# Patient Record
Sex: FEMALE | Race: WHITE | NOT HISPANIC OR LATINO | Employment: OTHER | URBAN - METROPOLITAN AREA
[De-identification: names, ages, dates, MRNs, and addresses within clinical notes are randomized per-mention and may not be internally consistent; named-entity substitution may affect disease eponyms.]

---

## 2017-05-09 ENCOUNTER — TRANSCRIBE ORDERS (OUTPATIENT)
Dept: ADMINISTRATIVE | Facility: HOSPITAL | Age: 82
End: 2017-05-09

## 2017-05-09 DIAGNOSIS — I73.9 PAD (PERIPHERAL ARTERY DISEASE) (HCC): Primary | ICD-10-CM

## 2017-05-16 ENCOUNTER — TRANSCRIBE ORDERS (OUTPATIENT)
Dept: ADMINISTRATIVE | Facility: HOSPITAL | Age: 82
End: 2017-05-16

## 2017-05-16 DIAGNOSIS — I73.9 PAD (PERIPHERAL ARTERY DISEASE) (HCC): Primary | ICD-10-CM

## 2017-05-17 ENCOUNTER — APPOINTMENT (OUTPATIENT)
Dept: CT IMAGING | Facility: HOSPITAL | Age: 82
End: 2017-05-17
Attending: SURGERY

## 2017-05-17 ENCOUNTER — HOSPITAL ENCOUNTER (OUTPATIENT)
Dept: CT IMAGING | Facility: HOSPITAL | Age: 82
Discharge: HOME | End: 2017-05-17
Attending: SURGERY | Admitting: SURGERY

## 2017-05-17 ENCOUNTER — OFFICE VISIT (OUTPATIENT)
Dept: CARDIOLOGY | Facility: CLINIC | Age: 82
End: 2017-05-17

## 2017-05-17 VITALS
BODY MASS INDEX: 20.32 KG/M2 | HEART RATE: 72 BPM | SYSTOLIC BLOOD PRESSURE: 138 MMHG | RESPIRATION RATE: 16 BRPM | HEIGHT: 64 IN | DIASTOLIC BLOOD PRESSURE: 78 MMHG | WEIGHT: 119 LBS

## 2017-05-17 DIAGNOSIS — I73.9 PAD (PERIPHERAL ARTERY DISEASE) (HCC): ICD-10-CM

## 2017-05-17 DIAGNOSIS — I10 ESSENTIAL HYPERTENSION: ICD-10-CM

## 2017-05-17 DIAGNOSIS — Z01.810 ENCOUNTER FOR PRE-OPERATIVE CARDIOVASCULAR CLEARANCE: Primary | ICD-10-CM

## 2017-05-17 DIAGNOSIS — I73.9 INTERMITTENT CLAUDICATION (HCC): ICD-10-CM

## 2017-05-17 DIAGNOSIS — R06.02 SHORTNESS OF BREATH: ICD-10-CM

## 2017-05-17 LAB — CREAT BLDA-MCNC: 0.9 MG/DL (ref 0.6–1.3)

## 2017-05-17 PROCEDURE — 82565 ASSAY OF CREATININE: CPT

## 2017-05-17 PROCEDURE — 99203 OFFICE O/P NEW LOW 30 MIN: CPT | Performed by: NURSE PRACTITIONER

## 2017-05-17 PROCEDURE — 0 IOPAMIDOL 61 % SOLUTION: Performed by: SURGERY

## 2017-05-17 PROCEDURE — 93000 ELECTROCARDIOGRAM COMPLETE: CPT | Performed by: NURSE PRACTITIONER

## 2017-05-17 PROCEDURE — 75635 CT ANGIO ABDOMINAL ARTERIES: CPT

## 2017-05-17 RX ORDER — OMEPRAZOLE 20 MG/1
20 CAPSULE, DELAYED RELEASE ORAL DAILY
COMMUNITY

## 2017-05-17 RX ORDER — FUROSEMIDE 20 MG/1
20 TABLET ORAL DAILY
COMMUNITY

## 2017-05-17 RX ORDER — MONTELUKAST SODIUM 10 MG/1
10 TABLET ORAL DAILY
COMMUNITY

## 2017-05-17 RX ORDER — ASPIRIN 325 MG
325 TABLET, DELAYED RELEASE (ENTERIC COATED) ORAL DAILY
COMMUNITY
End: 2017-07-18 | Stop reason: DRUGHIGH

## 2017-05-17 RX ORDER — PENTOXIFYLLINE 400 MG/1
400 TABLET, EXTENDED RELEASE ORAL
Status: ON HOLD | COMMUNITY
End: 2022-10-31

## 2017-05-17 RX ORDER — POTASSIUM CHLORIDE 750 MG/1
10 CAPSULE, EXTENDED RELEASE ORAL DAILY
COMMUNITY

## 2017-05-17 RX ORDER — CARBOXYMETHYLCELLULOSE SODIUM 5 MG/ML
1 SOLUTION/ DROPS OPHTHALMIC 3 TIMES DAILY PRN
COMMUNITY

## 2017-05-17 RX ORDER — AMOXICILLIN 500 MG
2400 CAPSULE ORAL DAILY
COMMUNITY

## 2017-05-17 RX ORDER — LISINOPRIL 40 MG/1
40 TABLET ORAL DAILY
COMMUNITY

## 2017-05-17 RX ADMIN — IOPAMIDOL 95 ML: 612 INJECTION, SOLUTION INTRAVENOUS at 12:10

## 2017-05-19 ENCOUNTER — TRANSCRIBE ORDERS (OUTPATIENT)
Dept: ADMINISTRATIVE | Facility: HOSPITAL | Age: 82
End: 2017-05-19

## 2017-05-19 DIAGNOSIS — I65.23 CAROTID STENOSIS, BILATERAL: ICD-10-CM

## 2017-05-19 DIAGNOSIS — I73.9 CLAUDICATION (HCC): Primary | ICD-10-CM

## 2017-05-19 PROBLEM — I10 ESSENTIAL HYPERTENSION: Status: ACTIVE | Noted: 2017-05-19

## 2017-05-19 PROBLEM — R06.02 SHORTNESS OF BREATH: Status: ACTIVE | Noted: 2017-05-19

## 2017-05-23 ENCOUNTER — HOSPITAL ENCOUNTER (OUTPATIENT)
Dept: CARDIOLOGY | Facility: HOSPITAL | Age: 82
Discharge: HOME | End: 2017-05-23
Admitting: NURSE PRACTITIONER

## 2017-05-23 ENCOUNTER — TELEPHONE (OUTPATIENT)
Dept: CARDIOLOGY | Facility: HOSPITAL | Age: 82
End: 2017-05-23

## 2017-05-23 DIAGNOSIS — R06.02 SHORTNESS OF BREATH: ICD-10-CM

## 2017-05-23 DIAGNOSIS — I73.9 INTERMITTENT CLAUDICATION (HCC): ICD-10-CM

## 2017-05-23 DIAGNOSIS — I73.9 PAD (PERIPHERAL ARTERY DISEASE) (HCC): ICD-10-CM

## 2017-05-23 DIAGNOSIS — I10 ESSENTIAL HYPERTENSION: ICD-10-CM

## 2017-05-23 DIAGNOSIS — Z01.810 ENCOUNTER FOR PRE-OPERATIVE CARDIOVASCULAR CLEARANCE: ICD-10-CM

## 2017-05-23 LAB
BH CV NUCLEAR PRIOR STUDY: 3
BH CV STRESS BP STAGE 1: NORMAL
BH CV STRESS COMMENTS STAGE 1: NORMAL
BH CV STRESS DOSE REGADENOSON STAGE 1: 0.4
BH CV STRESS DURATION MIN STAGE 1: 0
BH CV STRESS DURATION SEC STAGE 1: 15
BH CV STRESS HR STAGE 1: 129
BH CV STRESS PROTOCOL 1: NORMAL
BH CV STRESS RECOVERY BP: NORMAL MMHG
BH CV STRESS RECOVERY HR: 91 BPM
BH CV STRESS STAGE 1: 1
LV EF NUC BP: 84 %
MAXIMAL PREDICTED HEART RATE: 140 BPM
PERCENT MAX PREDICTED HR: 92.14 %
STRESS BASELINE BP: NORMAL MMHG
STRESS BASELINE HR: 75 BPM
STRESS PERCENT HR: 108 %
STRESS POST EXERCISE DUR SEC: 15 SEC
STRESS POST PEAK BP: NORMAL MMHG
STRESS POST PEAK HR: 129 BPM
STRESS TARGET HR: 119 BPM

## 2017-05-23 PROCEDURE — A9502 TC99M TETROFOSMIN: HCPCS | Performed by: NURSE PRACTITIONER

## 2017-05-23 PROCEDURE — 93016 CV STRESS TEST SUPVJ ONLY: CPT | Performed by: INTERNAL MEDICINE

## 2017-05-23 PROCEDURE — 0 TECHNETIUM TETROFOSMIN KIT: Performed by: NURSE PRACTITIONER

## 2017-05-23 PROCEDURE — 78452 HT MUSCLE IMAGE SPECT MULT: CPT | Performed by: INTERNAL MEDICINE

## 2017-05-23 PROCEDURE — 25010000002 REGADENOSON 0.4 MG/5ML SOLUTION: Performed by: NURSE PRACTITIONER

## 2017-05-23 PROCEDURE — 93017 CV STRESS TEST TRACING ONLY: CPT

## 2017-05-23 PROCEDURE — 78452 HT MUSCLE IMAGE SPECT MULT: CPT

## 2017-05-23 PROCEDURE — 93018 CV STRESS TEST I&R ONLY: CPT | Performed by: INTERNAL MEDICINE

## 2017-05-23 RX ADMIN — REGADENOSON 0.4 MG: 0.08 INJECTION, SOLUTION INTRAVENOUS at 12:35

## 2017-05-23 RX ADMIN — TETROFOSMIN 1 DOSE: 1.38 INJECTION, POWDER, LYOPHILIZED, FOR SOLUTION INTRAVENOUS at 12:35

## 2017-05-23 RX ADMIN — TETROFOSMIN 1 DOSE: 1.38 INJECTION, POWDER, LYOPHILIZED, FOR SOLUTION INTRAVENOUS at 11:35

## 2017-06-16 ENCOUNTER — HOSPITAL ENCOUNTER (OUTPATIENT)
Dept: CARDIOLOGY | Facility: HOSPITAL | Age: 82
Discharge: HOME | End: 2017-06-16
Attending: SURGERY

## 2017-06-16 ENCOUNTER — HOSPITAL ENCOUNTER (OUTPATIENT)
Dept: CARDIOLOGY | Facility: HOSPITAL | Age: 82
Discharge: HOME | End: 2017-06-16
Attending: SURGERY | Admitting: SURGERY

## 2017-06-16 DIAGNOSIS — I65.23 CAROTID STENOSIS, BILATERAL: ICD-10-CM

## 2017-06-16 DIAGNOSIS — I73.9 CLAUDICATION (HCC): ICD-10-CM

## 2017-06-16 LAB
BH CV LOWER ARTERIAL LEFT ABI RATIO: 0.54
BH CV LOWER ARTERIAL LEFT DORSALIS PEDIS SYS MAX: 90 MMHG
BH CV LOWER ARTERIAL LEFT GREAT TOE SYS MAX: 48 MMHG
BH CV LOWER ARTERIAL LEFT HIGH THIGH SYS MAX: 173 MMHG
BH CV LOWER ARTERIAL LEFT LOW THIGH SYS MAX: 134 MMHG
BH CV LOWER ARTERIAL LEFT POPLITEAL SYS MAX: 112 MMHG
BH CV LOWER ARTERIAL LEFT POST EX ABI RATIO: 0.52
BH CV LOWER ARTERIAL LEFT POST TIBIAL SYS MAX: 92 MMHG
BH CV LOWER ARTERIAL LEFT TBI RATIO: 0.28
BH CV LOWER ARTERIAL RIGHT ABI RATIO: 0.64
BH CV LOWER ARTERIAL RIGHT DORSALIS PEDIS SYS MAX: 108 MMHG
BH CV LOWER ARTERIAL RIGHT GREAT TOE SYS MAX: 52 MMHG
BH CV LOWER ARTERIAL RIGHT LOW THIGH SYS MAX: 169 MMHG
BH CV LOWER ARTERIAL RIGHT POPLITEAL SYS MAX: 139 MMHG
BH CV LOWER ARTERIAL RIGHT POST EX ABI RATIO: 0.63
BH CV LOWER ARTERIAL RIGHT POST TIBIAL SYS MAX: 109 MMHG
BH CV LOWER ARTERIAL RIGHT TBI RATIO: 0.31
BH CV XLRA MEAS LEFT DIST CCA EDV: -14.1 CM/SEC
BH CV XLRA MEAS LEFT DIST CCA PSV: -67.2 CM/SEC
BH CV XLRA MEAS LEFT DIST ICA EDV: -21 CM/SEC
BH CV XLRA MEAS LEFT DIST ICA PSV: -87.6 CM/SEC
BH CV XLRA MEAS LEFT MID ICA EDV: -18.6 CM/SEC
BH CV XLRA MEAS LEFT MID ICA PSV: -70.9 CM/SEC
BH CV XLRA MEAS LEFT PROX CCA EDV: 13.4 CM/SEC
BH CV XLRA MEAS LEFT PROX CCA PSV: 77.8 CM/SEC
BH CV XLRA MEAS LEFT PROX ECA EDV: -7.5 CM/SEC
BH CV XLRA MEAS LEFT PROX ECA PSV: -62.9 CM/SEC
BH CV XLRA MEAS LEFT PROX ICA EDV: 19.9 CM/SEC
BH CV XLRA MEAS LEFT PROX ICA PSV: 101 CM/SEC
BH CV XLRA MEAS LEFT PROX SCLA PSV: 119 CM/SEC
BH CV XLRA MEAS LEFT VERTEBRAL A EDV: 8.2 CM/SEC
BH CV XLRA MEAS LEFT VERTEBRAL A PSV: 39.9 CM/SEC
BH CV XLRA MEAS RIGHT DIST CCA EDV: -13.8 CM/SEC
BH CV XLRA MEAS RIGHT DIST CCA PSV: -57 CM/SEC
BH CV XLRA MEAS RIGHT DIST ICA EDV: -16.3 CM/SEC
BH CV XLRA MEAS RIGHT DIST ICA PSV: -73.9 CM/SEC
BH CV XLRA MEAS RIGHT MID ICA EDV: 25.8 CM/SEC
BH CV XLRA MEAS RIGHT MID ICA PSV: 110 CM/SEC
BH CV XLRA MEAS RIGHT PROX CCA EDV: -14.1 CM/SEC
BH CV XLRA MEAS RIGHT PROX CCA PSV: -78 CM/SEC
BH CV XLRA MEAS RIGHT PROX ECA EDV: -9.6 CM/SEC
BH CV XLRA MEAS RIGHT PROX ECA PSV: -77.8 CM/SEC
BH CV XLRA MEAS RIGHT PROX ICA EDV: -21.7 CM/SEC
BH CV XLRA MEAS RIGHT PROX ICA PSV: -86.2 CM/SEC
BH CV XLRA MEAS RIGHT PROX SCLA PSV: 136 CM/SEC
BH CV XLRA MEAS RIGHT VERTEBRAL A EDV: 16.8 CM/SEC
BH CV XLRA MEAS RIGHT VERTEBRAL A PSV: 55 CM/SEC
LEFT ARM BP: 169 MMHG
RIGHT ARM BP: 164 MMHG
UPPER ARTERIAL LEFT ARM BRACHIAL SYS MAX: 169 MMHG
UPPER ARTERIAL RIGHT ARM BRACHIAL SYS MAX: 164 MMHG

## 2017-06-16 PROCEDURE — 93924 LWR XTR VASC STDY BILAT: CPT

## 2017-06-16 PROCEDURE — 93880 EXTRACRANIAL BILAT STUDY: CPT

## 2017-07-18 RX ORDER — CLOPIDOGREL BISULFATE 75 MG/1
75 TABLET ORAL DAILY
COMMUNITY
End: 2022-11-03 | Stop reason: HOSPADM

## 2017-07-18 RX ORDER — ASPIRIN 81 MG/1
81 TABLET ORAL DAILY
COMMUNITY
End: 2022-11-19 | Stop reason: HOSPADM

## 2017-07-19 ENCOUNTER — APPOINTMENT (OUTPATIENT)
Dept: CT IMAGING | Facility: HOSPITAL | Age: 82
End: 2017-07-19

## 2017-07-19 ENCOUNTER — ANESTHESIA (OUTPATIENT)
Dept: PERIOP | Facility: HOSPITAL | Age: 82
End: 2017-07-19

## 2017-07-19 ENCOUNTER — HOSPITAL ENCOUNTER (INPATIENT)
Facility: HOSPITAL | Age: 82
LOS: 2 days | Discharge: HOME | End: 2017-07-21
Attending: SURGERY | Admitting: SURGERY

## 2017-07-19 ENCOUNTER — APPOINTMENT (OUTPATIENT)
Dept: GENERAL RADIOLOGY | Facility: HOSPITAL | Age: 82
End: 2017-07-19
Attending: SURGERY

## 2017-07-19 ENCOUNTER — APPOINTMENT (OUTPATIENT)
Dept: GENERAL RADIOLOGY | Facility: HOSPITAL | Age: 82
End: 2017-07-19

## 2017-07-19 ENCOUNTER — ANESTHESIA EVENT (OUTPATIENT)
Dept: PERIOP | Facility: HOSPITAL | Age: 82
End: 2017-07-19

## 2017-07-19 DIAGNOSIS — R26.2 DIFFICULTY WALKING: Primary | ICD-10-CM

## 2017-07-19 PROBLEM — I70.213 ATHEROSCLEROSIS OF NATIVE ARTERIES OF EXTREMITIES WITH INTERMITTENT CLAUDICATION, BILATERAL LEGS: Status: ACTIVE | Noted: 2017-07-19

## 2017-07-19 LAB
ALBUMIN SERPL-MCNC: 4.1 G/DL (ref 3.5–5.2)
ALBUMIN/GLOB SERPL: 1.5 G/DL
ALP SERPL-CCNC: 90 U/L (ref 39–117)
ALT SERPL W P-5'-P-CCNC: 10 U/L (ref 1–33)
ANION GAP SERPL CALCULATED.3IONS-SCNC: 11.1 MMOL/L
APTT PPP: 29.7 SECONDS (ref 22.7–35.4)
AST SERPL-CCNC: 13 U/L (ref 1–32)
BACTERIA UR QL AUTO: NORMAL /HPF
BASOPHILS # BLD AUTO: 0.02 10*3/MM3 (ref 0–0.2)
BASOPHILS NFR BLD AUTO: 0.3 % (ref 0–1.5)
BILIRUB SERPL-MCNC: 0.5 MG/DL (ref 0.1–1.2)
BILIRUB UR QL STRIP: NEGATIVE
BUN BLD-MCNC: 10 MG/DL (ref 8–23)
BUN/CREAT SERPL: 13.2 (ref 7–25)
CALCIUM SPEC-SCNC: 9.2 MG/DL (ref 8.6–10.5)
CHLORIDE SERPL-SCNC: 94 MMOL/L (ref 98–107)
CLARITY UR: CLEAR
CO2 SERPL-SCNC: 24.9 MMOL/L (ref 22–29)
COLOR UR: YELLOW
CREAT BLD-MCNC: 0.76 MG/DL (ref 0.57–1)
DEPRECATED RDW RBC AUTO: 44.5 FL (ref 37–54)
EOSINOPHIL # BLD AUTO: 0.19 10*3/MM3 (ref 0–0.7)
EOSINOPHIL NFR BLD AUTO: 3.3 % (ref 0.3–6.2)
ERYTHROCYTE [DISTWIDTH] IN BLOOD BY AUTOMATED COUNT: 13.3 % (ref 11.7–13)
GFR SERPL CREATININE-BSD FRML MDRD: 73 ML/MIN/1.73
GLOBULIN UR ELPH-MCNC: 2.8 GM/DL
GLUCOSE BLD-MCNC: 93 MG/DL (ref 65–99)
GLUCOSE UR STRIP-MCNC: NEGATIVE MG/DL
HCT VFR BLD AUTO: 34.9 % (ref 35.6–45.5)
HGB BLD-MCNC: 11.9 G/DL (ref 11.9–15.5)
HGB UR QL STRIP.AUTO: NEGATIVE
HYALINE CASTS UR QL AUTO: NORMAL /LPF
IMM GRANULOCYTES # BLD: 0 10*3/MM3 (ref 0–0.03)
IMM GRANULOCYTES NFR BLD: 0 % (ref 0–0.5)
INR PPP: 1.01 (ref 0.9–1.1)
KETONES UR QL STRIP: NEGATIVE
LEUKOCYTE ESTERASE UR QL STRIP.AUTO: NEGATIVE
LYMPHOCYTES # BLD AUTO: 1.02 10*3/MM3 (ref 0.9–4.8)
LYMPHOCYTES NFR BLD AUTO: 17.7 % (ref 19.6–45.3)
MCH RBC QN AUTO: 31.3 PG (ref 26.9–32)
MCHC RBC AUTO-ENTMCNC: 34.1 G/DL (ref 32.4–36.3)
MCV RBC AUTO: 91.8 FL (ref 80.5–98.2)
MONOCYTES # BLD AUTO: 0.78 10*3/MM3 (ref 0.2–1.2)
MONOCYTES NFR BLD AUTO: 13.5 % (ref 5–12)
NEUTROPHILS # BLD AUTO: 3.76 10*3/MM3 (ref 1.9–8.1)
NEUTROPHILS NFR BLD AUTO: 65.2 % (ref 42.7–76)
NITRITE UR QL STRIP: NEGATIVE
PH UR STRIP.AUTO: 7 [PH] (ref 5–8)
PLATELET # BLD AUTO: 262 10*3/MM3 (ref 140–500)
PMV BLD AUTO: 9.8 FL (ref 6–12)
POTASSIUM BLD-SCNC: 4.2 MMOL/L (ref 3.5–5.2)
PROT SERPL-MCNC: 6.9 G/DL (ref 6–8.5)
PROT UR QL STRIP: NEGATIVE
PROTHROMBIN TIME: 12.9 SECONDS (ref 11.7–14.2)
RBC # BLD AUTO: 3.8 10*6/MM3 (ref 3.9–5.2)
RBC # UR: NORMAL /HPF
REF LAB TEST METHOD: NORMAL
SODIUM BLD-SCNC: 130 MMOL/L (ref 136–145)
SP GR UR STRIP: 1.01 (ref 1–1.03)
SQUAMOUS #/AREA URNS HPF: NORMAL /HPF
UROBILINOGEN UR QL STRIP: NORMAL
WBC NRBC COR # BLD: 5.77 10*3/MM3 (ref 4.5–10.7)
WBC UR QL AUTO: NORMAL /HPF

## 2017-07-19 PROCEDURE — G0378 HOSPITAL OBSERVATION PER HR: HCPCS

## 2017-07-19 PROCEDURE — 85730 THROMBOPLASTIN TIME PARTIAL: CPT | Performed by: SURGERY

## 2017-07-19 PROCEDURE — 85610 PROTHROMBIN TIME: CPT | Performed by: SURGERY

## 2017-07-19 PROCEDURE — C1887 CATHETER, GUIDING: HCPCS | Performed by: SURGERY

## 2017-07-19 PROCEDURE — 25010000002 HEPARIN (PORCINE) PER 1000 UNITS: Performed by: SURGERY

## 2017-07-19 PROCEDURE — C1874 STENT, COATED/COV W/DEL SYS: HCPCS | Performed by: SURGERY

## 2017-07-19 PROCEDURE — C1894 INTRO/SHEATH, NON-LASER: HCPCS | Performed by: SURGERY

## 2017-07-19 PROCEDURE — 25010000002 HEPARIN (PORCINE) PER 1000 UNITS: Performed by: NURSE ANESTHETIST, CERTIFIED REGISTERED

## 2017-07-19 PROCEDURE — C1751 CATH, INF, PER/CENT/MIDLINE: HCPCS | Performed by: SURGERY

## 2017-07-19 PROCEDURE — 81001 URINALYSIS AUTO W/SCOPE: CPT | Performed by: SURGERY

## 2017-07-19 PROCEDURE — C1769 GUIDE WIRE: HCPCS | Performed by: SURGERY

## 2017-07-19 PROCEDURE — 74176 CT ABD & PELVIS W/O CONTRAST: CPT

## 2017-07-19 PROCEDURE — 25010000002 PROPOFOL 10 MG/ML EMULSION: Performed by: NURSE ANESTHETIST, CERTIFIED REGISTERED

## 2017-07-19 PROCEDURE — 25010000002 ONDANSETRON PER 1 MG: Performed by: SURGERY

## 2017-07-19 PROCEDURE — 25010000002 PROTAMINE SULFATE PER 10 MG: Performed by: NURSE ANESTHETIST, CERTIFIED REGISTERED

## 2017-07-19 PROCEDURE — B4001ZZ PLAIN RADIOGRAPHY OF ABDOMINAL AORTA USING LOW OSMOLAR CONTRAST: ICD-10-PCS | Performed by: SURGERY

## 2017-07-19 PROCEDURE — 04QK0ZZ REPAIR RIGHT FEMORAL ARTERY, OPEN APPROACH: ICD-10-PCS | Performed by: SURGERY

## 2017-07-19 PROCEDURE — 25010000002 HYDROMORPHONE PER 4 MG: Performed by: NURSE ANESTHETIST, CERTIFIED REGISTERED

## 2017-07-19 PROCEDURE — B40G1ZZ PLAIN RADIOGRAPHY OF LEFT LOWER EXTREMITY ARTERIES USING LOW OSMOLAR CONTRAST: ICD-10-PCS | Performed by: SURGERY

## 2017-07-19 PROCEDURE — B40F1ZZ PLAIN RADIOGRAPHY OF RIGHT LOWER EXTREMITY ARTERIES USING LOW OSMOLAR CONTRAST: ICD-10-PCS | Performed by: SURGERY

## 2017-07-19 PROCEDURE — 25010000002 FENTANYL CITRATE (PF) 100 MCG/2ML SOLUTION: Performed by: NURSE ANESTHETIST, CERTIFIED REGISTERED

## 2017-07-19 PROCEDURE — 25010000002 MIDAZOLAM PER 1 MG: Performed by: NURSE ANESTHETIST, CERTIFIED REGISTERED

## 2017-07-19 PROCEDURE — 80053 COMPREHEN METABOLIC PANEL: CPT | Performed by: SURGERY

## 2017-07-19 PROCEDURE — 71020 HC CHEST PA AND LATERAL: CPT

## 2017-07-19 PROCEDURE — 0 IOPAMIDOL PER 1 ML: Performed by: SURGERY

## 2017-07-19 PROCEDURE — 25010000002 MIDAZOLAM PER 1 MG: Performed by: ANESTHESIOLOGY

## 2017-07-19 PROCEDURE — 047C3DZ DILATION OF RIGHT COMMON ILIAC ARTERY WITH INTRALUMINAL DEVICE, PERCUTANEOUS APPROACH: ICD-10-PCS | Performed by: SURGERY

## 2017-07-19 PROCEDURE — 85025 COMPLETE CBC W/AUTO DIFF WBC: CPT | Performed by: SURGERY

## 2017-07-19 PROCEDURE — 71250 CT THORAX DX C-: CPT

## 2017-07-19 DEVICE — STENTGR ENDOPROSTH VIABAHN VBX EXP 7F 8X16X39MM 135CM: Type: IMPLANTABLE DEVICE | Status: FUNCTIONAL

## 2017-07-19 RX ORDER — ONDANSETRON 2 MG/ML
4 INJECTION INTRAMUSCULAR; INTRAVENOUS ONCE AS NEEDED
Status: DISCONTINUED | OUTPATIENT
Start: 2017-07-19 | End: 2017-07-19 | Stop reason: HOSPADM

## 2017-07-19 RX ORDER — ONDANSETRON 2 MG/ML
4 INJECTION INTRAMUSCULAR; INTRAVENOUS EVERY 6 HOURS PRN
Status: DISCONTINUED | OUTPATIENT
Start: 2017-07-19 | End: 2017-07-21 | Stop reason: HOSPADM

## 2017-07-19 RX ORDER — NALOXONE HCL 0.4 MG/ML
0.4 VIAL (ML) INJECTION
Status: DISCONTINUED | OUTPATIENT
Start: 2017-07-19 | End: 2017-07-21 | Stop reason: HOSPADM

## 2017-07-19 RX ORDER — MIDAZOLAM HYDROCHLORIDE 1 MG/ML
2 INJECTION INTRAMUSCULAR; INTRAVENOUS
Status: DISCONTINUED | OUTPATIENT
Start: 2017-07-19 | End: 2017-07-19 | Stop reason: HOSPADM

## 2017-07-19 RX ORDER — NALOXONE HCL 0.4 MG/ML
0.2 VIAL (ML) INJECTION AS NEEDED
Status: DISCONTINUED | OUTPATIENT
Start: 2017-07-19 | End: 2017-07-19 | Stop reason: HOSPADM

## 2017-07-19 RX ORDER — HYDROCODONE BITARTRATE AND ACETAMINOPHEN 5; 325 MG/1; MG/1
1 TABLET ORAL EVERY 4 HOURS PRN
Status: DISCONTINUED | OUTPATIENT
Start: 2017-07-19 | End: 2017-07-21 | Stop reason: HOSPADM

## 2017-07-19 RX ORDER — PANTOPRAZOLE SODIUM 40 MG/1
40 TABLET, DELAYED RELEASE ORAL EVERY MORNING
Status: DISCONTINUED | OUTPATIENT
Start: 2017-07-19 | End: 2017-07-21 | Stop reason: HOSPADM

## 2017-07-19 RX ORDER — MONTELUKAST SODIUM 10 MG/1
10 TABLET ORAL DAILY
Status: DISCONTINUED | OUTPATIENT
Start: 2017-07-19 | End: 2017-07-21 | Stop reason: HOSPADM

## 2017-07-19 RX ORDER — FLUMAZENIL 0.1 MG/ML
0.2 INJECTION INTRAVENOUS AS NEEDED
Status: DISCONTINUED | OUTPATIENT
Start: 2017-07-19 | End: 2017-07-19 | Stop reason: HOSPADM

## 2017-07-19 RX ORDER — PROPOFOL 10 MG/ML
VIAL (ML) INTRAVENOUS CONTINUOUS PRN
Status: DISCONTINUED | OUTPATIENT
Start: 2017-07-19 | End: 2017-07-19 | Stop reason: SURG

## 2017-07-19 RX ORDER — CLOPIDOGREL BISULFATE 75 MG/1
75 TABLET ORAL DAILY
Status: DISCONTINUED | OUTPATIENT
Start: 2017-07-20 | End: 2017-07-21 | Stop reason: HOSPADM

## 2017-07-19 RX ORDER — MIDAZOLAM HYDROCHLORIDE 1 MG/ML
INJECTION INTRAMUSCULAR; INTRAVENOUS AS NEEDED
Status: DISCONTINUED | OUTPATIENT
Start: 2017-07-19 | End: 2017-07-19 | Stop reason: SURG

## 2017-07-19 RX ORDER — POTASSIUM CHLORIDE 750 MG/1
10 CAPSULE, EXTENDED RELEASE ORAL DAILY
Status: DISCONTINUED | OUTPATIENT
Start: 2017-07-20 | End: 2017-07-21 | Stop reason: HOSPADM

## 2017-07-19 RX ORDER — HYDROMORPHONE HYDROCHLORIDE 1 MG/ML
0.5 INJECTION, SOLUTION INTRAMUSCULAR; INTRAVENOUS; SUBCUTANEOUS
Status: DISCONTINUED | OUTPATIENT
Start: 2017-07-19 | End: 2017-07-19 | Stop reason: HOSPADM

## 2017-07-19 RX ORDER — ONDANSETRON 4 MG/1
4 TABLET, FILM COATED ORAL EVERY 6 HOURS PRN
Status: DISCONTINUED | OUTPATIENT
Start: 2017-07-19 | End: 2017-07-21 | Stop reason: HOSPADM

## 2017-07-19 RX ORDER — FENTANYL CITRATE 50 UG/ML
50 INJECTION, SOLUTION INTRAMUSCULAR; INTRAVENOUS
Status: DISCONTINUED | OUTPATIENT
Start: 2017-07-19 | End: 2017-07-19 | Stop reason: HOSPADM

## 2017-07-19 RX ORDER — LABETALOL HYDROCHLORIDE 5 MG/ML
5 INJECTION, SOLUTION INTRAVENOUS
Status: DISCONTINUED | OUTPATIENT
Start: 2017-07-19 | End: 2017-07-19 | Stop reason: HOSPADM

## 2017-07-19 RX ORDER — PROMETHAZINE HYDROCHLORIDE 25 MG/1
25 SUPPOSITORY RECTAL ONCE AS NEEDED
Status: DISCONTINUED | OUTPATIENT
Start: 2017-07-19 | End: 2017-07-19 | Stop reason: HOSPADM

## 2017-07-19 RX ORDER — HYDROCODONE BITARTRATE AND ACETAMINOPHEN 7.5; 325 MG/1; MG/1
1 TABLET ORAL ONCE AS NEEDED
Status: DISCONTINUED | OUTPATIENT
Start: 2017-07-19 | End: 2017-07-19 | Stop reason: HOSPADM

## 2017-07-19 RX ORDER — OXYCODONE AND ACETAMINOPHEN 7.5; 325 MG/1; MG/1
1 TABLET ORAL ONCE AS NEEDED
Status: DISCONTINUED | OUTPATIENT
Start: 2017-07-19 | End: 2017-07-19 | Stop reason: HOSPADM

## 2017-07-19 RX ORDER — PENTOXIFYLLINE 400 MG/1
400 TABLET, EXTENDED RELEASE ORAL 2 TIMES DAILY
Status: DISCONTINUED | OUTPATIENT
Start: 2017-07-20 | End: 2017-07-21 | Stop reason: HOSPADM

## 2017-07-19 RX ORDER — LISINOPRIL 20 MG/1
40 TABLET ORAL DAILY
Status: DISCONTINUED | OUTPATIENT
Start: 2017-07-19 | End: 2017-07-21 | Stop reason: HOSPADM

## 2017-07-19 RX ORDER — PROTAMINE SULFATE 10 MG/ML
INJECTION, SOLUTION INTRAVENOUS AS NEEDED
Status: DISCONTINUED | OUTPATIENT
Start: 2017-07-19 | End: 2017-07-19 | Stop reason: SURG

## 2017-07-19 RX ORDER — FAMOTIDINE 10 MG/ML
20 INJECTION, SOLUTION INTRAVENOUS ONCE
Status: COMPLETED | OUTPATIENT
Start: 2017-07-19 | End: 2017-07-19

## 2017-07-19 RX ORDER — MIDAZOLAM HYDROCHLORIDE 1 MG/ML
1 INJECTION INTRAMUSCULAR; INTRAVENOUS
Status: DISCONTINUED | OUTPATIENT
Start: 2017-07-19 | End: 2017-07-19 | Stop reason: HOSPADM

## 2017-07-19 RX ORDER — EPHEDRINE SULFATE 50 MG/ML
5 INJECTION, SOLUTION INTRAVENOUS ONCE AS NEEDED
Status: DISCONTINUED | OUTPATIENT
Start: 2017-07-19 | End: 2017-07-19 | Stop reason: HOSPADM

## 2017-07-19 RX ORDER — FENTANYL CITRATE 50 UG/ML
INJECTION, SOLUTION INTRAMUSCULAR; INTRAVENOUS AS NEEDED
Status: DISCONTINUED | OUTPATIENT
Start: 2017-07-19 | End: 2017-07-19 | Stop reason: SURG

## 2017-07-19 RX ORDER — ASPIRIN 81 MG/1
81 TABLET ORAL DAILY
Status: DISCONTINUED | OUTPATIENT
Start: 2017-07-20 | End: 2017-07-21 | Stop reason: HOSPADM

## 2017-07-19 RX ORDER — ONDANSETRON 4 MG/1
4 TABLET, ORALLY DISINTEGRATING ORAL EVERY 6 HOURS PRN
Status: DISCONTINUED | OUTPATIENT
Start: 2017-07-19 | End: 2017-07-21 | Stop reason: HOSPADM

## 2017-07-19 RX ORDER — HYDRALAZINE HYDROCHLORIDE 20 MG/ML
5 INJECTION INTRAMUSCULAR; INTRAVENOUS
Status: DISCONTINUED | OUTPATIENT
Start: 2017-07-19 | End: 2017-07-19 | Stop reason: HOSPADM

## 2017-07-19 RX ORDER — DIPHENHYDRAMINE HYDROCHLORIDE 50 MG/ML
12.5 INJECTION INTRAMUSCULAR; INTRAVENOUS
Status: DISCONTINUED | OUTPATIENT
Start: 2017-07-19 | End: 2017-07-19 | Stop reason: HOSPADM

## 2017-07-19 RX ORDER — HEPARIN SODIUM 1000 [USP'U]/ML
INJECTION, SOLUTION INTRAVENOUS; SUBCUTANEOUS AS NEEDED
Status: DISCONTINUED | OUTPATIENT
Start: 2017-07-19 | End: 2017-07-19 | Stop reason: SURG

## 2017-07-19 RX ORDER — SODIUM CHLORIDE, SODIUM LACTATE, POTASSIUM CHLORIDE, CALCIUM CHLORIDE 600; 310; 30; 20 MG/100ML; MG/100ML; MG/100ML; MG/100ML
100 INJECTION, SOLUTION INTRAVENOUS CONTINUOUS
Status: DISCONTINUED | OUTPATIENT
Start: 2017-07-19 | End: 2017-07-20

## 2017-07-19 RX ORDER — FUROSEMIDE 20 MG/1
20 TABLET ORAL DAILY
Status: DISCONTINUED | OUTPATIENT
Start: 2017-07-20 | End: 2017-07-21 | Stop reason: HOSPADM

## 2017-07-19 RX ORDER — PROMETHAZINE HYDROCHLORIDE 25 MG/1
25 TABLET ORAL ONCE AS NEEDED
Status: DISCONTINUED | OUTPATIENT
Start: 2017-07-19 | End: 2017-07-19 | Stop reason: HOSPADM

## 2017-07-19 RX ORDER — SODIUM CHLORIDE 0.9 % (FLUSH) 0.9 %
1-10 SYRINGE (ML) INJECTION AS NEEDED
Status: DISCONTINUED | OUTPATIENT
Start: 2017-07-19 | End: 2017-07-19 | Stop reason: HOSPADM

## 2017-07-19 RX ORDER — ALBUTEROL SULFATE 2.5 MG/3ML
2.5 SOLUTION RESPIRATORY (INHALATION) ONCE AS NEEDED
Status: DISCONTINUED | OUTPATIENT
Start: 2017-07-19 | End: 2017-07-19 | Stop reason: HOSPADM

## 2017-07-19 RX ORDER — SODIUM CHLORIDE, SODIUM LACTATE, POTASSIUM CHLORIDE, CALCIUM CHLORIDE 600; 310; 30; 20 MG/100ML; MG/100ML; MG/100ML; MG/100ML
9 INJECTION, SOLUTION INTRAVENOUS CONTINUOUS
Status: DISCONTINUED | OUTPATIENT
Start: 2017-07-19 | End: 2017-07-19

## 2017-07-19 RX ORDER — PROMETHAZINE HYDROCHLORIDE 25 MG/1
12.5 TABLET ORAL ONCE AS NEEDED
Status: DISCONTINUED | OUTPATIENT
Start: 2017-07-19 | End: 2017-07-19 | Stop reason: HOSPADM

## 2017-07-19 RX ORDER — PROMETHAZINE HYDROCHLORIDE 25 MG/ML
5 INJECTION, SOLUTION INTRAMUSCULAR; INTRAVENOUS
Status: DISCONTINUED | OUTPATIENT
Start: 2017-07-19 | End: 2017-07-19 | Stop reason: HOSPADM

## 2017-07-19 RX ORDER — LIDOCAINE HYDROCHLORIDE 20 MG/ML
INJECTION, SOLUTION INFILTRATION; PERINEURAL AS NEEDED
Status: DISCONTINUED | OUTPATIENT
Start: 2017-07-19 | End: 2017-07-19 | Stop reason: SURG

## 2017-07-19 RX ORDER — PROMETHAZINE HYDROCHLORIDE 25 MG/ML
12.5 INJECTION, SOLUTION INTRAMUSCULAR; INTRAVENOUS ONCE AS NEEDED
Status: DISCONTINUED | OUTPATIENT
Start: 2017-07-19 | End: 2017-07-19 | Stop reason: HOSPADM

## 2017-07-19 RX ADMIN — SODIUM CHLORIDE, POTASSIUM CHLORIDE, SODIUM LACTATE AND CALCIUM CHLORIDE 100 ML/HR: 600; 310; 30; 20 INJECTION, SOLUTION INTRAVENOUS at 14:01

## 2017-07-19 RX ADMIN — EPHEDRINE SULFATE 5 MG: 50 INJECTION INTRAMUSCULAR; INTRAVENOUS; SUBCUTANEOUS at 09:40

## 2017-07-19 RX ADMIN — IOPAMIDOL 204.3 ML: 510 INJECTION, SOLUTION INTRAVASCULAR at 08:45

## 2017-07-19 RX ADMIN — HYDROMORPHONE HYDROCHLORIDE 0.5 MG: 1 INJECTION, SOLUTION INTRAMUSCULAR; INTRAVENOUS; SUBCUTANEOUS at 11:56

## 2017-07-19 RX ADMIN — LIDOCAINE HYDROCHLORIDE 25 MG: 20 INJECTION, SOLUTION INFILTRATION; PERINEURAL at 08:55

## 2017-07-19 RX ADMIN — HEPARIN SODIUM 5000 UNITS: 1000 INJECTION, SOLUTION INTRAVENOUS; SUBCUTANEOUS at 08:40

## 2017-07-19 RX ADMIN — FENTANYL CITRATE 25 MCG: 50 INJECTION INTRAMUSCULAR; INTRAVENOUS at 09:30

## 2017-07-19 RX ADMIN — EPHEDRINE SULFATE 10 MG: 50 INJECTION INTRAMUSCULAR; INTRAVENOUS; SUBCUTANEOUS at 09:15

## 2017-07-19 RX ADMIN — PROPOFOL 50 MCG/KG/MIN: 10 INJECTION, EMULSION INTRAVENOUS at 08:55

## 2017-07-19 RX ADMIN — FENTANYL CITRATE 25 MCG: 50 INJECTION INTRAMUSCULAR; INTRAVENOUS at 08:55

## 2017-07-19 RX ADMIN — FENTANYL CITRATE 25 MCG: 50 INJECTION INTRAMUSCULAR; INTRAVENOUS at 10:50

## 2017-07-19 RX ADMIN — EPHEDRINE SULFATE 5 MG: 50 INJECTION INTRAMUSCULAR; INTRAVENOUS; SUBCUTANEOUS at 09:45

## 2017-07-19 RX ADMIN — EPHEDRINE SULFATE 5 MG: 50 INJECTION INTRAMUSCULAR; INTRAVENOUS; SUBCUTANEOUS at 10:20

## 2017-07-19 RX ADMIN — EPHEDRINE SULFATE 5 MG: 50 INJECTION INTRAMUSCULAR; INTRAVENOUS; SUBCUTANEOUS at 09:05

## 2017-07-19 RX ADMIN — PROTAMINE SULFATE 30 MG: 10 INJECTION, SOLUTION INTRAVENOUS at 10:30

## 2017-07-19 RX ADMIN — SODIUM CHLORIDE, POTASSIUM CHLORIDE, SODIUM LACTATE AND CALCIUM CHLORIDE 100 ML/HR: 600; 310; 30; 20 INJECTION, SOLUTION INTRAVENOUS at 18:17

## 2017-07-19 RX ADMIN — HEPARIN SODIUM 3000 UNITS: 1000 INJECTION, SOLUTION INTRAVENOUS; SUBCUTANEOUS at 09:45

## 2017-07-19 RX ADMIN — VANCOMYCIN HYDROCHLORIDE 750 MG: 750 INJECTION, POWDER, LYOPHILIZED, FOR SOLUTION INTRAVENOUS at 07:15

## 2017-07-19 RX ADMIN — SODIUM CHLORIDE, POTASSIUM CHLORIDE, SODIUM LACTATE AND CALCIUM CHLORIDE 9 ML/HR: 600; 310; 30; 20 INJECTION, SOLUTION INTRAVENOUS at 07:24

## 2017-07-19 RX ADMIN — MIDAZOLAM 1 MG: 1 INJECTION INTRAMUSCULAR; INTRAVENOUS at 07:25

## 2017-07-19 RX ADMIN — FENTANYL CITRATE 50 MCG: 50 INJECTION INTRAMUSCULAR; INTRAVENOUS at 11:51

## 2017-07-19 RX ADMIN — MIDAZOLAM HYDROCHLORIDE 0.5 MG: 1 INJECTION, SOLUTION INTRAMUSCULAR; INTRAVENOUS at 08:55

## 2017-07-19 RX ADMIN — SODIUM CHLORIDE, POTASSIUM CHLORIDE, SODIUM LACTATE AND CALCIUM CHLORIDE: 600; 310; 30; 20 INJECTION, SOLUTION INTRAVENOUS at 10:45

## 2017-07-19 RX ADMIN — MIDAZOLAM HYDROCHLORIDE 0.5 MG: 1 INJECTION, SOLUTION INTRAMUSCULAR; INTRAVENOUS at 09:30

## 2017-07-19 RX ADMIN — MIDAZOLAM HYDROCHLORIDE 0.5 MG: 1 INJECTION, SOLUTION INTRAMUSCULAR; INTRAVENOUS at 08:00

## 2017-07-19 RX ADMIN — ONDANSETRON 4 MG: 2 INJECTION INTRAMUSCULAR; INTRAVENOUS at 15:36

## 2017-07-19 RX ADMIN — EPHEDRINE SULFATE 5 MG: 50 INJECTION INTRAMUSCULAR; INTRAVENOUS; SUBCUTANEOUS at 10:15

## 2017-07-19 RX ADMIN — MIDAZOLAM HYDROCHLORIDE 0.5 MG: 1 INJECTION, SOLUTION INTRAMUSCULAR; INTRAVENOUS at 07:47

## 2017-07-19 RX ADMIN — FENTANYL CITRATE 25 MCG: 50 INJECTION INTRAMUSCULAR; INTRAVENOUS at 08:00

## 2017-07-19 RX ADMIN — EPHEDRINE SULFATE 5 MG: 50 INJECTION INTRAMUSCULAR; INTRAVENOUS; SUBCUTANEOUS at 10:25

## 2017-07-19 RX ADMIN — VANCOMYCIN HYDROCHLORIDE 750 MG: 750 INJECTION, POWDER, LYOPHILIZED, FOR SOLUTION INTRAVENOUS at 22:57

## 2017-07-19 RX ADMIN — FENTANYL CITRATE 25 MCG: 50 INJECTION INTRAMUSCULAR; INTRAVENOUS at 07:47

## 2017-07-19 RX ADMIN — FAMOTIDINE 20 MG: 10 INJECTION INTRAVENOUS at 07:24

## 2017-07-19 NOTE — ANESTHESIA PREPROCEDURE EVALUATION
Anesthesia Evaluation            Airway   Mallampati: II  TM distance: <3 FB  Neck ROM: full  no difficulty expected  Dental      Comment: One chipped upper right molar    Pulmonary - normal exam   (-) not a smoker  Cardiovascular - normal exam    (+) hypertension, PVD,       Neuro/Psych  (+) CVA,    GI/Hepatic/Renal/Endo    (+)  GERD,     Musculoskeletal     Abdominal    Substance History      OB/GYN          Other                                        Anesthesia Plan    ASA 3     MAC     intravenous induction   Anesthetic plan and risks discussed with patient.

## 2017-07-19 NOTE — OP NOTE
ANGIOPLASTY FEMORAL ARTERY POSSIBLE STENT  Procedure Note    Lourdes Platt  Admission date: 7/19/2017  Date of operation: 7/19/2017    Pre-op Diagnosis:    * Atherosclerosis of native arteries of extremities with intermittent claudication, bilateral legs [I70.213]    Post-op Diagnosis:     Post-Op Diagnosis Codes:     * Atherosclerosis of native arteries of extremities with intermittent claudication, bilateral legs [I70.213]    Procedure(s):  RIGHT FEMORAL ARTERIAL CUTDOWN, LIGATION OF FEMORAL ARTERY PSUEDOANEURYSM, AIF WITH BILATERAL RUNOFF ARTERIOGRAM, RIGHT COMMON ILIAC ANGIOPLASTY    Surgeon(s):  Keny Sadler MD    Assistants:  Margaret JIMÉNEZ, Breann Guidry    Anesthesia: Monitor Anesthesia Care    Staff:   Cell Saver : Shaan Baig  Circulator: Gricelda Wallis RN; Mirela Lyons RN  Scrub Person: Raul Lambert  Assistant: Margaret Pearson  Orientee: Jaqueline Antonio RN  Vascular Radiology Technician: Wilfred Stein  Other: Breann Guidry    Indications:  Pleasant female with progressive lifestyle limiting claudication bilaterally.  CT angiogram with severe right common iliac stenoses.  She also has mid left superficial femoral artery occlusion.  She is for arteriogram from right femoral artery approach for right common iliac artery stenting as well as possible crossing total occlusion left femoral artery with angioplasty stent.  Plans and risks discussed and agrees to proceed.  Preoperative chest x-ray with lesion behind heart and patient has history of this in past but no recent evaluation.  She also has history pneumonias in past.  For this reason we'll check CT chest after procedure.       Procedure Details both groins prepped and draped in usual fashion.  Percutaneous access performed with 18-gauge needle of the right common femoral artery.  Wire advanced but did have difficulty and were unable to advance a 5 Azeri or navicross catheter over the wire, even though the  wire did advance into the iliac and aorta..  For this reason the catheters and wires were removed.  Pressure was held for 10 minutes and hemostasis achieved.  A second puncture performed of the right common femoral artery with a micropuncture technique.  Right iliofemoral arteriogram the micropuncture catheter confirmed the common femoral artery was okay in the catheter was in the appropriate position.  A 7 Serbian sheath was placed. Patient was given 5000 units of heparin. Marker pigtail catheter placed and juxtarenal position.  Abdominal aortogram was performed.  Pigtail catheter pulled into aortic bifurcation and aortoiliofemoral arteriogram with bilateral runoff arteriogram was performed.  This showed the expected findings.  However there was evidence of a pseudoaneurysm of a distal branch of a common femoral artery going into the pelvis and retroperitoneum retrograde.  This was felt to be secondary to the first wire access.  For this reason we then performed a right femoral artery cutdown through the skin and subcutaneous tissue.  Bleeders were controlled with electrocautery.  Crossing vessels controlled with hemoclips.  The common femoral artery above the level of the sheath insertion which was in the common femoral artery was circumferentially controlled.  The distal common femoral artery down to the bifurcation into the superficial deep femoral arteries were also controlled.  The branch was seen to be posterior off the common femoral artery with 2 branches going medially to the thigh and then the superior one going retrograde to the retroperitoneum which was the source of the retroperitoneal bleed and branch rupture.  The branches off of the posterior common femoral artery were clamped divided and ligated and hemoclipped.  Repeat right iliofemoral arteriogram showed resolution of the branch artery femoral pseudoaneurysm with no further leaking.  Next the 7 Serbian sheath was exchanged to an 8 Serbian sheath.   With RIM catheter selective catheterization was performed of the left femoral artery.  We elected to give an additional 2500 units of heparin at this time.  Advantage wire was placed over the horn and a 45 cm destination 6 Cypriot sheath was placed into the left common femoral artery.  Never cross catheter was placed and this was exchanged to an angled Glidewire.  With the narrow cross catheter and angled Glidewire selective catheterization of the proximal left sufficient artery was performed.  With a combination of the never cross catheter with a straight stiff Glidewire and angled Glidewire we attempted to get through the total occlusion of the superficial artery however was severely diseased and calcified and were unable to cross the total occlusion.  For this reasons the wires and catheters were removed from the left iliac artery.  The wire was redirected into the abdominal aorta.  Pigtail catheter was placed and repeat abdominal aortogram performed localizing the severe right common iliac artery stenoses.  Stent angioplasty was performed with a [place] VBX stent graft of the severe right common femoral artery 8 x 39.  Repeat angiogram showed no residual stenoses.  The wires and sheaths were removed.  The common femoral arteries clamped proximally and distally.  The sheath site was closed directly with a figure-of-eight 5-0 Prolene suture without problems.  Additional bleeders were controlled with electrocautery and silk suture ligatures.  Patient had Doppler pedal pulses bilaterally same as preoperatively on the left and improved on the right.  The heparin was reversed with 30 mg of protamine.  The right groin was irrigated with antibiotic solution.  All counts correct.  The right groin closed in layers with 2-0 and 3-0 Vicryl and the skin was closed with subcuticular stitch.  Dermabond applied.  Patient taken to recovery room in stable condition.  Because we did have the do the femoral cutdown, patient lives 2  hours away, and we had CT scans of the chest with elected to admit the patient postoperatively.    Radiographic interpretation: Abdominal aortogram with patent aorta but severe calcification.  80% stenosis of the right common iliac artery.  Left common duct artery without symptoms and stenoses.  Right and left internal and external iliac artery velocities and stenoses.  Right iliofemoral arteriogram with retroperitoneal rupture of a branch of the common femoral artery distally.  Repeat arteriogram following ligation of branch with no evidence of bleeding in the retroperitoneum.  Bilateral runoff was stenoses of the right sufficient artery and total occlusion of the mid left superficial femoral artery.  Significant distal tibial disease and popliteal disease bilaterally    Findings: See above    Estimated Blood Loss: 150 cc    Specimens:   None      Drains:   Urethral Catheter 07/19/17 0700 100% silicone 16 10 10 (Active)           Complications: Right common femoral artery distal retroperitoneal branch pseudoaneurysm    Condition: stable    Disposition: To recovery room, admitted postoperatively    Keny Sadler MD     Date: 7/19/2017  Time: 11:14 AM    There are no hospital problems to display for this patient.

## 2017-07-19 NOTE — NURSING NOTE
Notified Dr Sadler upon transfer to floor, unable to doppler Left PT pulse. Confirmed presence of Lt DP pulse.

## 2017-07-19 NOTE — ANESTHESIA POSTPROCEDURE EVALUATION
"Patient: Lourdes Platt    Procedure Summary     Date Anesthesia Start Anesthesia Stop Room / Location    07/19/17 0744 1110  [facility]       Procedure Diagnosis Provider Provider    RIGHT FEMORAL ARTERIAL CUTDOWN, LIGATION OF FEMORAL ARTERY PSUEDOANEURYSM, AIF WITH BILATERAL RUNOFF ARTERIOGRAM, RIGHT COMMON ILIAC ANGIOPLASTY (Left Thigh) Atherosclerosis of native arteries of extremities with intermittent claudication, bilateral legs MD Lacey Kiran MD          Anesthesia Type: MAC  Last vitals  BP   104/58 (07/19/17 1205)    Temp        Pulse   77 (07/19/17 1205)   Resp   18 (07/19/17 1205)    SpO2   97 % (07/19/17 1205)      Post Anesthesia Care and Evaluation    Patient location during evaluation: PACU  Patient participation: complete - patient participated  Level of consciousness: sleepy but conscious  Pain score: 0  Pain management: adequate  Airway patency: patent  Anesthetic complications: No anesthetic complications    Cardiovascular status: acceptable  Respiratory status: acceptable  Hydration status: acceptable    Comments: /58  Pulse 77  Temp 36.4 °C (97.6 °F) (Oral)   Resp 18  Ht 64\" (162.6 cm)  Wt 119 lb (54 kg)  SpO2 97%  BMI 20.43 kg/m2      "

## 2017-07-19 NOTE — PLAN OF CARE
Problem: Patient Care Overview (Adult)  Goal: Plan of Care Review  Outcome: Ongoing (interventions implemented as appropriate)    07/19/17 0716   Coping/Psychosocial Response Interventions   Plan Of Care Reviewed With patient   Patient Care Overview   Progress no change       Goal: Adult Individualization and Mutuality  Outcome: Ongoing (interventions implemented as appropriate)  Goal: Discharge Needs Assessment  Outcome: Ongoing (interventions implemented as appropriate)    Problem: Perioperative Period (Adult)  Goal: Signs and Symptoms of Listed Potential Problems Will be Absent or Manageable (Perioperative Period)  Outcome: Ongoing (interventions implemented as appropriate)    07/19/17 0716   Perioperative Period   Problems Assessed (Perioperative Period) pain   Problems Present (Perioperative Period) none

## 2017-07-19 NOTE — PERIOPERATIVE NURSING NOTE
DR BAIRD AT BEDSIDE, INFORMED HIM DIFFICULTY FINDING (L)PT PULSE. FOOT IS COOL BUT WITH ADEQUATE CAPILLARY REFILL

## 2017-07-19 NOTE — H&P
Patient Care Team:  Everton Disla MD as PCP - General (Internal Medicine)    Chief complaint progressive life style limiting bilateral claudication    Subjective     Patient is a 82 y.o. female presents with bilateral claudication left worse than right. Onset of symptoms was gradual starting 6 months ago.  Symptoms are associated with walking.  Symptoms are aggravated by stairs.   Symptoms improve with stopping. CTA with right common iliac severe stenosis, left superficial femoral artery occlusion and right stenosis.    Patient never smoked. Patient with no cardiac symptoms. Patient with history stroke remote past but mild carotid disease and asymptomatic.    Pre op CXR reviewed with patient that shows retrocardiac 3 cm possible infiltrate vs mass. Discussed with patient. She has chronic cough and history of prior pneumonias and CXR at home in past. She has no acute symptoms. Will check CT of chest after procedure and patient agrees.       Review of Systems   Pertinent items are noted in HPI, all other systems reviewed and negative    Past Medical History:   Diagnosis Date   • Bilateral carotid artery stenosis    • Hypertension    • Intermittent claudication 5/19/2017   • PAD (peripheral artery disease) 5/19/2017   • PVD (peripheral vascular disease) with claudication     lennie legs   • Stroke     2005     Past Surgical History:   Procedure Laterality Date   • APPENDECTOMY  1949   • BUNIONECTOMY     • HYSTERECTOMY  1970   • ORIF WRIST FRACTURE Left      Family History   Problem Relation Age of Onset   • Cancer Mother    • Stroke Mother    • Heart disease Brother    • Stroke Brother    • Leukemia Other    • Malig Hyperthermia Neg Hx      Social History   Substance Use Topics   • Smoking status: Never Smoker   • Smokeless tobacco: Never Used      Comment: caffeine - 3 - 5 daily   • Alcohol use No     Prescriptions Prior to Admission   Medication Sig Dispense Refill Last Dose   • aspirin 81 MG EC tablet Take 81 mg  "by mouth Daily.   7/18/2017 at 0700   • carboxymethylcellulose (REFRESH PLUS) 0.5 % solution Administer 1 drop to both eyes 3 (Three) Times a Day As Needed for Dry Eyes.   7/18/2017 at 0700   • clopidogrel (PLAVIX) 75 MG tablet Take 75 mg by mouth Daily.   7/18/2017 at 0700   • Flaxseed, Linseed, (FLAXSEED OIL) 1200 MG capsule Take 2 capsules by mouth Daily. Holding for last two weeks   7/5/2017   • furosemide (LASIX) 20 MG tablet Take 20 mg by mouth Daily.   7/18/2017 at 0700   • lisinopril (PRINIVIL,ZESTRIL) 40 MG tablet Take 40 mg by mouth Daily.   7/18/2017 at 0700   • montelukast (SINGULAIR) 10 MG tablet Take 10 mg by mouth Daily.   7/18/2017 at 0700   • Nutritional Supplements (EQUATE PO) Take 750 mg by mouth Daily.   7/18/2017 at 1600   • Omega-3 Fatty Acids (FISH OIL) 1200 MG capsule capsule Take 2,400 mg by mouth Daily. Holding for sx   7/5/2017   • omeprazole (priLOSEC) 20 MG capsule Take 20 mg by mouth Daily.   7/19/2017 at 0200   • pentoxifylline (TRENtal) 400 MG CR tablet Take 400 mg by mouth 3 (Three) Times a Day With Meals.   7/18/2017 at 1600   • potassium chloride (MICRO-K) 10 MEQ CR capsule Take 10 mEq by mouth Daily.   7/18/2017 at 0700     Allergies:  Penicillins    Objective     Vital Signs  Temp:  [98 °F (36.7 °C)] 98 °F (36.7 °C)  Heart Rate:  [78] 78  Resp:  [16] 16  BP: (178)/(78) 178/78    Flowsheet Rows         First Filed Value    Admission Height  64\" (162.6 cm) Documented at 07/18/2017 0955    Admission Weight  118 lb (53.5 kg) Documented at 07/18/2017 0955           Physical Exam:      General Appearance:    Alert, cooperative, in no acute distress   Head:    Normocephalic, without obvious abnormality, atraumatic   Eyes:            Lids and lashes normal, conjunctivae and sclerae normal, no   icterus, no pallor, corneas clear, PERRLA   Ears:    Ears appear intact with no abnormalities noted   Throat:   No oral lesions, no thrush, oral mucosa moist   Neck:   No adenopathy, supple, " trachea midline, no thyromegaly, no     carotid bruit, no JVD   Back:     No kyphosis present, no scoliosis present, no skin lesions,       erythema or scars, no tenderness to percussion or                   palpation,   range of motion normal   Lungs:     Clear to auscultation,respirations regular, even and                   unlabored    Heart:    Regular rhythm and normal rate, normal S1 and S2, no            murmur, no gallop, no rub, no click   Breast Exam:    Deferred   Abdomen:     Normal bowel sounds, no masses, no organomegaly, soft        non-tender, non-distended, no guarding, no rebound                 tenderness   Genitalia:    Deferred   Extremities:   Moves all extremities well, no edema, no cyanosis, no              redness   Pulses:   Pulses palpable femoral and  and equal bilaterally   Skin:   No bleeding, bruising or rash   Lymph nodes:   No palpable adenopathy   Neurologic:   Cranial nerves 2 - 12 grossly intact, sensation intact, DTR        present and equal bilaterally       Results Review:    I reviewed the patient's new clinical results.  Assessment/Plan     Active Problems:    * No active hospital problems. *      Arteriogram with right iliac stent and left superficial femoral artery crossing total occlusion with angioplasty possible stent. Plans and risks again discussed and agree to proceed.    Plan ct chest after to assess CXR findings    I discussed the patients findings and my recommendations with patient and nursing staff.     Keny Sadler MD  07/19/17  7:23 AM    Time: 30 minutes

## 2017-07-19 NOTE — PERIOPERATIVE NURSING NOTE
PATIENT NOTED WITH MODERATE PAIN WITH PALPATION TO (R)GROIN SITE AS WELL AS OVER (R)LQ ABDOMEN. DR BAIRD ON UNIT AND INFORMED. PATIENT SEEN AND ASSESSED BY HIM. DR BAIRD STATED WE WILL OBTAIN CT SCAN OF CHEST, ABDOMEN AND PELVIS W/O CONTRAST ON WAY TO FLOOR

## 2017-07-20 LAB
ANION GAP SERPL CALCULATED.3IONS-SCNC: 10.9 MMOL/L
APTT PPP: 29.3 SECONDS (ref 22.7–35.4)
BASOPHILS # BLD AUTO: 0.02 10*3/MM3 (ref 0–0.2)
BASOPHILS NFR BLD AUTO: 0.2 % (ref 0–1.5)
BUN BLD-MCNC: 7 MG/DL (ref 8–23)
BUN/CREAT SERPL: 10.1 (ref 7–25)
CALCIUM SPEC-SCNC: 8.4 MG/DL (ref 8.6–10.5)
CHLORIDE SERPL-SCNC: 98 MMOL/L (ref 98–107)
CO2 SERPL-SCNC: 24.1 MMOL/L (ref 22–29)
CREAT BLD-MCNC: 0.69 MG/DL (ref 0.57–1)
DEPRECATED RDW RBC AUTO: 45.8 FL (ref 37–54)
EOSINOPHIL # BLD AUTO: 0.09 10*3/MM3 (ref 0–0.7)
EOSINOPHIL NFR BLD AUTO: 1 % (ref 0.3–6.2)
ERYTHROCYTE [DISTWIDTH] IN BLOOD BY AUTOMATED COUNT: 13.7 % (ref 11.7–13)
GFR SERPL CREATININE-BSD FRML MDRD: 82 ML/MIN/1.73
GLUCOSE BLD-MCNC: 99 MG/DL (ref 65–99)
HCT VFR BLD AUTO: 24.6 % (ref 35.6–45.5)
HGB BLD-MCNC: 8.3 G/DL (ref 11.9–15.5)
IMM GRANULOCYTES # BLD: 0 10*3/MM3 (ref 0–0.03)
IMM GRANULOCYTES NFR BLD: 0 % (ref 0–0.5)
INR PPP: 1.06 (ref 0.9–1.1)
LYMPHOCYTES # BLD AUTO: 1.02 10*3/MM3 (ref 0.9–4.8)
LYMPHOCYTES NFR BLD AUTO: 11.5 % (ref 19.6–45.3)
MCH RBC QN AUTO: 31 PG (ref 26.9–32)
MCHC RBC AUTO-ENTMCNC: 33.7 G/DL (ref 32.4–36.3)
MCV RBC AUTO: 91.8 FL (ref 80.5–98.2)
MONOCYTES # BLD AUTO: 0.88 10*3/MM3 (ref 0.2–1.2)
MONOCYTES NFR BLD AUTO: 9.9 % (ref 5–12)
NEUTROPHILS # BLD AUTO: 6.86 10*3/MM3 (ref 1.9–8.1)
NEUTROPHILS NFR BLD AUTO: 77.4 % (ref 42.7–76)
PLATELET # BLD AUTO: 217 10*3/MM3 (ref 140–500)
PMV BLD AUTO: 9.7 FL (ref 6–12)
POTASSIUM BLD-SCNC: 4.4 MMOL/L (ref 3.5–5.2)
PROTHROMBIN TIME: 13.4 SECONDS (ref 11.7–14.2)
RBC # BLD AUTO: 2.68 10*6/MM3 (ref 3.9–5.2)
SODIUM BLD-SCNC: 133 MMOL/L (ref 136–145)
WBC NRBC COR # BLD: 8.87 10*3/MM3 (ref 4.5–10.7)

## 2017-07-20 PROCEDURE — 85730 THROMBOPLASTIN TIME PARTIAL: CPT | Performed by: SURGERY

## 2017-07-20 PROCEDURE — 85025 COMPLETE CBC W/AUTO DIFF WBC: CPT | Performed by: SURGERY

## 2017-07-20 PROCEDURE — 97161 PT EVAL LOW COMPLEX 20 MIN: CPT

## 2017-07-20 PROCEDURE — 85610 PROTHROMBIN TIME: CPT | Performed by: SURGERY

## 2017-07-20 PROCEDURE — 97110 THERAPEUTIC EXERCISES: CPT

## 2017-07-20 PROCEDURE — 80048 BASIC METABOLIC PNL TOTAL CA: CPT | Performed by: SURGERY

## 2017-07-20 RX ORDER — POLYETHYLENE GLYCOL 3350 17 G/17G
17 POWDER, FOR SOLUTION ORAL DAILY
Status: DISCONTINUED | OUTPATIENT
Start: 2017-07-20 | End: 2017-07-21 | Stop reason: HOSPADM

## 2017-07-20 RX ADMIN — FUROSEMIDE 20 MG: 20 TABLET ORAL at 09:29

## 2017-07-20 RX ADMIN — HYDROCODONE BITARTRATE AND ACETAMINOPHEN 1 TABLET: 5; 325 TABLET ORAL at 16:44

## 2017-07-20 RX ADMIN — PENTOXIFYLLINE 400 MG: 400 TABLET, EXTENDED RELEASE ORAL at 17:29

## 2017-07-20 RX ADMIN — MONTELUKAST 10 MG: 10 TABLET, FILM COATED ORAL at 09:30

## 2017-07-20 RX ADMIN — CLOPIDOGREL 75 MG: 75 TABLET, FILM COATED ORAL at 09:29

## 2017-07-20 RX ADMIN — PANTOPRAZOLE SODIUM 40 MG: 40 TABLET, DELAYED RELEASE ORAL at 09:30

## 2017-07-20 RX ADMIN — ASPIRIN 81 MG: 81 TABLET ORAL at 09:29

## 2017-07-20 RX ADMIN — PENTOXIFYLLINE 400 MG: 400 TABLET, EXTENDED RELEASE ORAL at 09:30

## 2017-07-20 RX ADMIN — POTASSIUM CHLORIDE 10 MEQ: 750 CAPSULE, EXTENDED RELEASE ORAL at 09:30

## 2017-07-20 RX ADMIN — LISINOPRIL 40 MG: 20 TABLET ORAL at 09:30

## 2017-07-20 RX ADMIN — POLYETHYLENE GLYCOL 3350 17 G: 17 POWDER, FOR SOLUTION ORAL at 17:28

## 2017-07-20 NOTE — PLAN OF CARE
Problem: Patient Care Overview (Adult)  Goal: Plan of Care Review    07/20/17 1242   Coping/Psychosocial Response Interventions   Plan Of Care Reviewed With patient   Patient Care Overview   Progress improving   Outcome Evaluation   Outcome Summary/Follow up Plan VSS. pt now on room air. O2 sats at %. pt ambulated around nurses station twice so far this shift. Currently up in bedside chair. complaint of soreness but refused prn pain medication. possibly d/c tomorrow.       Goal: Adult Individualization and Mutuality  Outcome: Ongoing (interventions implemented as appropriate)    Problem: Perioperative Period (Adult)  Goal: Signs and Symptoms of Listed Potential Problems Will be Absent or Manageable (Perioperative Period)  Outcome: Ongoing (interventions implemented as appropriate)

## 2017-07-20 NOTE — PROGRESS NOTES
Discharge Planning Assessment  [facility]     Patient Name: Lourdes Platt  MRN: [id number]  Today's Date: 7/20/2017    Admit Date: 7/19/2017          Discharge Needs Assessment       07/20/17 1120    Living Environment    Lives With alone    Living Arrangements house    Home Accessibility bed and bath on same level;no concerns    Stair Railings at Home none    Living Environment Comment has a walker and 3 in 1 commode if needed    Living Environment    Provides Primary Care For no one    Living Arrangement Comments lives alone, but son and daughter in law live close by    Discharge Needs Assessment    Concerns To Be Addressed no discharge needs identified    Readmission Within The Last 30 Days no previous admission in last 30 days    Equipment Currently Used at Home none            Discharge Plan       07/20/17 1121    Case Management/Social Work Plan    Plan return home- no needs    Patient/Family In Agreement With Plan yes    Additional Comments Facesheet verified.  Spoke with patient and son, Fady, in room.  Introduced self and explained role.  Patient lives in a ss house alone.  Patient IADLS and still drives.  She  does not use any DME and does not have a HH or SNU history. She does have a walker and 3 in 1 commode that she can use if needed.  Denies any needs and plans to return home.  CCP will follow.         Discharge Placement     No information found                Demographic Summary       07/20/17 1119    Referral Information    Admission Type inpatient    Arrived From admitted as an inpatient    Referral Source admission list    Reason For Consult discharge planning    Primary Care Physician Information    Name Dr Everton Disla            Functional Status       07/20/17 1119    Functional Status Current    Ambulation 0-->independent    Transferring 0-->independent    Toileting 0-->independent    Bathing 0-->independent    Dressing 0-->independent    Eating 0-->independent    Communication  0-->understands/communicates without difficulty    Swallowing (if score 2 or more for any item, consult Rehab Services) 0-->swallows foods/liquids without difficulty    Change in Functional Status Since Onset of Current Illness/Injury no    Functional Status Prior    Ambulation 0-->independent    Transferring 0-->independent    Toileting 0-->independent    Bathing 0-->independent    Dressing 0-->independent    Eating 0-->independent    Communication 0-->understands/communicates without difficulty    Swallowing 0-->swallows foods/liquids without difficulty    Cognitive/Perceptual/Developmental    Current Mental Status/Cognitive Functioning no deficits noted    Recent Changes in Mental Status/Cognitive Functioning no changes            Psychosocial     None            Abuse/Neglect     None            Legal     None            Substance Abuse     None            Patient Forms     None          Jennifer Landrum, RN

## 2017-07-20 NOTE — THERAPY EVALUATION
Acute Care - Physical Therapy Initial Evaluation  [facility]     Patient Name: Lourdes Platt  : 1935  MRN: [id number]  Today's Date: 2017   Onset of Illness/Injury or Date of Surgery Date: 17            Admit Date: 2017     Visit Dx:    ICD-10-CM ICD-9-CM   1. Difficulty walking R26.2 719.7     Patient Active Problem List   Diagnosis   • PAD (peripheral artery disease)   • Intermittent claudication   • Shortness of breath   • Essential hypertension   • Atherosclerosis of native arteries of extremities with intermittent claudication, bilateral legs     Past Medical History:   Diagnosis Date   • Bilateral carotid artery stenosis    • Hypertension    • Intermittent claudication 2017   • PAD (peripheral artery disease) 2017   • PVD (peripheral vascular disease) with claudication     lennie legs   • Stroke          Past Surgical History:   Procedure Laterality Date   • ANGIOPLASTY FEMORAL ARTERY Left 2017    Procedure: RIGHT FEMORAL ARTERIAL CUTDOWN, LIGATION OF FEMORAL ARTERY PSUEDOANEURYSM, AIF WITH BILATERAL RUNOFF ARTERIOGRAM, RIGHT COMMON ILIAC ANGIOPLASTY;  Surgeon: Keny Sadler MD;  Location: [facility] ;  Service:    • APPENDECTOMY     • BUNIONECTOMY     • HYSTERECTOMY     • ORIF WRIST FRACTURE Left           PT ASSESSMENT (last 72 hours)      PT Evaluation       17 0950 17 1328    Rehab Evaluation    Document Type evaluation  -DM     Subjective Information no complaints;agree to therapy  -DM     Patient Effort, Rehab Treatment good  -DM     Symptoms Noted During/After Treatment none  -DM     General Information    Patient Profile Review yes  -DM     Onset of Illness/Injury or Date of Surgery Date 17  -DM     Pertinent History Of Current Problem POD#1 R memoral artery angioplasty ( hx/of intermittent claudication)  -DM     Precautions/Limitations fall precautions  -DM     Prior Level of Function independent:  -DM     Equipment  "Currently Used at Home none  -DM none  -BR    Plans/Goals Discussed With patient and family;agreed upon  -DM     Living Environment    Living Environment Comment has a \"walking stick\" and walker if needed  -DM     Clinical Impression    Functional Level At Time Of Evaluation CGA/Lulu  -DM     Patient/Family Goals Statement PLOF  -DM     Criteria for Skilled Therapeutic Interventions Met yes;treatment indicated  -DM     Pathology/Pathophysiology Noted (Describe Specifically for Each System) musculoskeletal  -DM     Impairments Found (describe specific impairments) gait, locomotion, and balance  -DM     Functional Limitations in Following Categories (Describe Specific Limitations) self-care;home management;community/leisure  -DM     Predicted Duration of Therapy Intervention (days/wks) 3 days  -DM     Pain Assessment    Pain Assessment No/denies pain  -DM     Vision Assessment/Intervention    Visual Impairment WFL with corrective lenses  -DM     Cognitive Assessment/Intervention    Current Cognitive/Communication Assessment functional  -DM     Orientation Status oriented x 4  -DM     Follows Commands/Answers Questions 100% of the time  -DM     Personal Safety WNL/WFL  -DM     Personal Safety Interventions fall prevention program maintained;gait belt;nonskid shoes/slippers when out of bed;supervised activity  -DM     ROM (Range of Motion)    General ROM no range of motion deficits identified  -DM     MMT (Manual Muscle Testing)    General MMT Assessment no strength deficits identified  -DM     General MMT Assessment Detail generalized weakness post-op  -DM     Bed Mobility, Assessment/Treatment    Bed Mobility, Assistive Device bed rails  -DM     Bed Mob, Supine to Sit, [place] verbal cues required;minimum assist (75% patient effort)  -DM     Bed Mob, Sit to Supine, [place] not tested  -DM     Bed Mobility, Impairments strength decreased  -DM     Bed Mobility, Comment some soreness at R groin reported  -DM  "    Transfer Assessment/Treatment    Transfers, Sit-Stand [place] contact guard assist;verbal cues required  -DM     Transfers, Stand-Sit [place] contact guard assist;verbal cues required  -DM     Transfer, Safety Issues step length decreased;balance decreased during turns  -DM     Transfer, Impairments strength decreased;impaired balance  -DM     Transfer, Comment first time up  -DM     Gait Assessment/Treatment    Gait, [place] Level contact guard assist;hand held assist;minimum assist (75% patient effort)  -DM     Gait, Assistive Device other (see comments)   initially HHA>none  -DM     Gait, Distance (Feet) 150  -DM     Gait, Safety Issues balance decreased during turns;step length decreased  -DM     Gait, Impairments strength decreased;impaired balance  -DM     Motor Skills/Interventions    Additional Documentation Balance Skills Training (Group)  -DM     Balance Skills Training    Sitting-Level of Assistance Independent  -DM     Sitting-Balance Support Feet supported  -DM     Standing-Level of Assistance Contact guard  -DM     Static Standing Balance Support Left upper extremity supported  -DM     Gait Balance-Level of Assistance Minimum assistance  -DM     Gait Balance Support Left upper extremity supported  -DM     Positioning and Restraints    Pre-Treatment Position in bed  -DM     Post Treatment Position chair  -DM     In Chair notified nsg;sitting;call light within reach;encouraged to call for assist;with family/caregiver  -       07/19/17 0710 07/18/17 0954    General Information    Equipment Currently Used at Home none  -RH     Living Environment    Lives With alone  -RH alone  -JS    Living Arrangements house  -RH     Home Accessibility bed and bath on same level;no concerns  -RH       User Key  (r) = Recorded By, (t) = Taken By, (c) = Cosigned By    Initials Name Provider Type    DM Janette Acuna, PT Physical Therapist     Johana Holloway, RN Registered Nurse    CONSUELO Cruz,  RN Registered Nurse    EVER Hendricks, RN Registered Nurse              PT Recommendation and Plan  Anticipated Discharge Disposition: home  Planned Therapy Interventions: balance training, bed mobility training, gait training, patient/family education, transfer training  PT Frequency: daily  Plan of Care Review  Plan Of Care Reviewed With: patient  Outcome Summary/Follow up Plan: Pt presented to hospital for R LE angioplasty due to  intermittent claudication pain affecting daily life; She has a hx/of PAD, HTN and is I in mobility. Upon PT eval, she presents with genetralized post-op weakness affecting her functional mobility, now requiring CGA./Min A for mobility. Will benefit from continued skilled PT for a few days to advance mobility for return to PLOF.          IP PT Goals       07/20/17 1012          Bed Mobility PT LTG    Bed Mobility PT LTG, Date Established 07/20/17  -DM      Bed Mobility PT LTG, Time to Achieve 3 days  -DM      Bed Mobility PT LTG, Activity Type all bed mobility  -DM      Bed Mobility PT LTG, [place] Level independent  -DM      Transfer Training PT LTG    Transfer Training PT LTG, Date Established 07/20/17  -DM      Transfer Training PT LTG, Time to Achieve 3 days  -DM      Transfer Training PT LTG, Activity Type all transfers  -DM      Transfer Training PT LTG, [place] Level independent  -DM      Gait Training PT LTG    Gait Training Goal PT LTG, Date Established 07/20/17  -DM      Gait Training Goal PT LTG, Time to Achieve 3 days  -DM      Gait Training Goal PT LTG, [place] Level supervision required  -DM      Gait Training Goal PT LTG, Distance to Achieve 150  -DM        User Key  (r) = Recorded By, (t) = Taken By, (c) = Cosigned By    Initials Name Provider Type    LUCIEN Acuna PT Physical Therapist                Outcome Measures       07/20/17 1000          How much help from another person do you currently need...    Turning from your back to your  side while in flat bed without using bedrails? 3  -DM      Moving from lying on back to sitting on the side of a flat bed without bedrails? 3  -DM      Moving to and from a bed to a chair (including a wheelchair)? 3  -DM      Standing up from a chair using your arms (e.g., wheelchair, bedside chair)? 3  -DM      Climbing 3-5 steps with a railing? 3  -DM      To walk in hospital room? 3  -DM      AM-PAC 6 Clicks Score 18  -DM      Functional Assessment    Outcome Measure Options AM-PAC 6 Clicks Basic Mobility (PT)  -DM        User Key  (r) = Recorded By, (t) = Taken By, (c) = Cosigned By    Initials Name Provider Type    LUCIEN Acuna PT Physical Therapist           Time Calculation:         PT Charges       07/20/17 1016          Time Calculation    Start Time 0950  -DM      Stop Time 1016  -DM      Time Calculation (min) 26 min  -DM      PT Received On 07/20/17  -DM      PT - Next Appointment 07/21/17  -DM      PT Goal Re-Cert Due Date 07/24/17  -DM        User Key  (r) = Recorded By, (t) = Taken By, (c) = Cosigned By    Initials Name Provider Type    LUCIEN Acuna PT Physical Therapist          Therapy Charges for Today     Code Description Service Date Service Provider Modifiers Qty    12753543438 HC PT EVAL LOW COMPLEXITY 2 7/20/2017 Janette Acuna, PT GP 1    59922954266 HC PT THER PROC EA 15 MIN 7/20/2017 Janette Acuna PT GP 2          PT G-Codes  Outcome Measure Options: AM-PAC 6 Clicks Basic Mobility (PT)      Janette Acuna PT  7/20/2017

## 2017-07-20 NOTE — PLAN OF CARE
Problem: Patient Care Overview (Adult)  Goal: Plan of Care Review    07/20/17 0524   Coping/Psychosocial Response Interventions   Plan Of Care Reviewed With patient   Patient Care Overview   Progress improving         Problem: Perioperative Period (Adult)  Intervention: Promote Pulmonary Hygiene and Secretion Clearance    07/19/17 2000 07/20/17 0305   Activity   Activity Type --  activity adjusted per tolerance   Promote Aggressive Pulmonary Hygiene/Secretion Management   Cough And Deep Breathing done with encouragement --    Positioning   Head Of Bed (HOB) Position --  HOB at 15 degrees         Goal: Signs and Symptoms of Listed Potential Problems Will be Absent or Manageable (Perioperative Period)  Outcome: Ongoing (interventions implemented as appropriate)    07/20/17 0524   Perioperative Period   Problems Assessed (Perioperative Period) all   Problems Present (Perioperative Period) physiologic stress response

## 2017-07-20 NOTE — PLAN OF CARE
Problem: Patient Care Overview (Adult)  Goal: Plan of Care Review  Outcome: Ongoing (interventions implemented as appropriate)    07/20/17 1012   Coping/Psychosocial Response Interventions   Plan Of Care Reviewed With patient   Outcome Evaluation   Outcome Summary/Follow up Plan Pt presented to hospital for R LE angioplasty due to intermittent claudication pain affecting daily life; She has a hx/of PAD, HTN and is I in mobility. Upon PT eval, she presents with genetralized post-op weakness affecting her functional mobility, now requiring CGA./Min A for mobility. Will benefit from continued skilled PT for a few days to advance mobility for return to PLOF.         Problem: Inpatient Physical Therapy  Goal: Bed Mobility Goal LTG- PT  Outcome: Ongoing (interventions implemented as appropriate)    07/20/17 1012   Bed Mobility PT LTG   Bed Mobility PT LTG, Date Established 07/20/17   Bed Mobility PT LTG, Time to Achieve 3 days   Bed Mobility PT LTG, Activity Type all bed mobility   Bed Mobility PT LTG, [place] Level independent       Goal: Transfer Training Goal 1 LTG- PT  Outcome: Ongoing (interventions implemented as appropriate)    07/20/17 1012   Transfer Training PT LTG   Transfer Training PT LTG, Date Established 07/20/17   Transfer Training PT LTG, Time to Achieve 3 days   Transfer Training PT LTG, Activity Type all transfers   Transfer Training PT LTG, [place] Level independent       Goal: Gait Training Goal LTG- PT  Outcome: Ongoing (interventions implemented as appropriate)    07/20/17 1012   Gait Training PT LTG   Gait Training Goal PT LTG, Date Established 07/20/17   Gait Training Goal PT LTG, Time to Achieve 3 days   Gait Training Goal PT LTG, [place] Level supervision required   Gait Training Goal PT LTG, Distance to Achieve 150

## 2017-07-20 NOTE — PROGRESS NOTES
Keny Sadler MD       LOS: 1 day   Patient Care Team:  Everton Disla MD as PCP - General (Internal Medicine)    Subjective     82 y.o. female doing well after right iliac stenting and failed attempted crossing total occlusion left femoral artery yesterday.  Patient did have femoral artery branch retroperitoneal bleed and pseudoaneurysm repair with ligation of artery at time of surgery.  Patient hemoglobin has decreased from 11 to 8 this morning.  Patient had excellent urine output and has been hemodynamically stable.  Abdominal pain and nausea resolved.  CT scan of abdomen results noted with expected bleed and pelvis area from femoral artery branch pseudoaneurysm that has been repaired.  CT scan chest because of abnormal chest x-ray showed chronic lung changes and no mass.  All above has been reviewed with patient and family yesterday.    Review of Systems  Review of Systems - Negative except history of present illness      Objective     Vital Signs  Temp:  [97.1 °F (36.2 °C)-98.9 °F (37.2 °C)] 98.9 °F (37.2 °C)  Heart Rate:  [61-97] 97  Resp:  [12-18] 16  BP: ()/(45-78) 126/52    Physical Exam  General: No acute distress. Alert and oriented x 4  HEENT: No jugular venous distension, trachea is midline  CV: RRR, S1S2  Resp: Clear unlabored breathing on oxygen  Abd: Abdomen is soft, nontender, nondistended  Extremities: Right groin incision with stable hematoma.  Mild redness around incision will monitor.  No leg swelling bilaterally.  Doppler pedal pulses bilaterally right improved and left no change.    Results Review:       Recent Results (from the past 12 hour(s))   Basic Metabolic Panel    Collection Time: 07/20/17  5:15 AM   Result Value Ref Range    Glucose 99 65 - 99 mg/dL    BUN 7 (L) 8 - 23 mg/dL    Creatinine 0.69 0.57 - 1.00 mg/dL    Sodium 133 (L) 136 - 145 mmol/L    Potassium 4.4 3.5 - 5.2 mmol/L    Chloride 98 98 - 107 mmol/L    CO2 24.1 22.0 - 29.0 mmol/L    Calcium 8.4 (L) 8.6 - 10.5  mg/dL    eGFR Non African Amer 82 >60 mL/min/1.73    BUN/Creatinine Ratio 10.1 7.0 - 25.0    Anion Gap 10.9 mmol/L   aPTT    Collection Time: 07/20/17  5:15 AM   Result Value Ref Range    PTT 29.3 22.7 - 35.4 seconds   Protime-INR    Collection Time: 07/20/17  5:15 AM   Result Value Ref Range    Protime 13.4 11.7 - 14.2 Seconds    INR 1.06 0.90 - 1.10   CBC Auto Differential    Collection Time: 07/20/17  5:15 AM   Result Value Ref Range    WBC 8.87 4.50 - 10.70 10*3/mm3    RBC 2.68 (L) 3.90 - 5.20 10*6/mm3    Hemoglobin 8.3 (L) 11.9 - 15.5 g/dL    Hematocrit 24.6 (L) 35.6 - 45.5 %    MCV 91.8 80.5 - 98.2 fL    MCH 31.0 26.9 - 32.0 pg    MCHC 33.7 32.4 - 36.3 g/dL    RDW 13.7 (H) 11.7 - 13.0 %    RDW-SD 45.8 37.0 - 54.0 fl    MPV 9.7 6.0 - 12.0 fL    Platelets 217 140 - 500 10*3/mm3    Neutrophil % 77.4 (H) 42.7 - 76.0 %    Lymphocyte % 11.5 (L) 19.6 - 45.3 %    Monocyte % 9.9 5.0 - 12.0 %    Eosinophil % 1.0 0.3 - 6.2 %    Basophil % 0.2 0.0 - 1.5 %    Immature Grans % 0.0 0.0 - 0.5 %    Neutrophils, Absolute 6.86 1.90 - 8.10 10*3/mm3    Lymphocytes, Absolute 1.02 0.90 - 4.80 10*3/mm3    Monocytes, Absolute 0.88 0.20 - 1.20 10*3/mm3    Eosinophils, Absolute 0.09 0.00 - 0.70 10*3/mm3    Basophils, Absolute 0.02 0.00 - 0.20 10*3/mm3    Immature Grans, Absolute 0.00 0.00 - 0.03 10*3/mm3   ]      Assessment/Plan           Active Problems:    Atherosclerosis of native arteries of extremities with intermittent claudication, bilateral legs      Assessment & Plan  82 y.o. female doing well after surgery yesterday.  We'll keep an hospital today and have physical therapy assist with ambulation.  We'll monitor hemoglobin and recheck tomorrow morning.  We'll send home tomorrow follow okay.  We'll increase diet and activity.  Discussed with patient CT scan findings of chest and abdomen.  Discussed will need to continue Plavix long-term and only option left leg revascularization would be attempted composite bypass with inability  to cross total occlusion of left femoral artery.  Would only recommend that for severe limb threatening ischemia and not for claudication which patient has at present.  Home tomorrow if all okay.      Keny Sadler MD  07/20/17  6:36 AM

## 2017-07-21 VITALS
SYSTOLIC BLOOD PRESSURE: 126 MMHG | HEIGHT: 64 IN | DIASTOLIC BLOOD PRESSURE: 62 MMHG | WEIGHT: 119 LBS | HEART RATE: 91 BPM | RESPIRATION RATE: 17 BRPM | OXYGEN SATURATION: 100 % | BODY MASS INDEX: 20.32 KG/M2 | TEMPERATURE: 99.6 F

## 2017-07-21 LAB
DEPRECATED RDW RBC AUTO: 46 FL (ref 37–54)
ERYTHROCYTE [DISTWIDTH] IN BLOOD BY AUTOMATED COUNT: 13.7 % (ref 11.7–13)
HCT VFR BLD AUTO: 21.8 % (ref 35.6–45.5)
HGB BLD-MCNC: 7.3 G/DL (ref 11.9–15.5)
MCH RBC QN AUTO: 30.9 PG (ref 26.9–32)
MCHC RBC AUTO-ENTMCNC: 33.5 G/DL (ref 32.4–36.3)
MCV RBC AUTO: 92.4 FL (ref 80.5–98.2)
PLATELET # BLD AUTO: 201 10*3/MM3 (ref 140–500)
PMV BLD AUTO: 9.5 FL (ref 6–12)
RBC # BLD AUTO: 2.36 10*6/MM3 (ref 3.9–5.2)
WBC NRBC COR # BLD: 9.44 10*3/MM3 (ref 4.5–10.7)

## 2017-07-21 PROCEDURE — 85027 COMPLETE CBC AUTOMATED: CPT | Performed by: SURGERY

## 2017-07-21 RX ORDER — HYDROCODONE BITARTRATE AND ACETAMINOPHEN 5; 325 MG/1; MG/1
1 TABLET ORAL EVERY 4 HOURS PRN
Qty: 30 TABLET | Refills: 0 | Status: SHIPPED | OUTPATIENT
Start: 2017-07-21 | End: 2017-07-29

## 2017-07-21 RX ORDER — FERROUS SULFATE 325(65) MG
325 TABLET ORAL 2 TIMES DAILY WITH MEALS
Status: DISCONTINUED | OUTPATIENT
Start: 2017-07-21 | End: 2017-07-21 | Stop reason: HOSPADM

## 2017-07-21 RX ORDER — FERROUS SULFATE 325(65) MG
325 TABLET ORAL 2 TIMES DAILY WITH MEALS
Qty: 60 TABLET | Refills: 3 | Status: SHIPPED | OUTPATIENT
Start: 2017-07-21 | End: 2017-11-22

## 2017-07-21 RX ADMIN — ASPIRIN 81 MG: 81 TABLET ORAL at 08:36

## 2017-07-21 RX ADMIN — PANTOPRAZOLE SODIUM 40 MG: 40 TABLET, DELAYED RELEASE ORAL at 06:28

## 2017-07-21 RX ADMIN — HYDROCODONE BITARTRATE AND ACETAMINOPHEN 1 TABLET: 5; 325 TABLET ORAL at 08:40

## 2017-07-21 RX ADMIN — POTASSIUM CHLORIDE 10 MEQ: 750 CAPSULE, EXTENDED RELEASE ORAL at 08:36

## 2017-07-21 RX ADMIN — PENTOXIFYLLINE 400 MG: 400 TABLET, EXTENDED RELEASE ORAL at 08:36

## 2017-07-21 RX ADMIN — POLYETHYLENE GLYCOL 3350 17 G: 17 POWDER, FOR SOLUTION ORAL at 08:36

## 2017-07-21 RX ADMIN — FUROSEMIDE 20 MG: 20 TABLET ORAL at 08:36

## 2017-07-21 RX ADMIN — CLOPIDOGREL 75 MG: 75 TABLET, FILM COATED ORAL at 08:36

## 2017-07-21 RX ADMIN — MONTELUKAST 10 MG: 10 TABLET, FILM COATED ORAL at 08:36

## 2017-07-21 RX ADMIN — FERROUS SULFATE TAB 325 MG (65 MG ELEMENTAL FE) 325 MG: 325 (65 FE) TAB at 08:36

## 2017-07-21 RX ADMIN — LISINOPRIL 40 MG: 20 TABLET ORAL at 08:36

## 2017-07-21 NOTE — PROGRESS NOTES
Keny Sadler MD       LOS: 2 days   Patient Care Team:  Everton Disla MD as PCP - General (Internal Medicine)    Subjective     82 y.o. female doing well after right iliac stent and failed attempt at crossing total occlusion left superficial femoral artery.  She did have common femoral artery branch retroperitoneal pseudoaneurysm and treated with ligation at time of surgery.  She does have postoperative blood loss anemia.  Hemoglobin yesterday 8 and today 7.3.  Discussed this with her and transfusion of 2 units of blood today however patient did not wish to do this and wish to take the option of taking iron to allow the blood count slowly returned towards normal.  Patient did wish to go home this morning.  She has no signs of active bleeding.  Her abdomen is soft and flat and a right groin is soft.  She is ambulating tolerating a diet and voiding with no difficulty.  We'll continue aspirin and Plavix with stent placement right iliac artery and ischemia left leg.  Discussed options of vein bypass of her left leg with use of segments of saphenous vein and arm vein.  Discussed option of staying in proceeding with that this hospitalization however patient is very reluctant and does not wish to do that and wishes to go home.  We'll see in office in 1 week and check arm vein mapping at that time of appointment to discuss options for left leg revascularization.  She is aware the risk of major amputation.  At present she has claudication and some numbness of her toes at night possibly consistent with rest pain with STELLA left 0.5.  She is well with definitely need to proceed with left leg revascularization of pain should worsen or develop tissue loss.  All questions answered with patient.    Review of Systems  Review of Systems - all negative except history of present illness      Objective     Vital Signs  Temp:  [97.3 °F (36.3 °C)-99.8 °F (37.7 °C)] 98.9 °F (37.2 °C)  Heart Rate:  [] 93  Resp:  [16-17]  16  BP: (132-141)/(54-62) 133/62    Physical Exam  General: No acute distress. Alert and oriented x 4  HEENT: No jugular venous distension, trachea is midline  CV: RRR, S1S2  Resp: Clear unlabored breathing  Abd: Abdomen is soft, nontender, nondistended  Extremities: Right groin incision healing well resolving hematoma stable.  No leg swelling.  Doppler pedal pulses bilaterally.    Results Review:       Recent Results (from the past 12 hour(s))   CBC (No Diff)    Collection Time: 07/21/17  5:30 AM   Result Value Ref Range    WBC 9.44 4.50 - 10.70 10*3/mm3    RBC 2.36 (L) 3.90 - 5.20 10*6/mm3    Hemoglobin 7.3 (L) 11.9 - 15.5 g/dL    Hematocrit 21.8 (L) 35.6 - 45.5 %    MCV 92.4 80.5 - 98.2 fL    MCH 30.9 26.9 - 32.0 pg    MCHC 33.5 32.4 - 36.3 g/dL    RDW 13.7 (H) 11.7 - 13.0 %    RDW-SD 46.0 37.0 - 54.0 fl    MPV 9.5 6.0 - 12.0 fL    Platelets 201 140 - 500 10*3/mm3   ]      Assessment/Plan           Active Problems:    Atherosclerosis of native arteries of extremities with intermittent claudication, bilateral legs      Assessment & Plan  82 y.o. female doing well after right iliac stent and failed attempts at crossing total occlusion left femoral artery.  Plan home on oral arm in addition to home medications.  Will follow-up in office in one week with arm vein mapping.      Keny Sadler MD  07/21/17  7:08 AM

## 2017-07-21 NOTE — SIGNIFICANT NOTE
07/21/17 1033   PT Discharge Summary   Reason for Discharge Discharge from facility   Discharge Destination Home with assist

## 2017-07-21 NOTE — PROGRESS NOTES
Continued Stay Note  [facility]     Patient Name: Lourdes Platt  MRN: [id number]  Today's Date: 7/21/2017    Admit Date: 7/19/2017          Discharge Plan       07/21/17 1531    Final Note    Final Note Pt discharged home.  Denied any discharge needs.               Discharge Codes       07/21/17 1532    Discharge Codes    Discharge Codes 01  Discharge to home        Expected Discharge Date and Time     Expected Discharge Date Expected Discharge Time    Jul 21, 2017             Maribeth Leung

## 2017-07-21 NOTE — DISCHARGE SUMMARY
Name: Lourdes Platt ADMIT: 2017   : 1935  PCP: Everton Disla MD    MRN: [id number] LOS: 2 days   AGE/SEX: 82 y.o. female  ROOM: [facility]/1     Date of Admission: 2017    Date of Discharge:  2017    PCP: Everton Disla MD    DISCHARGE DIAGNOSIS  Active Hospital Problems (** Indicates Principal Problem)    Diagnosis Date Noted   • Atherosclerosis of native arteries of extremities with intermittent claudication, bilateral legs [I70.213] 2017      Resolved Hospital Problems    Diagnosis Date Noted Date Resolved   No resolved problems to display.       SECONDARY DIAGNOSES  Past Medical History:   Diagnosis Date   • Bilateral carotid artery stenosis    • Hypertension    • Intermittent claudication 2017   • PAD (peripheral artery disease) 2017   • PVD (peripheral vascular disease) with claudication     lennie legs   • Stroke     2005       CONSULTS   Consults     No orders found from 2017 to 2017.          PROCEDURES PERFORMED    Procedure(s):  RIGHT FEMORAL ARTERIAL CUTDOWN, LIGATION OF FEMORAL ARTERY PSUEDOANEURYSM, AIF WITH BILATERAL RUNOFF ARTERIOGRAM, RIGHT COMMON ILIAC ANGIOPLASTY       HOSPITAL COURSE    Patient is a 82 y.o. female presented to [facility] admitted for Progressive claudication left worse than right leg.  Please see the admitting history and physical for further details. Diagnosis on admission was Atherosclerosis of native arteries of extremities with intermittent claudication, bilateral legs [I70.213].  Patient medical postoperatively following right iliac stenting.  She had an attempt at crossing total occlusion left superficial femoral artery unsuccessful.  At procedure she had distal branch of common femoral artery pseudoaneurysm with retroperitoneal bleed and postoperative blood loss anemia treated with ligation of branch at time of surgery via right femoral artery cutdown.  Hemoglobin 8 postoperative day 1 and 7.3 postoperative  "day #2.  This was treated with iron per patient request and not blood transfusions.  Patient was asymptomatic.  They have discharge patient was ambulating and tolerating diet and voiding without difficulty with resolving hematoma groin.  CT scan did show retroperitoneal hematoma but abdomen soft and flat and improving as well.  She maintained Doppler pedal pulses.  Long discussion with patient regarding option of revascularization left leg of segments of saphenous vein left leg with arm vein.  She did not wish to proceed with that this admission and wished to think about it.  She is aware with definite need to have developed severe ischemia rest pain or tissue loss.  She is aware of risk of major amputation.  We'll continue medical treatment.  Will follow-up in office in 1 week and check arm vein mapping at that time.  With placement of iliac stent will continue aspirin and Plavix.  She was discharged on her home medication and iron prescription for narcotic pain medicine prescription given to patient as well.      VITAL SIGNS  /62 (BP Location: Left arm, Patient Position: Lying)  Pulse 93  Temp 98.9 °F (37.2 °C) (Oral)   Resp 16  Ht 64\" (162.6 cm)  Wt 119 lb (54 kg)  SpO2 100%  BMI 20.43 kg/m2   Flowsheet Rows         First Filed Value    Admission Height  64\" (162.6 cm) Documented at 07/18/2017 0955    Admission Weight  118 lb (53.5 kg) Documented at 07/18/2017 0955          Physical Exam:     General Appearance:    Alert, cooperative, in no acute distress   Head:    Normocephalic, without obvious abnormality, atraumatic   Eyes:            Lids and lashes normal, conjunctivae and sclerae normal, no   icterus, no pallor, corneas clear, PERRLA   Ears:    Ears appear intact with no abnormalities noted   Throat:   No oral lesions, no thrush, oral mucosa moist   Neck:   No adenopathy, supple, trachea midline, no thyromegaly, no     carotid bruit, no JVD   Back:     No kyphosis present, no scoliosis " present, no skin lesions,       erythema or scars, no tenderness to percussion or                   palpation,   range of motion normal   Lungs:     Clear to auscultation,respirations regular, even and                   unlabored    Heart:    Regular rhythm and normal rate, normal S1 and S2, no            murmur, no gallop, no rub, no click   Breast Exam:    Deferred   Abdomen:     Normal bowel sounds, no masses, no organomegaly, soft        non-tender, non-distended, no guarding, no rebound                 tenderness   Genitalia:    Deferred   Extremities:   Moves all extremities well, no edema, no cyanosis, no              redness, Right groin incision soft and resolving hematoma    Pulses:   Pulses Dopplerable and equal bilaterally   Skin:   No bleeding, bruising or rash   Lymph nodes:   No palpable adenopathy   Neurologic:   Cranial nerves 2 - 12 grossly intact, sensation intact, DTR        present and equal bilaterally         Pertinent Test Results: angiography: Resolution right iliac stenosis with stent.  Resolution right distal branch retroperitoneal pseudoaneurysm of common femoral artery with ligation.  Occluded left superficial femoral artery with failed attempt at crossing total occlusion.    CONDITION ON DISCHARGE  Stable.      DISCHARGE DISPOSITION Home or Self Care      DISCHARGE MEDICATIONS     Lourdes Platt   Home Medication Instructions TRINH:[id number]    Printed on:07/21/17 0990   Medication Information                      aspirin 81 MG EC tablet  Take 81 mg by mouth Daily.             carboxymethylcellulose (REFRESH PLUS) 0.5 % solution  Administer 1 drop to both eyes 3 (Three) Times a Day As Needed for Dry Eyes.             clopidogrel (PLAVIX) 75 MG tablet  Take 75 mg by mouth Daily.             ferrous sulfate 325 (65 FE) MG tablet  Take 1 tablet by mouth 2 (Two) Times a Day With Meals.             Flaxseed, Linseed, (FLAXSEED OIL) 1200 MG capsule  Take 2 capsules by mouth Daily. Holding for  last two weeks             furosemide (LASIX) 20 MG tablet  Take 20 mg by mouth Daily.             HYDROcodone-acetaminophen (NORCO) 5-325 MG per tablet  Take 1 tablet by mouth Every 4 (Four) Hours As Needed for Moderate Pain  for up to 8 days.             lisinopril (PRINIVIL,ZESTRIL) 40 MG tablet  Take 40 mg by mouth Daily.             montelukast (SINGULAIR) 10 MG tablet  Take 10 mg by mouth Daily.             Nutritional Supplements (EQUATE PO)  Take 750 mg by mouth Daily.             Omega-3 Fatty Acids (FISH OIL) 1200 MG capsule capsule  Take 2,400 mg by mouth Daily. Holding for sx             omeprazole (priLOSEC) 20 MG capsule  Take 20 mg by mouth Daily.             pentoxifylline (TRENtal) 400 MG CR tablet  Take 400 mg by mouth 3 (Three) Times a Day With Meals.             potassium chloride (MICRO-K) 10 MEQ CR capsule  Take 10 mEq by mouth Daily.                 TEST  RESULTS PENDING AT DISCHARGE        Discharge Diet:   Diet Instructions     Diet: Regular, Cardiac; Thin Liquids, No Restrictions       Discharge Diet:   Regular  Cardiac      Fluid Consistency:  Thin Liquids, No Restrictions                 Activity at Discharge:   Activity Instructions     Activity as Tolerated                     Follow-up Appointments    Future Appointments  Date Time Provider Department Center   10/13/2017 9:00 AM [facility] US [facility] ARTERIAL CRT 1 [facility]     Additional Instructions for the Follow-ups that You Need to Schedule     Discharge Follow-Up With Specified Provider    As directed    To:  dr dent   Follow Up:  1 Week               Additional Instructions for the Follow-ups that You Need to Schedule     Discharge Follow-Up With Specified Provider    As directed    To:  dr dent   Follow Up:  1 Week             Follow-up Information     Follow up with Everton Disla MD .    Specialty:  Internal Medicine    Contact information:    [facility] [address] [place] [id number] [phone]                [place]    Keny Sadler MD  Office Number [phone]    17  7:20 AM        Dragon disclaimer:  Much of this encounter note is an electronic transcription/translation of spoken language to printed text. The electronic translation of spoken language may permit erroneous, or at times, nonsensical words or phrases to be inadvertently transcribed; Although I have reviewed the note for such errors, some may still exist.

## 2017-08-04 ENCOUNTER — TRANSCRIBE ORDERS (OUTPATIENT)
Dept: ADMINISTRATIVE | Facility: HOSPITAL | Age: 82
End: 2017-08-04

## 2017-08-04 DIAGNOSIS — I73.9 CLAUDICATION (HCC): ICD-10-CM

## 2017-08-04 DIAGNOSIS — K66.1: ICD-10-CM

## 2017-08-04 DIAGNOSIS — I72.4 FEMORAL ARTERY PSEUDO-ANEURYSM, RIGHT (HCC): Primary | ICD-10-CM

## 2017-08-08 ENCOUNTER — TRANSCRIBE ORDERS (OUTPATIENT)
Dept: ADMINISTRATIVE | Facility: HOSPITAL | Age: 82
End: 2017-08-08

## 2017-08-08 DIAGNOSIS — I73.9 CLAUDICATION (HCC): Primary | ICD-10-CM

## 2017-08-10 ENCOUNTER — APPOINTMENT (OUTPATIENT)
Dept: CARDIOLOGY | Facility: HOSPITAL | Age: 82
End: 2017-08-10
Attending: SURGERY

## 2017-08-10 ENCOUNTER — APPOINTMENT (OUTPATIENT)
Dept: CT IMAGING | Facility: HOSPITAL | Age: 82
End: 2017-08-10
Attending: SURGERY

## 2017-08-18 ENCOUNTER — LAB (OUTPATIENT)
Dept: LAB | Facility: HOSPITAL | Age: 82
End: 2017-08-18
Attending: SURGERY

## 2017-08-18 ENCOUNTER — HOSPITAL ENCOUNTER (OUTPATIENT)
Dept: CT IMAGING | Facility: HOSPITAL | Age: 82
Discharge: HOME | End: 2017-08-18
Attending: SURGERY | Admitting: SURGERY

## 2017-08-18 ENCOUNTER — HOSPITAL ENCOUNTER (OUTPATIENT)
Dept: CARDIOLOGY | Facility: HOSPITAL | Age: 82
Discharge: HOME | End: 2017-08-18
Attending: SURGERY

## 2017-08-18 ENCOUNTER — TRANSCRIBE ORDERS (OUTPATIENT)
Dept: ADMINISTRATIVE | Facility: HOSPITAL | Age: 82
End: 2017-08-18

## 2017-08-18 DIAGNOSIS — K66.1: ICD-10-CM

## 2017-08-18 DIAGNOSIS — D64.9 ANEMIA, UNSPECIFIED TYPE: ICD-10-CM

## 2017-08-18 DIAGNOSIS — I73.9 CLAUDICATION (HCC): ICD-10-CM

## 2017-08-18 DIAGNOSIS — I72.4 FEMORAL ARTERY PSEUDO-ANEURYSM, RIGHT (HCC): ICD-10-CM

## 2017-08-18 DIAGNOSIS — D64.9 ANEMIA, UNSPECIFIED TYPE: Primary | ICD-10-CM

## 2017-08-18 LAB
ALBUMIN SERPL-MCNC: 4.5 G/DL (ref 3.5–5.2)
ALBUMIN/GLOB SERPL: 1.5 G/DL
ALP SERPL-CCNC: 103 U/L (ref 39–117)
ALT SERPL W P-5'-P-CCNC: 14 U/L (ref 1–33)
ANION GAP SERPL CALCULATED.3IONS-SCNC: 12.9 MMOL/L
AST SERPL-CCNC: 16 U/L (ref 1–32)
BASOPHILS # BLD AUTO: 0.02 10*3/MM3 (ref 0–0.2)
BASOPHILS NFR BLD AUTO: 0.4 % (ref 0–1.5)
BH CV LOWER ARTERIAL LEFT ABI RATIO: 0.7
BH CV LOWER ARTERIAL LEFT DORSALIS PEDIS SYS MAX: 84 MMHG
BH CV LOWER ARTERIAL LEFT GREAT TOE SYS MAX: 49 MMHG
BH CV LOWER ARTERIAL LEFT LOW THIGH SYS MAX: 101 MMHG
BH CV LOWER ARTERIAL LEFT POPLITEAL SYS MAX: 85 MMHG
BH CV LOWER ARTERIAL LEFT POST TIBIAL SYS MAX: 105 MMHG
BH CV LOWER ARTERIAL LEFT TBI RATIO: 0.32
BH CV LOWER ARTERIAL RIGHT ABI RATIO: 0.73
BH CV LOWER ARTERIAL RIGHT DORSALIS PEDIS SYS MAX: 103 MMHG
BH CV LOWER ARTERIAL RIGHT GREAT TOE SYS MAX: 52 MMHG
BH CV LOWER ARTERIAL RIGHT LOW THIGH SYS MAX: 166 MMHG
BH CV LOWER ARTERIAL RIGHT POPLITEAL SYS MAX: 125 MMHG
BH CV LOWER ARTERIAL RIGHT POST TIBIAL SYS MAX: 110 MMHG
BH CV LOWER ARTERIAL RIGHT TBI RATIO: 0.34
BILIRUB SERPL-MCNC: 0.3 MG/DL (ref 0.1–1.2)
BUN BLD-MCNC: 10 MG/DL (ref 8–23)
BUN/CREAT SERPL: 12.7 (ref 7–25)
CALCIUM SPEC-SCNC: 10.2 MG/DL (ref 8.6–10.5)
CHLORIDE SERPL-SCNC: 94 MMOL/L (ref 98–107)
CO2 SERPL-SCNC: 26.1 MMOL/L (ref 22–29)
CREAT BLD-MCNC: 0.79 MG/DL (ref 0.57–1)
CREAT BLDA-MCNC: 0.8 MG/DL (ref 0.6–1.3)
DEPRECATED RDW RBC AUTO: 52.7 FL (ref 37–54)
EOSINOPHIL # BLD AUTO: 0.22 10*3/MM3 (ref 0–0.7)
EOSINOPHIL NFR BLD AUTO: 4 % (ref 0.3–6.2)
ERYTHROCYTE [DISTWIDTH] IN BLOOD BY AUTOMATED COUNT: 14.8 % (ref 11.7–13)
GFR SERPL CREATININE-BSD FRML MDRD: 70 ML/MIN/1.73
GLOBULIN UR ELPH-MCNC: 3.1 GM/DL
GLUCOSE BLD-MCNC: 90 MG/DL (ref 65–99)
HCT VFR BLD AUTO: 37.1 % (ref 35.6–45.5)
HGB BLD-MCNC: 11.7 G/DL (ref 11.9–15.5)
IMM GRANULOCYTES # BLD: 0 10*3/MM3 (ref 0–0.03)
IMM GRANULOCYTES NFR BLD: 0 % (ref 0–0.5)
LYMPHOCYTES # BLD AUTO: 1.04 10*3/MM3 (ref 0.9–4.8)
LYMPHOCYTES NFR BLD AUTO: 19 % (ref 19.6–45.3)
MCH RBC QN AUTO: 30.4 PG (ref 26.9–32)
MCHC RBC AUTO-ENTMCNC: 31.5 G/DL (ref 32.4–36.3)
MCV RBC AUTO: 96.4 FL (ref 80.5–98.2)
MONOCYTES # BLD AUTO: 0.56 10*3/MM3 (ref 0.2–1.2)
MONOCYTES NFR BLD AUTO: 10.2 % (ref 5–12)
NEUTROPHILS # BLD AUTO: 3.63 10*3/MM3 (ref 1.9–8.1)
NEUTROPHILS NFR BLD AUTO: 66.4 % (ref 42.7–76)
PLATELET # BLD AUTO: 285 10*3/MM3 (ref 140–500)
PMV BLD AUTO: 9.6 FL (ref 6–12)
POTASSIUM BLD-SCNC: 4.4 MMOL/L (ref 3.5–5.2)
PROT SERPL-MCNC: 7.6 G/DL (ref 6–8.5)
RBC # BLD AUTO: 3.85 10*6/MM3 (ref 3.9–5.2)
SODIUM BLD-SCNC: 133 MMOL/L (ref 136–145)
UPPER ARTERIAL LEFT ARM BRACHIAL SYS MAX: 151 MMHG
UPPER ARTERIAL RIGHT ARM BRACHIAL SYS MAX: 150 MMHG
WBC NRBC COR # BLD: 5.47 10*3/MM3 (ref 4.5–10.7)

## 2017-08-18 PROCEDURE — 0 IOPAMIDOL 61 % SOLUTION: Performed by: SURGERY

## 2017-08-18 PROCEDURE — 82565 ASSAY OF CREATININE: CPT

## 2017-08-18 PROCEDURE — 74174 CTA ABD&PLVS W/CONTRAST: CPT

## 2017-08-18 PROCEDURE — 36415 COLL VENOUS BLD VENIPUNCTURE: CPT

## 2017-08-18 PROCEDURE — 80053 COMPREHEN METABOLIC PANEL: CPT

## 2017-08-18 PROCEDURE — 93923 UPR/LXTR ART STDY 3+ LVLS: CPT

## 2017-08-18 PROCEDURE — 85025 COMPLETE CBC W/AUTO DIFF WBC: CPT

## 2017-08-18 RX ADMIN — IOPAMIDOL 95 ML: 612 INJECTION, SOLUTION INTRAVENOUS at 10:15

## 2017-10-13 ENCOUNTER — APPOINTMENT (OUTPATIENT)
Dept: CARDIOLOGY | Facility: HOSPITAL | Age: 82
End: 2017-10-13
Attending: SURGERY

## 2017-11-22 ENCOUNTER — APPOINTMENT (OUTPATIENT)
Dept: PREADMISSION TESTING | Facility: HOSPITAL | Age: 82
End: 2017-11-22

## 2017-11-22 ENCOUNTER — HOSPITAL ENCOUNTER (OUTPATIENT)
Dept: GENERAL RADIOLOGY | Facility: HOSPITAL | Age: 82
Discharge: HOME | End: 2017-11-22
Admitting: SURGERY

## 2017-11-22 VITALS
BODY MASS INDEX: 20.84 KG/M2 | HEIGHT: 64 IN | OXYGEN SATURATION: 96 % | HEART RATE: 82 BPM | WEIGHT: 122.1 LBS | RESPIRATION RATE: 16 BRPM | SYSTOLIC BLOOD PRESSURE: 115 MMHG | DIASTOLIC BLOOD PRESSURE: 64 MMHG | TEMPERATURE: 97.1 F

## 2017-11-22 LAB
ALBUMIN SERPL-MCNC: 4.3 G/DL (ref 3.5–5.2)
ALBUMIN/GLOB SERPL: 1.4 G/DL
ALP SERPL-CCNC: 90 U/L (ref 39–117)
ALT SERPL W P-5'-P-CCNC: 12 U/L (ref 1–33)
ANION GAP SERPL CALCULATED.3IONS-SCNC: 11.8 MMOL/L
APTT PPP: 26.6 SECONDS (ref 22.7–35.4)
AST SERPL-CCNC: 16 U/L (ref 1–32)
BILIRUB SERPL-MCNC: 0.4 MG/DL (ref 0.1–1.2)
BILIRUB UR QL STRIP: NEGATIVE
BUN BLD-MCNC: 14 MG/DL (ref 8–23)
BUN/CREAT SERPL: 18.2 (ref 7–25)
CALCIUM SPEC-SCNC: 9.6 MG/DL (ref 8.6–10.5)
CHLORIDE SERPL-SCNC: 101 MMOL/L (ref 98–107)
CLARITY UR: CLEAR
CO2 SERPL-SCNC: 27.2 MMOL/L (ref 22–29)
COLOR UR: YELLOW
CREAT BLD-MCNC: 0.77 MG/DL (ref 0.57–1)
DEPRECATED RDW RBC AUTO: 45.8 FL (ref 37–54)
ERYTHROCYTE [DISTWIDTH] IN BLOOD BY AUTOMATED COUNT: 14.1 % (ref 11.7–13)
GFR SERPL CREATININE-BSD FRML MDRD: 72 ML/MIN/1.73
GLOBULIN UR ELPH-MCNC: 3 GM/DL
GLUCOSE BLD-MCNC: 115 MG/DL (ref 65–99)
GLUCOSE UR STRIP-MCNC: NEGATIVE MG/DL
HCT VFR BLD AUTO: 38.2 % (ref 35.6–45.5)
HGB BLD-MCNC: 12.9 G/DL (ref 11.9–15.5)
HGB UR QL STRIP.AUTO: NEGATIVE
INR PPP: 0.91 (ref 0.9–1.1)
KETONES UR QL STRIP: NEGATIVE
LEUKOCYTE ESTERASE UR QL STRIP.AUTO: NEGATIVE
MCH RBC QN AUTO: 30.2 PG (ref 26.9–32)
MCHC RBC AUTO-ENTMCNC: 33.8 G/DL (ref 32.4–36.3)
MCV RBC AUTO: 89.5 FL (ref 80.5–98.2)
NITRITE UR QL STRIP: NEGATIVE
PH UR STRIP.AUTO: 7 [PH] (ref 5–8)
PLATELET # BLD AUTO: 280 10*3/MM3 (ref 140–500)
PMV BLD AUTO: 9.4 FL (ref 6–12)
POTASSIUM BLD-SCNC: 4.1 MMOL/L (ref 3.5–5.2)
PROT SERPL-MCNC: 7.3 G/DL (ref 6–8.5)
PROT UR QL STRIP: NEGATIVE
PROTHROMBIN TIME: 11.9 SECONDS (ref 11.7–14.2)
RBC # BLD AUTO: 4.27 10*6/MM3 (ref 3.9–5.2)
SODIUM BLD-SCNC: 140 MMOL/L (ref 136–145)
SP GR UR STRIP: 1.01 (ref 1–1.03)
UROBILINOGEN UR QL STRIP: NORMAL
WBC NRBC COR # BLD: 5.83 10*3/MM3 (ref 4.5–10.7)

## 2017-11-22 PROCEDURE — 36415 COLL VENOUS BLD VENIPUNCTURE: CPT

## 2017-11-22 PROCEDURE — 85027 COMPLETE CBC AUTOMATED: CPT | Performed by: SURGERY

## 2017-11-22 PROCEDURE — 80053 COMPREHEN METABOLIC PANEL: CPT | Performed by: SURGERY

## 2017-11-22 PROCEDURE — 93005 ELECTROCARDIOGRAM TRACING: CPT

## 2017-11-22 PROCEDURE — 71020 HC CHEST PA AND LATERAL: CPT

## 2017-11-22 PROCEDURE — 85610 PROTHROMBIN TIME: CPT | Performed by: SURGERY

## 2017-11-22 PROCEDURE — 81003 URINALYSIS AUTO W/O SCOPE: CPT | Performed by: SURGERY

## 2017-11-22 PROCEDURE — 85730 THROMBOPLASTIN TIME PARTIAL: CPT | Performed by: SURGERY

## 2017-11-22 PROCEDURE — 93010 ELECTROCARDIOGRAM REPORT: CPT | Performed by: INTERNAL MEDICINE

## 2017-11-22 NOTE — DISCHARGE INSTRUCTIONS
Take the following medications the morning of surgery with a small sip of water:  OMEPRAZOLE, ASPIRIN (PER DR. BAIRD)    PLEASE ARRIVE AT HOSPITAL AT 05:30 AM ON November 24, 2017      General Instructions:  • Do not eat solid food after midnight the night before surgery.  • You may drink clear liquids day of surgery but must stop at least one hour before your hospital arrival time.  • It is beneficial for you to have a clear drink that contains carbohydrates the day of surgery.  We suggest a 12 to 20 ounce bottle of Gatorade or Powerade for non-diabetic patients or a 12 to 20 ounce bottle of G2 or Powerade Zero for diabetic patients. (Pediatric patients, are not advised to drink a 12 to 20 ounce carbohydrate drink)    Clear liquids are liquids you can see through.  Nothing red in color.     Plain water                               Sports drinks  Sodas                                   Gelatin (Jell-O)  Fruit juices without pulp such as white grape juice and apple juice  Popsicles that contain no fruit or yogurt  Tea or coffee (no cream or milk added)  Gatorade / Powerade  G2 / Powerade Zero    • Infants may have breast milk up to four hours before surgery.  • Infants drinking formula may drink formula up to six hours before surgery.   • Patients who avoid smoking, chewing tobacco and alcohol for 4 weeks prior to surgery have a reduced risk of post-operative complications.  Quit smoking as many days before surgery as you can.  • Do not smoke, use chewing tobacco or drink alcohol the day of surgery.   • If applicable bring your C-PAP/ BI-PAP machine.  • Bring any papers given to you in the doctor’s office.  • Wear clean comfortable clothes and socks.  • Do not wear contact lenses or make-up.  Bring a case for your glasses.   • Bring crutches or walker if applicable.  • Remove all piercings.  Leave jewelry and any other valuables at home.  • The Pre-Admission Testing nurse will instruct you to bring medications  if unable to obtain an accurate list in Pre-Admission Testing.        If you were given a blood bank ID arm band remember to bring it with you the day of surgery.    Preventing a Surgical Site Infection:  • For 2 to 3 days before surgery, avoid shaving with a razor because the razor can irritate skin and make it easier to develop an infection.  • The night prior to surgery sleep in a clean bed with clean clothing.  Do not allow pets to sleep with you.  • Shower on the morning of surgery using a fresh bar of anti-bacterial soap (such as Dial) and clean washcloth.  Dry with a clean towel and dress in clean clothing.  • Ask your surgeon if you will be receiving antibiotics prior to surgery.  • Make sure you, your family, and all healthcare providers clean their hands with soap and water or an alcohol based hand  before caring for you or your wound.    Day of surgery:  Upon arrival, a Pre-op nurse and Anesthesiologist will review your health history, obtain vital signs, and answer questions you may have.  The only belongings needed at this time will be your home medications and if applicable your C-PAP/BI-PAP machine.  If you are staying overnight your family can leave the rest of your belongings in the car and bring them to your room later.  A Pre-op nurse will start an IV and you may receive medication in preparation for surgery, including something to help you relax.  Your family will be able to see you in the Pre-op area.  While you are in surgery your family should notify the waiting room  if they leave the waiting room area and provide a contact phone number.    Please be aware that surgery does come with discomfort.  We want to make every effort to control your discomfort so please discuss any uncontrolled symptoms with your nurse.   Your doctor will most likely have prescribed pain medications.      If you are going home after surgery you will receive individualized written care instructions  before being discharged.  A responsible adult must drive you to and from the hospital on the day of your surgery and stay with you for 24 hours.    If you are staying overnight following surgery, you will be transported to your hospital room following the recovery period.  [facility] has all private rooms.    If you have any questions please call Pre-Admission Testing at [phone].  Deductibles and co-payments are collected on the day of service. Please be prepared to pay the required co-pay, deductible or deposit on the day of service as defined by your plan.

## 2017-11-24 ENCOUNTER — APPOINTMENT (OUTPATIENT)
Dept: GENERAL RADIOLOGY | Facility: HOSPITAL | Age: 82
End: 2017-11-24

## 2017-11-24 ENCOUNTER — ANESTHESIA EVENT (OUTPATIENT)
Dept: PERIOP | Facility: HOSPITAL | Age: 82
End: 2017-11-24

## 2017-11-24 ENCOUNTER — ANESTHESIA (OUTPATIENT)
Dept: PERIOP | Facility: HOSPITAL | Age: 82
End: 2017-11-24

## 2017-11-24 ENCOUNTER — HOSPITAL ENCOUNTER (OUTPATIENT)
Facility: HOSPITAL | Age: 82
Setting detail: HOSPITAL OUTPATIENT SURGERY
Discharge: HOME | End: 2017-11-24
Attending: SURGERY | Admitting: SURGERY

## 2017-11-24 VITALS
WEIGHT: 120.44 LBS | TEMPERATURE: 97.5 F | HEIGHT: 64 IN | SYSTOLIC BLOOD PRESSURE: 159 MMHG | DIASTOLIC BLOOD PRESSURE: 75 MMHG | BODY MASS INDEX: 20.56 KG/M2 | RESPIRATION RATE: 16 BRPM | HEART RATE: 76 BPM | OXYGEN SATURATION: 98 %

## 2017-11-24 PROBLEM — I70.221 ATHEROSCLEROSIS OF NATIVE ARTERIES OF EXTREMITIES WITH REST PAIN, RIGHT LEG (HCC): Status: ACTIVE | Noted: 2017-07-19

## 2017-11-24 PROCEDURE — 25010000002 HEPARIN (PORCINE) PER 1000 UNITS: Performed by: SURGERY

## 2017-11-24 PROCEDURE — C1725 CATH, TRANSLUMIN NON-LASER: HCPCS | Performed by: SURGERY

## 2017-11-24 PROCEDURE — 25010000002 PROPOFOL 10 MG/ML EMULSION: Performed by: NURSE ANESTHETIST, CERTIFIED REGISTERED

## 2017-11-24 PROCEDURE — C1887 CATHETER, GUIDING: HCPCS | Performed by: SURGERY

## 2017-11-24 PROCEDURE — C1894 INTRO/SHEATH, NON-LASER: HCPCS | Performed by: SURGERY

## 2017-11-24 PROCEDURE — C1769 GUIDE WIRE: HCPCS | Performed by: SURGERY

## 2017-11-24 PROCEDURE — C1760 CLOSURE DEV, VASC: HCPCS | Performed by: SURGERY

## 2017-11-24 PROCEDURE — 75710 ARTERY X-RAYS ARM/LEG: CPT

## 2017-11-24 PROCEDURE — C2623 CATH, TRANSLUMIN, DRUG-COAT: HCPCS

## 2017-11-24 PROCEDURE — 25010000002 HEPARIN (PORCINE) PER 1000 UNITS: Performed by: NURSE ANESTHETIST, CERTIFIED REGISTERED

## 2017-11-24 PROCEDURE — 0 IOPAMIDOL PER 1 ML: Performed by: SURGERY

## 2017-11-24 PROCEDURE — 25010000002 PROTAMINE SULFATE PER 10 MG: Performed by: NURSE ANESTHETIST, CERTIFIED REGISTERED

## 2017-11-24 PROCEDURE — 85347 COAGULATION TIME ACTIVATED: CPT

## 2017-11-24 PROCEDURE — C1751 CATH, INF, PER/CENT/MIDLINE: HCPCS | Performed by: SURGERY

## 2017-11-24 RX ORDER — PROTAMINE SULFATE 10 MG/ML
INJECTION, SOLUTION INTRAVENOUS AS NEEDED
Status: DISCONTINUED | OUTPATIENT
Start: 2017-11-24 | End: 2017-11-24 | Stop reason: SURG

## 2017-11-24 RX ORDER — EPHEDRINE SULFATE 50 MG/ML
5 INJECTION, SOLUTION INTRAVENOUS ONCE AS NEEDED
Status: DISCONTINUED | OUTPATIENT
Start: 2017-11-24 | End: 2017-11-24 | Stop reason: HOSPADM

## 2017-11-24 RX ORDER — SODIUM CHLORIDE 0.9 % (FLUSH) 0.9 %
1-10 SYRINGE (ML) INJECTION AS NEEDED
Status: DISCONTINUED | OUTPATIENT
Start: 2017-11-24 | End: 2017-11-24 | Stop reason: HOSPADM

## 2017-11-24 RX ORDER — NALOXONE HCL 0.4 MG/ML
0.2 VIAL (ML) INJECTION AS NEEDED
Status: DISCONTINUED | OUTPATIENT
Start: 2017-11-24 | End: 2017-11-24 | Stop reason: HOSPADM

## 2017-11-24 RX ORDER — PROPOFOL 10 MG/ML
VIAL (ML) INTRAVENOUS CONTINUOUS PRN
Status: DISCONTINUED | OUTPATIENT
Start: 2017-11-24 | End: 2017-11-24 | Stop reason: SURG

## 2017-11-24 RX ORDER — LIDOCAINE HYDROCHLORIDE 20 MG/ML
INJECTION, SOLUTION INFILTRATION; PERINEURAL AS NEEDED
Status: DISCONTINUED | OUTPATIENT
Start: 2017-11-24 | End: 2017-11-24 | Stop reason: SURG

## 2017-11-24 RX ORDER — FLUMAZENIL 0.1 MG/ML
0.2 INJECTION INTRAVENOUS AS NEEDED
Status: DISCONTINUED | OUTPATIENT
Start: 2017-11-24 | End: 2017-11-24 | Stop reason: HOSPADM

## 2017-11-24 RX ORDER — FENTANYL CITRATE 50 UG/ML
50 INJECTION, SOLUTION INTRAMUSCULAR; INTRAVENOUS
Status: DISCONTINUED | OUTPATIENT
Start: 2017-11-24 | End: 2017-11-24 | Stop reason: HOSPADM

## 2017-11-24 RX ORDER — FAMOTIDINE 10 MG/ML
20 INJECTION, SOLUTION INTRAVENOUS ONCE
Status: COMPLETED | OUTPATIENT
Start: 2017-11-24 | End: 2017-11-24

## 2017-11-24 RX ORDER — CLINDAMYCIN PHOSPHATE 600 MG/50ML
600 INJECTION INTRAVENOUS ONCE
Status: COMPLETED | OUTPATIENT
Start: 2017-11-24 | End: 2017-11-24

## 2017-11-24 RX ORDER — IBUPROFEN 400 MG/1
400 TABLET ORAL EVERY 6 HOURS PRN
COMMUNITY
End: 2022-11-19 | Stop reason: HOSPADM

## 2017-11-24 RX ORDER — SODIUM CHLORIDE, SODIUM LACTATE, POTASSIUM CHLORIDE, CALCIUM CHLORIDE 600; 310; 30; 20 MG/100ML; MG/100ML; MG/100ML; MG/100ML
9 INJECTION, SOLUTION INTRAVENOUS CONTINUOUS
Status: DISCONTINUED | OUTPATIENT
Start: 2017-11-24 | End: 2017-11-24 | Stop reason: HOSPADM

## 2017-11-24 RX ORDER — LABETALOL HYDROCHLORIDE 5 MG/ML
5 INJECTION, SOLUTION INTRAVENOUS
Status: DISCONTINUED | OUTPATIENT
Start: 2017-11-24 | End: 2017-11-24 | Stop reason: HOSPADM

## 2017-11-24 RX ORDER — HYDRALAZINE HYDROCHLORIDE 20 MG/ML
5 INJECTION INTRAMUSCULAR; INTRAVENOUS
Status: DISCONTINUED | OUTPATIENT
Start: 2017-11-24 | End: 2017-11-24 | Stop reason: HOSPADM

## 2017-11-24 RX ORDER — HEPARIN SODIUM 1000 [USP'U]/ML
INJECTION, SOLUTION INTRAVENOUS; SUBCUTANEOUS AS NEEDED
Status: DISCONTINUED | OUTPATIENT
Start: 2017-11-24 | End: 2017-11-24 | Stop reason: SURG

## 2017-11-24 RX ORDER — ONDANSETRON 2 MG/ML
4 INJECTION INTRAMUSCULAR; INTRAVENOUS ONCE AS NEEDED
Status: DISCONTINUED | OUTPATIENT
Start: 2017-11-24 | End: 2017-11-24 | Stop reason: HOSPADM

## 2017-11-24 RX ADMIN — IOPAMIDOL 103.8 ML: 510 INJECTION, SOLUTION INTRAVASCULAR at 08:30

## 2017-11-24 RX ADMIN — HEPARIN SODIUM 5000 UNITS: 1000 INJECTION, SOLUTION INTRAVENOUS; SUBCUTANEOUS at 08:42

## 2017-11-24 RX ADMIN — PROTAMINE SULFATE 40 MG: 10 INJECTION, SOLUTION INTRAVENOUS at 09:47

## 2017-11-24 RX ADMIN — HEPARIN SODIUM 5000 UNITS: 1000 INJECTION, SOLUTION INTRAVENOUS; SUBCUTANEOUS at 08:22

## 2017-11-24 RX ADMIN — LIDOCAINE HYDROCHLORIDE 100 MG: 20 INJECTION, SOLUTION INFILTRATION; PERINEURAL at 07:48

## 2017-11-24 RX ADMIN — PROPOFOL 100 MCG/KG/MIN: 10 INJECTION, EMULSION INTRAVENOUS at 07:47

## 2017-11-24 RX ADMIN — SODIUM CHLORIDE, POTASSIUM CHLORIDE, SODIUM LACTATE AND CALCIUM CHLORIDE: 600; 310; 30; 20 INJECTION, SOLUTION INTRAVENOUS at 07:39

## 2017-11-24 RX ADMIN — CLINDAMYCIN PHOSPHATE 600 MG: 12 INJECTION, SOLUTION INTRAVENOUS at 07:45

## 2017-11-24 RX ADMIN — FAMOTIDINE 20 MG: 10 INJECTION, SOLUTION INTRAVENOUS at 07:14

## 2017-11-24 NOTE — ANESTHESIA POSTPROCEDURE EVALUATION
Patient: Lourdes Platt    Procedure Summary     Date Anesthesia Start Anesthesia Stop Room / Location    11/24/17 0738 1015  [facility]       Procedure Diagnosis Provider Provider    LEFT FEMORAL APPROACH AIF RIGHT LEG ARTERIOGRAM  RIGHT FEMORAL POPLITEAL ARTERY WITH DRUG COATED BALLOON ANGIOPLASTY (Right ) Atherosclerosis of native artery of right leg with rest pain MD Lacey Kiran MD          Anesthesia Type: MAC  Last vitals  BP   131/74 (11/24/17 1130)   Temp   36.4 °C (97.5 °F) (11/24/17 1030)   Pulse   72 (11/24/17 1130)   Resp   16 (11/24/17 1130)     SpO2   98 % (11/24/17 1130)     Post Anesthesia Care and Evaluation    Patient location during evaluation: PACU  Patient participation: complete - patient participated  Level of consciousness: awake and alert  Pain management: adequate  Airway patency: patent  Anesthetic complications: No anesthetic complications    Cardiovascular status: acceptable  Respiratory status: acceptable  Hydration status: acceptable

## 2017-11-24 NOTE — PLAN OF CARE
Problem: Patient Care Overview (Adult)  Goal: Plan of Care Review  Outcome: Outcome(s) achieved Date Met:  11/24/17  Goal: Adult Individualization and Mutuality  Outcome: Outcome(s) achieved Date Met:  11/24/17  Goal: Discharge Needs Assessment  Outcome: Outcome(s) achieved Date Met:  11/24/17    Problem: Perioperative Period (Adult)  Goal: Signs and Symptoms of Listed Potential Problems Will be Absent or Manageable (Perioperative Period)  Outcome: Outcome(s) achieved Date Met:  11/24/17 11/24/17 1344   Perioperative Period   Problems Assessed (Perioperative Period) pain;wound complications;hemorrhage;perioperative injury;infection;situational response;physiologic stress response   Problems Present (Perioperative Period) pain

## 2017-11-24 NOTE — PERIOPERATIVE NURSING NOTE
Dr. Sadler at bedside to speak with patient and family. Discussed surgery and findings. Wants patient to have a one week follow-up appointment. Flat Bedrest for 2 hours, and can discharge home an hour afterwards. Remove Phillips once Patient is off Bedrest. No lifting more than 20 pounds.

## 2017-11-24 NOTE — INTERVAL H&P NOTE
H&P updated. The patient was examined and the following changes are noted:  Also discussed with patient and family the possibility of laser tibial artery angioplasty to improve perfusion of occluded tibial peroneal trunk with single-vessel peroneal artery runoff.  Also discussed that if cannot improve blood flow only option would be major alone amputation in this patient with no adequate arm or leg vein and understand the above the risks of the procedure and agreed to proceed.

## 2017-11-24 NOTE — PLAN OF CARE
"Problem: Patient Care Overview (Adult)  Goal: Plan of Care Review  Outcome: Ongoing (interventions implemented as appropriate)    11/24/17 0642   Coping/Psychosocial Response Interventions   Plan Of Care Reviewed With patient   Patient Care Overview   Progress progress toward functional goals as expected       Goal: Adult Individualization and Mutuality  Outcome: Ongoing (interventions implemented as appropriate)    11/24/17 0642   Individualization   Patient Specific Preferences PT PREFERS TO BE CALLED \"WIN\"    Patient Specific Goals TO BE RELAXED FOR SX. TODAY.   Patient Specific Interventions TO GIVE RELAXING MED PER AA ORDER   Mutuality/Individual Preferences   What Anxieties, Fears or Concerns Do You Have About Your Health or Care? NONE AT THIS TIME.    What Questions Do You Have About Your Health or Care? NONE AT THIS TIME.   What Information Would Help Us Give You More Personalized Care? SEE ABOVE       Goal: Discharge Needs Assessment  Outcome: Ongoing (interventions implemented as appropriate)    Problem: Perioperative Period (Adult)  Goal: Signs and Symptoms of Listed Potential Problems Will be Absent or Manageable (Perioperative Period)  Outcome: Ongoing (interventions implemented as appropriate)    11/24/17 0642   Perioperative Period   Problems Assessed (Perioperative Period) all   Problems Present (Perioperative Period) pain           "

## 2017-11-24 NOTE — PERIOPERATIVE NURSING NOTE
DR BAIRD HERE, ASKED HIM IF HE WANTED PT TO HAVE AN ANTIBIOTIC TODAY, NEW ORDERS NOTED. ALSO LET HIM KNOW WE WERE NOT ABLE TO DOPPLER A DP PULSE IN RIGHT FOOT, HE STATES OK.

## 2017-11-24 NOTE — ANESTHESIA PREPROCEDURE EVALUATION
Anesthesia Evaluation     history of anesthetic complications:         Airway   Mallampati: III  TM distance: >3 FB  Neck ROM: limited  no difficulty expected  Dental - normal exam     Pulmonary    (+) shortness of breath,   Cardiovascular - normal exam    (+) hypertension, PVD,       Neuro/Psych  (+) CVA, headaches,    GI/Hepatic/Renal/Endo    (+)  GERD,     Musculoskeletal     Abdominal    Substance History      OB/GYN          Other   (+) arthritis                                     Anesthesia Plan    ASA 3     MAC     Anesthetic plan and risks discussed with patient.

## 2017-11-24 NOTE — OP NOTE
ATHRECTOMY ILIAC, FEMORAL, TIBIAL ARTERY WITH POSSIBLE STENT  Procedure Note    Lourdes Platt  Admission date: 11/24/2017  Date of operation: 11/24/2017    Pre-op Diagnosis:      * Atherosclerosis of native artery of right leg with rest pain [I70.221]    Post-Op Diagnosis Codes:     * Atherosclerosis of native artery of right leg with rest pain [I70.221]    Assistants:  Stewart Ocasio MD    Anesthesia: Monitor Anesthesia Care    Staff:   Circulator: Karen Castaneda RN  Laser Staff: Rickie Burnette V  Scrub Person: Raul Lambert  Vascular Radiology Technician: Wilfred Stein    Indications:  Pleasant female with severe ischemia rest pain right lower extremity.  She is for right leg arteriogram possible angioplasty stent or laser atherectomy.  Plans and risks discussed and agrees to proceed.       Procedure Details both groins prepped and draped in usual fashion.  1% Xylocaine with Marcaine was used for local anesthesia left groin.  Ultrasound-guided access left common femoral artery performed without difficulty with micropuncture technique.  Left iliofemoral arteriogram in oblique view confirmed puncture common femoral artery. 5 Malawian sheath placed left iliofemoral artery and flushed with heparin saline.  Straight multi-sidehole catheter placed into the aorta.  Aortoiliofemoral arteriogram performed.  RIM catheter and angled Glidewire utilized for selective catheterization right femoral artery.  Straight multi-sidehole catheter advanced to the right external iliac artery.  Right leg runoff arteriogram performed.  This showed new occlusion of the mid right superficial femoral artery.  Diseased popliteal artery behind the reconstituted.  Tibial peroneal trunk remained occluded.  Mid calf peroneal artery reconstituted as so collateral was only collaterals in foot.  Patient given 10,000 units of heparin to get ACT twice normal.  5 Malawian sheath removed from left femoral artery and 6 Malawian 60 cm destination sheath  placed over the advantage wire placed and right femoral artery into proximal right superficial femoral artery.  A combination of straight stiff Glidewire and angled navicross catheter utilized to cross total occlusion of right superficial femoral artery with wire advanced into popliteal artery.  Arteriogram through catheter in op material artery confirmed intraluminal position across occlusion.  Repeat arteriogram with catheter in the popliteal artery below-knee confirmed total occlusion tibial peroneal trunk with peroneal reconstituting the catheter.  With use of was per wire and narrow cross catheter we attempted to cross the occlusion of tibial peroneal trunk.  With this maneuver we did go subintimal and perforating branch of the peroneal artery with arteriogram.  The catheters and wires were removed from the tibial arteries because of this and we stopped attempted crossing total occlusion tibial peroneal trunk.  We then proceeded with drug-coated balloon angioplasty of the occluded and diseased popliteal artery and superficial femoral artery up to the proximal third of the thigh.  3 minute inflation times were each drug-coated balloon angioplasty was performed.  Repeat arteriogram following this showed patent superficial femoral and popliteal artery without residual stenoses.  The tibial peroneal trunk remained occluded with collaterals reconstituting the peroneal artery mid and distal calf with collaterals into the foot.  The wires and sheaths were redirected into the aorta.  The destination sheath was removed and a short 6 Bengali sheath was placed left iliofemoral artery.  A minx device was utilized for closure of the left common femoral artery puncture.  The heparin was reversed with 40 mg of protamine.  With 10 minutes of pressure hemostasis achieved left groin.  Dressing applied.  Patient had Doppler pedal pulses bilaterally at completion.  The right calf was soft without evidence of hematoma.  Patient  tolerated procedure well and taken to recovery area in stable condition.    Radiographic interpretation: Total occlusion mid and distal superficial femoral artery and above-knee popliteal artery.  Total occlusion tibial peroneal trunk with peroneal artery runoff midcalf to ankle with collaterals only and foot.  No residual stenoses after drug coated balloon angioplasty of superficial femoral and popliteal arteries.    Findings: See above    Estimated Blood Loss: 900 mL    Specimens: * No orders in the log *      Drains:   Urethral Catheter 11/24/17 0755 100% silicone (Active)   Daily Indications < 24 hr post op 11/24/2017 10:12 AM   Securement secured to upper leg with adhesive device 11/24/2017 10:12 AM           Complications: Subintimal dissection and branch perforation peroneal artery.     Condition: stable    Disposition: To recovery    Keny Sadler MD     Date: 11/24/2017  Time: 10:27 AM    There are no hospital problems to display for this patient.

## 2017-11-27 LAB
ACT BLD: 186 SECONDS (ref 82–152)
ACT BLD: 219 SECONDS (ref 82–152)
ACT BLD: 340 SECONDS (ref 82–152)

## 2017-12-01 PROCEDURE — C2623 CATH, TRANSLUMIN, DRUG-COAT: HCPCS

## 2019-12-10 NOTE — DISCHARGE INSTRUCTIONS
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   Follow-up MRI has improved and is now stabilized  Continue to follow up with Hematology Oncology      Patient was provided medication, adherence and prevention education    Subjective:  Routine follow-up for HIV  Patient claims 100% adherence with Prezcobix/Tivicay/Descovy  Patient denies any notable side effects  Overall the feeling well  The patient denies any fever chills or sweats, denies any nausea vomiting or diarrhea, denies any cough or shortness of breath  ROS: A complete 12 point ROS is negative other than that noted in the HPI    Followup portions patient history reviewed and updated as: Allergies, current medications, past medical history, past social history, past surgical history, and the problem list    Objective:  Vitals:  Vitals:    12/10/19 1605   BP: 110/80   Pulse: 97   Temp: 98 8 °F (37 1 °C)   SpO2: 98%   Weight: 77 3 kg (170 lb 6 4 oz)   Height: 5' 1" (1 549 m)       Physical Exam:   General Appearance:  Alert, interactive, appearing well,  nontoxic, no acute distress  Neck:   Supple without lymphadenopathy, no thyromegaly or masses   Throat: Oropharynx moist without lesions  Lungs:   Clear to auscultation bilaterally; no wheezes, rhonchi or rales; respirations unlabored   Heart:  RRR; no murmur, rub or gallop   Abdomen:   Soft, non-tender, non-distended, positive bowel sounds       Extremities: No clubbing, cyanosis or [facility]  Juaquin Garcia, Anthony Caro Rachel, Scherrer, Thomas  [address]  [place] [address]  [phone]      Discharge Instructions for Arteriogram/Balloon Angioplasty    1. Go home, rest and take it easy today.      2. You may experience some dizziness or memory loss from the anesthesia.  This may last for the next 24 hours.  Someone should plan on staying with you for the first 24 hours for your safety.    3. Do not make any important legal decisions or sign any legal papers for the next 24 hours.      4. Eat and drink lightly today.  Start off with liquids, jello, soup, crackers or other bland foods at first. You may advance your diet tomorrow as tolerated as long as you do not experience any nausea or vomiting.    5. You may resume routine medications including blood thinners. However, Glucophage should be not be started for 72 hours after the dye is given.      6. No lifting over 10 pounds and no strenuous activity for the next 2-3 days.    7. Try not to strain or bear down when your bowels move or when you empty your bladder.    8. Limit going up and down steps for 2 days.    9. No driving for the remainder of the day.  You may resume limited driving tomorrow if necessary.     10.  You may shower tomorrow.  No bath or hot tubs for at least 2 days after the procedure.          11. Leave the pressure dressing on until tomorrow morning.  You may then take this off, as well as the small see through dressing with gauze tomorrow.  If it doesn’t come off easily, do not pull this off.  If it is stuck, shower and let the warm water loosen it before removal.       12. Check your groin dressing regularly for bleeding through the dressing (under the pressure dressing).  A small amount of blood contained by the gauze is normal; the whole dressing should not be filled with blood or leaking out under the sides.     13. If you experience bleeding through the gauze/clear sticky  dressing, lay flat and have someone apply direct pressure for 15 minutes.  If bleeding has stopped after this, you may put a clean gauze and tape over the area.  Continue to lie flat for 1-2 hours.  If bleeding continues after 15 minutes of pressure, call us at the number listed above.  There is an MD available after hours.      14. If you experience heavy bleeding or large swelling, continue to hold firm pressure and              call 911.  Do not call the MD on call.     15.  If you experience pain or discomfort you may take Tylenol or Ibuprofen, whichever you normally use for minor discomfort, unless otherwise instructed.       16.  If the MD gives you a prescription for narcotic pain pills (Tylenol #3, Vicodin, Hydrocodone, Oxycodone or Percocet), you cannot drive a vehicle or operate machinery while taking these.    17.  Severe pain is not expected after this procedure.  If you experience severe pain, please call our office at [phone].    18.  Remember to contact our office for any of the following:    • Fever > 101 degrees  • Severe pain that cannot be controlled by taking your pain pills  • Severe nausea or vomiting   • Significant bleeding of your incisions  • Drainage that has a bad smell or is yellow or green in appearance  • Any other questions or concerns        Moderate Conscious Sedation, Adult, Care After  Refer to this sheet in the next few weeks. These instructions provide you with information on caring for yourself after your procedure. Your health care provider may also give you more specific instructions. Your treatment has been planned according to current medical practices, but problems sometimes occur. Call your health care provider if you have any problems or questions after your procedure.  WHAT TO EXPECT AFTER THE PROCEDURE   After your procedure:  · You may feel sleepy, clumsy, and have poor balance for several hours.  · Vomiting may occur if you eat too soon after the procedure.  HOME  edema   Skin: No new rashes or lesions  No draining wounds noted  Labs, Imaging, & Other studies:   All pertinent labs and imaging studies were personally reviewed    Lab Results   Component Value Date     06/16/2017    K 4 2 11/02/2019     11/02/2019    CO2 23 11/02/2019    ANIONGAP 6 06/11/2015    BUN 16 11/02/2019    CREATININE 0 93 11/02/2019    GLUCOSE 154 (H) 07/01/2017    GLUF 118 (H) 11/02/2019    CALCIUM 9 1 11/02/2019    AST 17 11/02/2019    ALT 33 11/02/2019    ALKPHOS 86 11/02/2019    PROT 7 2 06/16/2017    BILITOT 0 2 06/16/2017    EGFR 79 11/02/2019     Lab Results   Component Value Date    WBC 5 27 11/02/2019    WBC 5 3 11/02/2019    HGB 15 5 (H) 11/02/2019    HGB 15 6 11/02/2019    HCT 46 6 (H) 11/02/2019    HCT 48 1 (H) 11/02/2019    MCV 89 11/02/2019    MCV 91 11/02/2019     11/02/2019     11/02/2019     Lab Results   Component Value Date    HEPCAB Non-reactive 05/10/2019     Lab Results   Component Value Date    HAV Non-reactive 08/16/2019    HEPAIGM Non-reactive 04/28/2017    HEPBIGM Non-reactive 04/28/2017    HEPCAB Non-reactive 05/10/2019     Lab Results   Component Value Date    RPR Non-Reactive 08/16/2019     CD4 ABS   Date/Time Value Ref Range Status   11/02/2019 10:44  (L) 359 - 1519 /uL Final     HIV-1 RNA by PCR, Qn   Date/Time Value Ref Range Status   11/02/2019 10:44 AM <20 copies/mL Final     Comment:     HIV-1 RNA detected  The reportable range for this assay is 20 to 10,000,000  copies HIV-1 RNA/mL             Current Outpatient Medications:     Acetaminophen 325 MG CAPS, Take 2 tablets by mouth every 6 (six) hours as needed, Disp: , Rfl:     nystatin (MYCOSTATIN) powder, Apply topically 3 (three) times a day, Disp: 15 g, Rfl: 1    dabigatran etexilate (PRADAXA) 150 mg capsu, Take 1 capsule (150 mg total) by mouth 2 (two) times a day, Disp: 180 capsule, Rfl: 5    Darunavir-Cobicistat (PREZCOBIX) 800-150 MG TABS, Take 1 tablet by mouth daily, CARE INSTRUCTIONS  · Do not participate in any activities where you could become injured for at least 24 hours. Do not:    Drive.    Swim.    Ride a bicycle.    Operate heavy machinery.    Cook.    Use power tools.    Climb ladders.    Work from a high place.  · Do not make important decisions or sign legal documents until you are improved.  · If you vomit, drink water, juice, or soup when you can drink without vomiting. Make sure you have little or no nausea before eating solid foods.  · Only take over-the-counter or prescription medicines for pain, discomfort, or fever as directed by your health care provider.  · Make sure you and your family fully understand everything about the medicines given to you, including what side effects may occur.  · You should not drink alcohol, take sleeping pills, or take medicines that cause drowsiness for at least 24 hours.  · If you smoke, do not smoke without supervision.  · If you are feeling better, you may resume normal activities 24 hours after you were sedated.  · Keep all appointments with your health care provider.  · You should have a responsible adult stay with you for the first 24 hours post procedure.  SEEK MEDICAL CARE IF:  · Your skin is pale or bluish in color.  · You continue to feel nauseous or vomit.  · Your pain is getting worse and is not helped by medicine.  · You have bleeding or swelling.  · You are still sleepy or feeling clumsy after 24 hours.  SEEK IMMEDIATE MEDICAL CARE IF:  · You develop a rash.  · You have difficulty breathing.  · You develop any type of allergic problem.  · You have a fever.  MAKE SURE YOU:  · Understand these instructions.  · Will watch your condition.  · Will get help right away if you are not doing well or get worse.     This information is not intended to replace advice given to you by your health care provider. Make sure you discuss any questions you have with your health care provider.     Document Released: 10/08/2014 Document  Disp: 30 tablet, Rfl: 5    dolutegravir (TIVICAY) 50 MG TABS, Take 1 tablet (50 mg total) by mouth daily, Disp: 30 tablet, Rfl: 5    emtricitabine-tenofovir AF (DESCOVY) 200-25 MG tablet, Take 1 tablet by mouth daily, Disp: 30 tablet, Rfl: 5 Revised: 01/08/2016 Document Reviewed: 10/08/2014  Elsevier Interactive Patient Education ©2016 [employer]

## 2022-08-26 ENCOUNTER — TRANSCRIBE ORDERS (OUTPATIENT)
Dept: ADMINISTRATIVE | Facility: HOSPITAL | Age: 87
End: 2022-08-26

## 2022-08-26 DIAGNOSIS — I73.9 PAD (PERIPHERAL ARTERY DISEASE): Primary | ICD-10-CM

## 2022-08-31 ENCOUNTER — HOSPITAL ENCOUNTER (OUTPATIENT)
Dept: CT IMAGING | Facility: HOSPITAL | Age: 87
Discharge: HOME | End: 2022-08-31
Admitting: SURGERY

## 2022-08-31 DIAGNOSIS — I73.9 PAD (PERIPHERAL ARTERY DISEASE): ICD-10-CM

## 2022-08-31 LAB — CREAT BLDA-MCNC: 1.1 MG/DL (ref 0.6–1.3)

## 2022-08-31 PROCEDURE — 25010000002 IOPAMIDOL 61 % SOLUTION: Performed by: SURGERY

## 2022-08-31 PROCEDURE — 82565 ASSAY OF CREATININE: CPT

## 2022-08-31 PROCEDURE — 75635 CT ANGIO ABDOMINAL ARTERIES: CPT

## 2022-08-31 RX ADMIN — IOPAMIDOL 100 ML: 612 INJECTION, SOLUTION INTRAVENOUS at 10:58

## 2022-10-28 RX ORDER — TOPIRAMATE 25 MG/1
25 TABLET ORAL DAILY
COMMUNITY
Start: 2022-08-29

## 2022-10-31 ENCOUNTER — ANESTHESIA (OUTPATIENT)
Dept: PERIOP | Facility: HOSPITAL | Age: 87
End: 2022-10-31

## 2022-10-31 ENCOUNTER — APPOINTMENT (OUTPATIENT)
Dept: GENERAL RADIOLOGY | Facility: HOSPITAL | Age: 87
End: 2022-10-31

## 2022-10-31 ENCOUNTER — ANESTHESIA EVENT (OUTPATIENT)
Dept: PERIOP | Facility: HOSPITAL | Age: 87
End: 2022-10-31

## 2022-10-31 ENCOUNTER — HOSPITAL ENCOUNTER (INPATIENT)
Facility: HOSPITAL | Age: 87
LOS: 3 days | Discharge: HOME HEALTH | End: 2022-11-03
Attending: SURGERY | Admitting: SURGERY

## 2022-10-31 DIAGNOSIS — D62 ACUTE BLOOD LOSS ANEMIA: ICD-10-CM

## 2022-10-31 DIAGNOSIS — I70.222 ATHEROSCLEROSIS OF NATIVE ARTERIES OF EXTREMITIES WITH REST PAIN, LEFT LEG: Primary | ICD-10-CM

## 2022-10-31 DIAGNOSIS — I70.221 ATHEROSCLEROSIS OF NATIVE ARTERIES OF EXTREMITIES WITH REST PAIN, RIGHT LEG: ICD-10-CM

## 2022-10-31 LAB
ABO GROUP BLD: NORMAL
ALBUMIN SERPL-MCNC: 4.1 G/DL (ref 3.5–5.2)
ALBUMIN/GLOB SERPL: 1.4 G/DL
ALP SERPL-CCNC: 104 U/L (ref 39–117)
ALT SERPL W P-5'-P-CCNC: 11 U/L (ref 1–33)
ANION GAP SERPL CALCULATED.3IONS-SCNC: 11.7 MMOL/L (ref 5–15)
APTT PPP: 27.8 SECONDS (ref 22.7–35.4)
AST SERPL-CCNC: 19 U/L (ref 1–32)
BASOPHILS # BLD AUTO: 0.04 10*3/MM3 (ref 0–0.2)
BASOPHILS NFR BLD AUTO: 0.2 % (ref 0–1.5)
BILIRUB SERPL-MCNC: 0.3 MG/DL (ref 0–1.2)
BLD GP AB SCN SERPL QL: NEGATIVE
BUN SERPL-MCNC: 20 MG/DL (ref 8–23)
BUN/CREAT SERPL: 23 (ref 7–25)
CALCIUM SPEC-SCNC: 9.2 MG/DL (ref 8.6–10.5)
CHLORIDE SERPL-SCNC: 102 MMOL/L (ref 98–107)
CO2 SERPL-SCNC: 23.3 MMOL/L (ref 22–29)
CREAT SERPL-MCNC: 0.87 MG/DL (ref 0.57–1)
DEPRECATED RDW RBC AUTO: 39.7 FL (ref 37–54)
DEPRECATED RDW RBC AUTO: 40.5 FL (ref 37–54)
EGFRCR SERPLBLD CKD-EPI 2021: 65 ML/MIN/1.73
EOSINOPHIL # BLD AUTO: 0 10*3/MM3 (ref 0–0.4)
EOSINOPHIL NFR BLD AUTO: 0 % (ref 0.3–6.2)
ERYTHROCYTE [DISTWIDTH] IN BLOOD BY AUTOMATED COUNT: 12.2 % (ref 12.3–15.4)
ERYTHROCYTE [DISTWIDTH] IN BLOOD BY AUTOMATED COUNT: 12.3 % (ref 12.3–15.4)
GLOBULIN UR ELPH-MCNC: 2.9 GM/DL
GLUCOSE SERPL-MCNC: 89 MG/DL (ref 65–99)
HCT VFR BLD AUTO: 24.5 % (ref 34–46.6)
HCT VFR BLD AUTO: 39.3 % (ref 34–46.6)
HGB BLD-MCNC: 13 G/DL (ref 12–15.9)
HGB BLD-MCNC: 8.2 G/DL (ref 12–15.9)
IMM GRANULOCYTES # BLD AUTO: 0.08 10*3/MM3 (ref 0–0.05)
IMM GRANULOCYTES NFR BLD AUTO: 0.5 % (ref 0–0.5)
INR PPP: 0.9 (ref 0.9–1.1)
LYMPHOCYTES # BLD AUTO: 0.72 10*3/MM3 (ref 0.7–3.1)
LYMPHOCYTES NFR BLD AUTO: 4.1 % (ref 19.6–45.3)
MCH RBC QN AUTO: 30.2 PG (ref 26.6–33)
MCH RBC QN AUTO: 30.4 PG (ref 26.6–33)
MCHC RBC AUTO-ENTMCNC: 33.1 G/DL (ref 31.5–35.7)
MCHC RBC AUTO-ENTMCNC: 33.5 G/DL (ref 31.5–35.7)
MCV RBC AUTO: 90.7 FL (ref 79–97)
MCV RBC AUTO: 91.2 FL (ref 79–97)
MONOCYTES # BLD AUTO: 0.73 10*3/MM3 (ref 0.1–0.9)
MONOCYTES NFR BLD AUTO: 4.1 % (ref 5–12)
NEUTROPHILS NFR BLD AUTO: 16.18 10*3/MM3 (ref 1.7–7)
NEUTROPHILS NFR BLD AUTO: 91.1 % (ref 42.7–76)
NRBC BLD AUTO-RTO: 0 /100 WBC (ref 0–0.2)
PLATELET # BLD AUTO: 183 10*3/MM3 (ref 140–450)
PLATELET # BLD AUTO: 273 10*3/MM3 (ref 140–450)
PMV BLD AUTO: 10 FL (ref 6–12)
PMV BLD AUTO: 9.7 FL (ref 6–12)
POTASSIUM SERPL-SCNC: 3.9 MMOL/L (ref 3.5–5.2)
PROT SERPL-MCNC: 7 G/DL (ref 6–8.5)
PROTHROMBIN TIME: 12.3 SECONDS (ref 11.7–14.2)
QT INTERVAL: 375 MS
RBC # BLD AUTO: 2.7 10*6/MM3 (ref 3.77–5.28)
RBC # BLD AUTO: 4.31 10*6/MM3 (ref 3.77–5.28)
RH BLD: POSITIVE
SODIUM SERPL-SCNC: 137 MMOL/L (ref 136–145)
T&S EXPIRATION DATE: NORMAL
WBC NRBC COR # BLD: 17.75 10*3/MM3 (ref 3.4–10.8)
WBC NRBC COR # BLD: 7.42 10*3/MM3 (ref 3.4–10.8)

## 2022-10-31 PROCEDURE — 85347 COAGULATION TIME ACTIVATED: CPT

## 2022-10-31 PROCEDURE — 041L0ZM BYPASS LEFT FEMORAL ARTERY TO PERONEAL ARTERY, OPEN APPROACH: ICD-10-PCS | Performed by: SURGERY

## 2022-10-31 PROCEDURE — 86923 COMPATIBILITY TEST ELECTRIC: CPT

## 2022-10-31 PROCEDURE — 86901 BLOOD TYPING SEROLOGIC RH(D): CPT | Performed by: SURGERY

## 2022-10-31 PROCEDURE — 25010000002 CEFAZOLIN IN DEXTROSE 2-4 GM/100ML-% SOLUTION: Performed by: SURGERY

## 2022-10-31 PROCEDURE — 80053 COMPREHEN METABOLIC PANEL: CPT | Performed by: SURGERY

## 2022-10-31 PROCEDURE — 25010000002 PROTAMINE SULFATE PER 10 MG: Performed by: NURSE ANESTHETIST, CERTIFIED REGISTERED

## 2022-10-31 PROCEDURE — 93010 ELECTROCARDIOGRAM REPORT: CPT | Performed by: INTERNAL MEDICINE

## 2022-10-31 PROCEDURE — P9047 ALBUMIN (HUMAN), 25%, 50ML: HCPCS | Performed by: SURGERY

## 2022-10-31 PROCEDURE — 25010000002 VANCOMYCIN 1 G RECONSTITUTED SOLUTION

## 2022-10-31 PROCEDURE — P9016 RBC LEUKOCYTES REDUCED: HCPCS

## 2022-10-31 PROCEDURE — 86900 BLOOD TYPING SEROLOGIC ABO: CPT

## 2022-10-31 PROCEDURE — 25010000002 ALBUMIN HUMAN 5% PER 50 ML: Performed by: NURSE ANESTHETIST, CERTIFIED REGISTERED

## 2022-10-31 PROCEDURE — 82947 ASSAY GLUCOSE BLOOD QUANT: CPT

## 2022-10-31 PROCEDURE — 25010000002 MIDAZOLAM PER 1 MG: Performed by: ANESTHESIOLOGY

## 2022-10-31 PROCEDURE — 25010000002 HEPARIN (PORCINE) PER 1000 UNITS: Performed by: NURSE ANESTHETIST, CERTIFIED REGISTERED

## 2022-10-31 PROCEDURE — 25010000002 ALBUMIN HUMAN 25% PER 50 ML: Performed by: SURGERY

## 2022-10-31 PROCEDURE — 25010000002 FENTANYL CITRATE (PF) 100 MCG/2ML SOLUTION: Performed by: NURSE ANESTHETIST, CERTIFIED REGISTERED

## 2022-10-31 PROCEDURE — C1768 GRAFT, VASCULAR: HCPCS | Performed by: SURGERY

## 2022-10-31 PROCEDURE — 85014 HEMATOCRIT: CPT

## 2022-10-31 PROCEDURE — 86850 RBC ANTIBODY SCREEN: CPT | Performed by: SURGERY

## 2022-10-31 PROCEDURE — P9041 ALBUMIN (HUMAN),5%, 50ML: HCPCS | Performed by: NURSE ANESTHETIST, CERTIFIED REGISTERED

## 2022-10-31 PROCEDURE — 25010000002 PROPOFOL 10 MG/ML EMULSION: Performed by: NURSE ANESTHETIST, CERTIFIED REGISTERED

## 2022-10-31 PROCEDURE — 85027 COMPLETE CBC AUTOMATED: CPT | Performed by: SURGERY

## 2022-10-31 PROCEDURE — 25010000002 ONDANSETRON PER 1 MG: Performed by: NURSE ANESTHETIST, CERTIFIED REGISTERED

## 2022-10-31 PROCEDURE — 25010000002 DEXAMETHASONE SODIUM PHOSPHATE 20 MG/5ML SOLUTION: Performed by: NURSE ANESTHETIST, CERTIFIED REGISTERED

## 2022-10-31 PROCEDURE — 85730 THROMBOPLASTIN TIME PARTIAL: CPT | Performed by: SURGERY

## 2022-10-31 PROCEDURE — 85018 HEMOGLOBIN: CPT

## 2022-10-31 PROCEDURE — 25010000002 HYDROMORPHONE 1 MG/ML SOLUTION: Performed by: NURSE ANESTHETIST, CERTIFIED REGISTERED

## 2022-10-31 PROCEDURE — 86901 BLOOD TYPING SEROLOGIC RH(D): CPT

## 2022-10-31 PROCEDURE — 25010000002 FENTANYL CITRATE (PF) 50 MCG/ML SOLUTION: Performed by: NURSE ANESTHETIST, CERTIFIED REGISTERED

## 2022-10-31 PROCEDURE — 25010000002 HEPARIN (PORCINE) PER 1000 UNITS: Performed by: SURGERY

## 2022-10-31 PROCEDURE — 86900 BLOOD TYPING SEROLOGIC ABO: CPT | Performed by: SURGERY

## 2022-10-31 PROCEDURE — 25010000002 NEOSTIGMINE 5 MG/10ML SOLUTION: Performed by: NURSE ANESTHETIST, CERTIFIED REGISTERED

## 2022-10-31 PROCEDURE — 85610 PROTHROMBIN TIME: CPT | Performed by: SURGERY

## 2022-10-31 PROCEDURE — 85025 COMPLETE CBC W/AUTO DIFF WBC: CPT | Performed by: SURGERY

## 2022-10-31 PROCEDURE — 36430 TRANSFUSION BLD/BLD COMPNT: CPT

## 2022-10-31 PROCEDURE — 82803 BLOOD GASES ANY COMBINATION: CPT

## 2022-10-31 PROCEDURE — 04CY0ZZ EXTIRPATION OF MATTER FROM LOWER ARTERY, OPEN APPROACH: ICD-10-PCS | Performed by: SURGERY

## 2022-10-31 PROCEDURE — 71045 X-RAY EXAM CHEST 1 VIEW: CPT

## 2022-10-31 PROCEDURE — 93005 ELECTROCARDIOGRAM TRACING: CPT | Performed by: SURGERY

## 2022-10-31 PROCEDURE — 25010000002 VANCOMYCIN 750 MG RECONSTITUTED SOLUTION: Performed by: SURGERY

## 2022-10-31 DEVICE — PROPATEN VASCULAR GRAFT TW IR 6MMX80CM 80CM RINGS HEPARIN
Type: IMPLANTABLE DEVICE | Site: LEG | Status: FUNCTIONAL
Brand: GORE PROPATEN VASCULAR GRAFT

## 2022-10-31 DEVICE — LIGACLIP MCA MULTIPLE CLIP APPLIERS, 20 MEDIUM CLIPS
Type: IMPLANTABLE DEVICE | Site: LEG | Status: FUNCTIONAL
Brand: LIGACLIP

## 2022-10-31 DEVICE — FLOSEAL HEMOSTATIC MATRIX, 10ML
Type: IMPLANTABLE DEVICE | Site: LEG | Status: FUNCTIONAL
Brand: FLOSEAL HEMOSTATIC MATRIX

## 2022-10-31 DEVICE — ABSORBABLE HEMOSTAT (OXIDIZED REGENERATED CELLULOSE, U.S.P.)
Type: IMPLANTABLE DEVICE | Site: LEG | Status: FUNCTIONAL
Brand: SURGICEL

## 2022-10-31 DEVICE — LIGACLIP MCA MULTIPLE CLIP APPLIERS, 20 SMALL CLIPS
Type: IMPLANTABLE DEVICE | Site: LEG | Status: FUNCTIONAL
Brand: LIGACLIP

## 2022-10-31 DEVICE — SURGICEL ABSORBABLE HEMOSTAT, STRUCTURED NON-WOVEN FABRIC
Type: IMPLANTABLE DEVICE | Site: LEG | Status: FUNCTIONAL
Brand: SURGICEL SNOW

## 2022-10-31 RX ORDER — FLUMAZENIL 0.1 MG/ML
0.2 INJECTION INTRAVENOUS AS NEEDED
Status: DISCONTINUED | OUTPATIENT
Start: 2022-10-31 | End: 2022-10-31 | Stop reason: HOSPADM

## 2022-10-31 RX ORDER — MIDAZOLAM HYDROCHLORIDE 1 MG/ML
INJECTION INTRAMUSCULAR; INTRAVENOUS AS NEEDED
Status: COMPLETED | OUTPATIENT
Start: 2022-10-31 | End: 2022-10-31

## 2022-10-31 RX ORDER — HYDROMORPHONE HYDROCHLORIDE 1 MG/ML
0.5 INJECTION, SOLUTION INTRAMUSCULAR; INTRAVENOUS; SUBCUTANEOUS
Status: DISCONTINUED | OUTPATIENT
Start: 2022-10-31 | End: 2022-11-03 | Stop reason: HOSPADM

## 2022-10-31 RX ORDER — HYDRALAZINE HYDROCHLORIDE 20 MG/ML
5 INJECTION INTRAMUSCULAR; INTRAVENOUS
Status: DISCONTINUED | OUTPATIENT
Start: 2022-10-31 | End: 2022-10-31 | Stop reason: HOSPADM

## 2022-10-31 RX ORDER — EPHEDRINE SULFATE 50 MG/ML
INJECTION INTRAVENOUS AS NEEDED
Status: DISCONTINUED | OUTPATIENT
Start: 2022-10-31 | End: 2022-10-31 | Stop reason: SURG

## 2022-10-31 RX ORDER — CEFAZOLIN SODIUM 2 G/100ML
2 INJECTION, SOLUTION INTRAVENOUS EVERY 8 HOURS
Status: COMPLETED | OUTPATIENT
Start: 2022-10-31 | End: 2022-11-01

## 2022-10-31 RX ORDER — PROMETHAZINE HYDROCHLORIDE 25 MG/1
25 TABLET ORAL ONCE AS NEEDED
Status: DISCONTINUED | OUTPATIENT
Start: 2022-10-31 | End: 2022-10-31 | Stop reason: HOSPADM

## 2022-10-31 RX ORDER — ONDANSETRON 2 MG/ML
4 INJECTION INTRAMUSCULAR; INTRAVENOUS EVERY 6 HOURS PRN
Status: DISCONTINUED | OUTPATIENT
Start: 2022-10-31 | End: 2022-11-03 | Stop reason: HOSPADM

## 2022-10-31 RX ORDER — FENTANYL CITRATE 50 UG/ML
50 INJECTION, SOLUTION INTRAMUSCULAR; INTRAVENOUS
Status: DISCONTINUED | OUTPATIENT
Start: 2022-10-31 | End: 2022-10-31 | Stop reason: HOSPADM

## 2022-10-31 RX ORDER — NALOXONE HCL 0.4 MG/ML
0.4 VIAL (ML) INJECTION
Status: DISCONTINUED | OUTPATIENT
Start: 2022-10-31 | End: 2022-11-03 | Stop reason: HOSPADM

## 2022-10-31 RX ORDER — PROMETHAZINE HYDROCHLORIDE 25 MG/1
25 SUPPOSITORY RECTAL ONCE AS NEEDED
Status: DISCONTINUED | OUTPATIENT
Start: 2022-10-31 | End: 2022-10-31 | Stop reason: HOSPADM

## 2022-10-31 RX ORDER — HYDROCODONE BITARTRATE AND ACETAMINOPHEN 7.5; 325 MG/1; MG/1
1 TABLET ORAL EVERY 4 HOURS PRN
Status: DISCONTINUED | OUTPATIENT
Start: 2022-10-31 | End: 2022-11-02

## 2022-10-31 RX ORDER — LIDOCAINE HYDROCHLORIDE 10 MG/ML
0.5 INJECTION, SOLUTION EPIDURAL; INFILTRATION; INTRACAUDAL; PERINEURAL ONCE AS NEEDED
Status: DISCONTINUED | OUTPATIENT
Start: 2022-10-31 | End: 2022-10-31 | Stop reason: HOSPADM

## 2022-10-31 RX ORDER — EPHEDRINE SULFATE 50 MG/ML
5 INJECTION, SOLUTION INTRAVENOUS ONCE AS NEEDED
Status: DISCONTINUED | OUTPATIENT
Start: 2022-10-31 | End: 2022-10-31 | Stop reason: HOSPADM

## 2022-10-31 RX ORDER — ALBUMIN (HUMAN) 12.5 G/50ML
12.5 SOLUTION INTRAVENOUS ONCE
Status: COMPLETED | OUTPATIENT
Start: 2022-10-31 | End: 2022-10-31

## 2022-10-31 RX ORDER — HYDROCODONE BITARTRATE AND ACETAMINOPHEN 7.5; 325 MG/1; MG/1
1 TABLET ORAL ONCE AS NEEDED
Status: DISCONTINUED | OUTPATIENT
Start: 2022-10-31 | End: 2022-10-31 | Stop reason: HOSPADM

## 2022-10-31 RX ORDER — POTASSIUM CHLORIDE 750 MG/1
10 TABLET, FILM COATED, EXTENDED RELEASE ORAL DAILY
Status: DISCONTINUED | OUTPATIENT
Start: 2022-10-31 | End: 2022-11-03 | Stop reason: HOSPADM

## 2022-10-31 RX ORDER — DIPHENHYDRAMINE HYDROCHLORIDE 50 MG/ML
12.5 INJECTION INTRAMUSCULAR; INTRAVENOUS
Status: DISCONTINUED | OUTPATIENT
Start: 2022-10-31 | End: 2022-10-31 | Stop reason: HOSPADM

## 2022-10-31 RX ORDER — FAMOTIDINE 10 MG/ML
20 INJECTION, SOLUTION INTRAVENOUS ONCE
Status: COMPLETED | OUTPATIENT
Start: 2022-10-31 | End: 2022-10-31

## 2022-10-31 RX ORDER — MIDAZOLAM HYDROCHLORIDE 1 MG/ML
0.5 INJECTION INTRAMUSCULAR; INTRAVENOUS
Status: DISCONTINUED | OUTPATIENT
Start: 2022-10-31 | End: 2022-10-31 | Stop reason: HOSPADM

## 2022-10-31 RX ORDER — SODIUM CHLORIDE 9 MG/ML
200 INJECTION, SOLUTION INTRAVENOUS ONCE
Status: DISCONTINUED | OUTPATIENT
Start: 2022-10-31 | End: 2022-11-03 | Stop reason: HOSPADM

## 2022-10-31 RX ORDER — LABETALOL HYDROCHLORIDE 5 MG/ML
5 INJECTION, SOLUTION INTRAVENOUS
Status: DISCONTINUED | OUTPATIENT
Start: 2022-10-31 | End: 2022-10-31 | Stop reason: HOSPADM

## 2022-10-31 RX ORDER — TOPIRAMATE 25 MG/1
25 TABLET ORAL DAILY
Status: DISCONTINUED | OUTPATIENT
Start: 2022-10-31 | End: 2022-11-03 | Stop reason: HOSPADM

## 2022-10-31 RX ORDER — PANTOPRAZOLE SODIUM 40 MG/1
40 TABLET, DELAYED RELEASE ORAL EVERY MORNING
Status: DISCONTINUED | OUTPATIENT
Start: 2022-11-01 | End: 2022-11-03 | Stop reason: HOSPADM

## 2022-10-31 RX ORDER — FUROSEMIDE 20 MG/1
20 TABLET ORAL DAILY
Status: DISCONTINUED | OUTPATIENT
Start: 2022-10-31 | End: 2022-11-03 | Stop reason: HOSPADM

## 2022-10-31 RX ORDER — HEPARIN SODIUM 1000 [USP'U]/ML
INJECTION, SOLUTION INTRAVENOUS; SUBCUTANEOUS AS NEEDED
Status: DISCONTINUED | OUTPATIENT
Start: 2022-10-31 | End: 2022-10-31 | Stop reason: SURG

## 2022-10-31 RX ORDER — HYDROMORPHONE HYDROCHLORIDE 1 MG/ML
0.5 INJECTION, SOLUTION INTRAMUSCULAR; INTRAVENOUS; SUBCUTANEOUS
Status: DISCONTINUED | OUTPATIENT
Start: 2022-10-31 | End: 2022-10-31 | Stop reason: HOSPADM

## 2022-10-31 RX ORDER — ALBUMIN, HUMAN INJ 5% 5 %
SOLUTION INTRAVENOUS CONTINUOUS PRN
Status: DISCONTINUED | OUTPATIENT
Start: 2022-10-31 | End: 2022-10-31 | Stop reason: SURG

## 2022-10-31 RX ORDER — PROTAMINE SULFATE 10 MG/ML
INJECTION, SOLUTION INTRAVENOUS AS NEEDED
Status: DISCONTINUED | OUTPATIENT
Start: 2022-10-31 | End: 2022-10-31 | Stop reason: SURG

## 2022-10-31 RX ORDER — SODIUM CHLORIDE 0.9 % (FLUSH) 0.9 %
3 SYRINGE (ML) INJECTION EVERY 12 HOURS SCHEDULED
Status: DISCONTINUED | OUTPATIENT
Start: 2022-10-31 | End: 2022-10-31 | Stop reason: HOSPADM

## 2022-10-31 RX ORDER — ONDANSETRON 2 MG/ML
INJECTION INTRAMUSCULAR; INTRAVENOUS AS NEEDED
Status: DISCONTINUED | OUTPATIENT
Start: 2022-10-31 | End: 2022-10-31 | Stop reason: SURG

## 2022-10-31 RX ORDER — BUPIVACAINE HCL/0.9 % NACL/PF 0.125 %
PLASTIC BAG, INJECTION (ML) EPIDURAL AS NEEDED
Status: DISCONTINUED | OUTPATIENT
Start: 2022-10-31 | End: 2022-10-31 | Stop reason: SURG

## 2022-10-31 RX ORDER — DEXTROSE, SODIUM CHLORIDE, AND POTASSIUM CHLORIDE 5; .45; .15 G/100ML; G/100ML; G/100ML
75 INJECTION INTRAVENOUS CONTINUOUS
Status: DISCONTINUED | OUTPATIENT
Start: 2022-10-31 | End: 2022-11-01

## 2022-10-31 RX ORDER — MIDAZOLAM HYDROCHLORIDE 1 MG/ML
2 INJECTION INTRAMUSCULAR; INTRAVENOUS ONCE
Status: DISCONTINUED | OUTPATIENT
Start: 2022-10-31 | End: 2022-10-31 | Stop reason: HOSPADM

## 2022-10-31 RX ORDER — ATORVASTATIN CALCIUM 20 MG/1
40 TABLET, FILM COATED ORAL EVERY EVENING
Status: DISCONTINUED | OUTPATIENT
Start: 2022-10-31 | End: 2022-11-03 | Stop reason: HOSPADM

## 2022-10-31 RX ORDER — LIDOCAINE HYDROCHLORIDE 20 MG/ML
INJECTION, SOLUTION INFILTRATION; PERINEURAL AS NEEDED
Status: DISCONTINUED | OUTPATIENT
Start: 2022-10-31 | End: 2022-10-31 | Stop reason: SURG

## 2022-10-31 RX ORDER — IBUPROFEN 400 MG/1
400 TABLET ORAL EVERY 6 HOURS PRN
Status: DISCONTINUED | OUTPATIENT
Start: 2022-10-31 | End: 2022-11-03 | Stop reason: HOSPADM

## 2022-10-31 RX ORDER — CLOPIDOGREL BISULFATE 75 MG/1
75 TABLET ORAL DAILY
Status: DISCONTINUED | OUTPATIENT
Start: 2022-10-31 | End: 2022-11-01

## 2022-10-31 RX ORDER — SODIUM CHLORIDE, SODIUM LACTATE, POTASSIUM CHLORIDE, CALCIUM CHLORIDE 600; 310; 30; 20 MG/100ML; MG/100ML; MG/100ML; MG/100ML
9 INJECTION, SOLUTION INTRAVENOUS CONTINUOUS
Status: DISCONTINUED | OUTPATIENT
Start: 2022-10-31 | End: 2022-10-31

## 2022-10-31 RX ORDER — NEOSTIGMINE METHYLSULFATE 0.5 MG/ML
INJECTION, SOLUTION INTRAVENOUS AS NEEDED
Status: DISCONTINUED | OUTPATIENT
Start: 2022-10-31 | End: 2022-10-31 | Stop reason: SURG

## 2022-10-31 RX ORDER — LISINOPRIL 40 MG/1
40 TABLET ORAL DAILY
Status: DISCONTINUED | OUTPATIENT
Start: 2022-10-31 | End: 2022-11-03 | Stop reason: HOSPADM

## 2022-10-31 RX ORDER — LABETALOL HYDROCHLORIDE 5 MG/ML
INJECTION, SOLUTION INTRAVENOUS AS NEEDED
Status: DISCONTINUED | OUTPATIENT
Start: 2022-10-31 | End: 2022-10-31 | Stop reason: SURG

## 2022-10-31 RX ORDER — ALBUMIN, HUMAN INJ 5% 5 %
250 SOLUTION INTRAVENOUS ONCE
Status: DISCONTINUED | OUTPATIENT
Start: 2022-10-31 | End: 2022-10-31

## 2022-10-31 RX ORDER — ROCURONIUM BROMIDE 10 MG/ML
INJECTION, SOLUTION INTRAVENOUS AS NEEDED
Status: DISCONTINUED | OUTPATIENT
Start: 2022-10-31 | End: 2022-10-31 | Stop reason: SURG

## 2022-10-31 RX ORDER — MONTELUKAST SODIUM 10 MG/1
10 TABLET ORAL DAILY
Status: DISCONTINUED | OUTPATIENT
Start: 2022-10-31 | End: 2022-11-03 | Stop reason: HOSPADM

## 2022-10-31 RX ORDER — GLYCOPYRROLATE 0.2 MG/ML
INJECTION INTRAMUSCULAR; INTRAVENOUS AS NEEDED
Status: DISCONTINUED | OUTPATIENT
Start: 2022-10-31 | End: 2022-10-31 | Stop reason: SURG

## 2022-10-31 RX ORDER — ONDANSETRON 4 MG/1
4 TABLET, FILM COATED ORAL EVERY 6 HOURS PRN
Status: DISCONTINUED | OUTPATIENT
Start: 2022-10-31 | End: 2022-11-03 | Stop reason: HOSPADM

## 2022-10-31 RX ORDER — ASPIRIN 81 MG/1
81 TABLET ORAL DAILY
Status: DISCONTINUED | OUTPATIENT
Start: 2022-10-31 | End: 2022-11-03 | Stop reason: HOSPADM

## 2022-10-31 RX ORDER — FENTANYL CITRATE 50 UG/ML
50 INJECTION, SOLUTION INTRAMUSCULAR; INTRAVENOUS ONCE
Status: DISCONTINUED | OUTPATIENT
Start: 2022-10-31 | End: 2022-10-31 | Stop reason: HOSPADM

## 2022-10-31 RX ORDER — ONDANSETRON 2 MG/ML
4 INJECTION INTRAMUSCULAR; INTRAVENOUS ONCE AS NEEDED
Status: DISCONTINUED | OUTPATIENT
Start: 2022-10-31 | End: 2022-10-31 | Stop reason: HOSPADM

## 2022-10-31 RX ORDER — ENOXAPARIN SODIUM 100 MG/ML
40 INJECTION SUBCUTANEOUS DAILY
Status: DISCONTINUED | OUTPATIENT
Start: 2022-11-01 | End: 2022-11-02

## 2022-10-31 RX ORDER — OXYCODONE AND ACETAMINOPHEN 7.5; 325 MG/1; MG/1
1 TABLET ORAL EVERY 4 HOURS PRN
Status: DISCONTINUED | OUTPATIENT
Start: 2022-10-31 | End: 2022-10-31 | Stop reason: HOSPADM

## 2022-10-31 RX ORDER — MAGNESIUM HYDROXIDE 1200 MG/15ML
LIQUID ORAL AS NEEDED
Status: DISCONTINUED | OUTPATIENT
Start: 2022-10-31 | End: 2022-10-31 | Stop reason: HOSPADM

## 2022-10-31 RX ORDER — DIPHENHYDRAMINE HCL 25 MG
25 CAPSULE ORAL
Status: DISCONTINUED | OUTPATIENT
Start: 2022-10-31 | End: 2022-10-31 | Stop reason: HOSPADM

## 2022-10-31 RX ORDER — FENTANYL CITRATE 50 UG/ML
INJECTION, SOLUTION INTRAMUSCULAR; INTRAVENOUS AS NEEDED
Status: DISCONTINUED | OUTPATIENT
Start: 2022-10-31 | End: 2022-10-31 | Stop reason: SURG

## 2022-10-31 RX ORDER — SODIUM CHLORIDE 9 MG/ML
500 INJECTION, SOLUTION INTRAVENOUS CONTINUOUS
Status: ACTIVE | OUTPATIENT
Start: 2022-10-31 | End: 2022-11-02

## 2022-10-31 RX ORDER — DEXAMETHASONE SODIUM PHOSPHATE 4 MG/ML
INJECTION, SOLUTION INTRA-ARTICULAR; INTRALESIONAL; INTRAMUSCULAR; INTRAVENOUS; SOFT TISSUE AS NEEDED
Status: DISCONTINUED | OUTPATIENT
Start: 2022-10-31 | End: 2022-10-31 | Stop reason: SURG

## 2022-10-31 RX ORDER — NITROGLYCERIN 0.4 MG/1
0.4 TABLET SUBLINGUAL
Status: DISCONTINUED | OUTPATIENT
Start: 2022-10-31 | End: 2022-11-03 | Stop reason: HOSPADM

## 2022-10-31 RX ORDER — SODIUM CHLORIDE 0.9 % (FLUSH) 0.9 %
3-10 SYRINGE (ML) INJECTION AS NEEDED
Status: DISCONTINUED | OUTPATIENT
Start: 2022-10-31 | End: 2022-10-31 | Stop reason: HOSPADM

## 2022-10-31 RX ORDER — NALOXONE HCL 0.4 MG/ML
0.2 VIAL (ML) INJECTION AS NEEDED
Status: DISCONTINUED | OUTPATIENT
Start: 2022-10-31 | End: 2022-10-31 | Stop reason: HOSPADM

## 2022-10-31 RX ORDER — PROPOFOL 10 MG/ML
VIAL (ML) INTRAVENOUS AS NEEDED
Status: DISCONTINUED | OUTPATIENT
Start: 2022-10-31 | End: 2022-10-31 | Stop reason: SURG

## 2022-10-31 RX ADMIN — HYDROCODONE BITARTRATE AND ACETAMINOPHEN 1 TABLET: 7.5; 325 TABLET ORAL at 20:15

## 2022-10-31 RX ADMIN — Medication 100 MCG: at 09:29

## 2022-10-31 RX ADMIN — Medication 100 MCG: at 11:45

## 2022-10-31 RX ADMIN — LABETALOL HYDROCHLORIDE 2.5 MG: 5 INJECTION, SOLUTION INTRAVENOUS at 10:16

## 2022-10-31 RX ADMIN — Medication 100 MCG: at 11:53

## 2022-10-31 RX ADMIN — Medication 100 MCG: at 11:58

## 2022-10-31 RX ADMIN — FENTANYL CITRATE 25 MCG: 50 INJECTION, SOLUTION INTRAMUSCULAR; INTRAVENOUS at 10:13

## 2022-10-31 RX ADMIN — SODIUM CHLORIDE, POTASSIUM CHLORIDE, SODIUM LACTATE AND CALCIUM CHLORIDE: 600; 310; 30; 20 INJECTION, SOLUTION INTRAVENOUS at 09:21

## 2022-10-31 RX ADMIN — DEXAMETHASONE SODIUM PHOSPHATE 8 MG: 4 INJECTION, SOLUTION INTRAMUSCULAR; INTRAVENOUS at 09:34

## 2022-10-31 RX ADMIN — Medication 50 MCG: at 10:51

## 2022-10-31 RX ADMIN — Medication 100 MCG: at 11:49

## 2022-10-31 RX ADMIN — TOPIRAMATE 25 MG: 25 TABLET, FILM COATED ORAL at 20:00

## 2022-10-31 RX ADMIN — Medication 50 MCG: at 10:59

## 2022-10-31 RX ADMIN — Medication 100 MCG: at 11:41

## 2022-10-31 RX ADMIN — MONTELUKAST SODIUM 10 MG: 10 TABLET, FILM COATED ORAL at 20:00

## 2022-10-31 RX ADMIN — HEPARIN SODIUM 7500 UNITS: 1000 INJECTION INTRAVENOUS; SUBCUTANEOUS at 10:32

## 2022-10-31 RX ADMIN — EPHEDRINE SULFATE 10 MG: 50 INJECTION INTRAVENOUS at 11:26

## 2022-10-31 RX ADMIN — SODIUM CHLORIDE, POTASSIUM CHLORIDE, SODIUM LACTATE AND CALCIUM CHLORIDE: 600; 310; 30; 20 INJECTION, SOLUTION INTRAVENOUS at 10:59

## 2022-10-31 RX ADMIN — ONDANSETRON 4 MG: 2 INJECTION INTRAMUSCULAR; INTRAVENOUS at 12:07

## 2022-10-31 RX ADMIN — Medication 100 MCG: at 09:46

## 2022-10-31 RX ADMIN — Medication 50 MCG: at 11:37

## 2022-10-31 RX ADMIN — ROCURONIUM BROMIDE 40 MG: 10 INJECTION, SOLUTION INTRAVENOUS at 09:29

## 2022-10-31 RX ADMIN — FENTANYL CITRATE 25 MCG: 50 INJECTION, SOLUTION INTRAMUSCULAR; INTRAVENOUS at 10:03

## 2022-10-31 RX ADMIN — SODIUM CHLORIDE 750 MG: 900 INJECTION, SOLUTION INTRAVENOUS at 08:52

## 2022-10-31 RX ADMIN — ROCURONIUM BROMIDE 10 MG: 10 INJECTION, SOLUTION INTRAVENOUS at 11:41

## 2022-10-31 RX ADMIN — CEFAZOLIN SODIUM 2 G: 2 INJECTION, SOLUTION INTRAVENOUS at 20:03

## 2022-10-31 RX ADMIN — PROPOFOL 100 MG: 10 INJECTION, EMULSION INTRAVENOUS at 09:29

## 2022-10-31 RX ADMIN — POTASSIUM CHLORIDE, DEXTROSE MONOHYDRATE AND SODIUM CHLORIDE 75 ML/HR: 150; 5; 450 INJECTION, SOLUTION INTRAVENOUS at 19:57

## 2022-10-31 RX ADMIN — GLYCOPYRROLATE 0.4 MCG: 1 INJECTION INTRAMUSCULAR; INTRAVENOUS at 12:07

## 2022-10-31 RX ADMIN — FENTANYL CITRATE 25 MCG: 50 INJECTION, SOLUTION INTRAMUSCULAR; INTRAVENOUS at 10:00

## 2022-10-31 RX ADMIN — Medication 50 MCG: at 11:39

## 2022-10-31 RX ADMIN — FENTANYL CITRATE 25 MCG: 50 INJECTION, SOLUTION INTRAMUSCULAR; INTRAVENOUS at 09:58

## 2022-10-31 RX ADMIN — SODIUM CHLORIDE 500 ML/HR: 9 INJECTION, SOLUTION INTRAVENOUS at 13:32

## 2022-10-31 RX ADMIN — ALBUMIN (HUMAN): 12.5 SOLUTION INTRAVENOUS at 11:43

## 2022-10-31 RX ADMIN — ROCURONIUM BROMIDE 10 MG: 10 INJECTION, SOLUTION INTRAVENOUS at 11:13

## 2022-10-31 RX ADMIN — POTASSIUM CHLORIDE 10 MEQ: 750 TABLET, EXTENDED RELEASE ORAL at 20:00

## 2022-10-31 RX ADMIN — NEOSTIGMINE METHYLSULFATE 3 MG: 0.5 INJECTION INTRAVENOUS at 12:07

## 2022-10-31 RX ADMIN — FUROSEMIDE 20 MG: 20 TABLET ORAL at 20:00

## 2022-10-31 RX ADMIN — HYDROMORPHONE HYDROCHLORIDE 0.5 MG: 1 INJECTION, SOLUTION INTRAMUSCULAR; INTRAVENOUS; SUBCUTANEOUS at 12:11

## 2022-10-31 RX ADMIN — Medication 100 MCG: at 11:43

## 2022-10-31 RX ADMIN — LIDOCAINE HYDROCHLORIDE 80 MG: 20 INJECTION, SOLUTION INFILTRATION; PERINEURAL at 09:29

## 2022-10-31 RX ADMIN — CLOPIDOGREL 75 MG: 75 TABLET, FILM COATED ORAL at 19:59

## 2022-10-31 RX ADMIN — FAMOTIDINE 20 MG: 10 INJECTION INTRAVENOUS at 08:51

## 2022-10-31 RX ADMIN — FENTANYL CITRATE 50 MCG: 50 INJECTION INTRAMUSCULAR; INTRAVENOUS at 15:34

## 2022-10-31 RX ADMIN — ATORVASTATIN CALCIUM 40 MG: 20 TABLET, FILM COATED ORAL at 19:59

## 2022-10-31 RX ADMIN — Medication 50 MCG: at 11:20

## 2022-10-31 RX ADMIN — ALBUMIN HUMAN 12.5 G: 0.25 SOLUTION INTRAVENOUS at 15:13

## 2022-10-31 RX ADMIN — Medication 100 MCG: at 12:28

## 2022-10-31 RX ADMIN — LABETALOL HYDROCHLORIDE 2.5 MG: 5 INJECTION, SOLUTION INTRAVENOUS at 10:31

## 2022-10-31 RX ADMIN — GLYCOPYRROLATE 0.2 MCG: 1 INJECTION INTRAMUSCULAR; INTRAVENOUS at 09:29

## 2022-10-31 RX ADMIN — Medication 50 MCG: at 11:33

## 2022-10-31 RX ADMIN — HEPARIN SODIUM 2000 UNITS: 1000 INJECTION INTRAVENOUS; SUBCUTANEOUS at 11:20

## 2022-10-31 RX ADMIN — ASPIRIN 81 MG: 81 TABLET, COATED ORAL at 19:59

## 2022-10-31 RX ADMIN — PROTAMINE SULFATE 50 MG: 10 INJECTION, SOLUTION INTRAVENOUS at 11:45

## 2022-10-31 NOTE — ANESTHESIA PROCEDURE NOTES
Airway  Urgency: elective    Airway not difficult    General Information and Staff    Patient location during procedure: OR  Anesthesiologist: Delvin Springer MD  CRNA/CAA: Melanie Badillo CRNA    Indications and Patient Condition  Indications for airway management: airway protection    Preoxygenated: yes  Mask difficulty assessment: 1 - vent by mask    Final Airway Details  Final airway type: endotracheal airway      Successful airway: ETT  Cuffed: yes   Successful intubation technique: direct laryngoscopy  Facilitating devices/methods: intubating stylet and anterior pressure/BURP  Endotracheal tube insertion site: oral  Blade: Villa  Blade size: 3  ETT size (mm): 7.0  Cormack-Lehane Classification: grade IIb - view of arytenoids or posterior of glottis only  Placement verified by: chest auscultation and capnometry   Measured from: lips  ETT/EBT  to lips (cm): 22  Number of attempts at approach: 1  Assessment: lips, teeth, and gum same as pre-op and atraumatic intubation

## 2022-10-31 NOTE — ANESTHESIA PROCEDURE NOTES
Arterial Line      Patient reassessed immediately prior to procedure    Patient location during procedure: pre-op   Line placed for ABGs/Labs/ISTAT and hemodynamic monitoring.  Performed By   Anesthesiologist: Taiwo Baker MD   Preanesthetic Checklist  Completed: patient identified, monitors and equipment checked, pre-op evaluation and timeout performed  Arterial Line Prep    Prep: ChloraPrep  Arterial Line Procedure   Laterality:left  Location:  radial artery  Catheter size: 20 G   Guidance: ultrasound guided and landmark technique  PROCEDURE NOTE/ULTRASOUND INTERPRETATION.  Using ultrasound guidance the potential vascular sites for insertion of the catheter were visualized to determine the patency of the vessel to be used for vascular access.  After selecting the appropriate site for insertion, the needle was visualized under ultrasound being inserted into the radial artery, followed by ultrasound confirmation of wire and catheter placement. There were no abnormalities seen on ultrasound; an image was taken; and the patient tolerated the procedure with no complications.   Number of attempts: 2  Successful placement: yes   Post Assessment   Dressing Type: biopatch applied.   Complications no   Patient Tolerance: patient tolerated the procedure well with no apparent complications

## 2022-10-31 NOTE — ANESTHESIA POSTPROCEDURE EVALUATION
"Patient: Lourdes Platt    Procedure Summary     Date: 10/31/22 Room / Location: [facility] OR  / [facility] MAIN OR    Anesthesia Start: 0921 Anesthesia Stop: 1242    Procedure: LEFT FEMORAL TO PERONEAL ARTERY BYPASS WITH POLYTETRAFLUOROETHYLENE, LEFT FEMORAL ENDARTERECTOMY (Left: Thigh) Diagnosis:     Surgeons: Juan A Roblero MD Provider: Delvin Springer MD    Anesthesia Type: generalMira ASA Status: 3          Anesthesia Type: general, [facility]    Vitals  Vitals Value Taken Time   /56 10/31/22 1601   Temp 36.4 °C (97.5 °F) 10/31/22 1600   Pulse 87 10/31/22 1609   Resp     SpO2 98 % 10/31/22 1609   Vitals shown include unvalidated device data.        Post Anesthesia Care and Evaluation    Patient location during evaluation: bedside  Patient participation: complete - patient participated  Level of consciousness: awake and alert  Pain management: adequate    Airway patency: patent  Anesthetic complications: No anesthetic complications  PONV Status: none  Cardiovascular status: acceptable  Respiratory status: acceptable  Hydration status: acceptable    Comments: /56   Pulse 87   Temp 36.4 °C (97.5 °F) (Oral)   Resp 18   Ht 160 cm (63\")   Wt 55.2 kg (121 lb 11.2 oz)   SpO2 100%   BMI 21.56 kg/m²         "

## 2022-10-31 NOTE — ANESTHESIA PREPROCEDURE EVALUATION
Anesthesia Evaluation     Patient summary reviewed and Nursing notes reviewed   NPO Solid Status: > 8 hours             Airway   Mallampati: II  TM distance: >3 FB  Neck ROM: full  Dental - normal exam     Pulmonary    (+) shortness of breath,   Cardiovascular     Rhythm: regular    (+) hypertension, PVD,       Neuro/Psych  (+) CVA,    GI/Hepatic/Renal/Endo    (+)  GERD,      Musculoskeletal     Abdominal    Substance History      OB/GYN          Other   arthritis,                      Anesthesia Plan    ASA 3     general and [place]       Anesthetic plan, risks, benefits, and alternatives have been provided, discussed and informed consent has been obtained with: patient.        CODE STATUS:

## 2022-11-01 LAB
ACT BLD: 126 SECONDS (ref 82–152)
ACT BLD: 138 SECONDS (ref 82–152)
ACT BLD: 225 SECONDS (ref 82–152)
ACT BLD: 254 SECONDS (ref 82–152)
ANION GAP SERPL CALCULATED.3IONS-SCNC: 6.5 MMOL/L (ref 5–15)
BASE EXCESS BLDA CALC-SCNC: -3 MMOL/L (ref -5–5)
BH BB BLOOD EXPIRATION DATE: NORMAL
BH BB BLOOD TYPE BARCODE: 5100
BH BB DISPENSE STATUS: NORMAL
BH BB PRODUCT CODE: NORMAL
BH BB UNIT NUMBER: NORMAL
BUN SERPL-MCNC: 14 MG/DL (ref 8–23)
BUN/CREAT SERPL: 16.3 (ref 7–25)
CA-I BLDA-SCNC: ABNORMAL MMOL/L
CALCIUM SPEC-SCNC: 8.2 MG/DL (ref 8.6–10.5)
CHLORIDE SERPL-SCNC: 108 MMOL/L (ref 98–107)
CO2 BLDA-SCNC: 24 MMOL/L (ref 24–29)
CO2 SERPL-SCNC: 23.5 MMOL/L (ref 22–29)
CREAT SERPL-MCNC: 0.86 MG/DL (ref 0.57–1)
CROSSMATCH INTERPRETATION: NORMAL
DEPRECATED RDW RBC AUTO: 42.5 FL (ref 37–54)
EGFRCR SERPLBLD CKD-EPI 2021: 65.9 ML/MIN/1.73
ERYTHROCYTE [DISTWIDTH] IN BLOOD BY AUTOMATED COUNT: 12.9 % (ref 12.3–15.4)
GLUCOSE BLDC GLUCOMTR-MCNC: 111 MG/DL (ref 70–130)
GLUCOSE SERPL-MCNC: 163 MG/DL (ref 65–99)
HCO3 BLDA-SCNC: 23.1 MMOL/L (ref 22–26)
HCT VFR BLD AUTO: 25.1 % (ref 34–46.6)
HCT VFR BLD AUTO: 25.1 % (ref 34–46.6)
HCT VFR BLDA CALC: 29 % (ref 38–51)
HGB BLD-MCNC: 8.4 G/DL (ref 12–15.9)
HGB BLD-MCNC: 8.4 G/DL (ref 12–15.9)
HGB BLDA-MCNC: 9.9 G/DL (ref 12–17)
MCH RBC QN AUTO: 30.2 PG (ref 26.6–33)
MCHC RBC AUTO-ENTMCNC: 33.5 G/DL (ref 31.5–35.7)
MCV RBC AUTO: 90.3 FL (ref 79–97)
PCO2 BLDA: 43 MM HG (ref 35–45)
PH BLDA: 7.35 PH UNITS (ref 7.35–7.6)
PLATELET # BLD AUTO: 150 10*3/MM3 (ref 140–450)
PMV BLD AUTO: 10.1 FL (ref 6–12)
PO2 BLDA: 226 MMHG (ref 80–105)
POTASSIUM BLDA-SCNC: 3.5 MMOL/L (ref 3.5–4.9)
POTASSIUM SERPL-SCNC: 4.1 MMOL/L (ref 3.5–5.2)
RBC # BLD AUTO: 2.78 10*6/MM3 (ref 3.77–5.28)
SAO2 % BLDA: 100 % (ref 95–98)
SODIUM SERPL-SCNC: 138 MMOL/L (ref 136–145)
UNIT  ABO: NORMAL
UNIT  RH: NORMAL
WBC NRBC COR # BLD: 12.07 10*3/MM3 (ref 3.4–10.8)

## 2022-11-01 PROCEDURE — 25010000002 HYDROMORPHONE PER 4 MG: Performed by: SURGERY

## 2022-11-01 PROCEDURE — 85018 HEMOGLOBIN: CPT | Performed by: SURGERY

## 2022-11-01 PROCEDURE — 85014 HEMATOCRIT: CPT | Performed by: SURGERY

## 2022-11-01 PROCEDURE — 80048 BASIC METABOLIC PNL TOTAL CA: CPT | Performed by: SURGERY

## 2022-11-01 PROCEDURE — 25010000002 ENOXAPARIN PER 10 MG: Performed by: SURGERY

## 2022-11-01 PROCEDURE — 97162 PT EVAL MOD COMPLEX 30 MIN: CPT

## 2022-11-01 PROCEDURE — 25010000002 CEFAZOLIN IN DEXTROSE 2-4 GM/100ML-% SOLUTION: Performed by: SURGERY

## 2022-11-01 PROCEDURE — 97110 THERAPEUTIC EXERCISES: CPT

## 2022-11-01 PROCEDURE — 85027 COMPLETE CBC AUTOMATED: CPT | Performed by: SURGERY

## 2022-11-01 RX ADMIN — HYDROCODONE BITARTRATE AND ACETAMINOPHEN 1 TABLET: 7.5; 325 TABLET ORAL at 11:16

## 2022-11-01 RX ADMIN — RIVAROXABAN 2.5 MG: 2.5 TABLET, FILM COATED ORAL at 09:58

## 2022-11-01 RX ADMIN — HYDROCODONE BITARTRATE AND ACETAMINOPHEN 1 TABLET: 7.5; 325 TABLET ORAL at 00:05

## 2022-11-01 RX ADMIN — POTASSIUM CHLORIDE 10 MEQ: 750 TABLET, EXTENDED RELEASE ORAL at 08:20

## 2022-11-01 RX ADMIN — HYDROCODONE BITARTRATE AND ACETAMINOPHEN 1 TABLET: 7.5; 325 TABLET ORAL at 17:21

## 2022-11-01 RX ADMIN — ATORVASTATIN CALCIUM 40 MG: 20 TABLET, FILM COATED ORAL at 17:20

## 2022-11-01 RX ADMIN — HYDROMORPHONE HYDROCHLORIDE 0.5 MG: 1 INJECTION, SOLUTION INTRAMUSCULAR; INTRAVENOUS; SUBCUTANEOUS at 08:23

## 2022-11-01 RX ADMIN — HYDROCODONE BITARTRATE AND ACETAMINOPHEN 1 TABLET: 7.5; 325 TABLET ORAL at 05:26

## 2022-11-01 RX ADMIN — TOPIRAMATE 25 MG: 25 TABLET, FILM COATED ORAL at 08:20

## 2022-11-01 RX ADMIN — HYDROCODONE BITARTRATE AND ACETAMINOPHEN 1 TABLET: 7.5; 325 TABLET ORAL at 21:22

## 2022-11-01 RX ADMIN — RIVAROXABAN 2.5 MG: 2.5 TABLET, FILM COATED ORAL at 17:21

## 2022-11-01 RX ADMIN — PANTOPRAZOLE SODIUM 40 MG: 40 TABLET, DELAYED RELEASE ORAL at 06:02

## 2022-11-01 RX ADMIN — MONTELUKAST SODIUM 10 MG: 10 TABLET, FILM COATED ORAL at 08:20

## 2022-11-01 RX ADMIN — ENOXAPARIN SODIUM 40 MG: 100 INJECTION SUBCUTANEOUS at 08:20

## 2022-11-01 RX ADMIN — ASPIRIN 81 MG: 81 TABLET, COATED ORAL at 08:20

## 2022-11-01 RX ADMIN — FUROSEMIDE 20 MG: 20 TABLET ORAL at 08:20

## 2022-11-01 RX ADMIN — CEFAZOLIN SODIUM 2 G: 2 INJECTION, SOLUTION INTRAVENOUS at 01:54

## 2022-11-01 RX ADMIN — LISINOPRIL 40 MG: 40 TABLET ORAL at 08:20

## 2022-11-01 RX ADMIN — CLOPIDOGREL 75 MG: 75 TABLET, FILM COATED ORAL at 08:20

## 2022-11-01 NOTE — PLAN OF CARE
Goal Outcome Evaluation:  Plan of Care Reviewed With: patient, family        Progress: improving  Outcome Evaluation: Pt seen for PT eval this afternoon. She is POD#1 femoral to peroneal artery bypass w femoral endarterectomy. She currently presents w expected post op pain and decreased functional mobility. At baseline, pt lives w alone and is independent with mobility and ADLs. She still drives at times. She also has walker and cane to use if needed. Today, pt is up in chair upon entry to room. She was able to stand w CGA/min A using Rwx. She then ambulated approx 35 ft w Rwx and CGA. She does exhibit antalgic gait w decreased L step length and heel strike. Her gait did improve w increased distance. Pt plans home at [place] and can stay with her son if needed. She will continue to benefit from skilled PT to maximize safety, strength, and independence w mobility.

## 2022-11-01 NOTE — PROGRESS NOTES
Name: Lourdes Platt ADMIT: 10/31/2022   : 1935  PCP: Everton Disla MD    MRN: [id number] LOS: 1 days   AGE/SEX: 87 y.o. female  ROOM: [address]    [place], Post Op Global    87 y.o. female with peripheral arterial disease of the left lower extremity with ischemic rest pain, who today is postop day 1 from common femoral to peroneal artery bypass with femoral endarterectomy.  Complains of appropriate incisional pain.  No postop nausea or vomiting.  Received 1 unit packed cell transfusion last night.    Scheduled Medications:   aspirin, 81 mg, Oral, Daily  atorvastatin, 40 mg, Oral, Q PM  clopidogrel, 75 mg, Oral, Daily  enoxaparin, 40 mg, Subcutaneous, Daily  furosemide, 20 mg, Oral, Daily  lisinopril, 40 mg, Oral, Daily  montelukast, 10 mg, Oral, Daily  pantoprazole, 40 mg, Oral, QAM  potassium chloride, 10 mEq, Oral, Daily  sodium chloride, 200 mL, Intravenous, Once  topiramate, 25 mg, Oral, Daily    Active Infusions:  dextrose 5 % and sodium chloride 0.45 % with KCl 20 mEq/L, 75 mL/hr, Last Rate: 75 mL/hr (10/31/22 1957)  sodium chloride, 500 mL/hr, Last Rate: Stopped (10/31/22 1530)    Vital Signs  Vital Signs Patient Vitals for the past 24 hrs:   BP Temp Temp src Pulse Resp SpO2   22 0600 124/52 -- -- 93 -- 98 %   22 0500 116/57 -- -- 95 -- 98 %   22 0400 129/58 -- -- 91 -- 99 %   22 0300 143/64 -- -- 92 -- 99 %   22 0200 132/62 -- -- 97 -- 99 %   22 0100 129/61 -- -- 99 -- 99 %   22 0000 132/65 -- -- 93 -- 100 %   10/31/22 2343 128/65 98.9 °F (37.2 °C) Oral 90 18 100 %   10/31/22 2301 119/77 98.7 °F (37.1 °C) Oral 99 18 96 %   10/31/22 2300 119/77 -- -- 105 -- 100 %   10/31/22 2200 135/69 -- -- 90 -- 100 %   10/31/22 2115 127/59 97.3 °F (36.3 °C) Oral 81 18 100 %   10/31/22 2100 119/54 -- -- 88 -- 100 %   10/31/22 2043 126/57 97.1 °F (36.2 °C) Oral 88 18 100 %   10/31/22 2000 107/47 -- -- 78 -- 99 %   10/31/22 1959 107/47 -- -- -- -- --    10/31/22 1900 127/56 -- -- 78 -- 100 %   10/31/22 1600 132/56 97.5 °F (36.4 °C) Oral 87 -- 100 %   10/31/22 1445 114/46 -- -- 67 -- 100 %   10/31/22 1435 -- -- -- 65 -- 100 %   10/31/22 1430 106/47 -- -- 64 -- 100 %   10/31/22 1415 108/49 -- -- 62 -- 100 %   10/31/22 1405 -- -- -- 62 -- 100 %   10/31/22 1400 99/43 -- -- 74 -- 99 %   10/31/22 1345 102/45 -- -- 58 -- 98 %   10/31/22 1335 -- -- -- 61 -- 98 %   10/31/22 1330 91/46 -- -- 57 -- 98 %   10/31/22 1305 -- -- -- 69 -- 98 %   10/31/22 1300 103/52 -- -- 66 -- 100 %   10/31/22 1255 -- -- -- 68 -- 99 %   10/31/22 1245 109/58 -- -- 69 -- 100 %   10/31/22 1240 109/55 98.4 °F (36.9 °C) Oral 80 -- 98 %   10/31/22 0915 -- -- -- 79 -- 94 %   10/31/22 0912 -- -- -- 76 -- 100 %   10/31/22 0909 -- -- -- 77 -- 100 %   10/31/22 0906 -- -- -- 75 -- 100 %   10/31/22 0904 -- -- -- -- -- 100 %   10/31/22 0903 -- -- -- 76 -- --   10/31/22 0902 -- -- -- -- -- 100 %   10/31/22 0900 -- -- -- 78 -- --   10/31/22 0857 -- -- -- 74 -- 100 %   10/31/22 0854 -- -- -- 79 -- 99 %   10/31/22 0851 -- -- -- 81 -- 100 %   10/31/22 0848 -- -- -- 78 -- 95 %     I/O:  I/O last 3 completed shifts:  In: 3507.5 [P.O.:240; I.V.:2517.5; Blood:500; IV Piggyback:250]  Out: 1710 [Urine:1210; Blood:500]    Physical Exam: Alert, oriented.  Does not appear to be uncomfortable.  Groin and calf incisions soft without bleeding.  Foot hyperemic.  Good peroneal Doppler signal.  Right foot okay.     CBC    Results from last 7 days   Lab Units 11/01/22  0518 10/31/22  1721 10/31/22  1123 10/31/22  0833   WBC 10*3/mm3 12.07* 17.75*  --  7.42   HEMOGLOBIN g/dL 8.4*  8.4* 8.2*  --  13.0   HEMOGLOBIN, POC g/dL  --   --  9.9*  --    PLATELETS 10*3/mm3 150 183  --  273     BMP   Results from last 7 days   Lab Units 11/01/22  0518 10/31/22  0833   SODIUM mmol/L 138 137   POTASSIUM mmol/L 4.1 3.9   CHLORIDE mmol/L 108* 102   CO2 mmol/L 23.5 23.3   BUN mg/dL 14 20   CREATININE mg/dL 0.86 0.87   GLUCOSE mg/dL 163* 89      Cr Clearance Estimated Creatinine Clearance: 40.9 mL/min (by C-G formula based on SCr of 0.86 mg/dL).  Assessment & Plan   Assessment & Plan    Atherosclerosis of native arteries of extremities with rest pain, left leg (HCC)    Essential hypertension    Atherosclerosis of native arteries of extremities with rest pain, right leg (HCC)    87 y.o. female stable post difficult revascularization procedure.  Already transfused 1 unit packed cells for acute blood loss anemia.  Bypasses patent to examination.  Will remove lines and mobilize.  Adjust medications in light of new prosthetic infrainguinal bypass extending below the knee.    Juan A Roblero MD  11/01/22  08:29 EDT    Please call my office with any question: [phone]    Active Hospital Problems    Diagnosis  POA   • **Atherosclerosis of native arteries of extremities with rest pain, left leg (HCC) [I70.222]  Yes   • Atherosclerosis of native arteries of extremities with rest pain, right leg (HCC) [I70.221]  Yes   • Essential hypertension [I10]  Yes      Resolved Hospital Problems   No resolved problems to display.

## 2022-11-01 NOTE — PLAN OF CARE
Goal Outcome Evaluation:      VSS. 2L O2 NC. YEMI Meyers positional, zeroed & calibrated, flushed per protocol. Consent for blood received. 1 Unit of blood given. H&H to be drawn in AM. D5 1/2 NS + 20 KCl fluids @ 75 mL/hr. IV Cefazolin antibx. PRN Norco x2 for pain. L lower leg incision w/ small, dried drainage, guaze dressing intact. L groin incision LALITHA, bruising, firmness noted and MD aware, area marked. L DP absent w/ doppler, MD aware. L PT dopplerable but weak. R DP found w/ doppler, site marked. R PT dopplerable & strong. Phillips catheter patent. Daughter-in-law @ bedside. Labs to get in AM through [facility].

## 2022-11-01 NOTE — THERAPY EVALUATION
Patient Name: Lourdes Platt  : 1935    MRN: [id number]                              Today's Date: 2022       Admit Date: 10/31/2022    Visit Dx: No diagnosis found.  Patient Active Problem List   Diagnosis   • PAD (peripheral artery disease) (Formerly [facility])   • Intermittent claudication (HCC)   • Shortness of breath   • Essential hypertension   • Atherosclerosis of native arteries of extremities with rest pain, right leg (HCC)   • Atherosclerosis of native arteries of extremities with rest pain, left leg (HCC)     Past Medical History:   Diagnosis Date   • Arthritis    • Bilateral carotid artery stenosis    • Constipation    • Depression    • Foot swelling     RIGHT    • GERD (gastroesophageal reflux disease)    • Hypertension    • Intermittent claudication (Formerly [facility]) 2017   • PAD (peripheral artery disease) (Formerly [facility]) 2017   • PONV (postoperative nausea and vomiting)    • PVD (peripheral vascular disease) with claudication (Formerly [facility])     lennie legs   • Slow to wake up after anesthesia    • Spinal headache    • Stroke (Formerly [facility])     ASA     Past Surgical History:   Procedure Laterality Date   • ANGIOPLASTY FEMORAL ARTERY Left 2017    Procedure: RIGHT FEMORAL ARTERIAL CUTDOWN, LIGATION OF FEMORAL ARTERY PSUEDOANEURYSM, AIF WITH BILATERAL RUNOFF ARTERIOGRAM, RIGHT COMMON ILIAC ANGIOPLASTY;  Surgeon: Keny Sadler MD;  Location: [facility] OR ;  Service:    • APPENDECTOMY     • ATHRECTOMY ILIAC, FEMORAL, TIBIAL ARTERY Right 2017    Procedure: LEFT FEMORAL APPROACH AIF RIGHT LEG ARTERIOGRAM  RIGHT FEMORAL POPLITEAL ARTERY WITH DRUG COATED BALLOON ANGIOPLASTY;  Surgeon: Keny Sadler MD;  Location: [facility] OR ;  Service:    • BUNIONECTOMY     • EYE SURGERY      CATARACT EXTRACTION   • FEMORAL POPLITEAL BYPASS Left 10/31/2022    Procedure: LEFT FEMORAL TO PERONEAL ARTERY BYPASS WITH POLYTETRAFLUOROETHYLENE, LEFT FEMORAL ENDARTERECTOMY;  Surgeon: Juan A Roblero MD;   Location: [facility] MAIN OR;  Service: Vascular;  Laterality: Left;   • HYSTERECTOMY  1970   • ORIF WRIST FRACTURE Left       General Information     Row Name 11/01/22 1537          Physical Therapy Time and Intention    Document Type evaluation  -EJ     Mode of Treatment physical therapy  -EJ     Row Name 11/01/22 1537          General Information    Patient Profile Reviewed yes  -EJ     Prior Level of Function independent:;ADL's;all household mobility;community mobility;driving  -EJ     Existing Precautions/Restrictions no known precautions/restrictions  -EJ     Barriers to Rehab none identified  -EJ     Row Name 11/01/22 1537          Living Environment    People in Home alone  can stay w family at [place]  -EJ     Row Name 11/01/22 1537          Home Main Entrance    Number of Stairs, Main Entrance two  -EJ     Row Name 11/01/22 1537          Cognition    Orientation Status (Cognition) oriented x 4  -EJ     Row Name 11/01/22 1537          Safety Issues, Functional Mobility    Impairments Affecting Function (Mobility) pain  -EJ           User Key  (r) = Recorded By, (t) = Taken By, (c) = Cosigned By    Initials Name Provider Type    EJ Danielle Fuentes, PT Physical Therapist               Mobility     Row Name 11/01/22 1539          Bed Mobility    Comment, (Bed Mobility) up in chair  -     Row Name 11/01/22 1539          Sit-Stand Transfer    Sit-Stand [place] (Transfers) verbal cues;nonverbal cues (demo/gesture);minimum assist (75% patient effort);contact guard  -EJ     Assistive Device (Sit-Stand Transfers) walker, front-wheeled  -EJ     Row Name 11/01/22 1539          Gait/Stairs (Locomotion)    [place] Level (Gait) verbal cues;contact guard  -EJ     Assistive Device (Gait) walker, front-wheeled  -EJ     Distance in Feet (Gait) 35  -EJ     Deviations/Abnormal Patterns (Gait) maria c decreased;antalgic;stride length decreased  -EJ     Bilateral Gait Deviations forward flexed posture  -EJ     Left Sided  Gait Deviations heel strike decreased;weight shift ability decreased  -EJ     Comment, (Gait/Stairs) slow pace, cues for gait sequence and increased L step length, limited L LE weight bearing initially due to pain-gait improved w distance  -EJ           User Key  (r) = Recorded By, (t) = Taken By, (c) = Cosigned By    Initials Name Provider Type    Danielle Cobb, PT Physical Therapist               Obj/Interventions     Row Name 11/01/22 1543          Range of Motion Comprehensive    General Range of Motion no range of motion deficits identified  -EJ     Comment, General Range of Motion x LLE due to pain  -EJ     Row Name 11/01/22 1543          Strength Comprehensive (MMT)    General Manual Muscle Testing (MMT) Assessment no strength deficits identified  -EJ     Comment, General Manual Muscle Testing (MMT) Assessment LLE weakness due to pain  -EJ     Row Name 11/01/22 1543          Motor Skills    Therapeutic Exercise --  seated BLE AP and LAQ x 10 reps  -EJ           User Key  (r) = Recorded By, (t) = Taken By, (c) = Cosigned By    Initials Name Provider Type    Danielle Cobb, PT Physical Therapist               Goals/Plan     Row Name 11/01/22 1558          Bed Mobility Goal 1 (PT)    Activity/Assistive Device (Bed Mobility Goal 1, PT) bed mobility activities, all  -EJ     [place] Level/Cues Needed (Bed Mobility Goal 1, PT) standby assist  -EJ     Time Frame (Bed Mobility Goal 1, PT) 1 week  -EJ     Row Name 11/01/22 1554          Transfer Goal 1 (PT)    Activity/Assistive Device (Transfer Goal 1, PT) transfers, all;walker, rolling  -EJ     [place] Level/Cues Needed (Transfer Goal 1, PT) standby assist  -EJ     Time Frame (Transfer Goal 1, PT) 1 week  -EJ     Row Name 11/01/22 1552          Gait Training Goal 1 (PT)    Activity/Assistive Device (Gait Training Goal 1, PT) gait (walking locomotion);walker, rolling  -EJ     [place] Level (Gait Training Goal 1, PT) standby assist  -EJ      Distance (Gait Training Goal 1, PT) 100  -EJ     Time Frame (Gait Training Goal 1, PT) 1 week  -     Row Name 11/01/22 2478          Therapy Assessment/Plan (PT)    Planned Therapy Interventions (PT) balance training;bed mobility training;gait training;home exercise program;patient/family education;stretching;strengthening;stair training;ROM (range of motion);transfer training  -EJ           User Key  (r) = Recorded By, (t) = Taken By, (c) = Cosigned By    Initials Name Provider Type    EJ Danielle Fuentes, PT Physical Therapist               Clinical Impression     Row Name 11/01/22 1546          Pain    Posttreatment Pain Rating 6/10  -EJ     Pain Location - Side/Orientation Left  -EJ     Pain Location lower  -EJ     Pain Location - extremity  -EJ     Pain Intervention(s) Repositioned;Ambulation/increased activity  -     Row Name 11/01/22 1542          Plan of Care Review    Plan of Care Reviewed With patient;family  -     Progress improving  -EJ     Outcome Evaluation Pt seen for PT eval this afternoon. She is POD#1 femoral to peroneal artery bypass w femoral endarterectomy. She currently presents w expected post op pain and decreased functional mobility. At baseline, pt lives w alone and is independent with mobility and ADLs. She still drives at times. She also has walker and cane to use if needed. Today, pt is up in chair upon entry to room. She was able to stand w CGA/min A using Rwx. She then ambulated approx 35 ft w Rwx and CGA. She does exhibit antalgic gait w decreased L step length and heel strike. Her gait did improve w increased distance. Pt plans home at [place] and can stay with her son if needed. She will continue to benefit from skilled PT to maximize safety, strength, and independence w mobility.  -     Row Name 11/01/22 1545          Therapy Assessment/Plan (PT)    Patient/Family Therapy Goals Statement (PT) go home  -EJ     Rehab Potential (PT) good, to achieve stated therapy goals  -      Criteria for Skilled Interventions Met (PT) yes  -EJ     Therapy Frequency (PT) 5 times/wk  -     Row Name 11/01/22 1545          Positioning and Restraints    Pre-Treatment Position sitting in chair/recliner  -EJ     Post Treatment Position chair  -EJ     In Chair notified nsg;sitting;call light within reach;encouraged to call for assist;exit alarm on;with family/caregiver  -           User Key  (r) = Recorded By, (t) = Taken By, (c) = Cosigned By    Initials Name Provider Type    Danielle Cobb, PT Physical Therapist               Outcome Measures     Row Name 11/01/22 1559          How much help from another person do you currently need...    Turning from your back to your side while in flat bed without using bedrails? 3  -EJ     Moving from lying on back to sitting on the side of a flat bed without bedrails? 3  -EJ     Moving to and from a bed to a chair (including a wheelchair)? 3  -EJ     Standing up from a chair using your arms (e.g., wheelchair, bedside chair)? 3  -EJ     Climbing 3-5 steps with a railing? 3  -EJ     To walk in hospital room? 3  -EJ     AM-PAC 6 Clicks Score (PT) 18  -EJ     Highest level of mobility 6 --> Walked 10 steps or more  -     Row Name 11/01/22 1559          Functional Assessment    Outcome Measure Options AM-PAC 6 Clicks Basic Mobility (PT)  -           User Key  (r) = Recorded By, (t) = Taken By, (c) = Cosigned By    Initials Name Provider Type    Danielle Cobb, PT Physical Therapist                             Physical Therapy Education     Title: PT OT SLP Therapies (In Progress)     Topic: Physical Therapy (In Progress)     Point: Mobility training (Done)     Learning Progress Summary           Patient Acceptance, E,TB,D, VU,NR by MALINDA at 11/1/2022 1559                   Point: Home exercise program (Done)     Learning Progress Summary           Patient Acceptance, E,TB,D, VU,NR by MALINDA at 11/1/2022 1559                   Point: Body mechanics (Not  Started)     Learner Progress:  Not documented in this visit.          Point: Precautions (Not Started)     Learner Progress:  Not documented in this visit.                      User Key     Initials Effective Dates Name Provider Type [facility] 06/16/21 -  Danielle Fuentes, PT Physical Therapist PT              PT Recommendation and Plan  Planned Therapy Interventions (PT): balance training, bed mobility training, gait training, home exercise program, patient/family education, stretching, strengthening, stair training, ROM (range of motion), transfer training  Plan of Care Reviewed With: patient, family  Progress: improving  Outcome Evaluation: Pt seen for PT eval this afternoon. She is POD#1 femoral to peroneal artery bypass w femoral endarterectomy. She currently presents w expected post op pain and decreased functional mobility. At baseline, pt lives w alone and is independent with mobility and ADLs. She still drives at times. She also has walker and cane to use if needed. Today, pt is up in chair upon entry to room. She was able to stand w CGA/min A using Rwx. She then ambulated approx 35 ft w Rwx and CGA. She does exhibit antalgic gait w decreased L step length and heel strike. Her gait did improve w increased distance. Pt plans home at [place] and can stay with her son if needed. She will continue to benefit from skilled PT to maximize safety, strength, and independence w mobility.     Time Calculation:    PT Charges     Row Name 11/01/22 5506             Time Calculation    Start Time 1500  -EJ      Stop Time 1525  -EJ      Time Calculation (min) 25 min  -EJ      PT Received On 11/01/22  -EJ      PT - Next Appointment 11/02/22  -EJ      PT Goal Re-Cert Due Date 11/08/22  -EJ         Time Calculation- PT    Total Timed Code Minutes- PT 20 minute(s)  -EJ            User Key  (r) = Recorded By, (t) = Taken By, (c) = Cosigned By    Initials Name Provider Type    EJ Danielle Fuentes, PT Physical Therapist               Therapy Charges for Today     Code Description Service Date Service Provider Modifiers Qty    31447269978 HC PT EVAL MOD COMPLEXITY 3 11/1/2022 Danielle Fuentes, PT GP 1    85946510485 HC PT THER PROC EA 15 MIN 11/1/2022 Danielle Fuentes, PT GP 1          PT G-Codes  Outcome Measure Options: AM-PAC 6 Clicks Basic Mobility (PT)  AM-PAC 6 Clicks Score (PT): 18    Danielle Fuentes, PT  11/1/2022

## 2022-11-01 NOTE — PLAN OF CARE
Goal Outcome Evaluation:  Plan of Care Reviewed With: patient        Progress: improving  Outcome Evaluation: VSS, pain treated with PRN meds. Mira and enamorado DC'd. Pt up in chair. PT to work with patient.

## 2022-11-01 NOTE — CASE MANAGEMENT/SOCIAL WORK
Discharge Planning Assessment  [facility]     Patient Name: Lourdes Platt  MRN: [id number]  Today's Date: 11/1/2022    Admit Date: 10/31/2022    Plan: Home with family   Discharge Needs Assessment     Row Name 11/01/22 1514       Living Environment    People in Home alone    Current Living Arrangements home    Primary Care Provided by self    Provides Primary Care For no one    Family Caregiver if Needed child(gretchen), adult    Quality of Family Relationships supportive       Transition Planning    Patient/Family Anticipates Transition to home with family    Patient/Family Anticipated Services at Transition none    Transportation Anticipated family or friend will provide       Discharge Needs Assessment    Equipment Currently Used at Home cane, straight;walker, rolling    Equipment Needed After Discharge none               Discharge Plan     Row Name 11/01/22 1514       Plan    Plan Home with family    Patient/Family in Agreement with Plan yes    Plan Comments Met with pt and family at bedside. Introduced self, explained CCP role, facesheet veriifed. Pt states she lives alone and is independent with ADLS. Has cane and walker at home, no other DME.  No history of HH or SNF. Plans to return home at discharge.  If having trouble with mobility, will stay with son and daughter in law at discharge.  No identified needs at this time. CCP will follow.  ROLANDO Leung RN              Continued Care and Services - Admitted Since 10/31/2022    Coordination has not been started for this encounter.       Expected Discharge Date and Time     Expected Discharge Date Expected Discharge Time    Nov 2, 2022          Demographic Summary     Row Name 11/01/22 1513       General Information    Admission Type inpatient    Arrived From home    Referral Source admission list    Reason for Consult discharge planning    Preferred Language English       Contact Information    Permission Granted to Share Info With family/designee  Stanley Platt (son)  [phone]               Functional Status    No documentation.                Psychosocial    No documentation.                Abuse/Neglect    No documentation.                Legal    No documentation.                Substance Abuse    No documentation.                Patient Forms    No documentation.                   Maribeth Leung RN

## 2022-11-02 LAB
DEPRECATED RDW RBC AUTO: 40.7 FL (ref 37–54)
ERYTHROCYTE [DISTWIDTH] IN BLOOD BY AUTOMATED COUNT: 12.6 % (ref 12.3–15.4)
HCT VFR BLD AUTO: 22.1 % (ref 34–46.6)
HGB BLD-MCNC: 7.6 G/DL (ref 12–15.9)
MCH RBC QN AUTO: 30.2 PG (ref 26.6–33)
MCHC RBC AUTO-ENTMCNC: 34.4 G/DL (ref 31.5–35.7)
MCV RBC AUTO: 87.7 FL (ref 79–97)
PLATELET # BLD AUTO: 142 10*3/MM3 (ref 140–450)
PMV BLD AUTO: 10.2 FL (ref 6–12)
RBC # BLD AUTO: 2.52 10*6/MM3 (ref 3.77–5.28)
WBC NRBC COR # BLD: 9.64 10*3/MM3 (ref 3.4–10.8)

## 2022-11-02 PROCEDURE — 97110 THERAPEUTIC EXERCISES: CPT

## 2022-11-02 PROCEDURE — 85027 COMPLETE CBC AUTOMATED: CPT | Performed by: SURGERY

## 2022-11-02 RX ORDER — OXYCODONE HYDROCHLORIDE AND ACETAMINOPHEN 5; 325 MG/1; MG/1
1 TABLET ORAL EVERY 4 HOURS PRN
Status: DISCONTINUED | OUTPATIENT
Start: 2022-11-02 | End: 2022-11-03 | Stop reason: HOSPADM

## 2022-11-02 RX ORDER — IRON ASPGLY,PS/C/B12/FA/CA/SUC 150-25-1
1 CAPSULE ORAL
Status: DISCONTINUED | OUTPATIENT
Start: 2022-11-02 | End: 2022-11-03 | Stop reason: HOSPADM

## 2022-11-02 RX ADMIN — TOPIRAMATE 25 MG: 25 TABLET, FILM COATED ORAL at 08:13

## 2022-11-02 RX ADMIN — LISINOPRIL 40 MG: 40 TABLET ORAL at 08:13

## 2022-11-02 RX ADMIN — POTASSIUM CHLORIDE 10 MEQ: 750 TABLET, EXTENDED RELEASE ORAL at 08:13

## 2022-11-02 RX ADMIN — OXYCODONE AND ACETAMINOPHEN 1 TABLET: 5; 325 TABLET ORAL at 08:18

## 2022-11-02 RX ADMIN — HYDROCODONE BITARTRATE AND ACETAMINOPHEN 1 TABLET: 7.5; 325 TABLET ORAL at 02:53

## 2022-11-02 RX ADMIN — PANTOPRAZOLE SODIUM 40 MG: 40 TABLET, DELAYED RELEASE ORAL at 07:17

## 2022-11-02 RX ADMIN — ATORVASTATIN CALCIUM 40 MG: 20 TABLET, FILM COATED ORAL at 17:25

## 2022-11-02 RX ADMIN — OXYCODONE AND ACETAMINOPHEN 1 TABLET: 5; 325 TABLET ORAL at 21:59

## 2022-11-02 RX ADMIN — ASPIRIN 81 MG: 81 TABLET, COATED ORAL at 08:13

## 2022-11-02 RX ADMIN — FUROSEMIDE 20 MG: 20 TABLET ORAL at 08:13

## 2022-11-02 RX ADMIN — RIVAROXABAN 2.5 MG: 2.5 TABLET, FILM COATED ORAL at 17:25

## 2022-11-02 RX ADMIN — OXYCODONE AND ACETAMINOPHEN 1 TABLET: 5; 325 TABLET ORAL at 17:25

## 2022-11-02 RX ADMIN — RIVAROXABAN 2.5 MG: 2.5 TABLET, FILM COATED ORAL at 08:13

## 2022-11-02 RX ADMIN — MONTELUKAST SODIUM 10 MG: 10 TABLET, FILM COATED ORAL at 08:13

## 2022-11-02 RX ADMIN — Medication 1 CAPSULE: at 10:42

## 2022-11-02 NOTE — PLAN OF CARE
Goal Outcome Evaluation:  Plan of Care Reviewed With: patient        Progress: improving  Outcome Evaluation: Pt seen for PT today. She is up in chair upon entry to room. Pt states she is doing well and feels she is moving better and better. Today, pt able to stand w CGA. She then ambulated approx 120 ft w Rwx. Still exhibits slower pace, but steady w overall improved gait mechanics today. Pt returned to chair at end of session. encouraged ambualtion w nsg as well. Pt plans home w family upon DC. Will continue to progress as tolerated.

## 2022-11-02 NOTE — PLAN OF CARE
Goal Outcome Evaluation:  Plan of Care Reviewed With: patient        Progress: improving  Outcome Evaluation: VSS, pain treated with PRN meds. Ambulating better with PT. Incision CDI

## 2022-11-02 NOTE — PLAN OF CARE
Problem: Adult Inpatient Plan of Care  Goal: Plan of Care Review  Flowsheets (Taken 11/2/2022 1973)  Progress: improving  Plan of Care Reviewed With: patient  Outcome Evaluation: incisions intact to left lower leg and left groin left groin bruised pedal pulses with doppler except left dp is absent ambulated to bathroom and tolerated well vooiding pain controlled with po pain med   Goal Outcome Evaluation:

## 2022-11-02 NOTE — PROGRESS NOTES
Name: Lourdes Platt ADMIT: 10/31/2022   : 1935  PCP: Everton Disla MD    MRN: [id number] LOS: 2 days   AGE/SEX: 87 y.o. female  ROOM: [address]    [place], Post Op [employer]    87 y.o. female with ischemic rest pain of the left foot, post prosthetic left common femoral to mid peroneal artery bypass with PTFE.  Complains of incisional pain, not well controlled with hydrocodone 7.5 mg.  No symptoms of opioid overdose.  [facility] and [facility]-line out and worked with PT yesterday.    Scheduled Medications:   aspirin, 81 mg, Oral, Daily  atorvastatin, 40 mg, Oral, Q PM  enoxaparin, 40 mg, Subcutaneous, Daily  furosemide, 20 mg, Oral, Daily  lisinopril, 40 mg, Oral, Daily  montelukast, 10 mg, Oral, Daily  pantoprazole, 40 mg, Oral, QAM  potassium chloride, 10 mEq, Oral, Daily  Rivaroxaban, 2.5 mg, Oral, BID With Meals  sodium chloride, 200 mL, Intravenous, Once  topiramate, 25 mg, Oral, Daily    Active Infusions:  sodium chloride, 500 mL/hr, Last Rate: Stopped (10/31/22 1530)    Vital Signs  Vital Signs Patient Vitals for the past 24 hrs:   BP Temp Temp src Pulse Resp SpO2   22 0700 124/61 98.5 °F (36.9 °C) Oral 96 18 95 %   22 2347 131/53 98.9 °F (37.2 °C) Oral 98 18 97 %   22 1955 120/55 98.6 °F (37 °C) Oral 89 18 99 %   22 1500 110/56 98.2 °F (36.8 °C) Oral -- 18 --   22 1100 133/52 98.6 °F (37 °C) Oral 91 18 99 %     I/O:  I/O last 3 completed shifts:  In: 2857.5 [P.O.:1440; I.V.:1117.5; Blood:300]  Out: 2100 [Urine:2100]    Physical Exam: Alert, oriented.  Tachycardic but not hypotensive.  Incisions clean, dry and intact and washed with CHG cloth.  Strong arterial Doppler signal in bypass graft at knee, with reasonable signal in peroneal artery near ankle.  Foot hyperemic.  No ulcers.     CBC    Results from last 7 days   Lab Units 22  0613 22  0518 10/31/22  1721 10/31/22  1123 10/31/22  0833   WBC 10*3/mm3 9.64 12.07* 17.75*  --  7.42   HEMOGLOBIN g/dL  7.6* 8.4*  8.4* 8.2*  --  13.0   HEMOGLOBIN, POC g/dL  --   --   --  9.9*  --    PLATELETS 10*3/mm3 142 150 183  --  273     BMP   Results from last 7 days   Lab Units 11/01/22  0518 10/31/22  0833   SODIUM mmol/L 138 137   POTASSIUM mmol/L 4.1 3.9   CHLORIDE mmol/L 108* 102   CO2 mmol/L 23.5 23.3   BUN mg/dL 14 20   CREATININE mg/dL 0.86 0.87   GLUCOSE mg/dL 163* 89     Cr Clearance Estimated Creatinine Clearance: 40.9 mL/min (by C-G formula based on SCr of 0.86 mg/dL).  Assessment & Plan   Assessment & Plan    Atherosclerosis of native arteries of extremities with rest pain, left leg (HCC)    Essential hypertension    Atherosclerosis of native arteries of extremities with rest pain, right leg (HCC)    87 y.o. female with peripheral arterial disease and rest pain of the left lower extremity post prosthetic left lower extremity bypass bypass graft is patent.  Treating with lower dose aspirin and rivaroxaban.  Acute blood loss anemia with hemoglobin trending down.  Not hypotensive, so will start iron and follow H&H.  No transfusion for now.  Mobilize, discharge planning.    Juan A Roblero MD  11/02/22  08:07 EDT    Please call my office with any question: [phone]    Active Hospital Problems    Diagnosis  POA   • **Atherosclerosis of native arteries of extremities with rest pain, left leg (HCC) [I70.222]  Yes   • Atherosclerosis of native arteries of extremities with rest pain, right leg (HCC) [I70.221]  Yes   • Essential hypertension [I10]  Yes      Resolved Hospital Problems   No resolved problems to display.

## 2022-11-02 NOTE — THERAPY TREATMENT NOTE
Patient Name: Lourdes Platt  : 1935    MRN: [id number]                              Today's Date: 2022       Admit Date: 10/31/2022    Visit Dx: No diagnosis found.  Patient Active Problem List   Diagnosis   • PAD (peripheral artery disease) (Formerly [facility])   • Intermittent claudication (HCC)   • Shortness of breath   • Essential hypertension   • Atherosclerosis of native arteries of extremities with rest pain, right leg (HCC)   • Atherosclerosis of native arteries of extremities with rest pain, left leg (HCC)     Past Medical History:   Diagnosis Date   • Arthritis    • Bilateral carotid artery stenosis    • Constipation    • Depression    • Foot swelling     RIGHT    • GERD (gastroesophageal reflux disease)    • Hypertension    • Intermittent claudication (Formerly [facility]) 2017   • PAD (peripheral artery disease) (Formerly [facility]) 2017   • PONV (postoperative nausea and vomiting)    • PVD (peripheral vascular disease) with claudication (Formerly [facility])     lennie legs   • Slow to wake up after anesthesia    • Spinal headache    • Stroke (Formerly [facility])     ASA     Past Surgical History:   Procedure Laterality Date   • ANGIOPLASTY FEMORAL ARTERY Left 2017    Procedure: RIGHT FEMORAL ARTERIAL CUTDOWN, LIGATION OF FEMORAL ARTERY PSUEDOANEURYSM, AIF WITH BILATERAL RUNOFF ARTERIOGRAM, RIGHT COMMON ILIAC ANGIOPLASTY;  Surgeon: Keny Sadler MD;  Location: [facility] OR ;  Service:    • APPENDECTOMY     • ATHRECTOMY ILIAC, FEMORAL, TIBIAL ARTERY Right 2017    Procedure: LEFT FEMORAL APPROACH AIF RIGHT LEG ARTERIOGRAM  RIGHT FEMORAL POPLITEAL ARTERY WITH DRUG COATED BALLOON ANGIOPLASTY;  Surgeon: Keny Sadler MD;  Location: [facility] OR ;  Service:    • BUNIONECTOMY     • EYE SURGERY      CATARACT EXTRACTION   • FEMORAL POPLITEAL BYPASS Left 10/31/2022    Procedure: LEFT FEMORAL TO PERONEAL ARTERY BYPASS WITH POLYTETRAFLUOROETHYLENE, LEFT FEMORAL ENDARTERECTOMY;  Surgeon: Juan A Roblero MD;   Location: [facility] MAIN OR;  Service: Vascular;  Laterality: Left;   • HYSTERECTOMY  1970   • ORIF WRIST FRACTURE Left       General Information     Row Name 11/02/22 1318          Physical Therapy Time and Intention    Document Type therapy note (daily note)  -EJ     Mode of Treatment physical therapy  -EJ     Row Name 11/02/22 1318          General Information    Existing Precautions/Restrictions no known precautions/restrictions  -EJ           User Key  (r) = Recorded By, (t) = Taken By, (c) = Cosigned By    Initials Name Provider Type    EJ Danielle Fuentes, PT Physical Therapist               Mobility     Row Name 11/02/22 1324          Bed Mobility    Comment, (Bed Mobility) up in chair  -EJ     Row Name 11/02/22 1324          Sit-Stand Transfer    Sit-Stand [place] (Transfers) contact guard  -EJ     Assistive Device (Sit-Stand Transfers) walker, front-wheeled  -EJ     Row Name 11/02/22 1324          Gait/Stairs (Locomotion)    [place] Level (Gait) contact guard  -EJ     Assistive Device (Gait) walker, front-wheeled  -EJ     Distance in Feet (Gait) 120  -EJ     Deviations/Abnormal Patterns (Gait) maria c decreased;antalgic;stride length decreased  -EJ     Bilateral Gait Deviations forward flexed posture  -EJ     Left Sided Gait Deviations weight shift ability decreased  -EJ     Comment, (Gait/Stairs) improved sequencing today and slightly increased L step length  -EJ           User Key  (r) = Recorded By, (t) = Taken By, (c) = Cosigned By    Initials Name Provider Type    EJ Danielle Fuentes, PT Physical Therapist               Obj/Interventions    No documentation.                Goals/Plan    No documentation.                Clinical Impression     Row Name 11/02/22 1335          Pain    Posttreatment Pain Rating 4/10  -EJ     Pain Location - Side/Orientation Left  -EJ     Pain Location lower  -EJ     Pain Location - extremity  -EJ     Row Name 11/02/22 1333          Plan of Care Review    Plan  of Care Reviewed With patient  -EJ     Progress improving  -EJ     Outcome Evaluation Pt seen for PT today. She is up in chair upon entry to room. Pt states she is doing well and feels she is moving better and better. Today, pt able to stand w CGA. She then ambulated approx 120 ft w Rwx. Still exhibits slower pace, but steady w overall improved gait mechanics today. Pt returned to chair at end of session. encouraged ambualtion w nsg as well. Pt plans home w family upon DC. Will continue to progress as tolerated.  -EJ     Row Name 11/02/22 1334          Positioning and Restraints    Pre-Treatment Position sitting in chair/recliner  -EJ     Post Treatment Position chair  -EJ     In Chair notified nsg;reclined;call light within reach;encouraged to call for assist;exit alarm on;with family/caregiver  -EJ           User Key  (r) = Recorded By, (t) = Taken By, (c) = Cosigned By    Initials Name Provider Type    Danielle Cobb PT Physical Therapist               Outcome Measures     Row Name 11/02/22 1347          How much help from another person do you currently need...    Turning from your back to your side while in flat bed without using bedrails? 3  -EJ     Moving from lying on back to sitting on the side of a flat bed without bedrails? 3  -EJ     Moving to and from a bed to a chair (including a wheelchair)? 3  -EJ     Standing up from a chair using your arms (e.g., wheelchair, bedside chair)? 3  -EJ     Climbing 3-5 steps with a railing? 3  -EJ     To walk in hospital room? 3  -EJ     AM-PAC 6 Clicks Score (PT) 18  -EJ     Highest level of mobility 6 --> Walked 10 steps or more  -EJ           User Key  (r) = Recorded By, (t) = Taken By, (c) = Cosigned By    Initials Name Provider Type    Danielle Cobb, PT Physical Therapist                             Physical Therapy Education     Title: PT OT SLP Therapies (In Progress)     Topic: Physical Therapy (In Progress)     Point: Mobility training (Done)      Learning Progress Summary           Patient Acceptance, E,TB,D, VU,NR by  at 11/1/2022 1559                   Point: Home exercise program (Done)     Learning Progress Summary           Patient Acceptance, E,TB,D, VU,NR by  at 11/1/2022 1559                   Point: Body mechanics (Not Started)     Learner Progress:  Not documented in this visit.          Point: Precautions (Not Started)     Learner Progress:  Not documented in this visit.                      User Key     Initials Effective Dates Name Provider Type [facility] 06/16/21 -  Danielle Fuentes, PT Physical Therapist PT              PT Recommendation and Plan  Planned Therapy Interventions (PT): balance training, bed mobility training, gait training, home exercise program, patient/family education, stretching, strengthening, stair training, ROM (range of motion), transfer training  Plan of Care Reviewed With: patient  Progress: improving  Outcome Evaluation: Pt seen for PT today. She is up in chair upon entry to room. Pt states she is doing well and feels she is moving better and better. Today, pt able to stand w CGA. She then ambulated approx 120 ft w Rwx. Still exhibits slower pace, but steady w overall improved gait mechanics today. Pt returned to chair at end of session. encouraged ambualtion w nsg as well. Pt plans home w family upon DC. Will continue to progress as tolerated.     Time Calculation:    PT Charges     Row Name 11/02/22 1347             Time Calculation    Start Time 1257  -EJ      Stop Time 1315  -EJ      Time Calculation (min) 18 min  -EJ      PT Received On 11/02/22  -EJ      PT - Next Appointment 11/03/22  -EJ            User Key  (r) = Recorded By, (t) = Taken By, (c) = Cosigned By    Initials Name Provider Type     Danielle Fuentes, PT Physical Therapist              Therapy Charges for Today     Code Description Service Date Service Provider Modifiers Qty    97391060583 HC PT EVAL MOD COMPLEXITY 3 11/1/2022  Danielle Fuentes, PT GP 1    91059660499 HC PT THER PROC EA 15 MIN 11/1/2022 Danielle Fuentes, PT GP 1    22116929890 HC PT THER PROC EA 15 MIN 11/2/2022 Danielle Fuentes, PT GP 1          PT G-Codes  Outcome Measure Options: AM-PAC 6 Clicks Basic Mobility (PT)  AM-PAC 6 Clicks Score (PT): 18    Danielle Fuentes, PT  11/2/2022

## 2022-11-03 VITALS
SYSTOLIC BLOOD PRESSURE: 118 MMHG | BODY MASS INDEX: 21.56 KG/M2 | HEIGHT: 63 IN | TEMPERATURE: 98.4 F | WEIGHT: 121.7 LBS | OXYGEN SATURATION: 98 % | RESPIRATION RATE: 16 BRPM | HEART RATE: 88 BPM | DIASTOLIC BLOOD PRESSURE: 56 MMHG

## 2022-11-03 PROBLEM — D62 ACUTE BLOOD LOSS ANEMIA: Status: ACTIVE | Noted: 2022-11-03

## 2022-11-03 LAB
HCT VFR BLD AUTO: 25.3 % (ref 34–46.6)
HGB BLD-MCNC: 8.1 G/DL (ref 12–15.9)

## 2022-11-03 PROCEDURE — 85014 HEMATOCRIT: CPT | Performed by: SURGERY

## 2022-11-03 PROCEDURE — 85018 HEMOGLOBIN: CPT | Performed by: SURGERY

## 2022-11-03 RX ORDER — OXYCODONE HYDROCHLORIDE AND ACETAMINOPHEN 5; 325 MG/1; MG/1
1 TABLET ORAL EVERY 4 HOURS PRN
Qty: 20 TABLET | Refills: 0 | Status: SHIPPED | OUTPATIENT
Start: 2022-11-03 | End: 2022-11-09

## 2022-11-03 RX ORDER — IRON ASPGLY,PS/C/B12/FA/CA/SUC 150-25-1
1 CAPSULE ORAL
Qty: 30 EACH | Refills: 1 | Status: SHIPPED | OUTPATIENT
Start: 2022-11-04

## 2022-11-03 RX ORDER — ATORVASTATIN CALCIUM 40 MG/1
40 TABLET, FILM COATED ORAL EVERY EVENING
Qty: 90 TABLET | Refills: 3 | Status: SHIPPED | OUTPATIENT
Start: 2022-11-03

## 2022-11-03 RX ADMIN — LISINOPRIL 40 MG: 40 TABLET ORAL at 08:02

## 2022-11-03 RX ADMIN — MONTELUKAST SODIUM 10 MG: 10 TABLET, FILM COATED ORAL at 08:02

## 2022-11-03 RX ADMIN — RIVAROXABAN 2.5 MG: 2.5 TABLET, FILM COATED ORAL at 08:02

## 2022-11-03 RX ADMIN — TOPIRAMATE 25 MG: 25 TABLET, FILM COATED ORAL at 08:02

## 2022-11-03 RX ADMIN — Medication 1 CAPSULE: at 08:02

## 2022-11-03 RX ADMIN — ASPIRIN 81 MG: 81 TABLET, COATED ORAL at 08:02

## 2022-11-03 RX ADMIN — PANTOPRAZOLE SODIUM 40 MG: 40 TABLET, DELAYED RELEASE ORAL at 05:16

## 2022-11-03 RX ADMIN — OXYCODONE AND ACETAMINOPHEN 1 TABLET: 5; 325 TABLET ORAL at 05:16

## 2022-11-03 RX ADMIN — POTASSIUM CHLORIDE 10 MEQ: 750 TABLET, EXTENDED RELEASE ORAL at 08:02

## 2022-11-03 RX ADMIN — FUROSEMIDE 20 MG: 20 TABLET ORAL at 08:02

## 2022-11-03 NOTE — DISCHARGE PLACEMENT REQUEST
"Win Platt (87 y.o. Female)     Date of Birth   1935    Social Security Number       Address   [address] [facility] [address]    Home Phone   [phone]    MRN   [id number]       [facility]    Marital Status                               Admission Date   10/31/22    Admission Type   Elective    Admitting Provider   Juan A Roblero MD    Attending Provider   Juan A Roblero MD    Department, Room/Bed   [address], [address]       Discharge Date       Discharge Disposition   Home or Self Care    Discharge Destination                               Attending Provider: Juan A Roblero MD    Allergies: Penicillins    Isolation: None   Infection: None   Code Status: CPR    Ht: 160 cm (63\")   Wt: 55.2 kg (121 lb 11.2 oz)    Admission Cmt: None   Principal Problem: Atherosclerosis of native arteries of extremities with rest pain, left leg (HCC) [I70.222]                 Active Insurance as of 10/31/2022     Primary Coverage     Payor Plan Insurance Group Employer/Plan Group    MEDICARE MEDICARE A & B      Payor Plan Address Payor Plan Phone Number Payor Plan Fax Number Effective Dates    [address] [phone]  7/1/2001 - None Entered    [facility] [id number]       Subscriber Name Subscriber Birth Date Member ID       WIN PLATT 1935 [id number]           Secondary Coverage     Payor Plan Insurance Group Employer/Plan Group    [facility] SUPP [id number]     Payor Plan Address Payor Plan Phone Number Payor Plan Fax Number Effective Dates    [address]   12/1/2016 - None Entered    [facility] [id number]       Subscriber Name Subscriber Birth Date Member ID       WIN PLATT 1935 [id number]                 Emergency Contacts      (Rel.) Home Phone Work Phone Mobile Phone    GiuliaStanley (Son) [phone] -- [phone]    GiuliaFady buckley (Son) [phone] -- [phone]          "

## 2022-11-03 NOTE — CASE MANAGEMENT/SOCIAL WORK
Case Management Discharge Note      Final Note: HH order noted.  [facility] and spoke with Isabel/Keira who accepted.  Pt will be staying with son and daughter in law at [address], [place].  Daughter in law is Breann [phone].  ROLANDO Leung RN         Selected Continued Care - Admitted Since 10/31/2022     Destination    No services have been selected for the patient.              Durable Medical Equipment    No services have been selected for the patient.              Dialysis/Infusion    No services have been selected for the patient.              Home Medical Care     Service Provider Selected Services Address Phone Fax Patient Preferred    [facility] - [facility] [address] [facility], [facility] [address] [phone] --          Therapy    No services have been selected for the patient.              Community Resources    No services have been selected for the patient.              Community & DME    No services have been selected for the patient.                  Transportation Services  Private: Car    Final Discharge Disposition Code: 06 - home with home health care

## 2022-11-03 NOTE — PLAN OF CARE
Goal Outcome Evaluation:  Plan of Care Reviewed With: patient        Progress: improving  Outcome Evaluation: VSS, pain treated with PRN meds. Incision CDI

## 2022-11-03 NOTE — DISCHARGE SUMMARY
Name: Lourdes Platt ADMIT: 10/31/2022   : 1935  PCP: Everton Disla MD    MRN: [id number] LOS: 3 days   AGE/SEX: 87 y.o. female  Location: [facility]     Date of Admission: 10/31/2022  Date of Discharge:  11/3/2022    PCP: Everton Disla MD    DISCHARGE DIAGNOSIS  Active Hospital Problems    Diagnosis  POA   • **Atherosclerosis of native arteries of extremities with rest pain, left leg (HCC) [I70.222]  Yes   • Acute blood loss anemia [D62]  No   • Atherosclerosis of native arteries of extremities with rest pain, right leg (HCC) [I70.221]  Yes   • Essential hypertension [I10]  Yes      Resolved Hospital Problems   No resolved problems to display.     SECONDARY DIAGNOSES  Past Medical History:   Diagnosis Date   • Arthritis    • Bilateral carotid artery stenosis    • Constipation    • Depression    • Foot swelling     RIGHT    • GERD (gastroesophageal reflux disease)    • Hypertension    • Intermittent claudication (Former[facility]) 2017   • PAD (peripheral artery disease) (Former[facility]) 2017   • PONV (postoperative nausea and vomiting)    • PVD (peripheral vascular disease) with claudication (Formerly [facility])     lennie legs   • Slow to wake up after anesthesia    • Spinal headache    • Stroke (Formerly [facility])     ASA     PROCEDURES PERFORMED  Date: 10/31/2022, left femoral endarterectomy and left common femoral to peroneal artery bypass with 6 mm PTFE prosthesis    HOSPITAL COURSE  Patient is a 87 y.o. female admitted to [facility] for treatment of peripheral arterial disease of the left lower extremity with ischemic rest pain.  She underwent the above described procedure on the date of admission.  Her procedure was difficult due to poor condition of her blood vessels, but went satisfactorily.  Blood loss was around 600 mL, but she underwent transfusion of she had blood through a Cell Saver.  She also received 1 unit packed cells for symptomatic acute blood loss anemia postoperatively.  After surgery her intraoperative  "course was favorable.  Her activity was increased.  She reported decreased foot pain.  Incisional pain associated with surgery was controlled with oxycodone.  The indication for Plavix was unclear, but to promote long-term patency of a prosthetic below-knee bypass I provided aspirin and lower dose rivaroxaban, and discontinued Plavix.  The patient is also provided a prescription for iron and pain medication.  She does not have a statin listed, though I provided a prescription for this in the past and providing another 1 today.  On the day of discharge she was doing satisfactorily, and ambulating better than when she came in by her report.  She was discharged to home.  Discharge instructions were reviewed.    VITAL SIGNS  /56 (BP Location: Right arm, Patient Position: Lying)   Pulse 88   Temp 98.4 °F (36.9 °C) (Oral)   Resp 16   Ht 160 cm (63\")   Wt 55.2 kg (121 lb 11.2 oz)   SpO2 98%   BMI 21.56 kg/m²   Objective    CONDITION ON DISCHARGE  Good.    DISCHARGE DISPOSITION   Home or Self Care    DISCHARGE MEDICATIONS     Discharge Medications      New Medications      Instructions Start Date   atorvastatin 40 MG tablet  Commonly known as: LIPITOR   40 mg, Oral, Every Evening      iron polysacch complex-B12-VitC-FA-Succ  MG capsule capsule   1 capsule, Oral, Daily With Breakfast   Start Date: November 4, 2022     oxyCODONE-acetaminophen 5-325 MG per tablet  Commonly known as: PERCOCET   1 tablet, Oral, Every 4 Hours PRN      Rivaroxaban 2.5 MG tablet  Commonly known as: XARELTO   2.5 mg, Oral, 2 Times Daily With Meals         Continue These Medications      Instructions Start Date   aspirin 81 MG EC tablet   81 mg, Oral, Daily, PER PT, DR. BAIRD INSTRUCTED PT TO NOT HOLD PRIOR TO SURGERY      carboxymethylcellulose 0.5 % solution  Commonly known as: REFRESH PLUS   1 drop, Both Eyes, 3 Times Daily PRN      fish oil 1200 MG capsule capsule   2,400 mg, Oral, Daily, HOLDING FOR SURGERY      Flaxseed " Oil 1200 MG capsule   2 capsules, Oral, Daily, PT IS TO HOLD FOR SURGERY      furosemide 20 MG tablet  Commonly known as: LASIX   20 mg, Oral, Daily      ibuprofen 400 MG tablet  Commonly known as: ADVIL,MOTRIN   400 mg, Oral, Every 6 Hours PRN      lisinopril 40 MG tablet  Commonly known as: PRINIVIL,ZESTRIL   40 mg, Oral, Daily      montelukast 10 MG tablet  Commonly known as: SINGULAIR   10 mg, Oral, Daily      omeprazole 20 MG capsule  Commonly known as: priLOSEC   20 mg, Oral, Daily      potassium chloride 10 MEQ CR capsule  Commonly known as: MICRO-K   10 mEq, Oral, Daily      topiramate 25 MG tablet  Commonly known as: TOPAMAX   25 mg, Oral, Daily         ASK your doctor about these medications      Instructions Start Date   clopidogrel 75 MG tablet  Commonly known as: PLAVIX   75 mg, Oral, Daily, Was told to keep taking before surgery           Diet Instructions     Diet: Regular      Discharge Diet: Regular         Activity Instructions     Activity as Tolerated      Gradually Increase Activity Until at Pre-Hospitalization Level        No future appointments.  Additional Instructions for the Follow-ups that You Need to Schedule     Ambulatory Referral to Home Health   As directed      Face to Face Visit Date: 11/3/2022    Follow-up provider for Plan of Care?: I will be treating the patient on an ongoing basis.  Please send me the Plan of Care for signature.    Follow-up provider: KATHIA DODD [[id number]]    Reason/Clinical Findings: Mobility limitations associated with surgery.  Recent vascular surgery, activity limitations, new anemia and anticoagulation.    Describe mobility limitations that make leaving home difficult: Mobility limitations associated with recent major vascular surgery, new anemia and anticoagulation    Nursing/Therapeutic Services Requested: Skilled Nursing Physical Therapy    Skilled nursing orders: Medication education Cardiopulmonary assessments    PT orders: Gait Training     Weight Bearing Status: As Tolerated    Frequency: 1 Week 1         Ambulatory Referral to Pharmacy   As directed      Please make rivaroxaban co-pay assistance information available to patient prior to discharge.    Order Comments: Please make rivaroxaban co-pay assistance information available to patient prior to discharge.     Services requested: Population Health    Follow-up needed: No         Discharge Follow-up with Specified Provider: Dr. Roblero; 2 Weeks   As directed      To: Dr. Roblero    Follow Up: 2 Weeks            Follow-up Information     Everton Disla MD .    Specialty: Internal Medicine  Contact information:  [facility]  [address] [address] [phone]             [facility] .    Contact information:  [address] [facility] [address] [phone]                     VQI Discharge Meds  The patient is being discharged on antiplatelet therapy Yes  The patient is being discharged on Statin therapy Yes    [place], [facility] [employer]  Juan A Roblero MD  Office Number [phone]    22  08:27 EDT

## 2022-11-03 NOTE — PROGRESS NOTES
Name: Lourdes Platt ADMIT: 10/31/2022   : 1935  PCP: Everton Disla MD    MRN: [id number] LOS: 3 days   AGE/SEX: 87 y.o. female  ROOM: [address]    [facility], [facility] [employer]    87 y.o. female with peripheral arterial disease of the left lower extremity with history of rest pain post op day 3 from prosthetic left femoral to peroneal artery bypass.  Feeling better.  Getting up and moving around.  Pain control better with oxycodone.  Wants to go home.  Feels she is stable for home discharge.    Scheduled Medications:   aspirin, 81 mg, Oral, Daily  atorvastatin, 40 mg, Oral, Q PM  furosemide, 20 mg, Oral, Daily  iron polysacch complex-B12-VitC-FA-Succ, 1 capsule, Oral, Daily With Breakfast  lisinopril, 40 mg, Oral, Daily  montelukast, 10 mg, Oral, Daily  pantoprazole, 40 mg, Oral, QAM  potassium chloride, 10 mEq, Oral, Daily  Rivaroxaban, 2.5 mg, Oral, BID With Meals  sodium chloride, 200 mL, Intravenous, Once  topiramate, 25 mg, Oral, Daily    Vital Signs  Vital Signs   Patient Vitals for the past 24 hrs:   BP Temp Temp src Pulse Resp SpO2   22 0658 118/56 98.4 °F (36.9 °C) Oral 88 16 98 %   22 2311 139/73 99.3 °F (37.4 °C) Oral 97 16 96 %   22 2200 -- 98.8 °F (37.1 °C) Oral 88 -- 96 %   22 1900 144/76 99.6 °F (37.6 °C) Oral 90 18 97 %   22 1500 126/53 98.8 °F (37.1 °C) Oral 95 18 94 %   22 1300 -- -- -- 111 -- 96 %   22 1100 137/67 99 °F (37.2 °C) Oral -- 18 95 %     I/O:  I/O last 3 completed shifts:  In: 1360 [P.O.:1360]  Out: 1800 [Urine:1800]    Physical Exam: Awake, alert, oriented and appropriate.  No distress.  Left thigh and calf wounds clean, dry and intact.  No hematoma appreciated.  Strong Doppler signal over graft and medial calf.  Weaker signals in pedal arteries.      CBC    Results from last 7 days   Lab Units 22  0530 22  0613 22  0518 10/31/22  1721 10/31/22  1123 10/31/22  0833   WBC 10*3/mm3  --  9.64 12.07*  17.75*  --  7.42   HEMOGLOBIN g/dL 8.1* 7.6* 8.4*  8.4* 8.2*  --  13.0   HEMOGLOBIN, POC g/dL  --   --   --   --  9.9*  --    PLATELETS 10*3/mm3  --  142 150 183  --  273     BMP   Results from last 7 days   Lab Units 11/01/22  0518 10/31/22  0833   SODIUM mmol/L 138 137   POTASSIUM mmol/L 4.1 3.9   CHLORIDE mmol/L 108* 102   CO2 mmol/L 23.5 23.3   BUN mg/dL 14 20   CREATININE mg/dL 0.86 0.87   GLUCOSE mg/dL 163* 89     Cr Clearance Estimated Creatinine Clearance: 40.9 mL/min (by C-G formula based on SCr of 0.86 mg/dL).  Assessment & Plan   Assessment & Plan    Atherosclerosis of native arteries of extremities with rest pain, left leg (HCC)    Essential hypertension    Atherosclerosis of native arteries of extremities with rest pain, right leg (HCC)    Acute blood loss anemia    87 y.o. female patient with satisfactory progress post left lower extremity revascularization.  Patient is doing well and wants to go home.  Will hold on full dose anticoagulation, but instead go with aspirin and lower dose rivaroxaban.  Iron supplementation for acute blood loss anemia.  Discharge instructions given.    Juan A Roblero MD  11/03/22  08:18 EDT    Please call my office with any question: [phone]    Active Hospital Problems    Diagnosis  POA   • **Atherosclerosis of native arteries of extremities with rest pain, left leg (HCC) [I70.222]  Yes   • Acute blood loss anemia [D62]  No   • Atherosclerosis of native arteries of extremities with rest pain, right leg (HCC) [I70.221]  Yes   • Essential hypertension [I10]  Yes      Resolved Hospital Problems   No resolved problems to display.

## 2022-11-03 NOTE — PLAN OF CARE
Goal Outcome Evaluation:  VSS. Pain controlled with PO meds. Left groin and calf incision c/d/I, soft. Pedal pulses per doppler. Monitor labs, possible getting blood today. Pulmonary hygiene encouraged. Will continue to monitor.

## 2022-11-08 DIAGNOSIS — I70.222 ATHEROSCLEROSIS OF NATIVE ARTERIES OF EXTREMITIES WITH REST PAIN, LEFT LEG: Primary | ICD-10-CM

## 2022-11-08 RX ORDER — OXYCODONE HYDROCHLORIDE 5 MG/1
5 TABLET ORAL EVERY 4 HOURS PRN
Qty: 12 TABLET | Refills: 0 | Status: ON HOLD | OUTPATIENT
Start: 2022-11-08 | End: 2022-11-18 | Stop reason: SDUPTHER

## 2022-11-08 NOTE — PROGRESS NOTES
Vascular Surgery    Patient's status post left lower extremity bypass procedure, experiencing increasing pain since starting Visicol therapy.  Was experiencing inadequate pain control with hydrocodone in the hospital.  Will refill oxycodone 5 mg, quantity 12 with 0 refills.

## 2022-11-13 ENCOUNTER — HOSPITAL ENCOUNTER (INPATIENT)
Facility: HOSPITAL | Age: 87
LOS: 6 days | Discharge: NURSING, REHAB OR LONG-TERM CARE | End: 2022-11-19
Attending: HOSPITALIST | Admitting: HOSPITALIST

## 2022-11-13 DIAGNOSIS — Z09 FOLLOW-UP EXAM: ICD-10-CM

## 2022-11-13 DIAGNOSIS — I70.222 ATHEROSCLEROSIS OF NATIVE ARTERIES OF EXTREMITIES WITH REST PAIN, LEFT LEG: ICD-10-CM

## 2022-11-13 DIAGNOSIS — S72.001A CLOSED DISPLACED FRACTURE OF RIGHT FEMORAL NECK: Primary | ICD-10-CM

## 2022-11-13 PROCEDURE — 93010 ELECTROCARDIOGRAM REPORT: CPT | Performed by: INTERNAL MEDICINE

## 2022-11-13 PROCEDURE — 93005 ELECTROCARDIOGRAM TRACING: CPT | Performed by: NURSE PRACTITIONER

## 2022-11-13 RX ORDER — BISACODYL 5 MG/1
5 TABLET, DELAYED RELEASE ORAL DAILY PRN
Status: DISCONTINUED | OUTPATIENT
Start: 2022-11-13 | End: 2022-11-19 | Stop reason: HOSPADM

## 2022-11-13 RX ORDER — ONDANSETRON 4 MG/1
4 TABLET, FILM COATED ORAL EVERY 6 HOURS PRN
Status: DISCONTINUED | OUTPATIENT
Start: 2022-11-13 | End: 2022-11-19 | Stop reason: HOSPADM

## 2022-11-13 RX ORDER — ACETAMINOPHEN 325 MG/1
650 TABLET ORAL EVERY 4 HOURS PRN
Status: DISCONTINUED | OUTPATIENT
Start: 2022-11-13 | End: 2022-11-19 | Stop reason: HOSPADM

## 2022-11-13 RX ORDER — BISACODYL 10 MG
10 SUPPOSITORY, RECTAL RECTAL DAILY PRN
Status: DISCONTINUED | OUTPATIENT
Start: 2022-11-13 | End: 2022-11-19 | Stop reason: HOSPADM

## 2022-11-13 RX ORDER — ONDANSETRON 2 MG/ML
4 INJECTION INTRAMUSCULAR; INTRAVENOUS EVERY 6 HOURS PRN
Status: DISCONTINUED | OUTPATIENT
Start: 2022-11-13 | End: 2022-11-19 | Stop reason: HOSPADM

## 2022-11-13 RX ORDER — HYDROCODONE BITARTRATE AND ACETAMINOPHEN 5; 325 MG/1; MG/1
1 TABLET ORAL EVERY 4 HOURS PRN
Status: DISCONTINUED | OUTPATIENT
Start: 2022-11-13 | End: 2022-11-19 | Stop reason: HOSPADM

## 2022-11-13 RX ORDER — UREA 10 %
3 LOTION (ML) TOPICAL NIGHTLY PRN
Status: DISCONTINUED | OUTPATIENT
Start: 2022-11-13 | End: 2022-11-19 | Stop reason: HOSPADM

## 2022-11-13 RX ORDER — POLYETHYLENE GLYCOL 3350 17 G/17G
17 POWDER, FOR SOLUTION ORAL DAILY PRN
Status: DISCONTINUED | OUTPATIENT
Start: 2022-11-13 | End: 2022-11-19 | Stop reason: HOSPADM

## 2022-11-13 RX ORDER — SODIUM CHLORIDE 0.9 % (FLUSH) 0.9 %
10 SYRINGE (ML) INJECTION AS NEEDED
Status: DISCONTINUED | OUTPATIENT
Start: 2022-11-13 | End: 2022-11-19 | Stop reason: HOSPADM

## 2022-11-13 RX ORDER — AMOXICILLIN 250 MG
2 CAPSULE ORAL 2 TIMES DAILY
Status: DISCONTINUED | OUTPATIENT
Start: 2022-11-13 | End: 2022-11-19 | Stop reason: HOSPADM

## 2022-11-13 RX ORDER — ALUMINA, MAGNESIA, AND SIMETHICONE 2400; 2400; 240 MG/30ML; MG/30ML; MG/30ML
15 SUSPENSION ORAL EVERY 6 HOURS PRN
Status: DISCONTINUED | OUTPATIENT
Start: 2022-11-13 | End: 2022-11-19 | Stop reason: HOSPADM

## 2022-11-13 RX ADMIN — HYDROCODONE BITARTRATE AND ACETAMINOPHEN 1 TABLET: 5; 325 TABLET ORAL at 22:01

## 2022-11-14 ENCOUNTER — APPOINTMENT (OUTPATIENT)
Dept: GENERAL RADIOLOGY | Facility: HOSPITAL | Age: 87
End: 2022-11-14

## 2022-11-14 ENCOUNTER — APPOINTMENT (OUTPATIENT)
Dept: OTHER | Facility: HOSPITAL | Age: 87
End: 2022-11-14

## 2022-11-14 PROBLEM — S72.001A CLOSED DISPLACED FRACTURE OF RIGHT FEMORAL NECK: Status: ACTIVE | Noted: 2022-11-14

## 2022-11-14 LAB
ANION GAP SERPL CALCULATED.3IONS-SCNC: 12.3 MMOL/L (ref 5–15)
BUN SERPL-MCNC: 15 MG/DL (ref 8–23)
BUN/CREAT SERPL: 20.8 (ref 7–25)
CALCIUM SPEC-SCNC: 9.1 MG/DL (ref 8.6–10.5)
CHLORIDE SERPL-SCNC: 109 MMOL/L (ref 98–107)
CO2 SERPL-SCNC: 19.7 MMOL/L (ref 22–29)
CREAT SERPL-MCNC: 0.72 MG/DL (ref 0.57–1)
DEPRECATED RDW RBC AUTO: 43.4 FL (ref 37–54)
EGFRCR SERPLBLD CKD-EPI 2021: 81.5 ML/MIN/1.73
ERYTHROCYTE [DISTWIDTH] IN BLOOD BY AUTOMATED COUNT: 13.2 % (ref 12.3–15.4)
GLUCOSE SERPL-MCNC: 128 MG/DL (ref 65–99)
HCT VFR BLD AUTO: 27.8 % (ref 34–46.6)
HGB BLD-MCNC: 9.1 G/DL (ref 12–15.9)
INR PPP: 1.24 (ref 0.9–1.1)
INR PPP: 1.24 (ref 0.9–1.1)
MCH RBC QN AUTO: 29.8 PG (ref 26.6–33)
MCHC RBC AUTO-ENTMCNC: 32.7 G/DL (ref 31.5–35.7)
MCV RBC AUTO: 91.1 FL (ref 79–97)
PLATELET # BLD AUTO: 500 10*3/MM3 (ref 140–450)
PMV BLD AUTO: 9.1 FL (ref 6–12)
POTASSIUM SERPL-SCNC: 3.3 MMOL/L (ref 3.5–5.2)
PROTHROMBIN TIME: 15.7 SECONDS (ref 11.7–14.2)
PROTHROMBIN TIME: 15.8 SECONDS (ref 11.7–14.2)
QT INTERVAL: 335 MS
QT INTERVAL: 343 MS
RBC # BLD AUTO: 3.05 10*6/MM3 (ref 3.77–5.28)
SODIUM SERPL-SCNC: 141 MMOL/L (ref 136–145)
TROPONIN T SERPL-MCNC: <0.01 NG/ML (ref 0–0.03)
WBC NRBC COR # BLD: 19.14 10*3/MM3 (ref 3.4–10.8)

## 2022-11-14 PROCEDURE — 73502 X-RAY EXAM HIP UNI 2-3 VIEWS: CPT

## 2022-11-14 PROCEDURE — 80048 BASIC METABOLIC PNL TOTAL CA: CPT | Performed by: NURSE PRACTITIONER

## 2022-11-14 PROCEDURE — 85610 PROTHROMBIN TIME: CPT | Performed by: NURSE PRACTITIONER

## 2022-11-14 PROCEDURE — 84484 ASSAY OF TROPONIN QUANT: CPT | Performed by: HOSPITALIST

## 2022-11-14 PROCEDURE — 93010 ELECTROCARDIOGRAM REPORT: CPT | Performed by: INTERNAL MEDICINE

## 2022-11-14 PROCEDURE — 25010000002 HEPARIN (PORCINE) PER 1000 UNITS: Performed by: SURGERY

## 2022-11-14 PROCEDURE — 93005 ELECTROCARDIOGRAM TRACING: CPT | Performed by: HOSPITALIST

## 2022-11-14 PROCEDURE — 85027 COMPLETE CBC AUTOMATED: CPT | Performed by: NURSE PRACTITIONER

## 2022-11-14 PROCEDURE — 85610 PROTHROMBIN TIME: CPT | Performed by: SURGERY

## 2022-11-14 PROCEDURE — 99222 1ST HOSP IP/OBS MODERATE 55: CPT | Performed by: ORTHOPAEDIC SURGERY

## 2022-11-14 RX ORDER — ATORVASTATIN CALCIUM 20 MG/1
40 TABLET, FILM COATED ORAL EVERY EVENING
Status: DISCONTINUED | OUTPATIENT
Start: 2022-11-14 | End: 2022-11-19 | Stop reason: HOSPADM

## 2022-11-14 RX ORDER — WARFARIN SODIUM 7.5 MG/1
7.5 TABLET ORAL
Status: DISCONTINUED | OUTPATIENT
Start: 2022-11-15 | End: 2022-11-16

## 2022-11-14 RX ORDER — WARFARIN SODIUM 10 MG/1
10 TABLET ORAL
Status: DISCONTINUED | OUTPATIENT
Start: 2022-11-14 | End: 2022-11-14

## 2022-11-14 RX ORDER — ASPIRIN 81 MG/1
81 TABLET ORAL DAILY
Status: DISCONTINUED | OUTPATIENT
Start: 2022-11-14 | End: 2022-11-14

## 2022-11-14 RX ORDER — HEPARIN SODIUM 5000 [USP'U]/ML
5000 INJECTION, SOLUTION INTRAVENOUS; SUBCUTANEOUS EVERY 8 HOURS SCHEDULED
Status: DISCONTINUED | OUTPATIENT
Start: 2022-11-14 | End: 2022-11-17

## 2022-11-14 RX ORDER — POTASSIUM CHLORIDE 750 MG/1
40 TABLET, FILM COATED, EXTENDED RELEASE ORAL ONCE
Status: COMPLETED | OUTPATIENT
Start: 2022-11-14 | End: 2022-11-14

## 2022-11-14 RX ORDER — PANTOPRAZOLE SODIUM 40 MG/1
40 TABLET, DELAYED RELEASE ORAL EVERY MORNING
Status: DISCONTINUED | OUTPATIENT
Start: 2022-11-14 | End: 2022-11-19 | Stop reason: HOSPADM

## 2022-11-14 RX ORDER — CLOPIDOGREL BISULFATE 75 MG/1
75 TABLET ORAL DAILY
Status: DISCONTINUED | OUTPATIENT
Start: 2022-11-16 | End: 2022-11-19 | Stop reason: HOSPADM

## 2022-11-14 RX ORDER — OXYCODONE HYDROCHLORIDE 5 MG/1
5 TABLET ORAL EVERY 4 HOURS PRN
Status: DISCONTINUED | OUTPATIENT
Start: 2022-11-14 | End: 2022-11-19 | Stop reason: HOSPADM

## 2022-11-14 RX ORDER — WARFARIN SODIUM 7.5 MG/1
7.5 TABLET ORAL
Status: DISCONTINUED | OUTPATIENT
Start: 2022-11-14 | End: 2022-11-14

## 2022-11-14 RX ORDER — TOPIRAMATE 25 MG/1
25 TABLET ORAL DAILY
Status: DISCONTINUED | OUTPATIENT
Start: 2022-11-14 | End: 2022-11-19 | Stop reason: HOSPADM

## 2022-11-14 RX ORDER — CLOPIDOGREL BISULFATE 75 MG/1
75 TABLET ORAL DAILY
Status: DISCONTINUED | OUTPATIENT
Start: 2022-11-14 | End: 2022-11-14

## 2022-11-14 RX ADMIN — POTASSIUM CHLORIDE 40 MEQ: 750 TABLET, EXTENDED RELEASE ORAL at 21:42

## 2022-11-14 RX ADMIN — HYDROCODONE BITARTRATE AND ACETAMINOPHEN 1 TABLET: 5; 325 TABLET ORAL at 02:25

## 2022-11-14 RX ADMIN — HEPARIN SODIUM 5000 UNITS: 5000 INJECTION INTRAVENOUS; SUBCUTANEOUS at 13:51

## 2022-11-14 RX ADMIN — DOCUSATE SODIUM 50MG AND SENNOSIDES 8.6MG 2 TABLET: 8.6; 5 TABLET, FILM COATED ORAL at 08:10

## 2022-11-14 RX ADMIN — PANTOPRAZOLE SODIUM 40 MG: 40 TABLET, DELAYED RELEASE ORAL at 05:44

## 2022-11-14 RX ADMIN — HEPARIN SODIUM 5000 UNITS: 5000 INJECTION INTRAVENOUS; SUBCUTANEOUS at 21:42

## 2022-11-14 RX ADMIN — HYDROCODONE BITARTRATE AND ACETAMINOPHEN 1 TABLET: 5; 325 TABLET ORAL at 06:29

## 2022-11-14 RX ADMIN — TOPIRAMATE 25 MG: 25 TABLET, FILM COATED ORAL at 08:10

## 2022-11-14 RX ADMIN — HYDROCODONE BITARTRATE AND ACETAMINOPHEN 1 TABLET: 5; 325 TABLET ORAL at 13:51

## 2022-11-14 RX ADMIN — HYDROCODONE BITARTRATE AND ACETAMINOPHEN 1 TABLET: 5; 325 TABLET ORAL at 18:02

## 2022-11-14 RX ADMIN — HYDROCODONE BITARTRATE AND ACETAMINOPHEN 1 TABLET: 5; 325 TABLET ORAL at 21:42

## 2022-11-14 RX ADMIN — ATORVASTATIN CALCIUM 40 MG: 20 TABLET, FILM COATED ORAL at 22:00

## 2022-11-14 RX ADMIN — DOCUSATE SODIUM 50MG AND SENNOSIDES 8.6MG 2 TABLET: 8.6; 5 TABLET, FILM COATED ORAL at 21:42

## 2022-11-14 NOTE — PLAN OF CARE
Goal Outcome Evaluation:  Plan of Care Reviewed With: patient        Progress: no change  Outcome Evaluation: vss, nvi, pain controlled with PO, turned q2, accumax and scds in place, voiding per purewick, subQ anticoagulation until after surgery, vascular following, plan for surgery tomorrow, NPO at MN, educated on bp meds and monitoring

## 2022-11-14 NOTE — CONSULTS
Patient Name: Lourdes Platt Account #: [id number]    MRN: [id number] Admission Date: 11/13/2022      Consulting Service: Vascular Surgery Date of Evaluation: November 14, 2022    Requesting Provider: Jamin Ventura MD    CHIEF COMPLAINT: Intolerance of anticoagulants  HPI: Lourdes Platt is a 87 y.o. female is being seen for a consultation and evaluation/management of anticoagulants in the face of a femme peroneal bypass with PTFE.  Patient's incisions look good.  She stopped her Eliquis and aspirin.  This is extremely dangerous in the graft will likely clot in short order if she is not fully anticoagulated.  I recommended transition to full dose Coumadin with subcu heparin bridge and Plavix in lieu of aspirin given her allergy.  She seems to be aware and understandable.  The cost of the Eliquis is not a problem.  We will ask for this to be followed by an outpatient Coumadin management clinic or her primary care doctor.    PAST MEDICAL HISTORY:   Past Medical History:   Diagnosis Date   • Arthritis    • Bilateral carotid artery stenosis    • Constipation    • Depression    • Foot swelling     RIGHT    • GERD (gastroesophageal reflux disease)    • Hypertension    • Intermittent claudication ([facility]) 05/19/2017   • PAD (peripheral artery disease) ([facility]) 05/19/2017   • PONV (postoperative nausea and vomiting)    • PVD (peripheral vascular disease) with claudication ([facility])     lennie legs   • Slow to wake up after anesthesia    • Spinal headache    • Stroke ([facility]) 2005    ASA      PAST SURGICAL HISTORY:   Past Surgical History:   Procedure Laterality Date   • ANGIOPLASTY FEMORAL ARTERY Left 7/19/2017    Procedure: RIGHT FEMORAL ARTERIAL CUTDOWN, LIGATION OF FEMORAL ARTERY PSUEDOANEURYSM, AIF WITH BILATERAL RUNOFF ARTERIOGRAM, RIGHT COMMON ILIAC ANGIOPLASTY;  Surgeon: Keny Sadler MD;  Location: [facility] 18/19;  Service:    • APPENDECTOMY  1949   • ATHRECTOMY ILIAC, FEMORAL, TIBIAL ARTERY Right 11/24/2017     Procedure: LEFT FEMORAL APPROACH AIF RIGHT LEG ARTERIOGRAM  RIGHT FEMORAL POPLITEAL ARTERY WITH DRUG COATED BALLOON ANGIOPLASTY;  Surgeon: Keny Sadler MD;  Location: [facility] OR 18/19;  Service:    • BUNIONECTOMY     • EYE SURGERY      CATARACT EXTRACTION   • FEMORAL POPLITEAL BYPASS Left 10/31/2022    Procedure: LEFT FEMORAL TO PERONEAL ARTERY BYPASS WITH POLYTETRAFLUOROETHYLENE, LEFT FEMORAL ENDARTERECTOMY;  Surgeon: Juan A Roblero MD;  Location: [facility] OR;  Service: Vascular;  Laterality: Left;   • HYSTERECTOMY  1970   • ORIF WRIST FRACTURE Left       FAMILY HISTORY:   Family History   Problem Relation Age of Onset   • Cancer Mother    • Stroke Mother    • Heart disease Brother    • Stroke Brother    • Leukemia Other    • Malig Hyperthermia Neg Hx       SOCIAL HISTORY:   Social History     Tobacco Use   • Smoking status: Never   • Smokeless tobacco: Never   • Tobacco comments:     caffeine - 3 - 5 daily   Vaping Use   • Vaping Use: Never used   Substance Use Topics   • Alcohol use: No   • Drug use: No      MEDICATIONS:   No current facility-administered medications on file prior to encounter.     Current Outpatient Medications on File Prior to Encounter   Medication Sig Dispense Refill   • aspirin 81 MG EC tablet Take 81 mg by mouth Daily. PER PT, DR. SADLER INSTRUCTED PT TO NOT HOLD PRIOR TO SURGERY     • atorvastatin (LIPITOR) 40 MG tablet Take 1 tablet by mouth Every Evening. 90 tablet 3   • carboxymethylcellulose (REFRESH PLUS) 0.5 % solution Administer 1 drop to both eyes 3 (Three) Times a Day As Needed for Dry Eyes.     • Flaxseed, Linseed, (FLAXSEED OIL) 1200 MG capsule Take 2 capsules by mouth Daily. PT IS TO HOLD FOR SURGERY     • furosemide (LASIX) 20 MG tablet Take 20 mg by mouth Daily.     • ibuprofen (ADVIL,MOTRIN) 400 MG tablet Take 400 mg by mouth Every 6 (Six) Hours As Needed for Mild Pain .     • lisinopril (PRINIVIL,ZESTRIL) 40 MG tablet Take 40 mg by mouth Daily.  "    • montelukast (SINGULAIR) 10 MG tablet Take 10 mg by mouth Daily.     • omeprazole (priLOSEC) 20 MG capsule Take 20 mg by mouth Daily.     • oxyCODONE (Roxicodone) 5 MG immediate release tablet Take 1 tablet by mouth Every 4 (Four) Hours As Needed for Moderate Pain for up to 12 doses. 12 tablet 0   • Rivaroxaban (XARELTO) 2.5 MG tablet Take 1 tablet by mouth 2 (Two) Times a Day With Meals. Indications: Disease of the Peripheral Arteries 60 tablet 3   • topiramate (TOPAMAX) 25 MG tablet Take 1 tablet by mouth Daily.     • iron polysacch complex-B12-VitC-FA-Succ (Ferrex 150 Forte Plus)  MG capsule capsule Take 1 capsule by mouth Daily With Breakfast. 30 each 1   • Omega-3 Fatty Acids (FISH OIL) 1200 MG capsule capsule Take 2,400 mg by mouth Daily. HOLDING FOR SURGERY     • potassium chloride (MICRO-K) 10 MEQ CR capsule Take 10 mEq by mouth Daily.               ALLERGIES: Penicillins   COMPLETE REVIEW OF SYSTEMS:     ENT ROS: negative  Cardiovascular ROS: no chest pain or dyspnea on exertion  Respiratory ROS: no cough, shortness of breath, or wheezing  Gastrointestinal ROS: no abdominal pain, change in bowel habits, or black or bloody stools  Neurological ROS: no TIA or stroke symptoms  Genito-Urinary ROS: no dysuria, trouble voiding, or hematuria  Musculoskeletal ROS: negative  Dermatological ROS: negative  Psychological ROS: negative      PHYSICAL EXAM:   Patient Vitals for the past 24 hrs:   BP Temp Temp src Pulse Resp SpO2 Height Weight   11/14/22 0605 156/71 98.4 °F (36.9 °C) Oral 89 16 99 % -- --   11/14/22 0227 140/76 97.8 °F (36.6 °C) Oral 97 16 99 % -- --   11/13/22 2100 -- -- -- -- -- -- 160 cm (62.99\") 56.1 kg (123 lb 10.9 oz)   11/13/22 1900 147/71 98.8 °F (37.1 °C) Oral 99 16 97 % -- --        General appearance: oriented to person, place, and time, in mild to moderate distress and chronically ill appearing.  Neurological exam reveals alert, oriented, normal speech, no focal findings or movement " disorder noted.  ENT exam reveals - ENT exam normal, no neck nodes or sinus tenderness.  CVS exam: normal rate, regular rhythm, normal S1, S2, no murmurs, rubs, clicks or gallops.  Chest: clear to auscultation, no wheezes, rales or rhonchi, symmetric air entry.  Abdominal exam: soft, nontender, nondistended, no masses or organomegaly.  Examination of the feet reveals warm, good capillary refill.  Left foot viable with stable wounds closed subcutaneously.  Signal at the ankle is monophasic.  Rooke boots on.      LABS:      Results Review:       I reviewed the patient's new clinical results.  Results from last 7 days   Lab Units 22  0422   WBC 10*3/mm3 19.14*   HEMOGLOBIN g/dL 9.1*   PLATELETS 10*3/mm3 500*     Results from last 7 days   Lab Units 22  0422   SODIUM mmol/L 141   POTASSIUM mmol/L 3.3*   CHLORIDE mmol/L 109*   CO2 mmol/L 19.7*   BUN mg/dL 15   CREATININE mg/dL 0.72   GLUCOSE mg/dL 128*   Estimated Creatinine Clearance: 49.7 mL/min (by C-G formula based on SCr of 0.72 mg/dL).  Results from last 7 days   Lab Units 22  0422   CALCIUM mg/dL 9.1     Results from last 7 days   Lab Units 22  0422   PROTIME Seconds 15.8*   INR  1.24*       The following radiologic or non-invasive studies have been reviewed by me: none    Active Hospital Problems    Diagnosis  POA   • **Fracture of neck of right femur ([facility]) [S72.001A]  Yes   • PAD (peripheral artery disease) ([facility]) [I73.9]  Yes   • Essential hypertension [I10]  Yes      Resolved Hospital Problems   No resolved problems to display.        [place]     ASSESSMENT/PLAN: 87 y.o. female with femoral to peroneal bypass using PTFE.  Incisions look good.  Admitted for syncope and falling.  Patient has stopped her anticoagulants due to cost and concerns for aspirin allergy.  This is unacceptable.  This bypass will fail without full anticoagulation.  She has been made aware of this.  She states she cannot afford the Eliquis.  We are going to  initiate full dose Coumadin and she will need follow-up outpatient for this through either her primary care or the cardiology Coumadin clinic or at [facility].  We cannot follow Coumadin levels in our office.  I will also add Plavix as she cannot be on aspirin.  The longevity of this type of bypass is average at best and will be made unacceptable by being off anticoagulants.      I discussed the plan with the patient and she is agreeable to the plan of care at this point. Thank you for this consult.     Anurag Caro MD   11/14/22

## 2022-11-14 NOTE — PROGRESS NOTES
"DAILY PROGRESS NOTE  [facility]    Patient Identification:  Name: Lourdes Platt  Age: 87 y.o.  Sex: female  :  1935  MRN: [id number]         Primary Care Physician: Everton Disla MD      Subjective  Patient presently has no acute complaints.    Objective:  General Appearance:  Comfortable, well-appearing, in no acute distress and not in pain.    Vital signs: (most recent): Blood pressure 180/79, pulse 96, temperature 99.3 °F (37.4 °C), temperature source Oral, resp. rate 16, height 160 cm (62.99\"), weight 56.1 kg (123 lb 10.9 oz), SpO2 99 %.    Lungs:  Normal effort and normal respiratory rate.  Breath sounds clear to auscultation.    Heart: Normal rate.  Regular rhythm.    Extremities: There is no dependent edema.    Neurological: Patient is alert and oriented to person, place and time.    Skin:  Warm and dry.                Vital signs in last 24 hours:  Temp:  [97.8 °F (36.6 °C)-100.3 °F (37.9 °C)] 99.3 °F (37.4 °C)  Heart Rate:  [89-99] 96  Resp:  [16] 16  BP: (140-180)/(68-79) 180/79    Intake/Output:    Intake/Output Summary (Last 24 hours) at 2022 1839  Last data filed at 2022 0000  Gross per 24 hour   Intake 250 ml   Output --   Net 250 ml         Results from last 7 days   Lab Units 22  042   WBC 10*3/mm3 19.14*   HEMOGLOBIN g/dL 9.1*   PLATELETS 10*3/mm3 500*     Results from last 7 days   Lab Units 22  0422   SODIUM mmol/L 141   POTASSIUM mmol/L 3.3*   CHLORIDE mmol/L 109*   CO2 mmol/L 19.7*   BUN mg/dL 15   CREATININE mg/dL 0.72   GLUCOSE mg/dL 128*   Estimated Creatinine Clearance: 49.7 mL/min (by C-G formula based on SCr of 0.72 mg/dL).  Results from last 7 days   Lab Units 22  042   CALCIUM mg/dL 9.1         Assessment:    Fracture  of neck of right femur: Orthopedic evaluation appreciated.  OR tomorrow.    PAD (peripheral artery disease: Status post femoral peroneal bypass.  Noncompliant with anticoagulation regime.  Now on heparin.  " Vascular surgery input appreciated.    Essential hypertension: Lisinopril on one of the med rec's but not on the other?  Presently blood pressure running high side.  Possible stress reaction.  Monitor closely.  Hypokalemia: Oral replacement ordered.  Monitor.    All problems new to this examiner.    Plan:  Please see above.    Chema Schrader MD  11/14/2022  18:39 EST

## 2022-11-14 NOTE — H&P
Patient Name:  Lourdes Platt  YOB: 1935  MRN:  [id number]  Admit Date:  11/13/2022  Patient Care Team:  Everton Disla MD as PCP - General (Internal Medicine)      Subjective   History Present Illness     Chief complaint: Right hip pain    History of Present Illness   Ms. Platt is a 87 y.o. non-smoker with a history of hypertension and peripheral artery disease status post recent femoropopliteal that transferred here to [facility] from [facility] complaining of right hip pain.  Patient reports that she was walking in her home when she tripped over some cords landing on her right hip.  She denies hitting her head or any loss of consciousness.  She also denies any dizziness or lightheadedness prior to her fall.  Of note, she was discharged from our facility on 11/3/2022 secondary to peripheral artery disease of the left lower extremity with ischemic rest pain.  She is status post a left femoral endarterectomy and left common femoral to peroneal artery bypass by .  Work-up in emergency department revealed a fracture of the right femoral neck with slight impaction.  She was transferred to our facility to be evaluated by orthopedics.    Review of Systems   Constitutional: Negative for chills and fever.   HENT: Negative for congestion and rhinorrhea.    Eyes: Negative for photophobia and visual disturbance.   Respiratory: Negative for cough and shortness of breath.    Cardiovascular: Negative for chest pain and palpitations.   Gastrointestinal: Negative for constipation, diarrhea, nausea and vomiting.   Endocrine: Negative for cold intolerance and heat intolerance.   Genitourinary: Negative for difficulty urinating and dysuria.   Musculoskeletal: Positive for gait problem. Negative for joint swelling.   Skin: Negative for rash and wound.   Neurological: Negative for dizziness, light-headedness and headaches.   Psychiatric/Behavioral: Negative for sleep  disturbance and suicidal ideas.        Personal History     Past Medical History:   Diagnosis Date   • Arthritis    • Bilateral carotid artery stenosis    • Constipation    • Depression    • Foot swelling     RIGHT    • GERD (gastroesophageal reflux disease)    • Hypertension    • Intermittent claudication ([facility]) 05/19/2017   • PAD (peripheral artery disease) ([facility]) 05/19/2017   • PONV (postoperative nausea and vomiting)    • PVD (peripheral vascular disease) with claudication ([facility])     lennie legs   • Slow to wake up after anesthesia    • Spinal headache    • Stroke ([facility]) 2005    ASA     Past Surgical History:   Procedure Laterality Date   • ANGIOPLASTY FEMORAL ARTERY Left 7/19/2017    Procedure: RIGHT FEMORAL ARTERIAL CUTDOWN, LIGATION OF FEMORAL ARTERY PSUEDOANEURYSM, AIF WITH BILATERAL RUNOFF ARTERIOGRAM, RIGHT COMMON ILIAC ANGIOPLASTY;  Surgeon: Keny Sadler MD;  Location: [facility] 18/19;  Service:    • APPENDECTOMY  1949   • ATHRECTOMY ILIAC, FEMORAL, TIBIAL ARTERY Right 11/24/2017    Procedure: LEFT FEMORAL APPROACH AIF RIGHT LEG ARTERIOGRAM  RIGHT FEMORAL POPLITEAL ARTERY WITH DRUG COATED BALLOON ANGIOPLASTY;  Surgeon: Keny Sadler MD;  Location: [facility] 18/19;  Service:    • BUNIONECTOMY     • EYE SURGERY      CATARACT EXTRACTION   • FEMORAL POPLITEAL BYPASS Left 10/31/2022    Procedure: LEFT FEMORAL TO PERONEAL ARTERY BYPASS WITH POLYTETRAFLUOROETHYLENE, LEFT FEMORAL ENDARTERECTOMY;  Surgeon: Juan A Roblero MD;  Location: [facility];  Service: Vascular;  Laterality: Left;   • HYSTERECTOMY  1970   • ORIF WRIST FRACTURE Left      Family History   Problem Relation Age of Onset   • Cancer Mother    • Stroke Mother    • Heart disease Brother    • Stroke Brother    • Leukemia Other    • Malig Hyperthermia Neg Hx      Social History     Tobacco Use   • Smoking status: Never   • Smokeless tobacco: Never   • Tobacco comments:     caffeine - 3 - 5 daily   Vaping Use   •  Vaping Use: Never used   Substance Use Topics   • Alcohol use: No   • Drug use: No     No current facility-administered medications on file prior to encounter.     Current Outpatient Medications on File Prior to Encounter   Medication Sig Dispense Refill   • aspirin 81 MG EC tablet Take 81 mg by mouth Daily. PER PT, DR. BAIRD INSTRUCTED PT TO NOT HOLD PRIOR TO SURGERY     • atorvastatin (LIPITOR) 40 MG tablet Take 1 tablet by mouth Every Evening. 90 tablet 3   • carboxymethylcellulose (REFRESH PLUS) 0.5 % solution Administer 1 drop to both eyes 3 (Three) Times a Day As Needed for Dry Eyes.     • Flaxseed, Linseed, (FLAXSEED OIL) 1200 MG capsule Take 2 capsules by mouth Daily. PT IS TO HOLD FOR SURGERY     • furosemide (LASIX) 20 MG tablet Take 20 mg by mouth Daily.     • ibuprofen (ADVIL,MOTRIN) 400 MG tablet Take 400 mg by mouth Every 6 (Six) Hours As Needed for Mild Pain .     • lisinopril (PRINIVIL,ZESTRIL) 40 MG tablet Take 40 mg by mouth Daily.     • montelukast (SINGULAIR) 10 MG tablet Take 10 mg by mouth Daily.     • omeprazole (priLOSEC) 20 MG capsule Take 20 mg by mouth Daily.     • oxyCODONE (Roxicodone) 5 MG immediate release tablet Take 1 tablet by mouth Every 4 (Four) Hours As Needed for Moderate Pain for up to 12 doses. 12 tablet 0   • Rivaroxaban (XARELTO) 2.5 MG tablet Take 1 tablet by mouth 2 (Two) Times a Day With Meals. Indications: Disease of the Peripheral Arteries 60 tablet 3   • topiramate (TOPAMAX) 25 MG tablet Take 1 tablet by mouth Daily.     • iron polysacch complex-B12-VitC-FA-Succ (Ferrex 150 Forte Plus)  MG capsule capsule Take 1 capsule by mouth Daily With Breakfast. 30 each 1   • Omega-3 Fatty Acids (FISH OIL) 1200 MG capsule capsule Take 2,400 mg by mouth Daily. HOLDING FOR SURGERY     • potassium chloride (MICRO-K) 10 MEQ CR capsule Take 10 mEq by mouth Daily.       Allergies   Allergen Reactions   • Penicillins Itching and Swelling       Objective    Objective     Vital  Signs  Temp:  [98.8 °F (37.1 °C)] 98.8 °F (37.1 °C)  Heart Rate:  [99] 99  Resp:  [16] 16  BP: (147)/(71) 147/71  SpO2:  [97 %] 97 %  on  Flow (L/min):  [2] 2;   Device (Oxygen Therapy): nasal cannula  Body mass index is 21.91 kg/m².    Physical Exam  Vitals and nursing note reviewed.   Constitutional:       General: She is not in acute distress.     Appearance: She is well-developed. She is not toxic-appearing.      Comments: Frail and chronically ill-appearing   HENT:      Head: Normocephalic and atraumatic.   Eyes:      General: No scleral icterus.        Right eye: No discharge.         Left eye: No discharge.      Conjunctiva/sclera: Conjunctivae normal.   Neck:      Vascular: No JVD.   Cardiovascular:      Rate and Rhythm: Regular rhythm. Tachycardia present.      Heart sounds: Normal heart sounds. No murmur heard.    No friction rub. No gallop.   Pulmonary:      Effort: Pulmonary effort is normal. No respiratory distress.      Breath sounds: Normal breath sounds. No wheezing or rales.   Abdominal:      General: Bowel sounds are normal. There is no distension.      Palpations: Abdomen is soft.      Tenderness: There is no abdominal tenderness. There is no guarding.   Musculoskeletal:         General: No tenderness or deformity. Normal range of motion.      Cervical back: Normal range of motion and neck supple.   Skin:     General: Skin is warm and dry.      Capillary Refill: Capillary refill takes less than 2 seconds.      Comments: Incision noted to left lower leg   Neurological:      Mental Status: She is alert and oriented to person, place, and time.   Psychiatric:         Behavior: Behavior normal.       Results Review:  I reviewed the patient's new clinical results.    Lab Results (last 24 hours)     ** No results found for the last 24 hours. **          Imaging Results (Last 24 Hours)     ** No results found for the last 24 hours. **              ECG 12 Lead Pre-Op / Pre-Procedure   Preliminary Result    HEART RATE= 102  bpm   RR Interval= 588  ms   LA Interval= 162  ms   P Horizontal Axis= 29  deg   P Front Axis= 76  deg   QRSD Interval= 81  ms   QT Interval= 335  ms   QRS Axis= 29  deg   T Wave Axis= 58  deg   - OTHERWISE NORMAL ECG -   Sinus tachycardia   Minimal ST depression, anterolateral leads   Electronically Signed By:    Date and Time of Study: 2022-11-13 21:37:59           Assessment/Plan     Active Hospital Problems    Diagnosis  POA   • **Fracture of neck of right femur ([facility]) [S72.001A]  Yes   • PAD (peripheral artery disease) ([facility]) [I73.9]  Yes   • Essential hypertension [I10]  Yes      Resolved Hospital Problems   No resolved problems to display.       Ms. Platt is a 87 y.o. non-smoker with a history of PAD status post a recent left femoropopliteal and hypertension who presents with right hip pain.      Right femoral neck fracture  -N.p.o. after midnight  -Orthopedic consult  -Supportive care with IV fluids and analgesics  -PT to treat and eval after evaluated by orthopedics  -Labs in a.m.   -Check EKG    PAD  -Status post recent left femoropopliteal by Dr. Roblero, will consult    Essential hypertension  -Stable, resume home regimen       I discussed the patient's findings and my recommendations with patient and Dr. Ventura.    VTE Prophylaxis - SCDs.  Code Status - Full code.       JOSE Ponce  [facility]  11/13/22  23:39 EST

## 2022-11-14 NOTE — CONSULTS
ORTHO CONSULT NOTE    Patient Identification:        Name: Lourdes Platt  Age: 87 y.o.  Sex: female  :  1935  MRN: [id number]                                                    HPI:        Lourdes Platt is a 87 y.o. year old female who presented at outside facility  and [facility] after a fall.  Imaging reported right femoral neck fracture.  Orthopedics at [facility] was called I was on call and agreed to transfer if medicine accepted we would consult.  The patient had recent vascular surgery done here at [facility].  The patient currently complains of pain in the hip.  Patient states she would like to avoid hip replacement surgery if at all possible especially total hip.  She denies any pain in the hip prior to her fall.  She states she did not require any walking aids due to her hip but was using a cane here and there for balance and fatigue.  The patient denies numbness or tingling. The patient denies chest pain or shortness of breath.    Problem List:  Patient Active Problem List   Diagnosis   • PAD (peripheral artery disease) ([facility])   • Intermittent claudication ([facility])   • Shortness of breath   • Essential hypertension   • Atherosclerosis of native arteries of extremities with rest pain, right leg ([facility])   • Atherosclerosis of native arteries of extremities with rest pain, left leg ([facility])   • Acute blood loss anemia   • Fracture of neck of right femur ([facility])       Past Medical History:  Past Medical History:   Diagnosis Date   • Arthritis    • Bilateral carotid artery stenosis    • Constipation    • Depression    • Foot swelling     RIGHT    • GERD (gastroesophageal reflux disease)    • Hypertension    • Intermittent claudication ([facility]) 2017   • PAD (peripheral artery disease) ([facility]) 2017   • PONV (postoperative nausea and vomiting)    • PVD (peripheral vascular disease) with claudication ([facility])     lennie legs   • Slow to wake up after anesthesia    • Spinal headache    •  Stroke (HCC) 2005    ASA       Past Surgical History:  Past Surgical History:   Procedure Laterality Date   • ANGIOPLASTY FEMORAL ARTERY Left 7/19/2017    Procedure: RIGHT FEMORAL ARTERIAL CUTDOWN, LIGATION OF FEMORAL ARTERY PSUEDOANEURYSM, AIF WITH BILATERAL RUNOFF ARTERIOGRAM, RIGHT COMMON ILIAC ANGIOPLASTY;  Surgeon: Keny Sadler MD;  Location: [facility] OR 18/19;  Service:    • APPENDECTOMY  1949   • ATHRECTOMY ILIAC, FEMORAL, TIBIAL ARTERY Right 11/24/2017    Procedure: LEFT FEMORAL APPROACH AIF RIGHT LEG ARTERIOGRAM  RIGHT FEMORAL POPLITEAL ARTERY WITH DRUG COATED BALLOON ANGIOPLASTY;  Surgeon: Keny Sadler MD;  Location: [facility] OR 18/19;  Service:    • BUNIONECTOMY     • EYE SURGERY      CATARACT EXTRACTION   • FEMORAL POPLITEAL BYPASS Left 10/31/2022    Procedure: LEFT FEMORAL TO PERONEAL ARTERY BYPASS WITH POLYTETRAFLUOROETHYLENE, LEFT FEMORAL ENDARTERECTOMY;  Surgeon: Juan A Roblero MD;  Location: [facility];  Service: Vascular;  Laterality: Left;   • HYSTERECTOMY  1970   • ORIF WRIST FRACTURE Left        Home Meds:  Medications Prior to Admission   Medication Sig Dispense Refill Last Dose   • aspirin 81 MG EC tablet Take 81 mg by mouth Daily. PER PT, DR. SADLER INSTRUCTED PT TO NOT HOLD PRIOR TO SURGERY   Past Month   • atorvastatin (LIPITOR) 40 MG tablet Take 1 tablet by mouth Every Evening. 90 tablet 3 11/12/2022   • carboxymethylcellulose (REFRESH PLUS) 0.5 % solution Administer 1 drop to both eyes 3 (Three) Times a Day As Needed for Dry Eyes.   11/12/2022   • Flaxseed, Linseed, (FLAXSEED OIL) 1200 MG capsule Take 2 capsules by mouth Daily. PT IS TO HOLD FOR SURGERY   11/12/2022   • furosemide (LASIX) 20 MG tablet Take 20 mg by mouth Daily.   11/12/2022   • ibuprofen (ADVIL,MOTRIN) 400 MG tablet Take 400 mg by mouth Every 6 (Six) Hours As Needed for Mild Pain .   11/12/2022   • lisinopril (PRINIVIL,ZESTRIL) 40 MG tablet Take 40 mg by mouth Daily.   11/12/2022    • montelukast (SINGULAIR) 10 MG tablet Take 10 mg by mouth Daily.   11/12/2022   • omeprazole (priLOSEC) 20 MG capsule Take 20 mg by mouth Daily.   11/12/2022   • oxyCODONE (Roxicodone) 5 MG immediate release tablet Take 1 tablet by mouth Every 4 (Four) Hours As Needed for Moderate Pain for up to 12 doses. 12 tablet 0 11/13/2022   • Rivaroxaban (XARELTO) 2.5 MG tablet Take 1 tablet by mouth 2 (Two) Times a Day With Meals. Indications: Disease of the Peripheral Arteries 60 tablet 3 11/12/2022   • topiramate (TOPAMAX) 25 MG tablet Take 1 tablet by mouth Daily.   11/12/2022   • iron polysacch complex-B12-VitC-FA-Succ (Ferrex 150 Forte Plus)  MG capsule capsule Take 1 capsule by mouth Daily With Breakfast. 30 each 1    • Omega-3 Fatty Acids (FISH OIL) 1200 MG capsule capsule Take 2,400 mg by mouth Daily. HOLDING FOR SURGERY      • potassium chloride (MICRO-K) 10 MEQ CR capsule Take 10 mEq by mouth Daily.          Current Meds:   Scheduled Meds:atorvastatin, 40 mg, Oral, Q PM  pantoprazole, 40 mg, Oral, QAM  senna-docusate sodium, 2 tablet, Oral, BID  topiramate, 25 mg, Oral, Daily      Continuous Infusions:   PRN Meds:.•  acetaminophen  •  aluminum-magnesium hydroxide-simethicone  •  senna-docusate sodium **AND** polyethylene glycol **AND** bisacodyl **AND** bisacodyl  •  HYDROcodone-acetaminophen  •  melatonin  •  ondansetron **OR** ondansetron  •  oxyCODONE  •  sodium chloride    Allergies:  Allergies   Allergen Reactions   • Penicillins Itching and Swelling       Social History:   Social History     Tobacco Use   • Smoking status: Never   • Smokeless tobacco: Never   • Tobacco comments:     caffeine - 3 - 5 daily   Substance Use Topics   • Alcohol use: No       Family History:  Family History   Problem Relation Age of Onset   • Cancer Mother    • Stroke Mother    • Heart disease Brother    • Stroke Brother    • Leukemia Other    • Malig Hyperthermia Neg Hx        Review of Systems:      Negative for fever,  "chills, nausea, vomiting, chest pain, shortness of breath, headache, dizziness, vision changes, or slurred speech.  All other pertinent positives and negatives as noted above in HPI.      Vitals:   Blood pressure 156/71, pulse 89, temperature 98.4 °F (36.9 °C), temperature source Oral, resp. rate 16, height 160 cm (62.99\"), weight 56.1 kg (123 lb 10.9 oz), SpO2 99 %.    I/O:     Intake/Output Summary (Last 24 hours) at 11/14/2022 0801  Last data filed at 11/14/2022 0000  Gross per 24 hour   Intake 250 ml   Output --   Net 250 ml       Physical Exam:        General - awake, alert, and oriented x 3, answers questions appropriately, well nourished, no acute distress   HEENT: atraumatic  Neck: supple  Skin: warm, no rash  Lung: unlabored breathing  CV: palpable dp/pt pulses    rightlower extremity:  Extremity held in externally rotated position  Slightly shortened compared to contralateral extremity  Tender to palpation over greater trochanter  Able to dorsiflex ankle and move toes  Pain with any IR/ER of hip  Unable to test ROM due to pain  Sensation grossly intact to light touch throughout  Distal pulses intact  No calf swelling  Homans sign negative  Compartments soft  No signs or symptoms of DVT or compartment syndrome    Exam of the contralateral hip is normal:  No atrophy, erythema, ecchymosis, or gross deformity noted  No tenderness to palpation  Full active range of motion  5/5 strength in hip flexion, abduction, and adduction  Stinchfield and straight leg raise negative  DEXTER negative  Sensation grossly intact to light touch throughout  Skin and distal pulses intact      Labs:      Lab Results (last 24 hours)     Procedure Component Value Units Date/Time    Basic Metabolic Panel [[id number]]  (Abnormal) Collected: 11/14/22 0422    Specimen: Blood Updated: 11/14/22 0546     Glucose 128 mg/dL      BUN 15 mg/dL      Creatinine 0.72 mg/dL      Sodium 141 mmol/L      Potassium 3.3 mmol/L      Chloride 109 mmol/L     "  CO2 19.7 mmol/L      Calcium 9.1 mg/dL      BUN/Creatinine Ratio 20.8     Anion Gap 12.3 mmol/L      eGFR 81.5 mL/min/1.73      Comment: National Kidney Foundation and American Society of Nephrology (ASN) Task Force recommended calculation based on the Chronic Kidney Disease Epidemiology Collaboration (CKD-EPI) equation refit without adjustment for race.       Narrative:      GFR Normal >60  Chronic Kidney Disease <60  Kidney Failure <15    The GFR formula is only valid for adults with stable renal function between ages 18 and 70.    Troponin [[id number]]  (Normal) Collected: 11/14/22 0422    Specimen: Blood Updated: 11/14/22 0546     Troponin T <0.010 ng/mL     Narrative:      Troponin T Reference Range:  <= 0.03 ng/mL-   Negative for AMI  >0.03 ng/mL-     Abnormal for myocardial necrosis.  Clinicians would have to utilize clinical acumen, EKG, Troponin and serial changes to determine if it is an Acute Myocardial Infarction or myocardial injury due to an underlying chronic condition.       Results may be falsely decreased if patient taking Biotin.      Protime-INR [[id number]]  (Abnormal) Collected: 11/14/22 0422    Specimen: Blood Updated: 11/14/22 0526     Protime 15.8 Seconds      INR 1.24    CBC (No Diff) [[id number]]  (Abnormal) Collected: 11/14/22 0422    Specimen: Blood Updated: 11/14/22 0518     WBC 19.14 10*3/mm3      RBC 3.05 10*6/mm3      Hemoglobin 9.1 g/dL      Hematocrit 27.8 %      MCV 91.1 fL      MCH 29.8 pg      MCHC 32.7 g/dL      RDW 13.2 %      RDW-SD 43.4 fl      MPV 9.1 fL      Platelets 500 10*3/mm3           Diagnostic Studies:      AP of the pelvis and lateral of the right hip were reviewed from the ED.  There is a femoral neck fracture with mild comminution at the fracture site.  The fracture is in mild valgus. There are no periosteal reactions or medullary lesions seen.    Comparison views not available      Assessment:     right hip femoral neck fracture     Plan:      I have discussed  the nature of femoral neck fractures with the patient and family.  This fracture will require endoprosthetic replacement vs. total hip arthroplasty.  The surgical procedure will be scheduled once all medical and cardiac clearances have been obtained.    The risks, benefits, and alternatives of hip fracture surgery were discussed extensively.  The risks include but are not limited to infection, DVT, PE, bleeding and blood loss, damage to nerves or blood vessels, malunion or nonunion, dislocation, continued pain, hip stiffness/loss of motion, hardware irritation, and the possibility of future arthrosis of the hip.  The risks of anesthesia including heart attack, stroke, and even death were discussed.  The typical post-operative course and rehab plan were discussed as well.  Activity restrictions following the surgery were emphasized and the patient expressed understanding.  All the patient's questions were answered.  A consent form was signed and placed on the chart.    The patient will receive perioperative antibiotics.  The patient will be placed on DVT prophylaxis after surgery.    Patient had recent vascular surgery as noted above.  She was supposed be on Xarelto had not started that.  They had put her on a heparin drip since her arrival we will stop daily 6 hours prior to surgery.  Okay to resume preferably the day after surgery but at least 12 hours recommended.    I did also review the as well as CT scan as noted above given the displacement and bone quality I recommend joint replacement procedure.  I know the patient is worried about that but I think any kind of lesser procedure would have significant risk and ultimately warrant possible hip replacement.  I discussed total hip replacement and partial hip replacement the patient she does not want a total hip replacement she is okay with proceeding with a partial/hemiarthroplasty.    Risk and benefits were discussed as noted above.  I did discuss risk and  benefits with the patient with risk including but not limited to bleeding, infection, damage nearby nerves, vessels, tendons, ligaments, continued pain, worsening pain, fracture, dislocation, leg length discrepancy, blood clots, even death with anesthesia and possible need for future procedures surgeries.  Patient understood this and has chosen to proceed.      N.p.o. tonight.  Hold anticoagulation.  Plan to proceed with surgery tomorrow.    Please call any questions or concerns thank you    Keny Waller MD

## 2022-11-14 NOTE — SIGNIFICANT NOTE
11/14/22 1142   OTHER   Discipline physical therapist   Rehab Time/Intention   Session Not Performed other (see comments)  (Will hold PT tamara, pt w R hip fracture, plans for sx on 11/15- will follow up post op.)   Recommendation   PT - Next Appointment 11/16/22

## 2022-11-14 NOTE — PLAN OF CARE
Goal Outcome Evaluation:  Plan of Care Reviewed With: patient        Progress: no change  Outcome Evaluation: Patient direct admit, VSS, NPO, pain controlled per MAR, Phillips catheter pulled on arrival and patient now voiding per purewick. Doppler on BLE done x2 this shift.

## 2022-11-14 NOTE — PROGRESS NOTES
[facility] Clinical Pharmacy Services: Warfarin Dosing/Monitoring Consult    Lourdes Platt is a 87 y.o. female, estimated creatinine clearance is 49.7 mL/min (by C-G formula based on SCr of 0.72 mg/dL). weighing 56.1 kg (123 lb 10.9 oz).    Results from last 7 days   Lab Units 11/14/22  0819 11/14/22  0422   INR  1.24* 1.24*   HEMOGLOBIN g/dL  --  9.1*   HEMATOCRIT %  --  27.8*   PLATELETS 10*3/mm3  --  500*     Prior to admission anticoagulation: [facility] Anticoagulation:  Consulting provider: Dr. Caro   Start date: 11/14  Indication: Other--full anticoagulation needed   Target INR: 2 - 3  Expected duration: TBD   Bridge Therapy: Patient is on subq heparin for VTE prophylaxis     Potential food or drug interactions:   -heparin, clopidogrel: increased risk of bleeding     Education complete?/Date: TBD   Assessment/Plan:  Dose: New start warfarin, previously on a DOAC. Given advanced age and small body weight, I will opt for a semi modest bolus of warfarin 7.5 mg x 1. Further dosing to be determined pending INR trend.  Monitor for any signs or symptoms of bleeding  Follow up daily INRs and dose adjustments    Pharmacy will continue to follow until discharge or discontinuation of warfarin.     Nancy Verma [facility]  Clinical Pharmacist

## 2022-11-14 NOTE — NURSING NOTE
Spoke with Dr Caro regarding anticoagulation prior to surgery, OK to hold warfarin and plavix until after surgery. Heparin subQ q8, hold am dose before surgery. Spoke with Nina Bain from [facility] and informed her. She and Dr Waller are OK with plan.

## 2022-11-15 ENCOUNTER — ANESTHESIA (OUTPATIENT)
Dept: PERIOP | Facility: HOSPITAL | Age: 87
End: 2022-11-15

## 2022-11-15 ENCOUNTER — ANESTHESIA EVENT (OUTPATIENT)
Dept: PERIOP | Facility: HOSPITAL | Age: 87
End: 2022-11-15

## 2022-11-15 ENCOUNTER — APPOINTMENT (OUTPATIENT)
Dept: GENERAL RADIOLOGY | Facility: HOSPITAL | Age: 87
End: 2022-11-15

## 2022-11-15 LAB
ANION GAP SERPL CALCULATED.3IONS-SCNC: 11 MMOL/L (ref 5–15)
BASOPHILS # BLD AUTO: 0.09 10*3/MM3 (ref 0–0.2)
BASOPHILS NFR BLD AUTO: 0.6 % (ref 0–1.5)
BUN SERPL-MCNC: 11 MG/DL (ref 8–23)
BUN/CREAT SERPL: 18.6 (ref 7–25)
CALCIUM SPEC-SCNC: 8.9 MG/DL (ref 8.6–10.5)
CHLORIDE SERPL-SCNC: 104 MMOL/L (ref 98–107)
CO2 SERPL-SCNC: 21 MMOL/L (ref 22–29)
CREAT SERPL-MCNC: 0.59 MG/DL (ref 0.57–1)
DEPRECATED RDW RBC AUTO: 42.2 FL (ref 37–54)
EGFRCR SERPLBLD CKD-EPI 2021: 87.9 ML/MIN/1.73
EOSINOPHIL # BLD AUTO: 0.56 10*3/MM3 (ref 0–0.4)
EOSINOPHIL NFR BLD AUTO: 3.6 % (ref 0.3–6.2)
ERYTHROCYTE [DISTWIDTH] IN BLOOD BY AUTOMATED COUNT: 12.9 % (ref 12.3–15.4)
GLUCOSE SERPL-MCNC: 98 MG/DL (ref 65–99)
HCT VFR BLD AUTO: 29.3 % (ref 34–46.6)
HGB BLD-MCNC: 9.7 G/DL (ref 12–15.9)
IMM GRANULOCYTES # BLD AUTO: 0.05 10*3/MM3 (ref 0–0.05)
IMM GRANULOCYTES NFR BLD AUTO: 0.3 % (ref 0–0.5)
INR PPP: 1.05 (ref 0.9–1.1)
LYMPHOCYTES # BLD AUTO: 0.74 10*3/MM3 (ref 0.7–3.1)
LYMPHOCYTES NFR BLD AUTO: 4.8 % (ref 19.6–45.3)
MCH RBC QN AUTO: 30.2 PG (ref 26.6–33)
MCHC RBC AUTO-ENTMCNC: 33.1 G/DL (ref 31.5–35.7)
MCV RBC AUTO: 91.3 FL (ref 79–97)
MONOCYTES # BLD AUTO: 0.91 10*3/MM3 (ref 0.1–0.9)
MONOCYTES NFR BLD AUTO: 5.9 % (ref 5–12)
NEUTROPHILS NFR BLD AUTO: 13.02 10*3/MM3 (ref 1.7–7)
NEUTROPHILS NFR BLD AUTO: 84.8 % (ref 42.7–76)
NRBC BLD AUTO-RTO: 0 /100 WBC (ref 0–0.2)
PLATELET # BLD AUTO: 494 10*3/MM3 (ref 140–450)
PMV BLD AUTO: 9.5 FL (ref 6–12)
POTASSIUM SERPL-SCNC: 4 MMOL/L (ref 3.5–5.2)
PROTHROMBIN TIME: 13.9 SECONDS (ref 11.7–14.2)
RBC # BLD AUTO: 3.21 10*6/MM3 (ref 3.77–5.28)
SODIUM SERPL-SCNC: 136 MMOL/L (ref 136–145)
WBC NRBC COR # BLD: 15.37 10*3/MM3 (ref 3.4–10.8)

## 2022-11-15 PROCEDURE — C1713 ANCHOR/SCREW BN/BN,TIS/BN: HCPCS | Performed by: ORTHOPAEDIC SURGERY

## 2022-11-15 PROCEDURE — 25010000002 CLONIDINE PER 1 MG: Performed by: ORTHOPAEDIC SURGERY

## 2022-11-15 PROCEDURE — 25010000002 EPINEPHRINE 1 MG/ML SOLUTION 30 ML VIAL: Performed by: ORTHOPAEDIC SURGERY

## 2022-11-15 PROCEDURE — 85025 COMPLETE CBC W/AUTO DIFF WBC: CPT | Performed by: HOSPITALIST

## 2022-11-15 PROCEDURE — 25010000002 HEPARIN (PORCINE) PER 1000 UNITS: Performed by: ORTHOPAEDIC SURGERY

## 2022-11-15 PROCEDURE — 25010000002 KETOROLAC TROMETHAMINE PER 15 MG: Performed by: ORTHOPAEDIC SURGERY

## 2022-11-15 PROCEDURE — 25010000002 ONDANSETRON PER 1 MG: Performed by: NURSE ANESTHETIST, CERTIFIED REGISTERED

## 2022-11-15 PROCEDURE — 25010000002 DEXAMETHASONE SODIUM PHOSPHATE 20 MG/5ML SOLUTION: Performed by: NURSE ANESTHETIST, CERTIFIED REGISTERED

## 2022-11-15 PROCEDURE — 25010000002 PROPOFOL 10 MG/ML EMULSION: Performed by: NURSE ANESTHETIST, CERTIFIED REGISTERED

## 2022-11-15 PROCEDURE — 25010000002 VANCOMYCIN 750 MG RECONSTITUTED SOLUTION: Performed by: ORTHOPAEDIC SURGERY

## 2022-11-15 PROCEDURE — 25010000002 FENTANYL CITRATE (PF) 50 MCG/ML SOLUTION: Performed by: NURSE ANESTHETIST, CERTIFIED REGISTERED

## 2022-11-15 PROCEDURE — 27236 TREAT THIGH FRACTURE: CPT | Performed by: NURSE PRACTITIONER

## 2022-11-15 PROCEDURE — 27236 TREAT THIGH FRACTURE: CPT | Performed by: ORTHOPAEDIC SURGERY

## 2022-11-15 PROCEDURE — 73501 X-RAY EXAM HIP UNI 1 VIEW: CPT

## 2022-11-15 PROCEDURE — 0SRR019 REPLACEMENT OF RIGHT HIP JOINT, FEMORAL SURFACE WITH METAL SYNTHETIC SUBSTITUTE, CEMENTED, OPEN APPROACH: ICD-10-PCS | Performed by: ORTHOPAEDIC SURGERY

## 2022-11-15 PROCEDURE — C1776 JOINT DEVICE (IMPLANTABLE): HCPCS | Performed by: ORTHOPAEDIC SURGERY

## 2022-11-15 PROCEDURE — 85610 PROTHROMBIN TIME: CPT | Performed by: SURGERY

## 2022-11-15 PROCEDURE — 80048 BASIC METABOLIC PNL TOTAL CA: CPT | Performed by: HOSPITALIST

## 2022-11-15 PROCEDURE — L1830 KO IMMOB CANVAS LONG PRE OTS: HCPCS | Performed by: ORTHOPAEDIC SURGERY

## 2022-11-15 PROCEDURE — 25010000002 ROPIVACAINE PER 1 MG: Performed by: ORTHOPAEDIC SURGERY

## 2022-11-15 PROCEDURE — 25010000002 NEOSTIGMINE 5 MG/10ML SOLUTION: Performed by: NURSE ANESTHETIST, CERTIFIED REGISTERED

## 2022-11-15 DEVICE — LINER VERSYS ASMBL POLY 47/48/49MM OD X28MM: Type: IMPLANTABLE DEVICE | Site: HIP | Status: FUNCTIONAL

## 2022-11-15 DEVICE — SHLL VERSYS M/BIPOL MTL OD 47MM: Type: IMPLANTABLE DEVICE | Site: HIP | Status: FUNCTIONAL

## 2022-11-15 DEVICE — CMT BONE REFOBACIN R W/GENT 1X40: Type: IMPLANTABLE DEVICE | Site: HIP | Status: FUNCTIONAL

## 2022-11-15 DEVICE — AVENIR® STANDARD STEM CEMENTED 4
Type: IMPLANTABLE DEVICE | Site: HIP | Status: FUNCTIONAL
Brand: AVENIR®

## 2022-11-15 DEVICE — DEV CONTRL TISS STRATAFIX SYMM PDS PLUS VIL CT-1 60CM: Type: IMPLANTABLE DEVICE | Site: HIP | Status: FUNCTIONAL

## 2022-11-15 DEVICE — CAP PRT HIP BIPOL: Type: IMPLANTABLE DEVICE | Status: FUNCTIONAL

## 2022-11-15 DEVICE — HD FEM TAPR COCR 0PLS 28MM: Type: IMPLANTABLE DEVICE | Site: HIP | Status: FUNCTIONAL

## 2022-11-15 RX ORDER — ROCURONIUM BROMIDE 10 MG/ML
INJECTION, SOLUTION INTRAVENOUS AS NEEDED
Status: DISCONTINUED | OUTPATIENT
Start: 2022-11-15 | End: 2022-11-15 | Stop reason: SURG

## 2022-11-15 RX ORDER — LIDOCAINE HYDROCHLORIDE 20 MG/ML
INJECTION, SOLUTION EPIDURAL; INFILTRATION; INTRACAUDAL; PERINEURAL AS NEEDED
Status: DISCONTINUED | OUTPATIENT
Start: 2022-11-15 | End: 2022-11-15 | Stop reason: SURG

## 2022-11-15 RX ORDER — NALOXONE HCL 0.4 MG/ML
0.2 VIAL (ML) INJECTION AS NEEDED
Status: DISCONTINUED | OUTPATIENT
Start: 2022-11-15 | End: 2022-11-15 | Stop reason: HOSPADM

## 2022-11-15 RX ORDER — LIDOCAINE HYDROCHLORIDE 10 MG/ML
0.5 INJECTION, SOLUTION EPIDURAL; INFILTRATION; INTRACAUDAL; PERINEURAL ONCE AS NEEDED
Status: DISCONTINUED | OUTPATIENT
Start: 2022-11-15 | End: 2022-11-15 | Stop reason: HOSPADM

## 2022-11-15 RX ORDER — BISACODYL 10 MG
10 SUPPOSITORY, RECTAL RECTAL DAILY PRN
Status: DISCONTINUED | OUTPATIENT
Start: 2022-11-15 | End: 2022-11-19 | Stop reason: HOSPADM

## 2022-11-15 RX ORDER — CLINDAMYCIN PHOSPHATE 900 MG/50ML
900 INJECTION INTRAVENOUS ONCE
Status: COMPLETED | OUTPATIENT
Start: 2022-11-15 | End: 2022-11-15

## 2022-11-15 RX ORDER — DEXAMETHASONE SODIUM PHOSPHATE 4 MG/ML
INJECTION, SOLUTION INTRA-ARTICULAR; INTRALESIONAL; INTRAMUSCULAR; INTRAVENOUS; SOFT TISSUE AS NEEDED
Status: DISCONTINUED | OUTPATIENT
Start: 2022-11-15 | End: 2022-11-15 | Stop reason: SURG

## 2022-11-15 RX ORDER — ONDANSETRON 2 MG/ML
4 INJECTION INTRAMUSCULAR; INTRAVENOUS EVERY 6 HOURS PRN
Status: DISCONTINUED | OUTPATIENT
Start: 2022-11-15 | End: 2022-11-19 | Stop reason: HOSPADM

## 2022-11-15 RX ORDER — FAMOTIDINE 10 MG/ML
20 INJECTION, SOLUTION INTRAVENOUS ONCE
Status: COMPLETED | OUTPATIENT
Start: 2022-11-15 | End: 2022-11-15

## 2022-11-15 RX ORDER — HYDROMORPHONE HYDROCHLORIDE 1 MG/ML
0.5 INJECTION, SOLUTION INTRAMUSCULAR; INTRAVENOUS; SUBCUTANEOUS
Status: DISCONTINUED | OUTPATIENT
Start: 2022-11-15 | End: 2022-11-15 | Stop reason: HOSPADM

## 2022-11-15 RX ORDER — ONDANSETRON 2 MG/ML
INJECTION INTRAMUSCULAR; INTRAVENOUS AS NEEDED
Status: DISCONTINUED | OUTPATIENT
Start: 2022-11-15 | End: 2022-11-15 | Stop reason: SURG

## 2022-11-15 RX ORDER — CLINDAMYCIN PHOSPHATE 600 MG/50ML
600 INJECTION INTRAVENOUS EVERY 8 HOURS
Status: COMPLETED | OUTPATIENT
Start: 2022-11-15 | End: 2022-11-16

## 2022-11-15 RX ORDER — HYDROCODONE BITARTRATE AND ACETAMINOPHEN 7.5; 325 MG/1; MG/1
1 TABLET ORAL ONCE AS NEEDED
Status: DISCONTINUED | OUTPATIENT
Start: 2022-11-15 | End: 2022-11-15 | Stop reason: HOSPADM

## 2022-11-15 RX ORDER — SODIUM CHLORIDE 0.9 % (FLUSH) 0.9 %
3-10 SYRINGE (ML) INJECTION AS NEEDED
Status: DISCONTINUED | OUTPATIENT
Start: 2022-11-15 | End: 2022-11-15 | Stop reason: HOSPADM

## 2022-11-15 RX ORDER — SODIUM CHLORIDE, SODIUM LACTATE, POTASSIUM CHLORIDE, CALCIUM CHLORIDE 600; 310; 30; 20 MG/100ML; MG/100ML; MG/100ML; MG/100ML
100 INJECTION, SOLUTION INTRAVENOUS CONTINUOUS
Status: DISCONTINUED | OUTPATIENT
Start: 2022-11-15 | End: 2022-11-19 | Stop reason: HOSPADM

## 2022-11-15 RX ORDER — POVIDONE-IODINE 10 MG/ML
1 SOLUTION TOPICAL ONCE
Status: COMPLETED | OUTPATIENT
Start: 2022-11-15 | End: 2022-11-15

## 2022-11-15 RX ORDER — SODIUM CHLORIDE, SODIUM LACTATE, POTASSIUM CHLORIDE, CALCIUM CHLORIDE 600; 310; 30; 20 MG/100ML; MG/100ML; MG/100ML; MG/100ML
9 INJECTION, SOLUTION INTRAVENOUS CONTINUOUS
Status: DISCONTINUED | OUTPATIENT
Start: 2022-11-15 | End: 2022-11-19 | Stop reason: HOSPADM

## 2022-11-15 RX ORDER — SODIUM CHLORIDE 0.9 % (FLUSH) 0.9 %
3 SYRINGE (ML) INJECTION EVERY 12 HOURS SCHEDULED
Status: DISCONTINUED | OUTPATIENT
Start: 2022-11-15 | End: 2022-11-15 | Stop reason: HOSPADM

## 2022-11-15 RX ORDER — FENTANYL CITRATE 50 UG/ML
50 INJECTION, SOLUTION INTRAMUSCULAR; INTRAVENOUS
Status: DISCONTINUED | OUTPATIENT
Start: 2022-11-15 | End: 2022-11-15 | Stop reason: HOSPADM

## 2022-11-15 RX ORDER — MAGNESIUM HYDROXIDE 1200 MG/15ML
LIQUID ORAL AS NEEDED
Status: DISCONTINUED | OUTPATIENT
Start: 2022-11-15 | End: 2022-11-15 | Stop reason: HOSPADM

## 2022-11-15 RX ORDER — LABETALOL HYDROCHLORIDE 5 MG/ML
5 INJECTION, SOLUTION INTRAVENOUS
Status: DISCONTINUED | OUTPATIENT
Start: 2022-11-15 | End: 2022-11-15 | Stop reason: HOSPADM

## 2022-11-15 RX ORDER — MIDAZOLAM HYDROCHLORIDE 1 MG/ML
0.5 INJECTION INTRAMUSCULAR; INTRAVENOUS
Status: DISCONTINUED | OUTPATIENT
Start: 2022-11-15 | End: 2022-11-15 | Stop reason: HOSPADM

## 2022-11-15 RX ORDER — ACETAMINOPHEN 325 MG/1
325 TABLET ORAL EVERY 4 HOURS PRN
Status: DISCONTINUED | OUTPATIENT
Start: 2022-11-15 | End: 2022-11-19 | Stop reason: HOSPADM

## 2022-11-15 RX ORDER — WARFARIN SODIUM 7.5 MG/1
7.5 TABLET ORAL
Status: DISCONTINUED | OUTPATIENT
Start: 2022-11-15 | End: 2022-11-15 | Stop reason: SDUPTHER

## 2022-11-15 RX ORDER — FENTANYL CITRATE 50 UG/ML
INJECTION, SOLUTION INTRAMUSCULAR; INTRAVENOUS AS NEEDED
Status: DISCONTINUED | OUTPATIENT
Start: 2022-11-15 | End: 2022-11-15 | Stop reason: SURG

## 2022-11-15 RX ORDER — ONDANSETRON 2 MG/ML
4 INJECTION INTRAMUSCULAR; INTRAVENOUS ONCE AS NEEDED
Status: DISCONTINUED | OUTPATIENT
Start: 2022-11-15 | End: 2022-11-15 | Stop reason: HOSPADM

## 2022-11-15 RX ORDER — GLYCOPYRROLATE 0.2 MG/ML
INJECTION INTRAMUSCULAR; INTRAVENOUS AS NEEDED
Status: DISCONTINUED | OUTPATIENT
Start: 2022-11-15 | End: 2022-11-15 | Stop reason: SURG

## 2022-11-15 RX ORDER — PROPOFOL 10 MG/ML
VIAL (ML) INTRAVENOUS AS NEEDED
Status: DISCONTINUED | OUTPATIENT
Start: 2022-11-15 | End: 2022-11-15 | Stop reason: SURG

## 2022-11-15 RX ORDER — BUPIVACAINE HCL/0.9 % NACL/PF 0.125 %
PLASTIC BAG, INJECTION (ML) EPIDURAL AS NEEDED
Status: DISCONTINUED | OUTPATIENT
Start: 2022-11-15 | End: 2022-11-15 | Stop reason: SURG

## 2022-11-15 RX ORDER — OXYCODONE AND ACETAMINOPHEN 7.5; 325 MG/1; MG/1
1 TABLET ORAL EVERY 4 HOURS PRN
Status: DISCONTINUED | OUTPATIENT
Start: 2022-11-15 | End: 2022-11-15 | Stop reason: HOSPADM

## 2022-11-15 RX ORDER — PROMETHAZINE HYDROCHLORIDE 25 MG/1
25 SUPPOSITORY RECTAL ONCE AS NEEDED
Status: DISCONTINUED | OUTPATIENT
Start: 2022-11-15 | End: 2022-11-15 | Stop reason: HOSPADM

## 2022-11-15 RX ORDER — PROMETHAZINE HYDROCHLORIDE 25 MG/1
25 TABLET ORAL ONCE AS NEEDED
Status: DISCONTINUED | OUTPATIENT
Start: 2022-11-15 | End: 2022-11-15 | Stop reason: HOSPADM

## 2022-11-15 RX ORDER — FLUMAZENIL 0.1 MG/ML
0.2 INJECTION INTRAVENOUS AS NEEDED
Status: DISCONTINUED | OUTPATIENT
Start: 2022-11-15 | End: 2022-11-15 | Stop reason: HOSPADM

## 2022-11-15 RX ORDER — EPHEDRINE SULFATE 50 MG/ML
5 INJECTION, SOLUTION INTRAVENOUS ONCE AS NEEDED
Status: DISCONTINUED | OUTPATIENT
Start: 2022-11-15 | End: 2022-11-15 | Stop reason: HOSPADM

## 2022-11-15 RX ORDER — NEOSTIGMINE METHYLSULFATE 0.5 MG/ML
INJECTION, SOLUTION INTRAVENOUS AS NEEDED
Status: DISCONTINUED | OUTPATIENT
Start: 2022-11-15 | End: 2022-11-15 | Stop reason: SURG

## 2022-11-15 RX ORDER — HYDRALAZINE HYDROCHLORIDE 20 MG/ML
5 INJECTION INTRAMUSCULAR; INTRAVENOUS
Status: DISCONTINUED | OUTPATIENT
Start: 2022-11-15 | End: 2022-11-15 | Stop reason: HOSPADM

## 2022-11-15 RX ORDER — PREGABALIN 75 MG/1
150 CAPSULE ORAL ONCE
Status: COMPLETED | OUTPATIENT
Start: 2022-11-15 | End: 2022-11-15

## 2022-11-15 RX ORDER — DIPHENHYDRAMINE HCL 25 MG
25 CAPSULE ORAL
Status: DISCONTINUED | OUTPATIENT
Start: 2022-11-15 | End: 2022-11-15 | Stop reason: HOSPADM

## 2022-11-15 RX ORDER — ACETAMINOPHEN 500 MG
1000 TABLET ORAL ONCE
Status: COMPLETED | OUTPATIENT
Start: 2022-11-15 | End: 2022-11-15

## 2022-11-15 RX ORDER — DOCUSATE SODIUM 100 MG/1
100 CAPSULE, LIQUID FILLED ORAL 2 TIMES DAILY
Status: DISCONTINUED | OUTPATIENT
Start: 2022-11-15 | End: 2022-11-19 | Stop reason: HOSPADM

## 2022-11-15 RX ORDER — POLYETHYLENE GLYCOL 3350 17 G/17G
17 POWDER, FOR SOLUTION ORAL 2 TIMES DAILY
Status: DISCONTINUED | OUTPATIENT
Start: 2022-11-15 | End: 2022-11-19 | Stop reason: HOSPADM

## 2022-11-15 RX ORDER — TRANEXAMIC ACID 100 MG/ML
INJECTION, SOLUTION INTRAVENOUS AS NEEDED
Status: DISCONTINUED | OUTPATIENT
Start: 2022-11-15 | End: 2022-11-15 | Stop reason: SURG

## 2022-11-15 RX ORDER — ONDANSETRON 4 MG/1
4 TABLET, FILM COATED ORAL EVERY 6 HOURS PRN
Status: DISCONTINUED | OUTPATIENT
Start: 2022-11-15 | End: 2022-11-19 | Stop reason: HOSPADM

## 2022-11-15 RX ORDER — DIPHENHYDRAMINE HYDROCHLORIDE 50 MG/ML
12.5 INJECTION INTRAMUSCULAR; INTRAVENOUS
Status: DISCONTINUED | OUTPATIENT
Start: 2022-11-15 | End: 2022-11-15 | Stop reason: HOSPADM

## 2022-11-15 RX ADMIN — DOCUSATE SODIUM 50MG AND SENNOSIDES 8.6MG 2 TABLET: 8.6; 5 TABLET, FILM COATED ORAL at 20:44

## 2022-11-15 RX ADMIN — Medication 50 MCG: at 13:59

## 2022-11-15 RX ADMIN — ROCURONIUM BROMIDE 30 MG: 10 INJECTION, SOLUTION INTRAVENOUS at 13:45

## 2022-11-15 RX ADMIN — ACETAMINOPHEN 1000 MG: 500 TABLET ORAL at 10:53

## 2022-11-15 RX ADMIN — LIDOCAINE HYDROCHLORIDE 50 MG: 20 INJECTION, SOLUTION EPIDURAL; INFILTRATION; INTRACAUDAL; PERINEURAL at 13:44

## 2022-11-15 RX ADMIN — DOCUSATE SODIUM 100 MG: 100 CAPSULE, LIQUID FILLED ORAL at 20:44

## 2022-11-15 RX ADMIN — DEXAMETHASONE SODIUM PHOSPHATE 8 MG: 4 INJECTION, SOLUTION INTRAMUSCULAR; INTRAVENOUS at 13:50

## 2022-11-15 RX ADMIN — GLYCOPYRROLATE 0.4 MCG: 1 INJECTION INTRAMUSCULAR; INTRAVENOUS at 15:28

## 2022-11-15 RX ADMIN — SODIUM CHLORIDE 750 MG: 900 INJECTION, SOLUTION INTRAVENOUS at 10:55

## 2022-11-15 RX ADMIN — ROCURONIUM BROMIDE 20 MG: 10 INJECTION, SOLUTION INTRAVENOUS at 14:07

## 2022-11-15 RX ADMIN — Medication 50 MCG: at 13:52

## 2022-11-15 RX ADMIN — Medication 20 MG: at 13:31

## 2022-11-15 RX ADMIN — PANTOPRAZOLE SODIUM 40 MG: 40 TABLET, DELAYED RELEASE ORAL at 06:46

## 2022-11-15 RX ADMIN — TRANEXAMIC ACID 1000 MG: 1 INJECTION, SOLUTION INTRAVENOUS at 14:11

## 2022-11-15 RX ADMIN — POVIDONE-IODINE 1 EACH: 10 SOLUTION TOPICAL at 10:57

## 2022-11-15 RX ADMIN — HEPARIN SODIUM 5000 UNITS: 5000 INJECTION INTRAVENOUS; SUBCUTANEOUS at 20:43

## 2022-11-15 RX ADMIN — SODIUM CHLORIDE, POTASSIUM CHLORIDE, SODIUM LACTATE AND CALCIUM CHLORIDE: 600; 310; 30; 20 INJECTION, SOLUTION INTRAVENOUS at 14:26

## 2022-11-15 RX ADMIN — CLINDAMYCIN IN 5 PERCENT DEXTROSE 600 MG: 12 INJECTION, SOLUTION INTRAVENOUS at 20:44

## 2022-11-15 RX ADMIN — NEOSTIGMINE METHYLSULFATE 3 MG: 0.5 INJECTION INTRAVENOUS at 15:28

## 2022-11-15 RX ADMIN — CLINDAMYCIN PHOSPHATE 900 MG: 900 INJECTION, SOLUTION INTRAVENOUS at 13:30

## 2022-11-15 RX ADMIN — FENTANYL CITRATE 25 MCG: 50 INJECTION, SOLUTION INTRAMUSCULAR; INTRAVENOUS at 15:45

## 2022-11-15 RX ADMIN — HYDROCODONE BITARTRATE AND ACETAMINOPHEN 1 TABLET: 5; 325 TABLET ORAL at 02:21

## 2022-11-15 RX ADMIN — HYDROCODONE BITARTRATE AND ACETAMINOPHEN 1 TABLET: 5; 325 TABLET ORAL at 06:07

## 2022-11-15 RX ADMIN — PROPOFOL 150 MG: 10 INJECTION, EMULSION INTRAVENOUS at 13:44

## 2022-11-15 RX ADMIN — PREGABALIN 150 MG: 75 CAPSULE ORAL at 10:53

## 2022-11-15 RX ADMIN — HYDROCODONE BITARTRATE AND ACETAMINOPHEN 1 TABLET: 5; 325 TABLET ORAL at 20:44

## 2022-11-15 RX ADMIN — SODIUM CHLORIDE, POTASSIUM CHLORIDE, SODIUM LACTATE AND CALCIUM CHLORIDE 9 ML/HR: 600; 310; 30; 20 INJECTION, SOLUTION INTRAVENOUS at 13:31

## 2022-11-15 RX ADMIN — Medication 100 MCG: at 14:11

## 2022-11-15 RX ADMIN — POLYETHYLENE GLYCOL 3350 17 G: 17 POWDER, FOR SOLUTION ORAL at 20:44

## 2022-11-15 RX ADMIN — FENTANYL CITRATE 25 MCG: 50 INJECTION, SOLUTION INTRAMUSCULAR; INTRAVENOUS at 15:06

## 2022-11-15 RX ADMIN — SODIUM CHLORIDE, POTASSIUM CHLORIDE, SODIUM LACTATE AND CALCIUM CHLORIDE 100 ML/HR: 600; 310; 30; 20 INJECTION, SOLUTION INTRAVENOUS at 21:54

## 2022-11-15 RX ADMIN — SODIUM CHLORIDE, POTASSIUM CHLORIDE, SODIUM LACTATE AND CALCIUM CHLORIDE 500 ML: 600; 310; 30; 20 INJECTION, SOLUTION INTRAVENOUS at 10:56

## 2022-11-15 RX ADMIN — ONDANSETRON 4 MG: 2 INJECTION INTRAMUSCULAR; INTRAVENOUS at 15:18

## 2022-11-15 NOTE — PROGRESS NOTES
[facility] Clinical Pharmacy Services: Warfarin Dosing/Monitoring Consult    Lourdes Platt is a 87 y.o. female, estimated creatinine clearance is 60.6 mL/min (by C-G formula based on SCr of 0.59 mg/dL). weighing 56.1 kg (123 lb 10.9 oz).    Results from last 7 days   Lab Units 11/15/22  0452 11/14/22  0819 11/14/22  0422   INR  1.05 1.24* 1.24*   HEMOGLOBIN g/dL 9.7*  --  9.1*   HEMATOCRIT % 29.3*  --  27.8*   PLATELETS 10*3/mm3 494*  --  500*     Prior to admission anticoagulation: [facility] Anticoagulation:  Consulting provider: Dr. Caro   Start date: 11/14  Indication: Other--full anticoagulation needed   Target INR: 2 - 3  Expected duration: TBD   Bridge Therapy: Patient is on subq heparin for VTE prophylaxis     Potential food or drug interactions:   -heparin, clopidogrel: increased risk of bleeding     Education complete?/Date: TBD   Assessment/Plan:  1. Dr. Caro rescheduled warfarin to start 11/15 pm. Agree with the 7.5mg tonight.   2. Monitor for any signs or symptoms of bleeding  3. Follow up daily INRs and dose adjustments    Pharmacy will continue to follow until discharge or discontinuation of warfarin.     Bina Thornton, [facility]  Clinical Pharmacist

## 2022-11-15 NOTE — ANESTHESIA POSTPROCEDURE EVALUATION
"Patient: Lourdes Platt    Procedure Summary     Date: 11/15/22 Room / Location: [facility] OR [address] [place] [address] MAIN OR    Anesthesia Start: 1335 Anesthesia Stop: 1602    Procedure: Right Posterior Hip Hemiarthroplasty (Right: Hip) Diagnosis:     Surgeons: Keny Waller MD Provider: Emanuel Savage MD    Anesthesia Type: general ASA Status: 3          Anesthesia Type: general    Vitals  Vitals Value Taken Time   /67 11/15/22 1716   Temp 36.4 °C (97.6 °F) 11/15/22 1716   Pulse 103 11/15/22 1718   Resp 16 11/15/22 1716   SpO2 97 % 11/15/22 1718   Vitals shown include unvalidated device data.        Post Anesthesia Care and Evaluation    Patient location during evaluation: bedside  Patient participation: complete - patient participated  Level of consciousness: awake  Pain management: adequate    Airway patency: patent  Anesthetic complications: No anesthetic complications    Cardiovascular status: acceptable  Respiratory status: acceptable  Hydration status: acceptable    Comments: */67   Pulse 94   Temp 36.4 °C (97.6 °F) (Oral)   Resp 16   Ht 160 cm (62.99\")   Wt 56.1 kg (123 lb 10.9 oz)   SpO2 96%   BMI 21.91 kg/m²         "

## 2022-11-15 NOTE — ANESTHESIA PREPROCEDURE EVALUATION
Anesthesia Evaluation     Patient summary reviewed and Nursing notes reviewed   NPO Solid Status: > 8 hours             Airway   Mallampati: II  TM distance: >3 FB  Neck ROM: full  Dental - normal exam     Pulmonary    (+) shortness of breath,   Cardiovascular     Rhythm: regular    (+) hypertension, PVD,       Neuro/Psych  (+) CVA,    GI/Hepatic/Renal/Endo    (+)  GERD,      Musculoskeletal     Abdominal    Substance History      OB/GYN          Other   arthritis,                        Anesthesia Plan    ASA 3     general       Anesthetic plan, risks, benefits, and alternatives have been provided, discussed and informed consent has been obtained with: patient.        CODE STATUS:

## 2022-11-15 NOTE — OP NOTE
Operative Report        Facility: [facility]  Patient Name: Lourdes Platt  YOB: 1935  Date: 11/15/2022  Medical Record Number: [id number]       Preoperative diagnosis: right  Femoral neck fracture     Postoperative diagnosis:  right Femoral neck fracture     Surgery performed:  bipolar hemiarthroplasty right hip     Implants:    Implant Name Type Inv. Item Serial No.  Lot No. LRB No. Used Action   DEV CONTRL TISS STRATAFIX SYMM PDS PLUS HI CT-1 60CM - [id number] Implant DEV CONTRL TISS STRATAFIX SYMM PDS PLUS HI CT-1 60CM  [employer] . Right 1 Implanted   CMT BONE REFOBACIN R W/GENT 1X40 - [id number] Implant CMT BONE REFOBACIN R W/GENT 1X40  [employer] [id number] Right 2 Implanted   STEM FEM/HIP AVENIR CMT STD SZ4 - [id number] Implant STEM FEM/HIP AVENIR CMT STD SZ4  [employer] [id number] Right 1 Implanted   HD FEM TAPR COCR 0PLS 28MM - [id number] Implant HD FEM TAPR COCR 0PLS 28MM  [employer] [id number] Right 1 Implanted   SHLL VERSYS M/BIPOL MTL OD 47MM - [id number] Implant SHLL VERSYS M/BIPOL MTL OD 47MM  [employer] [id number] Right 1 Implanted   LINER VERSYS ASMBL POLY 47/48/49MM OD X28MM - [id number] Implant LINER VERSYS ASMBL POLY 47/48/49MM OD X28MM  [employer] [id number] Right 1 Implanted           Carole Avenir femoral stem 12/14 tapered cemented size 4 with standard offset, bipolar head outer diameter 47 mm internal +0 mm headball.     Surgeon: Keny Waller MD      Assistant: JOSE Segal whose assistance was critical for help with patient positioning, suctioning and irrigation, retraction, manipulation of the extremity for insertion of the implants, wound closure and application of the bandages.  Her assistance was critical to the success of this case.        Anesthesia: General     Estimated blood loss: 100 mL     Drains: None     Complications: None    Specimens: None     Disposition: The patient will be transferred back to the  [facility] floor postoperatively and will be weightbearing as tolerated.  The patient will be started on Xarelto per vascular for DVT prophylaxis and will mobilize with physical therapy likely tomorrow.  Once medically stable patient will be discharged home or to an outpatient facility per the primary team and family.     Indications for procedure: This is a pleasant 88yo female who presented to the ER after a ground-level fall.  X-rays were taken demonstrate a right femoral neck fracture.  Orthopedics was consulted.  The injury and treatment options were discussed with the patient and her family They elected for surgical intervention and we decided on a bipolar hemiarthroplasty due to patient's wishes. Risks and benefits were explained and include but are not limited to bleeding, infection, fracture, dislocation, blood clots, damage to nearby nerves or blood vessels, death, possible future procedures/surgeries.  The patient and family wish to proceed with surgery.  All questions were answered.     Description of procedure:     Patient was brought into the operating room.  General anesthesia was administered.  The patient was transferred to the OR table and placed in the lateral position on a pegboard.  Proper padding was placed and verified.  Attention was paid to the right lower extremity and was scrubbed, prepped and draped in normal sterile fashion.  Timeout was performed identifying correct patient, surgical site, surgical procedure and antibiotics given.  All in the room agreed.  Attention was then paid to the right extremity.  A standard posterior incision approach was made. Skin incision was made sharply with a knife.  Bleeders were coagulated.  Careful dissection was carried to the tensor fascia and incised.  Charnley retractor was placed.  The hip bursa was taken down.  The sciatic nerve was identified and palpated.  The abductor muscles were identified retractors were placed under them.  The external  rotators were identified and tagged with suture.  We then removed the external rotators followed by removal of the capsule which was also tagged.  After removing the capsule the fracture was identified.  We went ahead and recut the femoral neck based upon our templating.  Once this was done we were able to remove the femoral head and fracture fragments.  The femoral head was measured for sizing for implant.  Acetabulum was thoroughly irrigated.  Fragments from the fracture were removed.  Attention was paid to the femur.  A cookie cutter was used to clear out the lateral neck we then used canal finder.  We then broached up sequentially up to a size 4 with the Mirta Avenir system.  Size 4 was then trialed with our trial head size 47, +0 mm headball , the hip was put through range of motion and was found to be stable.  Trial components were removed.  The femoral canal was then again checked for its integrity and was found to be intact.  The femoral canal was then prepped.  A canal restrictor for cement was placed.  Real implants were opened consisting of a size 4 cemented Mirta Avenir stem,  cement was mixed.  The stem was placed about 15 degrees of anteversion.  Myself and my assistant both visualize this and agreed.  Excess cement was removed from around the stem.  Once the cement hardened the stem was found to be in good position and stable.  We then again thoroughly irrigated copiously with normal sterile saline and checked acetabulum,  no fracture fragments or pieces of the cement were found.  The final head which we determined was  47 mm outer diameter with +0 mm headball bipolar head was then placed onto the trunnion and impacted into place.  The hip was then reduced.  Again we tried range of motion and the hip was found to be stable in all positions.  We then copiously irrigated with Irrisept followed by normal sterile saline.    Capsule and external rotators were repaired posteriorly with 0 PDS suture..   Capsule was nicely repaired. The sciatic nerve was checked again and its integrity was verified by myself and my assistant.   We placed our hip injection.  The Charnley retractor was removed.  The fascia was closed with 0 Vicryl and Stratafix.  Copiously irrigated again.  Count was done and correct.  Deep layers were closed with 0 Vicryl.  Subcu was closed with 2-0 Vicryl.  Skin was closed with staples. Final count was done and was found to be correct all the room agreed.  Incision site was cleaned a sterile dressing was placed.  The patient tolerated procedure well.  She was transferred from the operating room to the PACU vital signs stable.

## 2022-11-15 NOTE — ANESTHESIA PROCEDURE NOTES
Airway  Urgency: elective    Date/Time: 11/15/2022 1:48 PM  Difficult airway    General Information and Staff    Patient location during procedure: OR  Anesthesiologist: Emanuel Savage MD  CRNA/CAA: Raine Chaves CRNA    Indications and Patient Condition    Preoxygenated: yes  Mask difficulty assessment: 1 - vent by mask    Final Airway Details  Final airway type: endotracheal airway      Successful airway: ETT  Cuffed: yes   Successful intubation technique: direct laryngoscopy  Facilitating devices/methods: intubating stylet  Endotracheal tube insertion site: oral  Blade: CMAC  Blade size: D  ETT size (mm): 7.0  Cormack-Lehane Classification: grade IV - neither glottis nor epiglottis seen  Placement verified by: chest auscultation and capnometry   Measured from: teeth  ETT/EBT  to teeth (cm): 20  Number of attempts at approach: 1  Assessment: lips, teeth, and gum same as pre-op and atraumatic intubation           Statement Selected

## 2022-11-16 LAB
ANION GAP SERPL CALCULATED.3IONS-SCNC: 7.4 MMOL/L (ref 5–15)
BUN SERPL-MCNC: 15 MG/DL (ref 8–23)
BUN/CREAT SERPL: 24.6 (ref 7–25)
CALCIUM SPEC-SCNC: 8.8 MG/DL (ref 8.6–10.5)
CHLORIDE SERPL-SCNC: 100 MMOL/L (ref 98–107)
CO2 SERPL-SCNC: 23.6 MMOL/L (ref 22–29)
CREAT SERPL-MCNC: 0.61 MG/DL (ref 0.57–1)
EGFRCR SERPLBLD CKD-EPI 2021: 87.2 ML/MIN/1.73
FOLATE SERPL-MCNC: 14.5 NG/ML (ref 4.78–24.2)
GLUCOSE SERPL-MCNC: 146 MG/DL (ref 65–99)
HCT VFR BLD AUTO: 25 % (ref 34–46.6)
HGB BLD-MCNC: 8.1 G/DL (ref 12–15.9)
INR PPP: 1.06 (ref 0.9–1.1)
IRON 24H UR-MRATE: 23 MCG/DL (ref 37–145)
IRON SATN MFR SERPL: 14 % (ref 20–50)
POTASSIUM SERPL-SCNC: 4.6 MMOL/L (ref 3.5–5.2)
PROTHROMBIN TIME: 14 SECONDS (ref 11.7–14.2)
SODIUM SERPL-SCNC: 131 MMOL/L (ref 136–145)
TIBC SERPL-MCNC: 165 MCG/DL (ref 298–536)
TRANSFERRIN SERPL-MCNC: 111 MG/DL (ref 200–360)
VIT B12 BLD-MCNC: 257 PG/ML (ref 211–946)

## 2022-11-16 PROCEDURE — 85014 HEMATOCRIT: CPT | Performed by: ORTHOPAEDIC SURGERY

## 2022-11-16 PROCEDURE — 99024 POSTOP FOLLOW-UP VISIT: CPT | Performed by: ORTHOPAEDIC SURGERY

## 2022-11-16 PROCEDURE — 97162 PT EVAL MOD COMPLEX 30 MIN: CPT

## 2022-11-16 PROCEDURE — 25010000002 HEPARIN (PORCINE) PER 1000 UNITS: Performed by: ORTHOPAEDIC SURGERY

## 2022-11-16 PROCEDURE — 84466 ASSAY OF TRANSFERRIN: CPT | Performed by: HOSPITALIST

## 2022-11-16 PROCEDURE — 80048 BASIC METABOLIC PNL TOTAL CA: CPT | Performed by: HOSPITALIST

## 2022-11-16 PROCEDURE — 85018 HEMOGLOBIN: CPT | Performed by: ORTHOPAEDIC SURGERY

## 2022-11-16 PROCEDURE — 97530 THERAPEUTIC ACTIVITIES: CPT

## 2022-11-16 PROCEDURE — 82746 ASSAY OF FOLIC ACID SERUM: CPT | Performed by: HOSPITALIST

## 2022-11-16 PROCEDURE — 83540 ASSAY OF IRON: CPT | Performed by: HOSPITALIST

## 2022-11-16 PROCEDURE — 82607 VITAMIN B-12: CPT | Performed by: HOSPITALIST

## 2022-11-16 PROCEDURE — 85610 PROTHROMBIN TIME: CPT | Performed by: ORTHOPAEDIC SURGERY

## 2022-11-16 RX ORDER — WARFARIN SODIUM 7.5 MG/1
7.5 TABLET ORAL
Status: COMPLETED | OUTPATIENT
Start: 2022-11-16 | End: 2022-11-16

## 2022-11-16 RX ADMIN — DOCUSATE SODIUM 100 MG: 100 CAPSULE, LIQUID FILLED ORAL at 20:35

## 2022-11-16 RX ADMIN — PANTOPRAZOLE SODIUM 40 MG: 40 TABLET, DELAYED RELEASE ORAL at 05:57

## 2022-11-16 RX ADMIN — DOCUSATE SODIUM 50MG AND SENNOSIDES 8.6MG 2 TABLET: 8.6; 5 TABLET, FILM COATED ORAL at 09:28

## 2022-11-16 RX ADMIN — SODIUM CHLORIDE, POTASSIUM CHLORIDE, SODIUM LACTATE AND CALCIUM CHLORIDE 100 ML/HR: 600; 310; 30; 20 INJECTION, SOLUTION INTRAVENOUS at 12:29

## 2022-11-16 RX ADMIN — CLOPIDOGREL 75 MG: 75 TABLET, FILM COATED ORAL at 09:28

## 2022-11-16 RX ADMIN — WARFARIN 7.5 MG: 7.5 TABLET ORAL at 18:01

## 2022-11-16 RX ADMIN — CLINDAMYCIN IN 5 PERCENT DEXTROSE 600 MG: 12 INJECTION, SOLUTION INTRAVENOUS at 05:45

## 2022-11-16 RX ADMIN — HEPARIN SODIUM 5000 UNITS: 5000 INJECTION INTRAVENOUS; SUBCUTANEOUS at 06:01

## 2022-11-16 RX ADMIN — POLYETHYLENE GLYCOL 3350 17 G: 17 POWDER, FOR SOLUTION ORAL at 09:28

## 2022-11-16 RX ADMIN — ATORVASTATIN CALCIUM 40 MG: 20 TABLET, FILM COATED ORAL at 18:01

## 2022-11-16 RX ADMIN — HYDROCODONE BITARTRATE AND ACETAMINOPHEN 1 TABLET: 5; 325 TABLET ORAL at 13:47

## 2022-11-16 RX ADMIN — TOPIRAMATE 25 MG: 25 TABLET, FILM COATED ORAL at 09:28

## 2022-11-16 RX ADMIN — Medication 3 MG: at 20:35

## 2022-11-16 RX ADMIN — HYDROCODONE BITARTRATE AND ACETAMINOPHEN 1 TABLET: 5; 325 TABLET ORAL at 20:35

## 2022-11-16 RX ADMIN — HYDROCODONE BITARTRATE AND ACETAMINOPHEN 1 TABLET: 5; 325 TABLET ORAL at 05:57

## 2022-11-16 RX ADMIN — DOCUSATE SODIUM 50MG AND SENNOSIDES 8.6MG 2 TABLET: 8.6; 5 TABLET, FILM COATED ORAL at 20:35

## 2022-11-16 RX ADMIN — HEPARIN SODIUM 5000 UNITS: 5000 INJECTION INTRAVENOUS; SUBCUTANEOUS at 20:35

## 2022-11-16 RX ADMIN — DOCUSATE SODIUM 100 MG: 100 CAPSULE, LIQUID FILLED ORAL at 09:28

## 2022-11-16 RX ADMIN — POLYETHYLENE GLYCOL 3350 17 G: 17 POWDER, FOR SOLUTION ORAL at 20:35

## 2022-11-16 RX ADMIN — HEPARIN SODIUM 5000 UNITS: 5000 INJECTION INTRAVENOUS; SUBCUTANEOUS at 14:08

## 2022-11-16 NOTE — PROGRESS NOTES
[facility] Clinical Pharmacy Services: Warfarin Dosing/Monitoring Consult    Lourdes Platt is a 87 y.o. female, estimated creatinine clearance is 58.6 mL/min (by C-G formula based on SCr of 0.61 mg/dL). weighing 56.1 kg (123 lb 10.9 oz).    Results from last 7 days   Lab Units 11/16/22  0504 11/15/22  0452 11/14/22  0819 11/14/22  0422   INR  1.06 1.05 1.24* 1.24*   HEMOGLOBIN g/dL 8.1* 9.7*  --  9.1*   HEMATOCRIT % 25.0* 29.3*  --  27.8*   PLATELETS 10*3/mm3  --  494*  --  500*     Prior to admission anticoagulation: [facility] Anticoagulation:  Consulting provider: Dr. Caro   Start date: 11/14  Indication: Other--full anticoagulation needed   Target INR: 2 - 3  Expected duration: TBD   Bridge Therapy: Patient is on subq heparin for VTE prophylaxis     Potential food or drug interactions:   -heparin, clopidogrel: increased risk of bleeding     Education complete?/Date: TBD   Assessment/Plan:  INR subtherapeutic at 1.06 (Goal 2-3). Warfarin was due to start yesterday but dose was not given for unknown reasons. Plan for warfarin 7.5 mg x 1 this evening.  Monitor for any signs or symptoms of bleeding  Follow up daily INRs and dose adjustments    Pharmacy will continue to follow until discharge or discontinuation of warfarin.     Nancy Verma, [facility]  Clinical Pharmacist

## 2022-11-16 NOTE — PROGRESS NOTES
Patient was seen in recovery room yesterday.  Patient needs to be fully anticoagulated for life due to her current bypass.  Patient cannot afford Eliquis and needs to be on full dose Coumadin prior to discharge.  Follow-up for this will need to be arranged as an outpatient to manage it.  We are not capable of doing office.  We will sign off and she will follow-up with Dr. Erwin for appointment

## 2022-11-16 NOTE — DISCHARGE PLACEMENT REQUEST
"Win Platt (87 y.o. Female)     Date of Birth   1935    Social Security Number       Address   [address] [facility] [address]    Home Phone   [phone]    MRN   [id number]       [facility]    Marital Status                               Admission Date   11/13/22    Admission Type   Urgent    Admitting Provider   Jamin Ventura MD    Attending Provider   Edwin Wolf MD    Department, Room/Bed   [address], P776/1       Discharge Date       Discharge Disposition       Discharge Destination                               Attending Provider: Edwin Wolf MD    Allergies: Penicillins    Isolation: None   Infection: None   Code Status: CPR    Ht: 160 cm (62.99\")   Wt: 56.1 kg (123 lb 10.9 oz)    Admission Cmt: None   Principal Problem: Fracture of neck of right femur (HCC) [S72.001A]                 Active Insurance as of 11/13/2022     Primary Coverage     Payor Plan Insurance Group Employer/Plan Group    MEDICARE MEDICARE A & B      Payor Plan Address Payor Plan Phone Number Payor Plan Fax Number Effective Dates    [address] [phone]  7/1/2001 - None Entered    [facility] [id number]       Subscriber Name Subscriber Birth Date Member ID       WIN PLATT 1935 [id number]           Secondary Coverage     Payor Plan Insurance Group Employer/Plan Group    [facility] SUPP [id number]     Payor Plan Address Payor Plan Phone Number Payor Plan Fax Number Effective Dates    [address]   12/1/2016 - None Entered    [facility] [address]       Subscriber Name Subscriber Birth Date Member ID       WIN PLATT 1935 [id number]                 Emergency Contacts      (Rel.) Home Phone Work Phone Mobile Phone    Giulia,Stanley (Son) [phone] -- [phone]    GiuliaFady (Son) [phone] -- [phone]    GiuliaBreann (Daughter) -- -- [phone]            "

## 2022-11-16 NOTE — PROGRESS NOTES
Continued Stay Note  [facility]     Patient Name: Lourdes Platt  MRN: [id number]  Today's Date: 11/16/2022    Admit Date: 11/13/2022    Plan: Acute RH/SNF TBD   Discharge Plan     Row Name 11/16/22 1648       Plan    Plan Acute RH/SNF TBD    Patient/Family in Agreement with Plan yes    Plan Comments Spoke with pts daughter Breann [phone], verified correct information on facesheet and explained the role of CCP. Pt is interested in d/c'ing to [facility] in [place], referral sent in [facility]. Spoke with Marlene who states she is reviewing clinicals but believes patient will be approved and will likely have bed available Friday 11/18. Pts daughter is verifying with family that this is their 1st choice and states they will call me tomorrow morning. CCP to follow.               Discharge Codes    No documentation.               Expected Discharge Date and Time     Expected Discharge Date Expected Discharge Time    Nov 17, 2022             Latisha Sultana RN

## 2022-11-16 NOTE — PLAN OF CARE
Goal Outcome Evaluation:  Plan of Care Reviewed With: patient        Progress: improving  Outcome Evaluation: vss, nvi, dressing c/d/i-sylvester blinking green, drowsy since arrival from surgery, no pain post op, DTV by tawanda in place, will probably need SNF at DC, educated on bp meds and monitoring

## 2022-11-16 NOTE — PLAN OF CARE
Goal Outcome Evaluation:              Outcome Evaluation: R hip hemiarthroplasty posterior, A&O, VSS, RA, assist x1, moves slowly, unable to void this shift, I&O cath x1 this shift @ 1500-tolerated very well, dressing c/d/i, family at bedside most of shift, rehab at d/c, educated on bp monitoring, CTM

## 2022-11-16 NOTE — PROGRESS NOTES
Name: Lourdes Platt ADMIT: 2022   : 1935  PCP: Everton Disla MD    MRN: [id number] LOS: 3 days   AGE/SEX: 87 y.o. female  ROOM: [facility]     Subjective   Subjective   Doing well postoperatively.  Hemoglobin did drop from 9.7-8.1.  Vascular surgery.  Due to her bypass, she need to be on lifelong anticoagulation.  First option.    Review of Systems   Constitutional: Negative for chills and fever.   Respiratory: Negative for chest tightness and shortness of breath.    Cardiovascular: Negative for chest pain and palpitations.   Gastrointestinal: Negative for abdominal pain and constipation.        Objective   Objective   Vital Signs  Temp:  [96.9 °F (36.1 °C)-99 °F (37.2 °C)] 97.7 °F (36.5 °C)  Heart Rate:  [] 86  Resp:  [12-16] 16  BP: ()/(52-70) 94/52  SpO2:  [94 %-100 %] 95 %  on  Flow (L/min):  [2-4] 2;   Device (Oxygen Therapy): room air  Body mass index is 21.91 kg/m².  Physical Exam  Constitutional:       Appearance: Normal appearance.   HENT:      Head: Normocephalic and atraumatic.   Cardiovascular:      Rate and Rhythm: Normal rate and regular rhythm.   Pulmonary:      Effort: Pulmonary effort is normal. No respiratory distress.   Abdominal:      General: There is no distension.      Palpations: Abdomen is soft.      Tenderness: There is no abdominal tenderness.   Neurological:      General: No focal deficit present.      Mental Status: She is alert and oriented to person, place, and time.         Results Review     I reviewed the patient's new clinical results.  Results from last 7 days   Lab Units 22  0504 11/15/22  0452 11/14/22  0422   WBC 10*3/mm3  --  15.37* 19.14*   HEMOGLOBIN g/dL 8.1* 9.7* 9.1*   PLATELETS 10*3/mm3  --  494* 500*     Results from last 7 days   Lab Units 22  0504 11/15/22  0452 222   SODIUM mmol/L 131* 136 141   POTASSIUM mmol/L 4.6 4.0 3.3*   CHLORIDE mmol/L 100 104 109*   CO2 mmol/L 23.6 21.0* 19.7*   BUN mg/dL 15 11 15    CREATININE mg/dL 0.61 0.59 0.72   GLUCOSE mg/dL 146* 98 128*   Estimated Creatinine Clearance: 58.6 mL/min (by C-G formula based on SCr of 0.61 mg/dL).    Results from last 7 days   Lab Units 11/16/22  0504 11/15/22  0452 11/14/22  0422   CALCIUM mg/dL 8.8 8.9 9.1     No results found for: COVID19  No results found for: HGBA1C, POCGLU    XR Hip 1 View Without Pelvis Right (Surgery Only)  Narrative: XR HIP 1 VIEW WO PELVIS RIGHT-     INDICATIONS: Postoperative evaluation.     TECHNIQUE: Frontal views of the right hip      COMPARISON: 11/14/2022     FINDINGS:      Intact appearing proximal right femoral carri-arthroplasty hardware is  seen with adjacent surgical soft tissue gas, overlying skin staples. No  acute fracture is identified. Arterial calcifications are present.        Impression:    Postsurgical changes.           This report was finalized on 11/15/2022 4:46 PM by Dr. Juaquin Murray M.D.       Scheduled Medications  atorvastatin, 40 mg, Oral, Q PM  clopidogrel, 75 mg, Oral, Daily  docusate sodium, 100 mg, Oral, BID  heparin (porcine), 5,000 Units, Subcutaneous, Q8H  pantoprazole, 40 mg, Oral, QAM  polyethylene glycol, 17 g, Oral, BID  senna-docusate sodium, 2 tablet, Oral, BID  topiramate, 25 mg, Oral, Daily  warfarin, 7.5 mg, Oral, Once    Infusions  lactated ringers, 9 mL/hr, Last Rate: 9 mL/hr (11/15/22 1331)  lactated ringers, 100 mL/hr, Last Rate: 100 mL/hr (11/15/22 2154)  Pharmacy to dose warfarin,     Diet  Diet Regular Texture (IDDSI 7); Regular Consistency; Regular/House Diet       Assessment/Plan     Active Hospital Problems    Diagnosis  POA   • **Fracture of neck of right femur (HCC) [S72.001A]  Yes   • Closed displaced fracture of right femoral neck (HCC) [S72.001A]  Yes   • PAD (peripheral artery disease) ([facility]) [I73.9]  Yes   • Essential hypertension [I10]  Yes      Resolved Hospital Problems   No resolved problems to display.       87 y.o. female admitted with Fracture of neck of right  femur (HCC).    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  She photocoagulation.  Cannot afford Eliquis.  Has been started on warfarin  Continue atorvastatin and Plavix  Will need to see PCP for INR checks  On heparin 5000u q8h for full AC    · Heparin 500u q8h for DVT ppx   · Full code.  · Discussed with patient.  · Anticipate discharge to SNU facility in 1-2 days.      Edwin Wolf MD  [facility]  11/16/22  12:15 EST

## 2022-11-16 NOTE — PROGRESS NOTES
Orthopedic Progress Note      Patient: Lourdes Platt  Date of Admission: 11/13/2022  YOB: 1935  Medical Record Number: [id number]    POD # :  1 Day Post-Op Procedure(s) (LRB):  Right Posterior Hip Hemiarthroplasty (Right)    Resting well when seen. Pain controlled. No complaints    Physical Exam:  87 y.o.  female  Vitals:  Temp:  [96.9 °F (36.1 °C)-100 °F (37.8 °C)] 97.4 °F (36.3 °C)  Heart Rate:  [] 84  Resp:  [12-16] 16  BP: (100-172)/(60-83) 119/70  alert and oriented    Ext: NV intact. ROM appropriate. Calf is soft and nontender. Negative Homans Sn.  2+ pedal pulses  Skin: Incision clean dry and intact w/out signs or  symptoms of infection.    Activity: Mobilizing Per P.T.   Weight Bearing: As Tolerated    Data Review     Admission on 11/13/2022   Component Date Value Ref Range Status   • QT Interval 11/13/2022 335  ms Final   • WBC 11/14/2022 19.14 (H)  3.40 - 10.80 10*3/mm3 Final   • RBC 11/14/2022 3.05 (L)  3.77 - 5.28 10*6/mm3 Final   • Hemoglobin 11/14/2022 9.1 (L)  12.0 - 15.9 g/dL Final   • Hematocrit 11/14/2022 27.8 (L)  34.0 - 46.6 % Final   • MCV 11/14/2022 91.1  79.0 - 97.0 fL Final   • MCH 11/14/2022 29.8  26.6 - 33.0 pg Final   • MCHC 11/14/2022 32.7  31.5 - 35.7 g/dL Final   • RDW 11/14/2022 13.2  12.3 - 15.4 % Final   • RDW-SD 11/14/2022 43.4  37.0 - 54.0 fl Final   • MPV 11/14/2022 9.1  6.0 - 12.0 fL Final   • Platelets 11/14/2022 500 (H)  140 - 450 10*3/mm3 Final   • Glucose 11/14/2022 128 (H)  65 - 99 mg/dL Final   • BUN 11/14/2022 15  8 - 23 mg/dL Final   • Creatinine 11/14/2022 0.72  0.57 - 1.00 mg/dL Final   • Sodium 11/14/2022 141  136 - 145 mmol/L Final   • Potassium 11/14/2022 3.3 (L)  3.5 - 5.2 mmol/L Final   • Chloride 11/14/2022 109 (H)  98 - 107 mmol/L Final   • CO2 11/14/2022 19.7 (L)  22.0 - 29.0 mmol/L Final   • Calcium 11/14/2022 9.1  8.6 - 10.5 mg/dL Final   • BUN/Creatinine Ratio 11/14/2022 20.8  7.0 - 25.0 Final   • Anion Gap 11/14/2022 12.3  5.0 - 15.0  mmol/L Final   • eGFR 11/14/2022 81.5  >60.0 mL/min/1.73 Final    National Kidney Foundation and American Society of Nephrology (ASN) Task Force recommended calculation based on the Chronic Kidney Disease Epidemiology Collaboration (CKD-EPI) equation refit without adjustment for race.   • Protime 11/14/2022 15.8 (H)  11.7 - 14.2 Seconds Final   • INR 11/14/2022 1.24 (H)  0.90 - 1.10 Final   • Troponin T 11/14/2022 <0.010  0.000 - 0.030 ng/mL Final   • QT Interval 11/14/2022 343  ms Final   • Protime 11/14/2022 15.7 (H)  11.7 - 14.2 Seconds Final   • INR 11/14/2022 1.24 (H)  0.90 - 1.10 Final   • Protime 11/15/2022 13.9  11.7 - 14.2 Seconds Final   • INR 11/15/2022 1.05  0.90 - 1.10 Final   • Glucose 11/15/2022 98  65 - 99 mg/dL Final   • BUN 11/15/2022 11  8 - 23 mg/dL Final   • Creatinine 11/15/2022 0.59  0.57 - 1.00 mg/dL Final   • Sodium 11/15/2022 136  136 - 145 mmol/L Final   • Potassium 11/15/2022 4.0  3.5 - 5.2 mmol/L Final   • Chloride 11/15/2022 104  98 - 107 mmol/L Final   • CO2 11/15/2022 21.0 (L)  22.0 - 29.0 mmol/L Final   • Calcium 11/15/2022 8.9  8.6 - 10.5 mg/dL Final   • BUN/Creatinine Ratio 11/15/2022 18.6  7.0 - 25.0 Final   • Anion Gap 11/15/2022 11.0  5.0 - 15.0 mmol/L Final   • eGFR 11/15/2022 87.9  >60.0 mL/min/1.73 Final    National Kidney Foundation and American Society of Nephrology (ASN) Task Force recommended calculation based on the Chronic Kidney Disease Epidemiology Collaboration (CKD-EPI) equation refit without adjustment for race.   • WBC 11/15/2022 15.37 (H)  3.40 - 10.80 10*3/mm3 Final   • RBC 11/15/2022 3.21 (L)  3.77 - 5.28 10*6/mm3 Final   • Hemoglobin 11/15/2022 9.7 (L)  12.0 - 15.9 g/dL Final   • Hematocrit 11/15/2022 29.3 (L)  34.0 - 46.6 % Final   • MCV 11/15/2022 91.3  79.0 - 97.0 fL Final   • MCH 11/15/2022 30.2  26.6 - 33.0 pg Final   • MCHC 11/15/2022 33.1  31.5 - 35.7 g/dL Final   • RDW 11/15/2022 12.9  12.3 - 15.4 % Final   • RDW-SD 11/15/2022 42.2  37.0 - 54.0 fl  Final   • MPV 11/15/2022 9.5  6.0 - 12.0 fL Final   • Platelets 11/15/2022 494 (H)  140 - 450 10*3/mm3 Final   • Neutrophil % 11/15/2022 84.8 (H)  42.7 - 76.0 % Final   • Lymphocyte % 11/15/2022 4.8 (L)  19.6 - 45.3 % Final   • Monocyte % 11/15/2022 5.9  5.0 - 12.0 % Final   • Eosinophil % 11/15/2022 3.6  0.3 - 6.2 % Final   • Basophil % 11/15/2022 0.6  0.0 - 1.5 % Final   • Immature Grans % 11/15/2022 0.3  0.0 - 0.5 % Final   • Neutrophils, Absolute 11/15/2022 13.02 (H)  1.70 - 7.00 10*3/mm3 Final   • Lymphocytes, Absolute 11/15/2022 0.74  0.70 - 3.10 10*3/mm3 Final   • Monocytes, Absolute 11/15/2022 0.91 (H)  0.10 - 0.90 10*3/mm3 Final   • Eosinophils, Absolute 11/15/2022 0.56 (H)  0.00 - 0.40 10*3/mm3 Final   • Basophils, Absolute 11/15/2022 0.09  0.00 - 0.20 10*3/mm3 Final   • Immature Grans, Absolute 11/15/2022 0.05  0.00 - 0.05 10*3/mm3 Final   • nRBC 11/15/2022 0.0  0.0 - 0.2 /100 WBC Final   • Protime 11/16/2022 14.0  11.7 - 14.2 Seconds Final   • INR 11/16/2022 1.06  0.90 - 1.10 Final   • Hemoglobin 11/16/2022 8.1 (L)  12.0 - 15.9 g/dL Final   • Hematocrit 11/16/2022 25.0 (L)  34.0 - 46.6 % Final   • Iron 11/16/2022 23 (L)  37 - 145 mcg/dL Final   • Iron Saturation 11/16/2022 14 (L)  20 - 50 % Final   • Transferrin 11/16/2022 111 (L)  200 - 360 mg/dL Final   • TIBC 11/16/2022 165 (L)  298 - 536 mcg/dL Final   • Vitamin B-12 11/16/2022 257  211 - 946 pg/mL Final   • Folate 11/16/2022 14.50  4.78 - 24.20 ng/mL Final   • Glucose 11/16/2022 146 (H)  65 - 99 mg/dL Final   • BUN 11/16/2022 15  8 - 23 mg/dL Final   • Creatinine 11/16/2022 0.61  0.57 - 1.00 mg/dL Final   • Sodium 11/16/2022 131 (L)  136 - 145 mmol/L Final   • Potassium 11/16/2022 4.6  3.5 - 5.2 mmol/L Final   • Chloride 11/16/2022 100  98 - 107 mmol/L Final   • CO2 11/16/2022 23.6  22.0 - 29.0 mmol/L Final   • Calcium 11/16/2022 8.8  8.6 - 10.5 mg/dL Final   • BUN/Creatinine Ratio 11/16/2022 24.6  7.0 - 25.0 Final   • Anion Gap 11/16/2022 7.4  5.0  - 15.0 mmol/L Final   • eGFR 11/16/2022 87.2  >60.0 mL/min/1.73 Final    National Kidney Foundation and American Society of Nephrology (ASN) Task Force recommended calculation based on the Chronic Kidney Disease Epidemiology Collaboration (CKD-EPI) equation refit without adjustment for race.       No results found.    Medications:  atorvastatin, 40 mg, Oral, Q PM  clopidogrel, 75 mg, Oral, Daily  docusate sodium, 100 mg, Oral, BID  heparin (porcine), 5,000 Units, Subcutaneous, Q8H  pantoprazole, 40 mg, Oral, QAM  polyethylene glycol, 17 g, Oral, BID  senna-docusate sodium, 2 tablet, Oral, BID  topiramate, 25 mg, Oral, Daily  warfarin, 7.5 mg, Oral, Once      •  acetaminophen  •  acetaminophen  •  aluminum-magnesium hydroxide-simethicone  •  senna-docusate sodium **AND** polyethylene glycol **AND** bisacodyl **AND** bisacodyl  •  bisacodyl  •  HYDROcodone-acetaminophen  •  magnesium hydroxide  •  melatonin  •  ondansetron **OR** ondansetron  •  ondansetron **OR** ondansetron  •  oxyCODONE  •  Pharmacy to dose warfarin  •  sodium chloride    XR HXR HIP 1 VIEW WO PELVIS RIGHT-     INDICATIONS: Postoperative evaluation.     TECHNIQUE: Frontal views of the right hip      COMPARISON: 11/14/2022     FINDINGS:      Intact appearing proximal right femoral carri-arthroplasty hardware is  seen with adjacent surgical soft tissue gas, overlying skin staples. No  acute fracture is identified. Arterial calcifications are present.        IMPRESSION:     Postsurgical changes.           This report was finalized on 11/15/2022 4:46 PM by Dr. Juaquin Murray M.D.     IP 1 VIEW WO PELVIS RIGHT-     INDICATIONS: Postoperative evaluation.     TECHNIQUE: Frontal views of the right hip      COMPARISON: 11/14/2022     FINDINGS:      Intact appearing proximal right femoral carri-arthroplasty hardware is  seen with adjacent surgical soft tissue gas, overlying skin staples. No  acute fracture is identified. Arterial calcifications are  present.        IMPRESSION:     Postsurgical changes.           This report was finalized on 11/15/2022 4:46 PM by Dr. Juaquin Murray M.D.             Assessment:  Doing well POD  # 1 Day Post-Op Procedure(s) (LRB):  Right Posterior Hip Hemiarthroplasty (Right)  Problems Addressed this Visit    None  Visit Diagnoses     Follow-up exam          Diagnoses       Codes Comments    Follow-up exam     ICD-10-CM: Z09  ICD-9-CM: V67.9           Plan:  Posterior hip precautions  Continue efforts to mobilize  Continue Pain Control Measures  Continue incisional Care  DVT prophylaxis: per vascular, dosing per pharmacy    F/u with ortho in 2 weeks    Please call with questions of concerns    Keny Waller MD    Date: 11/16/2022  Time: 07:17 EST

## 2022-11-16 NOTE — THERAPY EVALUATION
Patient Name: Lourdes Platt  : 1935    MRN: [id number]                              Today's Date: 2022       Admit Date: 2022    Visit Dx:     ICD-10-CM ICD-9-CM   1. Follow-up exam  Z09 V67.9     Patient Active Problem List   Diagnosis   • PAD (peripheral artery disease) ([facility])   • Intermittent claudication ([facility])   • Shortness of breath   • Essential hypertension   • Atherosclerosis of native arteries of extremities with rest pain, right leg (HCC)   • Atherosclerosis of native arteries of extremities with rest pain, left leg (HCC)   • Acute blood loss anemia   • Fracture of neck of right femur ([facility])   • Closed displaced fracture of right femoral neck ([facility])     Past Medical History:   Diagnosis Date   • Arthritis    • Bilateral carotid artery stenosis    • Constipation    • Depression    • Foot swelling     RIGHT    • GERD (gastroesophageal reflux disease)    • Hypertension    • Intermittent claudication ([facility]) 2017   • PAD (peripheral artery disease) ([facility]) 2017   • PONV (postoperative nausea and vomiting)    • PVD (peripheral vascular disease) with claudication ([facility])     lennie legs   • Slow to wake up after anesthesia    • Spinal headache    • Stroke ([facility])     ASA     Past Surgical History:   Procedure Laterality Date   • ANGIOPLASTY FEMORAL ARTERY Left 2017    Procedure: RIGHT FEMORAL ARTERIAL CUTDOWN, LIGATION OF FEMORAL ARTERY PSUEDOANEURYSM, AIF WITH BILATERAL RUNOFF ARTERIOGRAM, RIGHT COMMON ILIAC ANGIOPLASTY;  Surgeon: Keny Sadler MD;  Location: [facility] OR ;  Service:    • APPENDECTOMY     • ATHRECTOMY ILIAC, FEMORAL, TIBIAL ARTERY Right 2017    Procedure: LEFT FEMORAL APPROACH AIF RIGHT LEG ARTERIOGRAM  RIGHT FEMORAL POPLITEAL ARTERY WITH DRUG COATED BALLOON ANGIOPLASTY;  Surgeon: Keny Sadler MD;  Location: [facility] OR ;  Service:    • BUNIONECTOMY     • EYE SURGERY      CATARACT EXTRACTION   • FEMORAL POPLITEAL BYPASS  Left 10/31/2022    Procedure: LEFT FEMORAL TO PERONEAL ARTERY BYPASS WITH POLYTETRAFLUOROETHYLENE, LEFT FEMORAL ENDARTERECTOMY;  Surgeon: Juan A Roblero MD;  Location: [facility];  Service: Vascular;  Laterality: Left;   • HYSTERECTOMY  1970   • ORIF WRIST FRACTURE Left    • TOTAL HIP ARTHROPLASTY Right 11/15/2022    Procedure: Right Posterior Hip Hemiarthroplasty;  Surgeon: Keny Waller MD;  Location: [facility];  Service: Orthopedics;  Laterality: Right;      General Information     [facility] Name 11/16/22 1022          Physical Therapy Time and Intention    Document Type evaluation  -     Mode of Treatment individual therapy;physical therapy  -[facility] Name 11/16/22 1022          General Information    Patient Profile Reviewed yes  -     Prior Level of Function independent:;gait;transfer;bed mobility  -     Existing Precautions/Restrictions fall  LLE in knee immobilizer on eval - no orders for KI?, d/w RN  -     Barriers to Rehab none identified  -[facility] Name 11/16/22 1022          Living Environment    People in Home alone  -[facility] Name 11/16/22 1022          Home Main Entrance    Number of Stairs, Main Entrance none  -[facility] Name 11/16/22 1022          Stairs Within Home, Primary    Number of Stairs, Within Home, Primary none  -[facility] Name 11/16/22 1022          Cognition    Orientation Status (Cognition) oriented x 4  -[facility] Name 11/16/22 1022          Safety Issues, Functional Mobility    Impairments Affecting Function (Mobility) balance;endurance/activity tolerance;strength;pain;range of motion (ROM)  -           User Key  (r) = Recorded By, (t) = Taken By, (c) = Cosigned By    Initials Name Provider Type     Zelda Rodarte PT Physical Therapist               Mobility     [facility] Name 11/16/22 1027          Bed Mobility    Bed Mobility supine-sit  -     Supine-Sit [place] (Bed Mobility) contact guard;verbal cues  -     Assistive Device (Bed Mobility)  bed rails;head of bed elevated  -[facility] Name 11/16/22 1027          Sit-Stand Transfer    Sit-Stand Darke (Transfers) minimum assist (75% patient effort);1 person assist;verbal cues  -     Assistive Device (Sit-Stand Transfers) walker, front-wheeled  -[facility] Name 11/16/22 1027          Gait/Stairs (Locomotion)    Darke Level (Gait) verbal cues;contact guard  -     Assistive Device (Gait) walker, front-wheeled  -     Distance in Feet (Gait) 5ft to chair  -     Deviations/Abnormal Patterns (Gait) antalgic;maria c decreased;gait speed decreased;stride length decreased  -     Bilateral Gait Deviations forward flexed posture  -     Right Sided Gait Deviations weight shift ability decreased  -[facility] Name 11/16/22 1027          Mobility    Extremity Weight-bearing Status right lower extremity  -     Right Lower Extremity (Weight-bearing Status) weight-bearing as tolerated (WBAT)  -           User Key  (r) = Recorded By, (t) = Taken By, (c) = Cosigned By    Initials Name Provider Type     Zelda Rodarte PT Physical Therapist               Obj/Interventions     [facility] Name 11/16/22 1027          Range of Motion Comprehensive    General Range of Motion lower extremity range of motion deficits identified  -     Comment, General Range of Motion Expected post-op ROM deficits  -[facility] Name 11/16/22 1027          Strength Comprehensive (MMT)    General Manual Muscle Testing (MMT) Assessment lower extremity strength deficits identified  -     Comment, General Manual Muscle Testing (MMT) Assessment Expected post-op strength deficits, BLE grossly 4-/5  -[facility] Name 11/16/22 1027          Balance    Balance Assessment sitting static balance;sitting dynamic balance;standing static balance;standing dynamic balance  -     Static Sitting Balance standby assist  -     Dynamic Sitting Balance standby assist  -     Position, Sitting Balance unsupported;sitting edge of bed  -      Static Standing Balance standby assist;verbal cues  -     Dynamic Standing Balance contact guard;verbal cues  -     Position/Device Used, Standing Balance supported;walker, front-wheeled  -     Balance Interventions sitting;standing;sit to stand;supported;static;dynamic  -[facility] Name 11/16/22 1027          Sensory Assessment (Somatosensory)    Sensory Assessment (Somatosensory) LE sensation intact  -           User Key  (r) = Recorded By, (t) = Taken By, (c) = Cosigned By    Initials Name Provider Type     Zelda Rodarte PT Physical Therapist               Goals/Plan     Row Name 11/16/22 1035          Bed Mobility Goal 1 (PT)    Activity/Assistive Device (Bed Mobility Goal 1, PT) bed mobility activities, all  -     [place] Level/Cues Needed (Bed Mobility Goal 1, PT) modified independence  -     Time Frame (Bed Mobility Goal 1, PT) 1 week  -[facility] Name 11/16/22 1035          Transfer Goal 1 (PT)    Activity/Assistive Device (Transfer Goal 1, PT) transfers, all  -     [place] Level/Cues Needed (Transfer Goal 1, PT) standby assist  -     Time Frame (Transfer Goal 1, PT) 1 week  -[facility] Name 11/16/22 1035          Gait Training Goal 1 (PT)    Activity/Assistive Device (Gait Training Goal 1, PT) gait (walking locomotion)  -     [place] Level (Gait Training Goal 1, PT) standby assist  -     Distance (Gait Training Goal 1, PT) 80ft  -     Time Frame (Gait Training Goal 1, PT) 1 week  -[facility] Name 11/16/22 1035          Therapy Assessment/Plan (PT)    Planned Therapy Interventions (PT) balance training;bed mobility training;gait training;home exercise program;patient/family education;strengthening;stair training;ROM (range of motion);transfer training  -           User Key  (r) = Recorded By, (t) = Taken By, (c) = Cosigned By    Initials Name Provider Type     Zelda Rodarte PT Physical Therapist               Clinical Impression     Row Name 11/16/22 1024           Pain    Pretreatment Pain Rating 7/10  -     Pain Location - Side/Orientation Left  -     Pain Location lower  -     Pain Location - extremity  -     Pre/Posttreatment Pain Comment C/o pain from recent vascular surgery  -     Pain Intervention(s) Repositioned;Ambulation/increased activity;Rest  -     Row Name 11/16/22 1028          Plan of Care Review    Plan of Care Reviewed With patient;family  -     Outcome Evaluation Pt is an 88 yo F admitted following a fall resulting in a R femoral neck fx. Pt now POD 1 R posterior hip hemiarthroplasty and is WBAT. Pt with knee immobilizer on during eval, no orders for KI in system, d/w RN following session but kept KI on throughout. Pt lives alone and reports independence at BL with a cane as needed. Pt has no steps at home and denies other recent falls. Pt with recent LLE vascular surgery and reporting increased weakness, likely contributing to this fall. Pt presents to PT with impaired strength, endurance, and pain limiting overall mobility. Pt c/o more LLE pain d/t vascular surgery. Pt transferred to EOB with CGA, STS with min A x1, and ambulated 5ft to the chair with CGA and rwx. Pt cued for sequencing of steps with rwx and upright posture. Impaired RLE weightbearing, but tolerated mobility well. Pt declines further gait today, but agreeable to sitting UIC. Assisted with repositioning and left with needs met, RN present. PT will continue to follow to progress mobility as tolerated. Pt interested in SNF at [place], stating she feels generally much weaker and more unsteady than BL with now 2 recent LE surgeries. Anticipate DC to SNF pending progress.  -     Row Name 11/16/22 1028          Therapy Assessment/Plan (PT)    Patient/Family Therapy Goals Statement (PT) Return to PLOF  -     Rehab Potential (PT) good, to achieve stated therapy goals  -     Criteria for Skilled Interventions Met (PT) yes  -     Therapy Frequency (PT) daily  -     Row Name  11/16/22 1028          Vital Signs    O2 Delivery Pre Treatment room air  -     O2 Delivery Intra Treatment room air  -     O2 Delivery Post Treatment room air  -     Row Name 11/16/22 1028          Positioning and Restraints    Pre-Treatment Position in bed  -     Post Treatment Position chair  -     In Chair reclined;call light within reach;encouraged to call for assist;exit alarm on;with family/caregiver;with nsg  -           User Key  (r) = Recorded By, (t) = Taken By, (c) = Cosigned By    Initials Name Provider Type     Zelda Rodarte PT Physical Therapist               Outcome Measures     Row Name 11/16/22 1035          How much help from another person do you currently need...    Turning from your back to your side while in flat bed without using bedrails? 3  -BH     Moving from lying on back to sitting on the side of a flat bed without bedrails? 3  -BH     Moving to and from a bed to a chair (including a wheelchair)? 3  -BH     Standing up from a chair using your arms (e.g., wheelchair, bedside chair)? 3  -BH     Climbing 3-5 steps with a railing? 2  -BH     To walk in hospital room? 2  -BH     AM-PAC 6 Clicks Score (PT) 16  -     Highest level of mobility 5 --> Static standing  -     Row Name 11/16/22 1035          Functional Assessment    Outcome Measure Options AM-PAC 6 Clicks Basic Mobility (PT)  -           User Key  (r) = Recorded By, (t) = Taken By, (c) = Cosigned By    Initials Name Provider Type    Zelda Cee PT Physical Therapist                             Physical Therapy Education     Title: PT OT SLP Therapies (Done)     Topic: Physical Therapy (Done)     Point: Mobility training (Done)     Learning Progress Summary           Patient Acceptance, E,TB,D, VU,NR by  at 11/16/2022 1036                   Point: Home exercise program (Done)     Learning Progress Summary           Patient Acceptance, E,TB,D, VU,NR by  at 11/16/2022 1036                   Point:  Body mechanics (Done)     Learning Progress Summary           Patient Acceptance, E,TB,D, VU,NR by  at 11/16/2022 1036                   Point: Precautions (Done)     Learning Progress Summary           Patient Acceptance, E,TB,D, VU,NR by  at 11/16/2022 1036                               User Key     Initials Effective Dates Name Provider Type Discipline     04/08/22 -  Zelda Rodarte, PT Physical Therapist PT              PT Recommendation and Plan  Planned Therapy Interventions (PT): balance training, bed mobility training, gait training, home exercise program, patient/family education, strengthening, stair training, ROM (range of motion), transfer training  Plan of Care Reviewed With: patient, family  Outcome Evaluation: Pt is an 88 yo F admitted following a fall resulting in a R femoral neck fx. Pt now POD 1 R posterior hip hemiarthroplasty and is WBAT. Pt with knee immobilizer on during eval, no orders for KI in system, d/w RN following session but kept KI on throughout. Pt lives alone and reports independence at BL with a cane as needed. Pt has no steps at home and denies other recent falls. Pt with recent LLE vascular surgery and reporting increased weakness, likely contributing to this fall. Pt presents to PT with impaired strength, endurance, and pain limiting overall mobility. Pt c/o more LLE pain d/t vascular surgery. Pt transferred to EOB with CGA, STS with min A x1, and ambulated 5ft to the chair with CGA and rwx. Pt cued for sequencing of steps with rwx and upright posture. Impaired RLE weightbearing, but tolerated mobility well. Pt declines further gait today, but agreeable to sitting UIC. Assisted with repositioning and left with needs met, RN present. PT will continue to follow to progress mobility as tolerated. Pt interested in SNF at [place], stating she feels generally much weaker and more unsteady than BL with now 2 recent LE surgeries. Anticipate DC to SNF pending progress.     Time  Calculation:    PT Charges     Row Name 11/16/22 1036             Time Calculation    Start Time 0913  -      Stop Time 0930  -      Time Calculation (min) 17 min  -      PT Received On 11/16/22  -      PT - Next Appointment 11/17/22  -      PT Goal Re-Cert Due Date 11/23/22  -         Time Calculation- PT    Total Timed Code Minutes- PT 15 minute(s)  -         Timed Charges    20852 - Gait Training Minutes  5  -      38302 - PT Therapeutic Activity Minutes 10  -BH         Untimed Charges    PT Eval/Re-eval Minutes 5  -         Total Minutes    Timed Charges Total Minutes 15  -BH      Untimed Charges Total Minutes 5  -       Total Minutes 20  -            User Key  (r) = Recorded By, (t) = Taken By, (c) = Cosigned By    Initials Name Provider Type     Zelda Rodarte, PT Physical Therapist              Therapy Charges for Today     Code Description Service Date Service Provider Modifiers Qty    45305654105 HC PT THERAPEUTIC ACT EA 15 MIN 11/16/2022 Zelda Rodarte, PT GP 1    [id number] HC PT EVAL MOD COMPLEXITY 3 11/16/2022 Zelda Rodarte, PT GP 1          PT G-Codes  Outcome Measure Options: AM-PAC 6 Clicks Basic Mobility (PT)  AM-PAC 6 Clicks Score (PT): 16  PT Discharge Summary  Anticipated Discharge Disposition (PT): skilled nursing facility    Zelda Rodarte, PT  11/16/2022

## 2022-11-16 NOTE — PROGRESS NOTES
"DAILY PROGRESS NOTE  [facility]    Patient Identification:  Name: Lourdes Platt  Age: 87 y.o.  Sex: female  :  1935  MRN: [id number]         Primary Care Physician: Everton Disla MD      Subjective  Patient is now postop.  No unusual postop complaints.  States \"I am all right\"  Patient's daughter-in-law is at bedside.  Discussed the patient's anticoagulation and her daughter-in-law assures me that she was indeed taking Xarelto prior to admission because she was personally giving it to her.  She does note that sometimes she would object however due to the price.    Objective:  General Appearance:  Comfortable, well-appearing, in no acute distress and not in pain.    Vital signs: (most recent): Blood pressure 116/61, pulse 96, temperature 96.9 °F (36.1 °C), temperature source Oral, resp. rate 16, height 160 cm (62.99\"), weight 56.1 kg (123 lb 10.9 oz), SpO2 96 %.    Lungs:  Normal effort and normal respiratory rate.  Breath sounds clear to auscultation.    Heart: Normal rate.  Regular rhythm.    Extremities: There is no dependent edema.    Neurological: (Patient still appears groggy from anesthesia.  Easily awakened however then cooperative.  Voices no complaints on awakening.).    Skin:  Warm and dry.                Vital signs in last 24 hours:  Temp:  [96.9 °F (36.1 °C)-100 °F (37.8 °C)] 96.9 °F (36.1 °C)  Heart Rate:  [] 96  Resp:  [12-16] 16  BP: (116-177)/(60-83) 116/61    Intake/Output:    Intake/Output Summary (Last 24 hours) at 11/15/2022 194  Last data filed at 11/15/2022 1557  Gross per 24 hour   Intake 1600 ml   Output 800 ml   Net 800 ml         Results from last 7 days   Lab Units 11/15/22  0452 11/14/22  0422   WBC 10*3/mm3 15.37* 19.14*   HEMOGLOBIN g/dL 9.7* 9.1*   PLATELETS 10*3/mm3 494* 500*     Results from last 7 days   Lab Units 11/15/22  0452 11/14/22  0422   SODIUM mmol/L 136 141   POTASSIUM mmol/L 4.0 3.3*   CHLORIDE mmol/L 104 109*   CO2 mmol/L 21.0* 19.7* "   BUN mg/dL 11 15   CREATININE mg/dL 0.59 0.72   GLUCOSE mg/dL 98 128*   Estimated Creatinine Clearance: 60.6 mL/min (by C-G formula based on SCr of 0.59 mg/dL).  Results from last 7 days   Lab Units 11/15/22  0452 11/14/22  0422   CALCIUM mg/dL 8.9 9.1         Assessment:   Fracture  of neck of right femur: Orthopedic evaluation appreciated.    Status post bipolar hemiarthroplasty right hip 11/15/2022.    PAD (peripheral artery disease: Status post femoral peroneal bypass.    Daughter-in-law notes that she has been ensuring that patient takes his Xarelto as directed and is willing to continue to do so.  She will discuss this with vascular surgery.    Essential hypertension: Lisinopril on one of the med rec's but not on the other?    Presently on hold and postop blood pressures in a very reasonable range.  Continue to monitor.    Hypokalemia:  Corrected.  Anemia: Acute on chronic.  Check iron panels, B12 etc.      Plan:  Please see above.  Discussed with daughter-in-law at bedside.  Discussed with JUN.    Chema Schrader MD  11/15/2022  19:47 EST

## 2022-11-17 LAB
DEPRECATED RDW RBC AUTO: 40.6 FL (ref 37–54)
ERYTHROCYTE [DISTWIDTH] IN BLOOD BY AUTOMATED COUNT: 12.9 % (ref 12.3–15.4)
HCT VFR BLD AUTO: 23.5 % (ref 34–46.6)
HGB BLD-MCNC: 8 G/DL (ref 12–15.9)
INR PPP: 0.98 (ref 0.9–1.1)
MCH RBC QN AUTO: 29.3 PG (ref 26.6–33)
MCHC RBC AUTO-ENTMCNC: 34 G/DL (ref 31.5–35.7)
MCV RBC AUTO: 86.1 FL (ref 79–97)
PLATELET # BLD AUTO: 438 10*3/MM3 (ref 140–450)
PMV BLD AUTO: 9.3 FL (ref 6–12)
PROTHROMBIN TIME: 13.1 SECONDS (ref 11.7–14.2)
RBC # BLD AUTO: 2.73 10*6/MM3 (ref 3.77–5.28)
WBC NRBC COR # BLD: 10.53 10*3/MM3 (ref 3.4–10.8)

## 2022-11-17 PROCEDURE — 97110 THERAPEUTIC EXERCISES: CPT

## 2022-11-17 PROCEDURE — 25010000002 ENOXAPARIN PER 10 MG: Performed by: STUDENT IN AN ORGANIZED HEALTH CARE EDUCATION/TRAINING PROGRAM

## 2022-11-17 PROCEDURE — 85027 COMPLETE CBC AUTOMATED: CPT | Performed by: STUDENT IN AN ORGANIZED HEALTH CARE EDUCATION/TRAINING PROGRAM

## 2022-11-17 PROCEDURE — 97530 THERAPEUTIC ACTIVITIES: CPT

## 2022-11-17 PROCEDURE — 85610 PROTHROMBIN TIME: CPT | Performed by: ORTHOPAEDIC SURGERY

## 2022-11-17 PROCEDURE — 97166 OT EVAL MOD COMPLEX 45 MIN: CPT

## 2022-11-17 PROCEDURE — 25010000002 HEPARIN (PORCINE) PER 1000 UNITS: Performed by: ORTHOPAEDIC SURGERY

## 2022-11-17 RX ORDER — ENOXAPARIN SODIUM 100 MG/ML
1 INJECTION SUBCUTANEOUS EVERY 12 HOURS
Status: DISCONTINUED | OUTPATIENT
Start: 2022-11-17 | End: 2022-11-19 | Stop reason: HOSPADM

## 2022-11-17 RX ORDER — WARFARIN SODIUM 7.5 MG/1
7.5 TABLET ORAL
Status: DISCONTINUED | OUTPATIENT
Start: 2022-11-17 | End: 2022-11-19

## 2022-11-17 RX ADMIN — HYDROCODONE BITARTRATE AND ACETAMINOPHEN 1 TABLET: 5; 325 TABLET ORAL at 05:16

## 2022-11-17 RX ADMIN — Medication 3 MG: at 21:00

## 2022-11-17 RX ADMIN — ENOXAPARIN SODIUM 60 MG: 100 INJECTION SUBCUTANEOUS at 15:54

## 2022-11-17 RX ADMIN — PANTOPRAZOLE SODIUM 40 MG: 40 TABLET, DELAYED RELEASE ORAL at 05:16

## 2022-11-17 RX ADMIN — HEPARIN SODIUM 5000 UNITS: 5000 INJECTION INTRAVENOUS; SUBCUTANEOUS at 05:16

## 2022-11-17 RX ADMIN — TOPIRAMATE 25 MG: 25 TABLET, FILM COATED ORAL at 10:06

## 2022-11-17 RX ADMIN — HYDROCODONE BITARTRATE AND ACETAMINOPHEN 1 TABLET: 5; 325 TABLET ORAL at 14:28

## 2022-11-17 RX ADMIN — CLOPIDOGREL 75 MG: 75 TABLET, FILM COATED ORAL at 10:06

## 2022-11-17 RX ADMIN — OXYCODONE HYDROCHLORIDE 5 MG: 5 TABLET ORAL at 21:01

## 2022-11-17 RX ADMIN — HYDROCODONE BITARTRATE AND ACETAMINOPHEN 1 TABLET: 5; 325 TABLET ORAL at 10:06

## 2022-11-17 RX ADMIN — DOCUSATE SODIUM 50MG AND SENNOSIDES 8.6MG 2 TABLET: 8.6; 5 TABLET, FILM COATED ORAL at 21:01

## 2022-11-17 RX ADMIN — ATORVASTATIN CALCIUM 40 MG: 20 TABLET, FILM COATED ORAL at 18:08

## 2022-11-17 RX ADMIN — DOCUSATE SODIUM 50MG AND SENNOSIDES 8.6MG 2 TABLET: 8.6; 5 TABLET, FILM COATED ORAL at 10:06

## 2022-11-17 RX ADMIN — DOCUSATE SODIUM 100 MG: 100 CAPSULE, LIQUID FILLED ORAL at 10:06

## 2022-11-17 RX ADMIN — DOCUSATE SODIUM 100 MG: 100 CAPSULE, LIQUID FILLED ORAL at 21:01

## 2022-11-17 RX ADMIN — HYDROCODONE BITARTRATE AND ACETAMINOPHEN 1 TABLET: 5; 325 TABLET ORAL at 18:08

## 2022-11-17 RX ADMIN — WARFARIN 7.5 MG: 7.5 TABLET ORAL at 18:08

## 2022-11-17 RX ADMIN — POLYETHYLENE GLYCOL 3350 17 G: 17 POWDER, FOR SOLUTION ORAL at 10:06

## 2022-11-17 NOTE — PROGRESS NOTES
Name: Lourdes Platt ADMIT: 2022   : 1935  PCP: Everton Disla MD    MRN: [id number] LOS: 4 days   AGE/SEX: 87 y.o. female  ROOM: [facility]     Subjective   Subjective   Hb has stabalized    Review of Systems   Constitutional: Negative for chills and fever.   Respiratory: Negative for chest tightness and shortness of breath.    Cardiovascular: Negative for chest pain and palpitations.   Gastrointestinal: Negative for abdominal pain and constipation.        Objective   Objective   Vital Signs  Temp:  [97.6 °F (36.4 °C)-99.3 °F (37.4 °C)] 97.6 °F (36.4 °C)  Heart Rate:  [] 101  Resp:  [16] 16  BP: (133-158)/(66-73) 146/66  SpO2:  [93 %-98 %] 96 %  on   ;   Device (Oxygen Therapy): room air  Body mass index is 21.91 kg/m².  Physical Exam  Constitutional:       Appearance: Normal appearance.   HENT:      Head: Normocephalic and atraumatic.   Cardiovascular:      Rate and Rhythm: Normal rate and regular rhythm.   Pulmonary:      Effort: Pulmonary effort is normal. No respiratory distress.   Abdominal:      General: There is no distension.      Palpations: Abdomen is soft.      Tenderness: There is no abdominal tenderness.   Musculoskeletal:      Right lower leg: No edema.      Left lower leg: No edema.   Skin:     General: Skin is warm and dry.   Neurological:      General: No focal deficit present.      Mental Status: She is alert and oriented to person, place, and time.         Results Review     I reviewed the patient's new clinical results.  Results from last 7 days   Lab Units 22  0430 22  0504 11/15/22  0452 11/14/22  0422   WBC 10*3/mm3 10.53  --  15.37* 19.14*   HEMOGLOBIN g/dL 8.0* 8.1* 9.7* 9.1*   PLATELETS 10*3/mm3 438  --  494* 500*     Results from last 7 days   Lab Units 22  0504 11/15/22  0452 222   SODIUM mmol/L 131* 136 141   POTASSIUM mmol/L 4.6 4.0 3.3*   CHLORIDE mmol/L 100 104 109*   CO2 mmol/L 23.6 21.0* 19.7*   BUN mg/dL 15 11 15   CREATININE  mg/dL 0.61 0.59 0.72   GLUCOSE mg/dL 146* 98 128*   Estimated Creatinine Clearance: 58.6 mL/min (by C-G formula based on SCr of 0.61 mg/dL).    Results from last 7 days   Lab Units 11/16/22  0504 11/15/22  0452 11/14/22  0422   CALCIUM mg/dL 8.8 8.9 9.1     No results found for: COVID19  No results found for: HGBA1C, POCGLU    XR Hip 1 View Without Pelvis Right (Surgery Only)  Narrative: XR HIP 1 VIEW WO PELVIS RIGHT-     INDICATIONS: Postoperative evaluation.     TECHNIQUE: Frontal views of the right hip      COMPARISON: 11/14/2022     FINDINGS:      Intact appearing proximal right femoral carri-arthroplasty hardware is  seen with adjacent surgical soft tissue gas, overlying skin staples. No  acute fracture is identified. Arterial calcifications are present.        Impression:    Postsurgical changes.           This report was finalized on 11/15/2022 4:46 PM by Dr. Juaquin Murray M.D.       Scheduled Medications  atorvastatin, 40 mg, Oral, Q PM  clopidogrel, 75 mg, Oral, Daily  docusate sodium, 100 mg, Oral, BID  heparin (porcine), 5,000 Units, Subcutaneous, Q8H  pantoprazole, 40 mg, Oral, QAM  polyethylene glycol, 17 g, Oral, BID  senna-docusate sodium, 2 tablet, Oral, BID  topiramate, 25 mg, Oral, Daily    Infusions  lactated ringers, 9 mL/hr, Last Rate: Stopped (11/17/22 0153)  lactated ringers, 100 mL/hr, Last Rate: Stopped (11/17/22 0153)  Pharmacy to dose warfarin,     Diet  Diet Regular Texture (IDDSI 7); Regular Consistency; Regular/House Diet       Assessment/Plan     Active Hospital Problems    Diagnosis  POA   • **Fracture of neck of right femur (HCC) [S72.001A]  Yes   • Closed displaced fracture of right femoral neck (HCC) [S72.001A]  Yes   • PAD (peripheral artery disease) ([facility]) [I73.9]  Yes   • Essential hypertension [I10]  Yes      Resolved Hospital Problems   No resolved problems to display.       87 y.o. female admitted with Fracture of neck of right femur (HCC).    Fracture of the right  femoral neck  Status post right posterior hip hemiarthroplasty  Monitor Hb     Femoral to peroneal bypass  Peripheral arterial disease  Evaluated by vascular surgery.  Has been started on warfarin. I have entered the pharmacy to dose order.  Continue atorvastatin and Plavix  Will need to see PCP for INR checks  Dc heparin and start therapeutic lovenox for full anticoagulation. Monitor INRs      · lovenox- pharmacy to dose for Anticoagulation   · Full code.  · Discussed with patient.  · Anticipate discharge to SNU facility in 1-2 days.      Edwin Wolf MD  [facility]  11/17/22  12:56 EST

## 2022-11-17 NOTE — PLAN OF CARE
"Goal Outcome Evaluation:  Plan of Care Reviewed With: patient        Progress: improving  Outcome Evaluation: Pt agreed to PT session, states she will do \"as much as she can\", pt fatigued and in pain upon entry; pt req assist of 2 OOB tfer , CGA-2 for amb ~15ft this session, hip exer x10 w/slight assist, naomi in chair after amb, nsg entered and aware, plans SNU at DC    Patient was not wearing a face mask during this therapy encounter. Therapist used appropriate personal protective equipment including eye protection, mask, and gloves.  Mask used was standard procedure mask. Appropriate PPE was worn during the entire therapy session. Hand hygiene was completed before and after therapy session. Patient is not in enhanced droplet precautions.      Jose Armanod Jackson, PT Tech present  "

## 2022-11-17 NOTE — PROGRESS NOTES
[facility] Clinical Pharmacy Services: Warfarin Dosing/Monitoring Consult    Lourdes Platt is a 87 y.o. female, estimated creatinine clearance is 58.6 mL/min (by C-G formula based on SCr of 0.61 mg/dL). weighing 56.1 kg (123 lb 10.9 oz).    Results from last 7 days   Lab Units 11/17/22  0430 11/16/22  0504 11/15/22  0452 11/14/22  0819 11/14/22  0422   INR  0.98 1.06 1.05 1.24* 1.24*   HEMOGLOBIN g/dL 8.0* 8.1* 9.7*  --  9.1*   HEMATOCRIT % 23.5* 25.0* 29.3*  --  27.8*   PLATELETS 10*3/mm3 438  --  494*  --  500*     Prior to admission anticoagulation: [facility] Anticoagulation:  Consulting provider: Dr. Caro   Start date: 11/14  Indication: Other--full anticoagulation needed   Target INR: 2 - 3  Expected duration: TBD   Bridge Therapy: yes, with therapeutic enoxaparin     Potential food or drug interactions:   -enoxaparin, clopidogrel: increased risk of bleeding     Education complete?/Date: TBD   Assessment/Plan:  INR subtherapeutic at 0.98 (Goal 2-3). Day 2 of new start therapy so this is unsurprising. Continue warfarin 7.5 mg daily for now.   Start enoxaparin 60 mg 9~1 mg/kg) every 12 hours based on patient weight and current renal function per consult request.  Monitor for any signs or symptoms of bleeding  Follow up daily INRs and dose adjustments    Pharmacy will continue to follow until discharge or discontinuation of warfarin.     Cheyenne Gowers, [facility]  Clinical Pharmacist

## 2022-11-17 NOTE — THERAPY TREATMENT NOTE
Patient Name: Lourdes Platt  : 1935    MRN: [id number]                              Today's Date: 2022       Admit Date: 2022    Visit Dx:     ICD-10-CM ICD-9-CM   1. Follow-up exam  Z09 V67.9     Patient Active Problem List   Diagnosis   • PAD (peripheral artery disease) ([facility])   • Intermittent claudication ([facility])   • Shortness of breath   • Essential hypertension   • Atherosclerosis of native arteries of extremities with rest pain, right leg (HCC)   • Atherosclerosis of native arteries of extremities with rest pain, left leg (HCC)   • Acute blood loss anemia   • Fracture of neck of right femur ([facility])   • Closed displaced fracture of right femoral neck ([facility])     Past Medical History:   Diagnosis Date   • Arthritis    • Bilateral carotid artery stenosis    • Constipation    • Depression    • Foot swelling     RIGHT    • GERD (gastroesophageal reflux disease)    • Hypertension    • Intermittent claudication ([facility]) 2017   • PAD (peripheral artery disease) ([facility]) 2017   • PONV (postoperative nausea and vomiting)    • PVD (peripheral vascular disease) with claudication ([facility])     lennie legs   • Slow to wake up after anesthesia    • Spinal headache    • Stroke ([facility])     ASA     Past Surgical History:   Procedure Laterality Date   • ANGIOPLASTY FEMORAL ARTERY Left 2017    Procedure: RIGHT FEMORAL ARTERIAL CUTDOWN, LIGATION OF FEMORAL ARTERY PSUEDOANEURYSM, AIF WITH BILATERAL RUNOFF ARTERIOGRAM, RIGHT COMMON ILIAC ANGIOPLASTY;  Surgeon: Keny Sadler MD;  [facility] ;  Service:    • APPENDECTOMY     • ATHRECTOMY ILIAC, FEMORAL, TIBIAL ARTERY Right 2017    Procedure: LEFT FEMORAL APPROACH AIF RIGHT LEG ARTERIOGRAM  RIGHT FEMORAL POPLITEAL ARTERY WITH DRUG COATED BALLOON ANGIOPLASTY;  Surgeon: Keny Sadler MD;  [facility] ;  Service:    • BUNIONECTOMY     • EYE SURGERY      CATARACT EXTRACTION   • FEMORAL POPLITEAL BYPASS  Left 10/31/2022    Procedure: LEFT FEMORAL TO PERONEAL ARTERY BYPASS WITH POLYTETRAFLUOROETHYLENE, LEFT FEMORAL ENDARTERECTOMY;  Surgeon: Juan A Roblero MD;  Location: [facility];  Service: Vascular;  Laterality: Left;   • HYSTERECTOMY  1970   • ORIF WRIST FRACTURE Left    • TOTAL HIP ARTHROPLASTY Right 11/15/2022    Procedure: Right Posterior Hip Hemiarthroplasty;  Surgeon: Keny Waller MD;  Location: [facility];  Service: Orthopedics;  Laterality: Right;      General Information     Row Name 11/17/22 1524          Physical Therapy Time and Intention    Document Type therapy note (daily note)  -     Mode of Treatment individual therapy;physical therapy  -     Row Name 11/17/22 1524          General Information    Patient Profile Reviewed yes  -     Existing Precautions/Restrictions fall;hip;right  per RN -OK to DC KI; pt is carri-arthroplasty R, recent vasc sx L  -     Row Name 11/17/22 1524          Living Environment    People in Home alone  -     Row Name 11/17/22 1524          Cognition    Orientation Status (Cognition) oriented x 3  -     Row Name 11/17/22 1524          Safety Issues, Functional Mobility    Safety Issues Affecting Function (Mobility) judgment;positioning of assistive device;problem-solving  -     Impairments Affecting Function (Mobility) balance;coordination;endurance/activity tolerance;pain;strength;range of motion (ROM)  -           User Key  (r) = Recorded By, (t) = Taken By, (c) = Cosigned By    Initials Name Provider Type     Doretha Parish PTA Physical Therapist Assistant               Mobility     Row Name 11/17/22 1526          Bed Mobility    Supine-Sit [place] (Bed Mobility) 2 person assist;minimum assist (75% patient effort)  post lean, reports incr fatigue  -     Sit-Supine [place] (Bed Mobility) not tested  -     Assistive Device (Bed Mobility) bed rails;draw sheet  -     Comment, (Bed Mobility) needed extra time , rest  "breaks  -     Row Name 11/17/22 1526          Sit-Stand Transfer    Sit-Stand [place] (Transfers) 2 person assist;minimum assist (75% patient effort);verbal cues;nonverbal cues (demo/gesture)  -     Assistive Device (Sit-Stand Transfers) walker, front-wheeled  -     Row Name 11/17/22 1526          Gait/Stairs (Locomotion)    [place] Level (Gait) 2 person assist;contact guard;verbal cues;nonverbal cues (demo/gesture)  -     Assistive Device (Gait) walker, front-wheeled  -     Distance in Feet (Gait) 15ft, slow apprehensive gt due to pain  -     Deviations/Abnormal Patterns (Gait) antalgic;maria c decreased;base of support, narrow;stride length decreased  -     Bilateral Gait Deviations forward flexed posture  corrected w/cues  -           User Key  (r) = Recorded By, (t) = Taken By, (c) = Cosigned By    Initials Name Provider Type    Doretha Ritchie PTA Physical Therapist Assistant               Obj/Interventions     Row Name 11/17/22 1528          Motor Skills    Therapeutic Exercise --  hip protocol x10 reps w/slight assist  -           User Key  (r) = Recorded By, (t) = Taken By, (c) = Cosigned By    Initials Name Provider Type    Doretha Ritchie PTA Physical Therapist Assistant               Goals/Plan    No documentation.                Clinical Impression     [facility] Name 11/17/22 1529          Pain    Pretreatment Pain Rating 8/10  -     Posttreatment Pain Rating 9/10  -     Pain Location - Side/Orientation Right  -     Pain Location - hip  -     Pain Intervention(s) Repositioned;Ambulation/increased activity;Elevated;Rest  -     Row Name 11/17/22 1529          Plan of Care Review    Plan of Care Reviewed With patient  -     Progress improving  -     Outcome Evaluation Pt agreed to PT session, states she will do \"as much as she can\", pt fatigued and in pain upon entry; pt req assist of 2 OOB tfer , CGA-2 for amb ~15ft this session, hip exer x10 w/slight assist, " naomi in chair after amb, nsg entered and aware, plans SNU at DC  -     Row Name 11/17/22 1529          Therapy Assessment/Plan (PT)    Rehab Potential (PT) good, to achieve stated therapy goals  -     Criteria for Skilled Interventions Met (PT) yes  -[facility] Name 11/17/22 1529          Vital Signs    O2 Delivery Pre Treatment room air  -[facility] Name 11/17/22 1529          Positioning and Restraints    Pre-Treatment Position in bed  -     Post Treatment Position chair  -     In Chair reclined;call light within reach;encouraged to call for assist;exit alarm on;notified nsg  -           User Key  (r) = Recorded By, (t) = Taken By, (c) = Cosigned By    Initials Name Provider Type    Doretha Ritchie PTA Physical Therapist Assistant               Outcome Measures     Row Name 11/17/22 1531          How much help from another person do you currently need...    Turning from your back to your side while in flat bed without using bedrails? 3  -JM     Moving from lying on back to sitting on the side of a flat bed without bedrails? 3  -JM     Moving to and from a bed to a chair (including a wheelchair)? 3  -JM     Standing up from a chair using your arms (e.g., wheelchair, bedside chair)? 3  -JM     Climbing 3-5 steps with a railing? 1  -JM     To walk in hospital room? 3  -     AM-PAC 6 Clicks Score (PT) 16  -     Highest level of mobility 5 --> Static standing  -[facility] Name 11/17/22 1525          Modified [place] Scale    Modified [place] Scale 3 - Moderate disability.  Requiring some help, but able to walk without assistance.  -     Row Name 11/17/22 1525          Functional Assessment    Outcome Measure Options AM-PAC 6 Clicks Daily Activity (OT);Modified [place]  -           User Key  (r) = Recorded By, (t) = Taken By, (c) = Cosigned By    Initials Name Provider Type    Doretha Ritchie PTA Physical Therapist Assistant    Melanie Klein, OT Occupational Therapist                      "        Physical Therapy Education     Title: PT OT SLP Therapies (Done)     Topic: Physical Therapy (Done)     Point: Mobility training (Done)     Learning Progress Summary           Patient Acceptance, E,TB,D, VU,NR by  at 11/17/2022 1532    Acceptance, E,TB,D, VU,NR by  at 11/16/2022 1036                   Point: Home exercise program (Done)     Learning Progress Summary           Patient Acceptance, E,TB,D, VU,NR by  at 11/17/2022 1532    Acceptance, E,TB,D, VU,NR by  at 11/16/2022 1036                   Point: Body mechanics (Done)     Learning Progress Summary           Patient Acceptance, E,TB,D, VU,NR by  at 11/17/2022 1532    Acceptance, E,TB,D, VU,NR by  at 11/16/2022 1036                   Point: Precautions (Done)     Learning Progress Summary           Patient Acceptance, E,TB,D, VU,NR by  at 11/17/2022 1532    Acceptance, E,TB,D, VU,NR by  at 11/16/2022 1036                               User Key     Initials Effective Dates Name Provider Type Discipline     03/07/18 -  Doretha Parish PTA Physical Therapist Assistant PT     04/08/22 -  Zelda Rodarte PT Physical Therapist PT              PT Recommendation and Plan     Plan of Care Reviewed With: patient  Progress: improving  Outcome Evaluation: Pt agreed to PT session, states she will do \"as much as she can\", pt fatigued and in pain upon entry; pt req assist of 2 OOB tfer , CGA-2 for amb ~15ft this session, hip exer x10 w/slight assist, naomi in chair after amb, nsg entered and aware, plans SNU at DC     Time Calculation:    PT Charges     Row Name 11/17/22 1533             Time Calculation    Start Time 1450  -      Stop Time 1514  -      Time Calculation (min) 24 min  -      PT Received On 11/17/22  -      PT - Next Appointment 11/18/22  -            User Key  (r) = Recorded By, (t) = Taken By, (c) = Cosigned By    Initials Name Provider Type     Doretha Parish PTA Physical Therapist Assistant          "     Therapy Charges for Today     Code Description Service Date Service Provider Modifiers Qty    [id number]  PT THER PROC EA 15 MIN 11/17/2022 Doretha Parish PTA GP 2    [id number] HC PT THER SUPP EA 15 MIN 11/17/2022 Doretha Parish PTA GP 2          PT G-Codes  Outcome Measure Options: AM-PAC 6 Clicks Daily Activity (OT), Modified Jayy  AM-PAC 6 Clicks Score (PT): 16  AM-PAC 6 Clicks Score (OT): 15  Modified [place] Scale: 3 - Moderate disability.  Requiring some help, but able to walk without assistance.  PT Discharge Summary  Anticipated Discharge Disposition (PT): skilled nursing facility    Doretha Parish PTA  11/17/2022

## 2022-11-17 NOTE — PROGRESS NOTES
Continued Stay Note  [facility]     Patient Name: Lourdes Platt  MRN: [id number]  Today's Date: 11/17/2022    Admit Date: 11/13/2022    Plan: [facility], bed available Friday 11/18   Discharge Plan     Row Name 11/17/22 1511       Plan    Plan [facility], bed available Friday 11/18    Patient/Family in Agreement with Plan yes    Plan Comments Called [facility] dc bed unit, no answer. Spoke with Stanley/son via telephone regarding d/c planning, family is agreeable to d/c to [facility]/[place] at d/c. Family is willing and able to transport pt to SNF if deemed safe by PT stable to go via car.               Discharge Codes    No documentation.               Expected Discharge Date and Time     Expected Discharge Date Expected Discharge Time    Nov 17, 2022             Latisha Sultana RN

## 2022-11-17 NOTE — PLAN OF CARE
"Goal Outcome Evaluation:  Plan of Care Reviewed With: patient        Progress: no change  Outcome Evaluation: Pt is an 86 yo F admitted following a fall resulting in a R femoral neck fx. Pt now POD 2 R posterior hip hemiarthroplasty and is WBAT. Pt with knee immobilizer on during eval, kept KI on throughout. Pt seen for OT eval this date, A&Ox3, reports she was not feeling well upon arrival however agreeable. Pt lives alone and reports independence at baseline with ADLs and uses cane as needed. Pt has no steps at home. Today, pt presents significantly below ADL baseline with impaired act tolerance, UE strength, impaired func transfers, mobility, balance, and post op weakness. Pt required min A for bed mobility, max A to don socks, STS x2 from EOB with mod Ax1 with poor transition to Rwx, noting on first attempt pt unable to successfully come to stand and transition to walker requiring seated rest break before second attempt. Pt able to come to stand with mod A x1, increased time and able to demo x3 lateral steps with CGA/min Ax1 using rWx. Pt quick to fatique and feeling \"woozy\" at times. Pt returned to supine with all needs met. Pt will continue to benefit from skilled OT to address noted functional deficits and progress toward prior level of (I). Recommending acute rehab at d/c. Continue to follow.  "

## 2022-11-17 NOTE — PLAN OF CARE
Goal Outcome Evaluation:              Outcome Evaluation: POD 2 R carri hip post, A&O, VSS, RA, up to chair, worked well w/PT, dressing c/d/i, assist x1, voiding per bsc, educated on bp monitoring, rehab at d/c, [facility]

## 2022-11-17 NOTE — PLAN OF CARE
Problem: Adult Inpatient Plan of Care  Goal: Plan of Care Review  Outcome: Ongoing, Progressing  Goal: Patient-Specific Goal (Individualized)  Outcome: Ongoing, Progressing  Goal: Absence of Hospital-Acquired Illness or Injury  Outcome: Ongoing, Progressing  Intervention: Identify and Manage Fall Risk  Recent Flowsheet Documentation  Taken 11/17/2022 0403 by Leo Staton RN  Safety Promotion/Fall Prevention: safety round/check completed  Taken 11/17/2022 0226 by Leo Staton RN  Safety Promotion/Fall Prevention:   activity supervised   assistive device/personal items within reach   safety round/check completed  Taken 11/17/2022 0000 by Leo Staton RN  Safety Promotion/Fall Prevention: safety round/check completed  Taken 11/16/2022 2200 by Leo Staton RN  Safety Promotion/Fall Prevention: safety round/check completed  Taken 11/16/2022 2000 by Leo Staton RN  Safety Promotion/Fall Prevention:   activity supervised   safety round/check completed  Intervention: Prevent Skin Injury  Recent Flowsheet Documentation  Taken 11/16/2022 2000 by Leo Staton RN  Skin Protection:   adhesive use limited   incontinence pads utilized  Intervention: Prevent and Manage VTE (Venous Thromboembolism) Risk  Recent Flowsheet Documentation  Taken 11/17/2022 0403 by Leo Staton RN  VTE Prevention/Management:   bilateral   sequential compression devices on  Taken 11/17/2022 0000 by Leo Staton RN  VTE Prevention/Management:   bilateral   sequential compression devices on  Taken 11/16/2022 2000 by Leo Staton RN  VTE Prevention/Management:   bilateral   sequential compression devices on  Intervention: Prevent Infection  Recent Flowsheet Documentation  Taken 11/17/2022 0403 by eLo Staton RN  Infection Prevention: rest/sleep promoted  Taken 11/17/2022 0226 by Leo Staton RN  Infection Prevention: rest/sleep promoted  Taken 11/17/2022 0000 by Leo Staton RN  Infection  Prevention: rest/sleep promoted  Taken 11/16/2022 2200 by Leo Staton RN  Infection Prevention: rest/sleep promoted  Taken 11/16/2022 2000 by Leo Staton RN  Infection Prevention: rest/sleep promoted  Goal: Optimal Comfort and Wellbeing  Outcome: Ongoing, Progressing  Intervention: Monitor Pain and Promote Comfort  Recent Flowsheet Documentation  Taken 11/16/2022 2000 by Leo Staton RN  Pain Management Interventions: (will bring relief when able) see MAR  Intervention: Provide Person-Centered Care  Recent Flowsheet Documentation  Taken 11/16/2022 2000 by Leo Staton RN  Trust Relationship/Rapport:   questions answered   questions encouraged  Goal: Readiness for Transition of Care  Outcome: Ongoing, Progressing     Problem: Fall Injury Risk  Goal: Absence of Fall and Fall-Related Injury  Outcome: Ongoing, Progressing  Intervention: Identify and Manage Contributors  Recent Flowsheet Documentation  Taken 11/17/2022 0403 by Leo Staton RN  Medication Review/Management: medications reviewed  Taken 11/17/2022 0226 by Leo Staton RN  Medication Review/Management: medications reviewed  Taken 11/17/2022 0000 by Leo Staton RN  Medication Review/Management: medications reviewed  Taken 11/16/2022 2200 by Leo Staton RN  Medication Review/Management: medications reviewed  Taken 11/16/2022 2000 by Leo Staton RN  Medication Review/Management: medications reviewed  Intervention: Promote Injury-Free Environment  Recent Flowsheet Documentation  Taken 11/17/2022 0403 by Leo Staton RN  Safety Promotion/Fall Prevention: safety round/check completed  Taken 11/17/2022 0226 by Leo Staton RN  Safety Promotion/Fall Prevention:   activity supervised   assistive device/personal items within reach   safety round/check completed  Taken 11/17/2022 0000 by Leo Staton RN  Safety Promotion/Fall Prevention: safety round/check completed  Taken 11/16/2022 2200 by Shemar  JUN Bailey  Safety Promotion/Fall Prevention: safety round/check completed  Taken 11/16/2022 2000 by Leo Staton RN  Safety Promotion/Fall Prevention:   activity supervised   safety round/check completed     Problem: Skin Injury Risk Increased  Goal: Skin Health and Integrity  Outcome: Ongoing, Progressing  Intervention: Optimize Skin Protection  Recent Flowsheet Documentation  Taken 11/16/2022 2000 by Leo Staton RN  Pressure Reduction Techniques: frequent weight shift encouraged  Pressure Reduction Devices: alternating pressure pump (ADD)  Skin Protection:   adhesive use limited   incontinence pads utilized     Problem: Fracture Stabilization and Management (Orthopaedic Fracture)  Goal: Fracture Stability  Outcome: Ongoing, Progressing     Problem: Functional Ability Impaired (Orthopaedic Fracture)  Goal: Optimal Functional Ability  Outcome: Ongoing, Progressing     Problem: Neurovascular Compromise (Orthopaedic Fracture)  Goal: Effective Tissue Perfusion  Outcome: Ongoing, Progressing  Intervention: Prevent or Manage Neurovascular Compromise  Recent Flowsheet Documentation  Taken 11/17/2022 0403 by Leo Staton RN  Compartment Syndrome Management: active flexion/extension encouraged  Taken 11/17/2022 0000 by Leo Staton RN  Compartment Syndrome Management: active flexion/extension encouraged  Taken 11/16/2022 2000 by Leo Staton RN  Compartment Syndrome Management: active flexion/extension encouraged     Problem: Pain (Orthopaedic Fracture)  Goal: Acceptable Pain Control  Outcome: Ongoing, Progressing  Intervention: Manage Acute Orthopaedic-Related Pain  Recent Flowsheet Documentation  Taken 11/16/2022 2000 by Leo Staton RN  Pain Management Interventions: (will bring relief when able) see MAR     Problem: Hypertension Comorbidity  Goal: Blood Pressure in Desired Range  Outcome: Ongoing, Progressing  Intervention: Maintain Blood Pressure Management  Recent Flowsheet  Documentation  Taken 11/17/2022 0403 by Leo Staton, RN  Medication Review/Management: medications reviewed  Taken 11/17/2022 0226 by Leo Staton RN  Medication Review/Management: medications reviewed  Taken 11/17/2022 0000 by Leo Staton RN  Medication Review/Management: medications reviewed  Taken 11/16/2022 2200 by Leo Staton, RN  Medication Review/Management: medications reviewed  Taken 11/16/2022 2000 by Leo Staton, RN  Medication Review/Management: medications reviewed   Goal Outcome Evaluation:

## 2022-11-17 NOTE — THERAPY EVALUATION
Patient Name: Lourdes Platt  : 1935    MRN: [id number]                              Today's Date: 2022       Admit Date: 2022    Visit Dx:     ICD-10-CM ICD-9-CM   1. Follow-up exam  Z09 V67.9     Patient Active Problem List   Diagnosis   • PAD (peripheral artery disease) ([facility])   • Intermittent claudication ([facility])   • Shortness of breath   • Essential hypertension   • Atherosclerosis of native arteries of extremities with rest pain, right leg (HCC)   • Atherosclerosis of native arteries of extremities with rest pain, left leg (HCC)   • Acute blood loss anemia   • Fracture of neck of right femur ([facility])   • Closed displaced fracture of right femoral neck ([facility])     Past Medical History:   Diagnosis Date   • Arthritis    • Bilateral carotid artery stenosis    • Constipation    • Depression    • Foot swelling     RIGHT    • GERD (gastroesophageal reflux disease)    • Hypertension    • Intermittent claudication ([facility]) 2017   • PAD (peripheral artery disease) ([facility]) 2017   • PONV (postoperative nausea and vomiting)    • PVD (peripheral vascular disease) with claudication ([facility])     lennie legs   • Slow to wake up after anesthesia    • Spinal headache    • Stroke ([facility])     ASA     Past Surgical History:   Procedure Laterality Date   • ANGIOPLASTY FEMORAL ARTERY Left 2017    Procedure: RIGHT FEMORAL ARTERIAL CUTDOWN, LIGATION OF FEMORAL ARTERY PSUEDOANEURYSM, AIF WITH BILATERAL RUNOFF ARTERIOGRAM, RIGHT COMMON ILIAC ANGIOPLASTY;  Surgeon: Keny Sadler MD;  Location: [facility] OR ;  Service:    • APPENDECTOMY     • ATHRECTOMY ILIAC, FEMORAL, TIBIAL ARTERY Right 2017    Procedure: LEFT FEMORAL APPROACH AIF RIGHT LEG ARTERIOGRAM  RIGHT FEMORAL POPLITEAL ARTERY WITH DRUG COATED BALLOON ANGIOPLASTY;  Surgeon: Keny Sadler MD;  Location: [facility] OR ;  Service:    • BUNIONECTOMY     • EYE SURGERY      CATARACT EXTRACTION   • FEMORAL POPLITEAL BYPASS  Left 10/31/2022    Procedure: LEFT FEMORAL TO PERONEAL ARTERY BYPASS WITH POLYTETRAFLUOROETHYLENE, LEFT FEMORAL ENDARTERECTOMY;  Surgeon: Juan A Roblero MD;  Location: [facility];  Service: Vascular;  Laterality: Left;   • HYSTERECTOMY  1970   • ORIF WRIST FRACTURE Left    • TOTAL HIP ARTHROPLASTY Right 11/15/2022    Procedure: Right Posterior Hip Hemiarthroplasty;  Surgeon: Keny Waller MD;  Location: [facility];  Service: Orthopedics;  Laterality: Right;      General Information     Row Name 11/17/22 [facility]          OT Time and Intention    Document Type evaluation  -MW     Mode of Treatment individual therapy;occupational therapy  -     Row Name 11/17/22 1513          General Information    Patient Profile Reviewed yes  -MW     Prior Level of Function independent:;ADL's;all household mobility;transfer  -MW     Existing Precautions/Restrictions fall  KI in place, kept on during session  -MW     Barriers to Rehab none identified  -     Row Name 11/17/22 1513          Living Environment    People in Home alone  -     Row Name 11/17/22 [facility]          Home Main Entrance    Number of Stairs, Main Entrance none  -MW     Row Name 11/17/22 1513          Cognition    Orientation Status (Cognition) oriented x 3;other (see comments)  drowsy today  -     Row Name 11/17/22 1513          Safety Issues, Functional Mobility    Impairments Affecting Function (Mobility) balance;endurance/activity tolerance;strength;pain;range of motion (ROM)  -MW           User Key  (r) = Recorded By, (t) = Taken By, (c) = Cosigned By    Initials Name Provider Type    MW Melanie Gomez OT Occupational Therapist                 Mobility/ADL's     Row Name 11/17/22 1513          Bed Mobility    Bed Mobility supine-sit;sit-supine  -MW     Supine-Sit [place] (Bed Mobility) minimum assist (75% patient effort)  -MW     Sit-Supine [place] (Bed Mobility) minimum assist (75% patient effort);verbal cues  -MW      Assistive Device (Bed Mobility) bed rails;head of bed elevated  -     Comment, (Bed Mobility) assist with RLE for bed mobility, increased difficulty moving extremity  -[facility] Name 11/17/22 1513          Transfers    Transfers sit-stand transfer  -[facility] Name 11/17/22 1513          Sit-Stand Transfer    Sit-Stand [place] (Transfers) moderate assist (50% patient effort);1 person assist;verbal cues;nonverbal cues (demo/gesture)  -     Assistive Device (Sit-Stand Transfers) walker, front-wheeled  -     Comment, (Sit-Stand Transfer) x2 STS from EOB, increased time to come to standing, poor transition to Rwx, very weak throughout session and quick to fatigued  -[facility] Name 11/17/22 1513          Functional Mobility    Functional Mobility- Comment pt able to demo x3 lateral steps to HOB with CGA/min A using Rwx with cueing  -[facility] Name 11/17/22 1513          Activities of Daily Living    BADL Assessment/Intervention lower body dressing;toileting  -MW     Row Name 11/17/22 1513          Mobility    Extremity Weight-bearing Status right lower extremity  -     Right Lower Extremity (Weight-bearing Status) weight-bearing as tolerated (WBAT)  -[facility] Name 11/17/22 1513          Lower Body Dressing Assessment/Training    Comment, (Lower Body Dressing) max A to don socks, unable to complete figure 4 with RLE due to KI in place, decreased func reach due to pain  -[facility] Name 11/17/22 1513          Toileting Assessment/Training    Comment, (Toileting) use of BSC prior to session, declined need to void at this time, will progress to BR commode when safe  -           User Key  (r) = Recorded By, (t) = Taken By, (c) = Cosigned By    Initials Name Provider Type    Melanie Klein OT Occupational Therapist               Obj/Interventions     [facility] Name 11/17/22 1518          Sensory Assessment (Somatosensory)    Sensory Assessment (Somatosensory) UE sensation intact  -MW     Row Name 11/17/22  1518          Vision Assessment/Intervention    Visual Impairment/Limitations WFL  -MW     Row Name 11/17/22 1518          Range of Motion Comprehensive    General Range of Motion bilateral upper extremity ROM WNL  -     Row Name 11/17/22 1518          Strength Comprehensive (MMT)    General Manual Muscle Testing (MMT) Assessment upper extremity strength deficits identified  -     Comment, General Manual Muscle Testing (MMT) Assessment generalized post op weakness, BUE grossly 4-/5  -MW     Row Name 11/17/22 1518          Motor Skills    Motor Skills functional endurance  -     Functional Endurance poor, quick to fatigue  -     Row Name 11/17/22 1518          Balance    Balance Assessment sitting static balance;sitting dynamic balance;sit to stand dynamic balance;standing static balance;standing dynamic balance  -     Static Sitting Balance standby assist  -     Dynamic Sitting Balance standby assist  -     Position, Sitting Balance unsupported;sitting edge of bed  -     Sit to Stand Dynamic Balance moderate assist;1-person assist;verbal cues;non-verbal cues (demo/gesture)  -     Static Standing Balance contact guard  -     Dynamic Standing Balance contact guard;minimal assist  -     Position/Device Used, Standing Balance supported;walker, front-wheeled  -     Balance Interventions sitting;standing;sit to stand;supported;static;dynamic;minimal challenge  -     Comment, Balance no overt LOBs, however poor transition to Rwx noted  -           User Key  (r) = Recorded By, (t) = Taken By, (c) = Cosigned By    Initials Name Provider Type     Melanie Gomez OT Occupational Therapist               Goals/Plan     Row Name 11/17/22 1524          Transfer Goal 1 (OT)    Activity/Assistive Device (Transfer Goal 1, OT) sit-to-stand/stand-to-sit;bed-to-chair/chair-to-bed;toilet  -     [place] Level/Cues Needed (Transfer Goal 1, OT) contact guard required  -     Time Frame (Transfer Goal  1, OT) short term goal (STG);2 weeks  -MW     Progress/Outcome (Transfer Goal 1, OT) goal ongoing  -     Row Name 11/17/22 1524          Dressing Goal 1 (OT)    Activity/Device (Dressing Goal 1, OT) dressing skills, all  -MW     [place]/Cues Needed (Dressing Goal 1, OT) contact guard required  -MW     Time Frame (Dressing Goal 1, OT) short term goal (STG);2 weeks  -MW     Progress/Outcome (Dressing Goal 1, OT) goal ongoing  -[facility] Name 11/17/22 1524          Toileting Goal 1 (OT)    Activity/Device (Toileting Goal 1, OT) toileting skills, all  -MW     [place] Level/Cues Needed (Toileting Goal 1, OT) contact guard required  -MW     Time Frame (Toileting Goal 1, OT) short term goal (STG);2 weeks  -MW     Progress/Outcome (Toileting Goal 1, OT) goal ongoing  -[facility] Name 11/17/22 1524          Grooming Goal 1 (OT)    Activity/Device (Grooming Goal 1, OT) grooming skills, all  -MW     [place] (Grooming Goal 1, OT) standby assist;set-up required  -MW     Time Frame (Grooming Goal 1, OT) short term goal (STG);2 weeks  -MW     Progress/Outcome (Grooming Goal 1, OT) goal ongoing  -[facility] Name 11/17/22 1524          Therapy Assessment/Plan (OT)    Planned Therapy Interventions (OT) activity tolerance training;functional balance retraining;BADL retraining;neuromuscular control/coordination retraining;occupation/activity based interventions;ROM/therapeutic exercise;strengthening exercise;transfer/mobility retraining;patient/caregiver education/training  -           User Key  (r) = Recorded By, (t) = Taken By, (c) = Cosigned By    Initials Name Provider Type    MW Melanie Gomez, OT Occupational Therapist               Clinical Impression     Row Name 11/17/22 1519          Pain Assessment    Pretreatment Pain Rating 7/10  -MW     Posttreatment Pain Rating 7/10  -MW     Pain Location - Side/Orientation Left  -MW     Pain Location lower  -MW     Pain Location - extremity  -MW     Row Name  "11/17/22 1519          Plan of Care Review    Plan of Care Reviewed With patient  -     Progress no change  -     Outcome Evaluation Pt is an 88 yo F admitted following a fall resulting in a R femoral neck fx. Pt now POD 2 R posterior hip hemiarthroplasty and is WBAT. Pt with knee immobilizer on during eval, kept KI on throughout. Pt seen for OT eval this date, A&Ox3, reports she was not feeling well upon arrival however agreeable. Pt lives alone and reports independence at baseline with ADLs and uses cane as needed. Pt has no steps at home. Today, pt presents significantly below ADL baseline with impaired act tolerance, UE strength, impaired func transfers, mobility, balance, and post op weakness. Pt required min A for bed mobility, max A to don socks, STS x2 from EOB with mod Ax1 with poor transition to Rwx, noting on first attempt pt unable to successfully come to stand and transition to walker requiring seated rest break before second attempt. Pt able to come to stand with mod A x1, increased time and able to demo x3 lateral steps with CGA/min Ax1 using rWx. Pt quick to fatique and feeling \"woozy\" at times. Pt returned to supine with all needs met. Pt will continue to benefit from skilled OT to address noted functional deficits and progress toward prior level of (I). Recommending acute rehab at d/c. Continue to follow.  -     Row Name 11/17/22 1519          Therapy Assessment/Plan (OT)    Rehab Potential (OT) good, to achieve stated therapy goals  -     Criteria for Skilled Therapeutic Interventions Met (OT) yes;meets criteria;skilled treatment is necessary  -     Therapy Frequency (OT) 5 times/wk  -     Row Name 11/17/22 1519          Therapy Plan Review/Discharge Plan (OT)    Anticipated Discharge Disposition (OT) inpatient rehabilitation facility  -     Row Name 11/17/22 1519          Vital Signs    O2 Delivery Pre Treatment room air  -MW     O2 Delivery Post Treatment room air  -MW     Pre " Patient Position Supine  -MW     Intra Patient Position Standing  -MW     Post Patient Position Supine  -MW     Row Name 11/17/22 1519          Positioning and Restraints    Pre-Treatment Position in bed  -MW     Post Treatment Position bed  -MW     In Bed notified nsg;fowlers;call light within reach;side rails up x3;encouraged to call for assist;exit alarm on  -MW           User Key  (r) = Recorded By, (t) = Taken By, (c) = Cosigned By    Initials Name Provider Type    Melanie Klein OT Occupational Therapist               Outcome Measures     Row Name 11/17/22 1525          How much help from another is currently needed...    Putting on and taking off regular lower body clothing? 2  -MW     Bathing (including washing, rinsing, and drying) 2  -MW     Toileting (which includes using toilet bed pan or urinal) 2  -MW     Putting on and taking off regular upper body clothing 3  -MW     Taking care of personal grooming (such as brushing teeth) 3  -MW     Eating meals 3  -MW     AM-PAC 6 Clicks Score (OT) 15  -MW     Row Name 11/17/22 1525          Modified [place] Scale    Modified [place] Scale 3 - Moderate disability.  Requiring some help, but able to walk without assistance.  -MW     Row Name 11/17/22 1525          Functional Assessment    Outcome Measure Options AM-PAC 6 Clicks Daily Activity (OT);Modified [place]  -           User Key  (r) = Recorded By, (t) = Taken By, (c) = Cosigned By    Initials Name Provider Type    Melanie Klein OT Occupational Therapist                Occupational Therapy Education     Title: PT OT SLP Therapies (Done)     Topic: Occupational Therapy (Done)     Point: ADL training (Done)     Description:   Instruct learner(s) on proper safety adaptation and remediation techniques during self care or transfers.   Instruct in proper use of assistive devices.              Learning Progress Summary           Patient Acceptance, E, VU by CAROL at 11/17/2022 1525    Comment: role of OT,  d/c rec, goals, xfer tech                   Point: Home exercise program (Done)     Description:   Instruct learner(s) on appropriate technique for monitoring, assisting and/or progressing therapeutic exercises/activities.              Learning Progress Summary           Patient Acceptance, E, VU by  at 11/17/2022 1525    Comment: role of OT, d/c rec, goals, xfer tech                   Point: Precautions (Done)     Description:   Instruct learner(s) on prescribed precautions during self-care and functional transfers.              Learning Progress Summary           Patient Acceptance, E, VU by  at 11/17/2022 1525    Comment: role of OT, d/c rec, goals, xfer tech                   Point: Body mechanics (Done)     Description:   Instruct learner(s) on proper positioning and spine alignment during self-care, functional mobility activities and/or exercises.              Learning Progress Summary           Patient Acceptance, E, VU by  at 11/17/2022 1525    Comment: role of OT, d/c rec, goals, xfer tech                               User Key     Initials Effective Dates Name Provider Type Discipline     08/20/21 -  Melanie Gomez OT Occupational Therapist OT              OT Recommendation and Plan  Planned Therapy Interventions (OT): activity tolerance training, functional balance retraining, BADL retraining, neuromuscular control/coordination retraining, occupation/activity based interventions, ROM/therapeutic exercise, strengthening exercise, transfer/mobility retraining, patient/caregiver education/training  Therapy Frequency (OT): 5 times/wk  Plan of Care Review  Plan of Care Reviewed With: patient  Progress: no change  Outcome Evaluation: Pt is an 86 yo F admitted following a fall resulting in a R femoral neck fx. Pt now POD 2 R posterior hip hemiarthroplasty and is WBAT. Pt with knee immobilizer on during eval, kept KI on throughout. Pt seen for OT eval this date, A&Ox3, reports she was not feeling well  "upon arrival however agreeable. Pt lives alone and reports independence at baseline with ADLs and uses cane as needed. Pt has no steps at home. Today, pt presents significantly below ADL baseline with impaired act tolerance, UE strength, impaired func transfers, mobility, balance, and post op weakness. Pt required min A for bed mobility, max A to don socks, STS x2 from EOB with mod Ax1 with poor transition to Rwx, noting on first attempt pt unable to successfully come to stand and transition to walker requiring seated rest break before second attempt. Pt able to come to stand with mod A x1, increased time and able to demo x3 lateral steps with CGA/min Ax1 using rWx. Pt quick to fatique and feeling \"woozy\" at times. Pt returned to supine with all needs met. Pt will continue to benefit from skilled OT to address noted functional deficits and progress toward prior level of (I). Recommending acute rehab at d/c. Continue to follow.     Time Calculation:    Time Calculation- OT     Row Name 11/17/22 1526             Time Calculation- OT    OT Start Time 1248  -MW      OT Stop Time 1305  -MW      OT Time Calculation (min) 17 min  -MW      Total Timed Code Minutes- OT 11 minute(s)  -MW      OT Received On 11/17/22  -MW      OT - Next Appointment 11/18/22  -MW      OT Goal Re-Cert Due Date 12/01/22  -MW         Timed Charges    10805 - OT Therapeutic Activity Minutes 11  -MW         Untimed Charges    OT Eval/Re-eval Minutes 6  -MW         Total Minutes    Timed Charges Total Minutes 11  -MW      Untimed Charges Total Minutes 6  -MW       Total Minutes 17  -MW            User Key  (r) = Recorded By, (t) = Taken By, (c) = Cosigned By    Initials Name Provider Type     Melanie Gomez OT Occupational Therapist              Therapy Charges for Today     Code Description Service Date Service Provider Modifiers Qty    [id number]  OT THERAPEUTIC ACT EA 15 MIN 11/17/2022 Melanie Gomez OT GO 1    [id number]  OT EVAL " MOD COMPLEXITY 2 11/17/2022 Melanie Gomez, OT GO 1               Melanie Gomez, MINGO  11/17/2022

## 2022-11-18 LAB
ANION GAP SERPL CALCULATED.3IONS-SCNC: 10.7 MMOL/L (ref 5–15)
BILIRUB UR QL STRIP: NEGATIVE
BUN SERPL-MCNC: 11 MG/DL (ref 8–23)
BUN/CREAT SERPL: 17.7 (ref 7–25)
CALCIUM SPEC-SCNC: 8.8 MG/DL (ref 8.6–10.5)
CHLORIDE SERPL-SCNC: 105 MMOL/L (ref 98–107)
CLARITY UR: ABNORMAL
CO2 SERPL-SCNC: 19.3 MMOL/L (ref 22–29)
COLOR UR: YELLOW
CREAT SERPL-MCNC: 0.62 MG/DL (ref 0.57–1)
DEPRECATED RDW RBC AUTO: 44.3 FL (ref 37–54)
EGFRCR SERPLBLD CKD-EPI 2021: 86.9 ML/MIN/1.73
ERYTHROCYTE [DISTWIDTH] IN BLOOD BY AUTOMATED COUNT: 13 % (ref 12.3–15.4)
GLUCOSE SERPL-MCNC: 109 MG/DL (ref 65–99)
GLUCOSE UR STRIP-MCNC: NEGATIVE MG/DL
HCT VFR BLD AUTO: 29 % (ref 34–46.6)
HGB BLD-MCNC: 9.5 G/DL (ref 12–15.9)
HGB UR QL STRIP.AUTO: NEGATIVE
INR PPP: 1.16 (ref 0.9–1.1)
KETONES UR QL STRIP: NEGATIVE
LEUKOCYTE ESTERASE UR QL STRIP.AUTO: NEGATIVE
MCH RBC QN AUTO: 30.7 PG (ref 26.6–33)
MCHC RBC AUTO-ENTMCNC: 32.8 G/DL (ref 31.5–35.7)
MCV RBC AUTO: 93.9 FL (ref 79–97)
NITRITE UR QL STRIP: NEGATIVE
PH UR STRIP.AUTO: 8.5 [PH] (ref 5–8)
PLATELET # BLD AUTO: 412 10*3/MM3 (ref 140–450)
PMV BLD AUTO: 9.9 FL (ref 6–12)
POTASSIUM SERPL-SCNC: 4.2 MMOL/L (ref 3.5–5.2)
PROT UR QL STRIP: NEGATIVE
PROTHROMBIN TIME: 15 SECONDS (ref 11.7–14.2)
RBC # BLD AUTO: 3.09 10*6/MM3 (ref 3.77–5.28)
SODIUM SERPL-SCNC: 135 MMOL/L (ref 136–145)
SP GR UR STRIP: 1.01 (ref 1–1.03)
UROBILINOGEN UR QL STRIP: ABNORMAL
WBC NRBC COR # BLD: 12.08 10*3/MM3 (ref 3.4–10.8)

## 2022-11-18 PROCEDURE — 97110 THERAPEUTIC EXERCISES: CPT

## 2022-11-18 PROCEDURE — 81003 URINALYSIS AUTO W/O SCOPE: CPT | Performed by: STUDENT IN AN ORGANIZED HEALTH CARE EDUCATION/TRAINING PROGRAM

## 2022-11-18 PROCEDURE — 25010000002 ENOXAPARIN PER 10 MG: Performed by: STUDENT IN AN ORGANIZED HEALTH CARE EDUCATION/TRAINING PROGRAM

## 2022-11-18 PROCEDURE — 85610 PROTHROMBIN TIME: CPT | Performed by: ORTHOPAEDIC SURGERY

## 2022-11-18 PROCEDURE — 80048 BASIC METABOLIC PNL TOTAL CA: CPT | Performed by: STUDENT IN AN ORGANIZED HEALTH CARE EDUCATION/TRAINING PROGRAM

## 2022-11-18 PROCEDURE — 85027 COMPLETE CBC AUTOMATED: CPT | Performed by: STUDENT IN AN ORGANIZED HEALTH CARE EDUCATION/TRAINING PROGRAM

## 2022-11-18 PROCEDURE — 97535 SELF CARE MNGMENT TRAINING: CPT

## 2022-11-18 RX ORDER — CLOPIDOGREL BISULFATE 75 MG/1
75 TABLET ORAL DAILY
Qty: 30 TABLET | Refills: 0 | Status: SHIPPED | OUTPATIENT
Start: 2022-11-18

## 2022-11-18 RX ORDER — OXYCODONE HYDROCHLORIDE 5 MG/1
5 TABLET ORAL EVERY 4 HOURS PRN
Qty: 12 TABLET | Refills: 0 | Status: SHIPPED | OUTPATIENT
Start: 2022-11-18

## 2022-11-18 RX ORDER — ENOXAPARIN SODIUM 100 MG/ML
1 INJECTION SUBCUTANEOUS EVERY 12 HOURS
Qty: 8.4 ML | Refills: 0 | Status: SHIPPED | OUTPATIENT
Start: 2022-11-18 | End: 2022-11-25

## 2022-11-18 RX ORDER — AMLODIPINE BESYLATE 5 MG/1
5 TABLET ORAL ONCE
Status: COMPLETED | OUTPATIENT
Start: 2022-11-18 | End: 2022-11-18

## 2022-11-18 RX ORDER — AMLODIPINE BESYLATE 5 MG/1
5 TABLET ORAL
Status: DISCONTINUED | OUTPATIENT
Start: 2022-11-18 | End: 2022-11-18

## 2022-11-18 RX ORDER — WARFARIN SODIUM 2 MG/1
TABLET ORAL
Qty: 120 TABLET | Refills: 0 | Status: SHIPPED | OUTPATIENT
Start: 2022-11-18 | End: 2022-11-19 | Stop reason: HOSPADM

## 2022-11-18 RX ORDER — AMLODIPINE BESYLATE 10 MG/1
10 TABLET ORAL
Status: DISCONTINUED | OUTPATIENT
Start: 2022-11-19 | End: 2022-11-19 | Stop reason: HOSPADM

## 2022-11-18 RX ADMIN — HYDROCODONE BITARTRATE AND ACETAMINOPHEN 1 TABLET: 5; 325 TABLET ORAL at 10:26

## 2022-11-18 RX ADMIN — POLYETHYLENE GLYCOL 3350 17 G: 17 POWDER, FOR SOLUTION ORAL at 08:08

## 2022-11-18 RX ADMIN — CLOPIDOGREL 75 MG: 75 TABLET, FILM COATED ORAL at 08:08

## 2022-11-18 RX ADMIN — ENOXAPARIN SODIUM 60 MG: 100 INJECTION SUBCUTANEOUS at 16:09

## 2022-11-18 RX ADMIN — WARFARIN 7.5 MG: 7.5 TABLET ORAL at 17:24

## 2022-11-18 RX ADMIN — TOPIRAMATE 25 MG: 25 TABLET, FILM COATED ORAL at 08:08

## 2022-11-18 RX ADMIN — DOCUSATE SODIUM 50MG AND SENNOSIDES 8.6MG 2 TABLET: 8.6; 5 TABLET, FILM COATED ORAL at 08:07

## 2022-11-18 RX ADMIN — AMLODIPINE BESYLATE 5 MG: 5 TABLET ORAL at 21:58

## 2022-11-18 RX ADMIN — PANTOPRAZOLE SODIUM 40 MG: 40 TABLET, DELAYED RELEASE ORAL at 05:15

## 2022-11-18 RX ADMIN — HYDROCODONE BITARTRATE AND ACETAMINOPHEN 1 TABLET: 5; 325 TABLET ORAL at 05:15

## 2022-11-18 RX ADMIN — DOCUSATE SODIUM 100 MG: 100 CAPSULE, LIQUID FILLED ORAL at 08:07

## 2022-11-18 RX ADMIN — AMLODIPINE BESYLATE 5 MG: 5 TABLET ORAL at 15:08

## 2022-11-18 RX ADMIN — ENOXAPARIN SODIUM 60 MG: 100 INJECTION SUBCUTANEOUS at 01:34

## 2022-11-18 RX ADMIN — ATORVASTATIN CALCIUM 40 MG: 20 TABLET, FILM COATED ORAL at 16:09

## 2022-11-18 NOTE — PLAN OF CARE
Goal Outcome Evaluation:  Plan of Care Reviewed With: patient, son        Progress: improving  Outcome Evaluation: Pt seen this date for OT tx session, noted improvements toward all stated goals this date. Pt completed bed mob mod A x1 for BLE assist and increased time. Pt STS from EOB with min-mod A, func mob into BR to commode with 3:1 in place, demo'ing transfer only this date, STS from commode CGA with use of railing. Pt with slow pace overall with mobility, ending up in chair with all needs met at end of session. Pt will continue to benefit from skilled OT to address noted func deficits and progress toward improved (I) with ADLs.

## 2022-11-18 NOTE — DISCHARGE SUMMARY
Patient Name: Lourdes Platt  : 1935  MRN: [id number]    Date of Admission: 2022  Date of Discharge:  2022  Primary Care Physician: Everton Disla MD      Chief Complaint:   No chief complaint on file.      Discharge Diagnoses     Active Hospital Problems    Diagnosis  POA   • **Fracture of neck of right femur ([facility]) [S72.001A]  Yes   • Closed displaced fracture of right femoral neck ([facility]) [S72.001A]  Yes   • PAD (peripheral artery disease) ([facility]) [I73.9]  Yes   • Essential hypertension [I10]  Yes      Resolved Hospital Problems   No resolved problems to display.        Hospital Course       This is an 87-year-old woman with a peripheral artery disease status post femoral peroneal bypass who presented an outlying facility after experiencing a fall that resulted in a right femoral neck fracture.  She underwent bipolar hemiarthroplasty of the right hip 11/15/2022.  There was some discussion regarding her anticoagulants in the setting of the peripheral arterial disease that required a femoral peroneal bypass.  She previously been on aspirin 81 mg for the Xarelto 2.5 mg daily.  Due to the cost of the Xarelto, patient reported that she was not able to continue taking the medication.  She was subsequently transitioned over to warfarin, with a goal INR of 2.0-3.0.  She is also being bridged with Lovenox in meantime.  Her INR should be checked daily until it reaches 2.0-3.0.  She will have to follow-up with her PCP for INR checks after discharge.    Of note, she did have a slight increase in her white blood cell count on the day of discharge.  She has been afebrile and has not been on supplemental oxygen, complaining of any urinary symptoms.  She should receive a CBC on  for follow-up with a white blood cell count elevation.  If it remains elevated, further investigation should be undertaken.    Day of Discharge       Physical Exam:  Temp:  [97.6 °F (36.4 °C)-98.9 °F (37.2 °C)] 98.7 °F (37.1  °C)  Heart Rate:  [] 98  Resp:  [16] 16  BP: (139-191)/(66-79) 175/72  Body mass index is 21.91 kg/m².  Physical Exam  Constitutional:       Appearance: Normal appearance.   HENT:      Head: Normocephalic and atraumatic.   Cardiovascular:      Rate and Rhythm: Normal rate and regular rhythm.   Pulmonary:      Effort: Pulmonary effort is normal. No respiratory distress.   Abdominal:      General: There is no distension.      Palpations: Abdomen is soft.      Tenderness: There is no abdominal tenderness.   Musculoskeletal:      Comments: Right leg incision clean/dry/intact    Neurological:      General: No focal deficit present.      Mental Status: She is alert and oriented to person, place, and time.         Consultants     Consult Orders (all) (From admission, onward)     Start     Ordered    11/15/22 1745  Inpatient Consult to Case Management   Once        Provider:  (Not yet assigned)    11/15/22 1744    11/14/22 0808  Inpatient Case Management  Consult  Once        Provider:  (Not yet assigned)    11/14/22 0807    11/14/22 0746  Inpatient Case Management  Consult  Once        Provider:  (Not yet assigned)    11/14/22 0745    11/14/22 0016  Inpatient Vascular Surgery Consult  Once        Specialty:  Vascular Surgery  Provider:  Juan A Roblero MD    11/14/22 0016    11/13/22 2119  Inpatient Orthopedic Surgery Consult  Once        Specialty:  Orthopedic Surgery  Provider:  Keny Waller MD    11/13/22 2118              Procedures     Imaging Results (All)     Procedure Component Value Units Date/Time    XR Hip 1 View Without Pelvis Right (Surgery Only) [[id number]] Collected: 11/15/22 1645     Updated: 11/15/22 1649    Narrative:      XR HIP 1 VIEW WO PELVIS RIGHT-     INDICATIONS: Postoperative evaluation.     TECHNIQUE: Frontal views of the right hip      COMPARISON: 11/14/2022     FINDINGS:      Intact appearing proximal right femoral carri-arthroplasty  hardware is  seen with adjacent surgical soft tissue gas, overlying skin staples. No  acute fracture is identified. Arterial calcifications are present.          Impression:         Postsurgical changes.           This report was finalized on 11/15/2022 4:46 PM by Dr. Juaquin Murray M.D.       XR Hip With or Without Pelvis 2 - 3 View Right [[id number]] Collected: 11/14/22 1532     Updated: 11/14/22 1537    Narrative:      XR HIP W OR WO PELVIS 2-3 VIEW RIGHT-     INDICATIONS: Right femoral neck fracture     TECHNIQUE: Frontal views of the pelvis, 3 views of the right hip     COMPARISON: 11/13/2022     FINDINGS:     Right femoral neck fracture is redemonstrated, without obvious interval  change in alignment of fracture fragments. No other fractures are  identified. Degenerative changes are seen at the symphysis pubis, partly  included lumbar spine. Arterial calcifications are conspicuous.       Impression:         As described.     This report was finalized on 11/14/2022 3:34 PM by Dr. Juaquin Murray M.D.       XR Outside Films [[id number]] Resulted: 11/14/22 1417     Updated: 11/14/22 1417    Narrative:      This procedure was auto-finalized with no dictation required.    CT Outside Films [[id number]] Resulted: 11/14/22 0814     Updated: 11/14/22 0814    Narrative:      This procedure was auto-finalized with no dictation required.          Pertinent Labs     Results from last 7 days   Lab Units 11/18/22 0518 11/17/22  0430 11/16/22  0504 11/15/22  0452 11/14/22  0422   WBC 10*3/mm3 12.08* 10.53  --  15.37* 19.14*   HEMOGLOBIN g/dL 9.5* 8.0* 8.1* 9.7* 9.1*   PLATELETS 10*3/mm3 412 438  --  494* 500*     Results from last 7 days   Lab Units 11/18/22 0518 11/16/22  0504 11/15/22  0452 11/14/22  0422   SODIUM mmol/L 135* 131* 136 141   POTASSIUM mmol/L 4.2 4.6 4.0 3.3*   CHLORIDE mmol/L 105 100 104 109*   CO2 mmol/L 19.3* 23.6 21.0* 19.7*   BUN mg/dL 11 15 11 15   CREATININE mg/dL 0.62 0.61 0.59 0.72   GLUCOSE  mg/dL 109* 146* 98 128*   Estimated Creatinine Clearance: 57.7 mL/min (by C-G formula based on SCr of 0.62 mg/dL).    Results from last 7 days   Lab Units 11/18/22  0518 11/16/22  0504 11/15/22  0452 11/14/22  0422   CALCIUM mg/dL 8.8 8.8 8.9 9.1       Results from last 7 days   Lab Units 11/14/22  0422   TROPONIN T ng/mL <0.010           Invalid input(s): LDLCALC        Test Results Pending at Discharge       Discharge Details        Discharge Medications      New Medications      Instructions Start Date   clopidogrel 75 MG tablet  Commonly known as: PLAVIX   75 mg, Oral, Daily      Enoxaparin Sodium 60 MG/0.6ML solution prefilled syringe syringe  Commonly known as: LOVENOX   1 mg/kg (60 mg), Subcutaneous, Every 12 Hours      warfarin 2 MG tablet  Commonly known as: COUMADIN   Take 3 tablets (6mg) daily. Goal INR 2.0-3.0         Continue These Medications      Instructions Start Date   atorvastatin 40 MG tablet  Commonly known as: LIPITOR   40 mg, Oral, Every Evening      carboxymethylcellulose 0.5 % solution  Commonly known as: REFRESH PLUS   1 drop, Both Eyes, 3 Times Daily PRN      fish oil 1200 MG capsule capsule   2,400 mg, Oral, Daily, HOLDING FOR SURGERY      Flaxseed Oil 1200 MG capsule   2 capsules, Oral, Daily, PT IS TO HOLD FOR SURGERY      furosemide 20 MG tablet  Commonly known as: LASIX   20 mg, Oral, Daily      iron polysacch complex-B12-VitC-FA-Succ  MG capsule capsule   1 capsule, Oral, Daily With Breakfast      lisinopril 40 MG tablet  Commonly known as: PRINIVIL,ZESTRIL   40 mg, Oral, Daily      montelukast 10 MG tablet  Commonly known as: SINGULAIR   10 mg, Oral, Daily      omeprazole 20 MG capsule  Commonly known as: priLOSEC   20 mg, Oral, Daily      oxyCODONE 5 MG immediate release tablet  Commonly known as: Roxicodone   5 mg, Oral, Every 4 Hours PRN      potassium chloride 10 MEQ CR capsule  Commonly known as: MICRO-K   10 mEq, Oral, Daily      topiramate 25 MG tablet  Commonly known  as: TOPAMAX   25 mg, Oral, Daily         Stop These Medications    aspirin 81 MG EC tablet     ibuprofen 400 MG tablet  Commonly known as: ADVIL,MOTRIN     Rivaroxaban 2.5 MG tablet  Commonly known as: XARELTO            Allergies   Allergen Reactions   • Penicillins Itching and Swelling         Discharge Disposition: SNF      Discharge Diet:  Diet Order   Procedures   • Diet Regular Texture (IDDSI 7); Regular Consistency; Regular/House Diet       Discharge Activity: as tolerated       CODE STATUS:    Code Status and Medical Interventions:   Ordered at: 11/13/22 2117     Code Status (Patient has no pulse and is not breathing):    CPR (Attempt to Resuscitate)     Medical Interventions (Patient has pulse or is breathing):    Full Support       No future appointments.   Contact information for after-discharge care     Destination     [facility] .    Service: Inpatient Rehabilitation  Contact information:  [address] [place] [id number] [phone]                             Time Spent on Discharge:  Greater than 30 minutes      Edwin Wolf MD  [facility]  11/18/22  10:46 EST

## 2022-11-18 NOTE — PROGRESS NOTES
[facility] Clinical Pharmacy Services: Warfarin Dosing/Monitoring Consult    Lourdes Platt is a 87 y.o. female, estimated creatinine clearance is 57.7 mL/min (by C-G formula based on SCr of 0.62 mg/dL). weighing 56.1 kg (123 lb 10.9 oz).    Results from last 7 days   Lab Units 11/18/22  0518 11/17/22  0430 11/16/22  0504 11/15/22  0452 11/14/22  0819 11/14/22  0422   INR  1.16* 0.98 1.06 1.05 1.24* 1.24*   HEMOGLOBIN g/dL 9.5* 8.0* 8.1* 9.7*  --  9.1*   HEMATOCRIT % 29.0* 23.5* 25.0* 29.3*  --  27.8*   PLATELETS 10*3/mm3 412 438  --  494*  --  500*     Prior to admission anticoagulation: [facility]ticoagulation:  Consulting provider: Dr. Caro   Start date: 11/14  Indication: Other--full anticoagulation needed   Target INR: 2 - 3  Expected duration: TBD   Bridge Therapy: yes, with therapeutic enoxaparin     Potential food or drug interactions:   -enoxaparin, clopidogrel: increased risk of bleeding     Education complete?/Date: TBD   Assessment/Plan:  INR subtherapeutic at 1.16 (Goal 2-3) but has a mild uptrend. Day 3 of new start therapy so this is unsurprising. Patient has received a bolus of 15 mg over the last 48 hours. Plan for warfarin 5 mg daily for now.   Continue enoxaparin 60 mg (~1 mg/kg) every 12 hours based on patient weight and current renal function per consult request.  Monitor for any signs or symptoms of bleeding  Follow up daily INRs and dose adjustments    Pharmacy will continue to follow until discharge or discontinuation of warfarin.     Cheyenne Gowers, [facility]  Clinical Pharmacist

## 2022-11-18 NOTE — THERAPY TREATMENT NOTE
Patient Name: Lourdes Platt  : 1935    MRN: [id number]                              Today's Date: 2022       Admit Date: 2022    Visit Dx:     ICD-10-CM ICD-9-CM   1. Follow-up exam  Z09 V67.9   2. Atherosclerosis of native arteries of extremities with rest pain, left leg ([facility])  I70.222 440.22     Patient Active Problem List   Diagnosis   • PAD (peripheral artery disease) ([facility])   • Intermittent claudication ([facility])   • Shortness of breath   • Essential hypertension   • Atherosclerosis of native arteries of extremities with rest pain, right leg ([facility])   • Atherosclerosis of native arteries of extremities with rest pain, left leg ([facility])   • Acute blood loss anemia   • Fracture of neck of right femur ([facility])   • Closed displaced fracture of right femoral neck ([facility])     Past Medical History:   Diagnosis Date   • Arthritis    • Bilateral carotid artery stenosis    • Constipation    • Depression    • Foot swelling     RIGHT    • GERD (gastroesophageal reflux disease)    • Hypertension    • Intermittent claudication ([facility]) 2017   • PAD (peripheral artery disease) ([facility]) 2017   • PONV (postoperative nausea and vomiting)    • PVD (peripheral vascular disease) with claudication ([facility])     lennie legs   • Slow to wake up after anesthesia    • Spinal headache    • Stroke ([facility])     ASA     Past Surgical History:   Procedure Laterality Date   • ANGIOPLASTY FEMORAL ARTERY Left 2017    Procedure: RIGHT FEMORAL ARTERIAL CUTDOWN, LIGATION OF FEMORAL ARTERY PSUEDOANEURYSM, AIF WITH BILATERAL RUNOFF ARTERIOGRAM, RIGHT COMMON ILIAC ANGIOPLASTY;  Surgeon: Keny Sadler MD;  Location: [facility] OR ;  Service:    • APPENDECTOMY     • ATHRECTOMY ILIAC, FEMORAL, TIBIAL ARTERY Right 2017    Procedure: LEFT FEMORAL APPROACH AIF RIGHT LEG ARTERIOGRAM  RIGHT FEMORAL POPLITEAL ARTERY WITH DRUG COATED BALLOON ANGIOPLASTY;  Surgeon: Keny Sadler MD;  Location: [facility] OR ;   Service:    • BUNIONECTOMY     • EYE SURGERY      CATARACT EXTRACTION   • FEMORAL POPLITEAL BYPASS Left 10/31/2022    Procedure: LEFT FEMORAL TO PERONEAL ARTERY BYPASS WITH POLYTETRAFLUOROETHYLENE, LEFT FEMORAL ENDARTERECTOMY;  Surgeon: Juan A Roblero MD;  Location: [facility];  Service: Vascular;  Laterality: Left;   • HYSTERECTOMY  1970   • ORIF WRIST FRACTURE Left    • TOTAL HIP ARTHROPLASTY Right 11/15/2022    Procedure: Right Posterior Hip Hemiarthroplasty;  Surgeon: Keny Waller MD;  Location: [facility];  Service: Orthopedics;  Laterality: Right;      General Information     Row Name 11/18/22 1104          OT Time and Intention    Document Type therapy note (daily note)  -     Mode of Treatment occupational therapy  -     Row Name 11/18/22 1104          General Information    Patient Profile Reviewed yes  -     Existing Precautions/Restrictions fall;hip;right  -     Row Name 11/18/22 1104          Cognition    Orientation Status (Cognition) oriented x 3  -     Row Name 11/18/22 1104          Safety Issues, Functional Mobility    Impairments Affecting Function (Mobility) balance;coordination;endurance/activity tolerance;pain;strength;range of motion (ROM)  -           User Key  (r) = Recorded By, (t) = Taken By, (c) = Cosigned By    Initials Name Provider Type    MW Melanie Gomez OT Occupational Therapist                 Mobility/ADL's     Row Name 11/18/22 1104          Bed Mobility    Bed Mobility supine-sit  -     Supine-Sit [place] (Bed Mobility) moderate assist (50% patient effort)  -MW     Sit-Supine [place] (Bed Mobility) not tested  -     Assistive Device (Bed Mobility) bed rails;draw sheet  -     Comment, (Bed Mobility) increased time, assist with BLE for bed mob  -     Row Name 11/18/22 1104          Transfers    Transfers sit-stand transfer;stand-sit transfer;toilet transfer  -     Row Name 11/18/22 1104          Sit-Stand Transfer     Sit-Stand [place] (Transfers) minimum assist (75% patient effort);moderate assist (50% patient effort);1 person assist;verbal cues;nonverbal cues (demo/gesture)  -     Assistive Device (Sit-Stand Transfers) walker, front-wheeled  -     Comment, (Sit-Stand Transfer) STS from EOB with min-mod A , CGA for STS from BR commode with 3:1 in place  -     Row Name 11/18/22 1104          Stand-Sit Transfer    Stand-Sit [place] (Transfers) contact guard  -     Assistive Device (Stand-Sit Transfers) walker, front-wheeled  -MW     Row Name 11/18/22 1104          Toilet Transfer    Type (Toilet Transfer) sit-stand;stand-sit  -MW     Assistive Device (Toilet Transfer) commode;commode, 3-in-1;grab bars/safety frame;walker, front-wheeled  -MW     Row Name 11/18/22 1104          Functional Mobility    Functional Mobility- Ind. Level contact guard assist  -     Functional Mobility- Device walker, front-wheeled  -MW     Functional Mobility- Comment pt deom fun mob into BR, and returned to chair with RWx, slow pace however CGA throughout  -     Row Name 11/18/22 1104          Activities of Daily Living    BADL Assessment/Intervention lower body dressing;toileting  -[facility] Name 11/18/22 1104          Mobility    Extremity Weight-bearing Status right lower extremity  -     Right Lower Extremity (Weight-bearing Status) weight-bearing as tolerated (WBAT)  -     Row Name 11/18/22 1104          Lower Body Dressing Assessment/Training    Comment, (Lower Body Dressing) max a to don socks due to impaired func reach  -     Row Name 11/18/22 1104          Toileting Assessment/Training    Comment, (Toileting) BR commode transfer only, declined need to void  -           User Key  (r) = Recorded By, (t) = Taken By, (c) = Cosigned By    Initials Name Provider Type    Melanie Klein OT Occupational Therapist               Obj/Interventions     Row Name 11/18/22 1106          Motor Skills    Functional Endurance  quick to fatigue  -MW     Row Name 11/18/22 1106          Balance    Balance Assessment sitting static balance;sitting dynamic balance;sit to stand dynamic balance;standing static balance;standing dynamic balance  -MW     Static Sitting Balance standby assist  -MW     Dynamic Sitting Balance standby assist  -MW     Position, Sitting Balance unsupported;sitting edge of bed  -MW     Sit to Stand Dynamic Balance minimal assist;moderate assist  -MW     Static Standing Balance contact guard  -MW     Dynamic Standing Balance contact guard;minimal assist  -MW     Position/Device Used, Standing Balance supported;walker, front-wheeled  -MW     Balance Interventions sitting;standing;sit to stand;supported;static;dynamic;minimal challenge;occupation based/functional task  -MW     Comment, Balance no overt LOBs  -MW           User Key  (r) = Recorded By, (t) = Taken By, (c) = Cosigned By    Initials Name Provider Type    Melanie Klein OT Occupational Therapist               Goals/Plan    No documentation.                Clinical Impression     Row Name 11/18/22 1107          Pain Assessment    Pretreatment Pain Rating 7/10  -MW     Posttreatment Pain Rating 7/10  -MW     Pain Location - Side/Orientation Right  -MW     Pain Location lower  -MW     Pain Location - extremity;hip  -MW     Row Name 11/18/22 1107          Plan of Care Review    Plan of Care Reviewed With patient;son  -MW     Progress improving  -MW     Outcome Evaluation Pt seen this date for OT tx session, noted improvements toward all stated goals this date. Pt completed bed mob mod A x1 for BLE assist and increased time. Pt STS from EOB with min-mod A, func mob into BR to commode with 3:1 in place, demo'ing transfer only this date, STS from commode CGA with use of railing. Pt with slow pace overall with mobility, ending up in chair with all needs met at end of session. Pt will continue to benefit from skilled OT to address noted func deficits and progress  toward improved (I) with ADLs.  -     Row Name 11/18/22 1107          Therapy Plan Review/Discharge Plan (OT)    Anticipated Discharge Disposition (OT) inpatient rehabilitation facility  -     Row Name 11/18/22 1107          Vital Signs    O2 Delivery Pre Treatment room air  -MW     Pre Patient Position Supine  -MW     Intra Patient Position Standing  -MW     Post Patient Position Sitting  -MW     Row Name 11/18/22 1107          Positioning and Restraints    Pre-Treatment Position in bed  -MW     Post Treatment Position chair  -MW     In Chair notified nsg;reclined;call light within reach;encouraged to call for assist;exit alarm on;with family/caregiver  -           User Key  (r) = Recorded By, (t) = Taken By, (c) = Cosigned By    Initials Name Provider Type    Melanie Klein OT Occupational Therapist               Outcome Measures     Row Name 11/18/22 1109          How much help from another is currently needed...    Putting on and taking off regular lower body clothing? 2  -MW     Bathing (including washing, rinsing, and drying) 2  -MW     Toileting (which includes using toilet bed pan or urinal) 2  -MW     Putting on and taking off regular upper body clothing 3  -MW     Taking care of personal grooming (such as brushing teeth) 3  -MW     Eating meals 3  -MW     AM-PAC 6 Clicks Score (OT) 15  -     Row Name 11/18/22 1109          Functional Assessment    Outcome Measure Options AM-PAC 6 Clicks Daily Activity (OT)  -           User Key  (r) = Recorded By, (t) = Taken By, (c) = Cosigned By    Initials Name Provider Type    Melanie Klein OT Occupational Therapist                Occupational Therapy Education     Title: PT OT SLP Therapies (Done)     Topic: Occupational Therapy (Done)     Point: ADL training (Done)     Description:   Instruct learner(s) on proper safety adaptation and remediation techniques during self care or transfers.   Instruct in proper use of assistive devices.               Learning Progress Summary           Patient Acceptance, E, VU by  at 11/17/2022 1525    Comment: role of OT, d/c rec, goals, xfer tech                   Point: Home exercise program (Done)     Description:   Instruct learner(s) on appropriate technique for monitoring, assisting and/or progressing therapeutic exercises/activities.              Learning Progress Summary           Patient Acceptance, E, VU by  at 11/17/2022 1525    Comment: role of OT, d/c rec, goals, xfer tech                   Point: Precautions (Done)     Description:   Instruct learner(s) on prescribed precautions during self-care and functional transfers.              Learning Progress Summary           Patient Acceptance, E, VU by  at 11/17/2022 1525    Comment: role of OT, d/c rec, goals, xfer tech                   Point: Body mechanics (Done)     Description:   Instruct learner(s) on proper positioning and spine alignment during self-care, functional mobility activities and/or exercises.              Learning Progress Summary           Patient Acceptance, E, VU by  at 11/17/2022 1525    Comment: role of OT, d/c rec, goals, xfer tech                               User Key     Initials Effective Dates Name Provider Type Discipline     08/20/21 -  Melanie Gomez OT Occupational Therapist OT              OT Recommendation and Plan  Planned Therapy Interventions (OT): activity tolerance training, functional balance retraining, BADL retraining, neuromuscular control/coordination retraining, occupation/activity based interventions, ROM/therapeutic exercise, strengthening exercise, transfer/mobility retraining, patient/caregiver education/training  Therapy Frequency (OT): 5 times/wk  Plan of Care Review  Plan of Care Reviewed With: patient, son  Progress: improving  Outcome Evaluation: Pt seen this date for OT tx session, noted improvements toward all stated goals this date. Pt completed bed mob mod A x1 for BLE assist and  increased time. Pt STS from EOB with min-mod A, func mob into BR to commode with 3:1 in place, demo'ing transfer only this date, STS from commode CGA with use of railing. Pt with slow pace overall with mobility, ending up in chair with all needs met at end of session. Pt will continue to benefit from skilled OT to address noted func deficits and progress toward improved (I) with ADLs.     Time Calculation:    Time Calculation- OT     Row Name 11/18/22 1109             Time Calculation- OT    OT Start Time 1040  -MW      OT Stop Time 1103  -MW      OT Time Calculation (min) 23 min  -MW      Total Timed Code Minutes- OT 23 minute(s)  -MW      OT Received On 11/18/22  -MW      OT - Next Appointment 11/21/22  -MW         Timed Charges    05576 - OT Self Care/Mgmt Minutes 23  -MW         Total Minutes    Timed Charges Total Minutes 23  -MW       Total Minutes 23  -MW            User Key  (r) = Recorded By, (t) = Taken By, (c) = Cosigned By    Initials Name Provider Type     Melanie Gomez OT Occupational Therapist              Therapy Charges for Today     Code Description Service Date Service Provider Modifiers Qty    96699302344 HC OT THERAPEUTIC ACT EA 15 MIN 11/17/2022 Melanie Gomez OT GO 1    39638012292 HC OT EVAL MOD COMPLEXITY 2 11/17/2022 Melanie Gomez OT GO 1    10531170003 HC OT SELF CARE/MGMT/TRAIN EA 15 MIN 11/18/2022 Melanie Gomez OT GO 2               Melanie Gomez OT  11/18/2022

## 2022-11-18 NOTE — PLAN OF CARE
Goal Outcome Evaluation:  Plan of Care Reviewed With: patient           Outcome Evaluation: pt planned to be discharged to rehab today, although blood pressure was elevated most of the shift so MD decided to keep an extra night to get blood pressure more under control, Norvasc was started and seemed to decrease her blood pressure to a more normal. pt had bowel movement today, UA was sent to lab due to pt complaining of moderate amount of burning when urinating, urinating well via external catheter, plan to discharge tomorrow to rehab via personal family vehicle.

## 2022-11-18 NOTE — PLAN OF CARE
Problem: Adult Inpatient Plan of Care  Goal: Plan of Care Review  Outcome: Ongoing, Progressing  Goal: Patient-Specific Goal (Individualized)  Outcome: Ongoing, Progressing  Goal: Absence of Hospital-Acquired Illness or Injury  Outcome: Ongoing, Progressing  Intervention: Identify and Manage Fall Risk  Recent Flowsheet Documentation  Taken 11/18/2022 0415 by Leo Staton RN  Safety Promotion/Fall Prevention: safety round/check completed  Taken 11/18/2022 0200 by Leo Staton RN  Safety Promotion/Fall Prevention:   activity supervised   assistive device/personal items within reach   safety round/check completed  Taken 11/18/2022 0000 by Leo Staton RN  Safety Promotion/Fall Prevention: safety round/check completed  Taken 11/17/2022 2200 by Leo Staton RN  Safety Promotion/Fall Prevention:   activity supervised   assistive device/personal items within reach   safety round/check completed  Taken 11/17/2022 2000 by Leo Staton RN  Safety Promotion/Fall Prevention: safety round/check completed  Intervention: Prevent Skin Injury  Recent Flowsheet Documentation  Taken 11/17/2022 2000 by Leo Staton RN  Skin Protection: adhesive use limited  Intervention: Prevent and Manage VTE (Venous Thromboembolism) Risk  Recent Flowsheet Documentation  Taken 11/18/2022 0415 by Leo Staton RN  VTE Prevention/Management:   bilateral   sequential compression devices on  Taken 11/18/2022 0000 by Leo Staton RN  VTE Prevention/Management:   bilateral   sequential compression devices on  Taken 11/17/2022 2000 by Leo Staton RN  VTE Prevention/Management:   bilateral   sequential compression devices on  Intervention: Prevent Infection  Recent Flowsheet Documentation  Taken 11/18/2022 0415 by Leo Staton RN  Infection Prevention: rest/sleep promoted  Taken 11/18/2022 0200 by Leo Staton RN  Infection Prevention: rest/sleep promoted  Taken 11/18/2022 0000 by Leo Staton  RN  Infection Prevention: rest/sleep promoted  Taken 11/17/2022 2200 by Leo Staton RN  Infection Prevention: rest/sleep promoted  Taken 11/17/2022 2000 by Leo Staton RN  Infection Prevention: rest/sleep promoted  Goal: Optimal Comfort and Wellbeing  Outcome: Ongoing, Progressing  Intervention: Monitor Pain and Promote Comfort  Recent Flowsheet Documentation  Taken 11/17/2022 2000 by Leo Staton RN  Pain Management Interventions: see MAR  Intervention: Provide Person-Centered Care  Recent Flowsheet Documentation  Taken 11/17/2022 2000 by Leo Staton RN  Trust Relationship/Rapport:   care explained   choices provided   questions answered   questions encouraged  Goal: Readiness for Transition of Care  Outcome: Ongoing, Progressing     Problem: Fall Injury Risk  Goal: Absence of Fall and Fall-Related Injury  Outcome: Ongoing, Progressing  Intervention: Identify and Manage Contributors  Recent Flowsheet Documentation  Taken 11/18/2022 0415 by Leo Staton RN  Medication Review/Management: medications reviewed  Taken 11/18/2022 0200 by Leo Staton RN  Medication Review/Management: medications reviewed  Taken 11/18/2022 0000 by Leo Staton RN  Medication Review/Management: medications reviewed  Taken 11/17/2022 2200 by Leo Staton RN  Medication Review/Management: medications reviewed  Taken 11/17/2022 2000 by Leo Staton RN  Medication Review/Management: medications reviewed  Intervention: Promote Injury-Free Environment  Recent Flowsheet Documentation  Taken 11/18/2022 0415 by Leo Staton RN  Safety Promotion/Fall Prevention: safety round/check completed  Taken 11/18/2022 0200 by Leo Staton RN  Safety Promotion/Fall Prevention:   activity supervised   assistive device/personal items within reach   safety round/check completed  Taken 11/18/2022 0000 by Leo Staton RN  Safety Promotion/Fall Prevention: safety round/check completed  Taken 11/17/2022  2200 by Leo Staton RN  Safety Promotion/Fall Prevention:   activity supervised   assistive device/personal items within reach   safety round/check completed  Taken 11/17/2022 2000 by Leo Staton RN  Safety Promotion/Fall Prevention: safety round/check completed     Problem: Skin Injury Risk Increased  Goal: Skin Health and Integrity  Outcome: Ongoing, Progressing  Intervention: Optimize Skin Protection  Recent Flowsheet Documentation  Taken 11/17/2022 2000 by Leo Staton RN  Pressure Reduction Techniques: frequent weight shift encouraged  Pressure Reduction Devices: alternating pressure pump (ADD)  Skin Protection: adhesive use limited     Problem: Fracture Stabilization and Management (Orthopaedic Fracture)  Goal: Fracture Stability  Outcome: Ongoing, Progressing     Problem: Functional Ability Impaired (Orthopaedic Fracture)  Goal: Optimal Functional Ability  Outcome: Ongoing, Progressing     Problem: Neurovascular Compromise (Orthopaedic Fracture)  Goal: Effective Tissue Perfusion  Outcome: Ongoing, Progressing  Intervention: Prevent or Manage Neurovascular Compromise  Recent Flowsheet Documentation  Taken 11/18/2022 0000 by Leo Staton RN  Compartment Syndrome Management: active flexion/extension encouraged  Taken 11/17/2022 2000 by Leo Staton RN  Compartment Syndrome Management: active flexion/extension encouraged     Problem: Pain (Orthopaedic Fracture)  Goal: Acceptable Pain Control  Outcome: Ongoing, Progressing  Intervention: Manage Acute Orthopaedic-Related Pain  Recent Flowsheet Documentation  Taken 11/17/2022 2000 by Leo Staton RN  Pain Management Interventions: see MAR     Problem: Hypertension Comorbidity  Goal: Blood Pressure in Desired Range  Outcome: Ongoing, Progressing  Intervention: Maintain Blood Pressure Management  Recent Flowsheet Documentation  Taken 11/18/2022 0415 by Leo Staton RN  Medication Review/Management: medications reviewed  Taken  11/18/2022 0200 by Leo Staton, RN  Medication Review/Management: medications reviewed  Taken 11/18/2022 0000 by Leo Staton, RN  Medication Review/Management: medications reviewed  Taken 11/17/2022 2200 by Leo Staton, RN  Medication Review/Management: medications reviewed  Taken 11/17/2022 2000 by Leo Staton, RN  Medication Review/Management: medications reviewed   Goal Outcome Evaluation:

## 2022-11-18 NOTE — PROGRESS NOTES
Orthopedic Progress Note      Patient: Lourdes Platt    YOB: 1935    Medical Record Number: [id number]    Attending Physician: Edwin Wolf MD    Date of Admission: 11/13/2022  8:56 PM    Admitting Dx:  Closed displaced fracture of right femoral neck (HCC) [S72.001A]    Status Post:  Right Posterior Hip Hemiarthroplasty    Post Operative Day Number: 3    Current Problem List:   Patient Active Problem List   Diagnosis    PAD (peripheral artery disease) ([facility])    Intermittent claudication (HCC)    Shortness of breath    Essential hypertension    Atherosclerosis of native arteries of extremities with rest pain, right leg (HCC)    Atherosclerosis of native arteries of extremities with rest pain, left leg (HCC)    Acute blood loss anemia    Fracture of neck of right femur (HCC)    Closed displaced fracture of right femoral neck (HCC)         Past Medical History:   Diagnosis Date    Arthritis     Bilateral carotid artery stenosis     Constipation     Depression     Foot swelling     RIGHT     GERD (gastroesophageal reflux disease)     Hypertension     Intermittent claudication ([facility]) 05/19/2017    PAD (peripheral artery disease) ([facility]) 05/19/2017    PONV (postoperative nausea and vomiting)     PVD (peripheral vascular disease) with claudication ([facility])     lennie legs    Slow to wake up after anesthesia     Spinal headache     Stroke ([facility]) 2005    ASA       Current Medications:  Scheduled Meds:atorvastatin, 40 mg, Oral, Q PM  clopidogrel, 75 mg, Oral, Daily  docusate sodium, 100 mg, Oral, BID  enoxaparin, 1 mg/kg, Subcutaneous, Q12H  pantoprazole, 40 mg, Oral, QAM  polyethylene glycol, 17 g, Oral, BID  senna-docusate sodium, 2 tablet, Oral, BID  topiramate, 25 mg, Oral, Daily  warfarin, 7.5 mg, Oral, Daily      PRN Meds:.  acetaminophen    acetaminophen    aluminum-magnesium hydroxide-simethicone    senna-docusate sodium **AND** polyethylene glycol **AND** bisacodyl **AND** bisacodyl    bisacodyl     HYDROcodone-acetaminophen    magnesium hydroxide    melatonin    ondansetron **OR** ondansetron    ondansetron **OR** ondansetron    oxyCODONE    Pharmacy to Dose enoxaparin (LOVENOX)    Pharmacy to dose warfarin    sodium chloride    SUBJECTIVE: 87 y.o.  female.  Awake and alert.     OBJECTIVE:   Vitals:    11/17/22 1740 11/17/22 1835 11/17/22 2330 11/18/22 0600   BP: (!) 191/79 164/66 180/75 175/72   BP Location: Right arm Left arm Right arm Right arm   Patient Position: Lying Lying Lying Lying   Pulse: 101  97 98   Resp: 16  16 16   Temp: 97.9 °F (36.6 °C)  98.9 °F (37.2 °C) 98.7 °F (37.1 °C)   TempSrc: Oral  Oral Oral   SpO2: 99%  100% 95%   Weight:       Height:         I/O last 3 completed shifts:  In: 360 [P.O.:360]  Out: 1775 [Urine:1775]    Diagnostic Tests:  Lab Results (last 72 hours)       Procedure Component Value Units Date/Time    CBC (No Diff) [[id number]]  (Abnormal) Collected: 11/18/22 0518    Specimen: Blood Updated: 11/18/22 0655     WBC 12.08 10*3/mm3      RBC 3.09 10*6/mm3      Hemoglobin 9.5 g/dL      Hematocrit 29.0 %      MCV 93.9 fL      MCH 30.7 pg      MCHC 32.8 g/dL      RDW 13.0 %      RDW-SD 44.3 fl      MPV 9.9 fL      Platelets 412 10*3/mm3     Basic Metabolic Panel [[id number]]  (Abnormal) Collected: 11/18/22 0518    Specimen: Blood Updated: 11/18/22 0622     Glucose 109 mg/dL      BUN 11 mg/dL      Creatinine 0.62 mg/dL      Sodium 135 mmol/L      Potassium 4.2 mmol/L      Comment: Slight hemolysis detected by analyzer. Results may be affected.        Chloride 105 mmol/L      CO2 19.3 mmol/L      Calcium 8.8 mg/dL      BUN/Creatinine Ratio 17.7     Anion Gap 10.7 mmol/L      eGFR 86.9 mL/min/1.73      Comment: National Kidney Foundation and American Society of Nephrology (ASN) Task Force recommended calculation based on the Chronic Kidney Disease Epidemiology Collaboration (CKD-EPI) equation refit without adjustment for race.       Narrative:      GFR Normal >60  Chronic Kidney  Disease <60  Kidney Failure <15    The GFR formula is only valid for adults with stable renal function between ages 18 and 70.    Protime-INR [[id number]]  (Abnormal) Collected: 11/18/22 0518    Specimen: Blood Updated: 11/18/22 0609     Protime 15.0 Seconds      INR 1.16    Protime-INR [[id number]]  (Normal) Collected: 11/17/22 0430    Specimen: Blood Updated: 11/17/22 0528     Protime 13.1 Seconds      INR 0.98    CBC (No Diff) [[id number]]  (Abnormal) Collected: 11/17/22 0430    Specimen: Blood Updated: 11/17/22 0517     WBC 10.53 10*3/mm3      RBC 2.73 10*6/mm3      Hemoglobin 8.0 g/dL      Hematocrit 23.5 %      MCV 86.1 fL      MCH 29.3 pg      MCHC 34.0 g/dL      RDW 12.9 %      RDW-SD 40.6 fl      MPV 9.3 fL      Platelets 438 10*3/mm3     Folate [[id number]]  (Normal) Collected: 11/16/22 0504    Specimen: Blood Updated: 11/16/22 0616     Folate 14.50 ng/mL     Narrative:      Results may be falsely increased if patient taking Biotin.      Vitamin B12 [[id number]]  (Normal) Collected: 11/16/22 0504    Specimen: Blood Updated: 11/16/22 0616     Vitamin B-12 257 pg/mL     Narrative:      Results may be falsely increased if patient taking Biotin.      Basic Metabolic Panel [[id number]]  (Abnormal) Collected: 11/16/22 0504    Specimen: Blood Updated: 11/16/22 0601     Glucose 146 mg/dL      BUN 15 mg/dL      Creatinine 0.61 mg/dL      Sodium 131 mmol/L      Potassium 4.6 mmol/L      Chloride 100 mmol/L      CO2 23.6 mmol/L      Calcium 8.8 mg/dL      BUN/Creatinine Ratio 24.6     Anion Gap 7.4 mmol/L      eGFR 87.2 mL/min/1.73      Comment: National Kidney Foundation and American Society of Nephrology (ASN) Task Force recommended calculation based on the Chronic Kidney Disease Epidemiology Collaboration (CKD-EPI) equation refit without adjustment for race.       Narrative:      GFR Normal >60  Chronic Kidney Disease <60  Kidney Failure <15    The GFR formula is only valid for adults with stable renal function between  ages 18 and 70.    Iron Profile [[id number]]  (Abnormal) Collected: 11/16/22 0504    Specimen: Blood Updated: 11/16/22 0559     Iron 23 mcg/dL      Iron Saturation 14 %      Transferrin 111 mg/dL      TIBC 165 mcg/dL     Protime-INR [[id number]]  (Normal) Collected: 11/16/22 0504    Specimen: Blood Updated: 11/16/22 0555     Protime 14.0 Seconds      INR 1.06    Hemoglobin & Hematocrit, Blood [[id number]]  (Abnormal) Collected: 11/16/22 0504    Specimen: Blood Updated: 11/16/22 0545     Hemoglobin 8.1 g/dL      Hematocrit 25.0 %              PHYSICAL EXAM: Right hip sylvester dressing remains dry and intact.  Battery is functioning properly.  Calf is soft and nontender.  She has good motion sensation to her foot and ankle.  INR today was 1.1.  Hemoglobin is 9.5.  Vital signs remained stable.  Patient does complain of a lot of pain with any movement as expected but otherwise pain is well controlled.  Voiding well.       ASSESSMENT & PLAN:  Patient is being transferred to subacute rehab for continued PT.  She will need to return to the office in 2 weeks to see Dr. Keny Waller for x-ray and wound check.  Anticoagulants as recommended by vascular.  Sylvester dressing should be left on until her follow-up in the office    Date: 11/18/2022    Nina Bain RN    I have reviewed and agree with the above.    Keny Waller MD

## 2022-11-18 NOTE — THERAPY TREATMENT NOTE
Patient Name: Lourdes Platt  : 1935    MRN: [id number]                              Today's Date: 2022       Admit Date: 2022    Visit Dx:     ICD-10-CM ICD-9-CM   1. Closed displaced fracture of right femoral neck ([facility])  S72.001A 820.8   2. Follow-up exam  Z09 V67.9   3. Atherosclerosis of native arteries of extremities with rest pain, left leg ([facility])  I70.222 440.22     Patient Active Problem List   Diagnosis   • PAD (peripheral artery disease) ([facility])   • Intermittent claudication ([facility])   • Shortness of breath   • Essential hypertension   • Atherosclerosis of native arteries of extremities with rest pain, right leg ([facility])   • Atherosclerosis of native arteries of extremities with rest pain, left leg ([facility])   • Acute blood loss anemia   • Fracture of neck of right femur ([facility])   • Closed displaced fracture of right femoral neck ([facility])     Past Medical History:   Diagnosis Date   • Arthritis    • Bilateral carotid artery stenosis    • Constipation    • Depression    • Foot swelling     RIGHT    • GERD (gastroesophageal reflux disease)    • Hypertension    • Intermittent claudication ([facility]) 2017   • PAD (peripheral artery disease) ([facility]) 2017   • PONV (postoperative nausea and vomiting)    • PVD (peripheral vascular disease) with claudication ([facility])     lennie legs   • Slow to wake up after anesthesia    • Spinal headache    • Stroke ([facility])     ASA     Past Surgical History:   Procedure Laterality Date   • ANGIOPLASTY FEMORAL ARTERY Left 2017    Procedure: RIGHT FEMORAL ARTERIAL CUTDOWN, LIGATION OF FEMORAL ARTERY PSUEDOANEURYSM, AIF WITH BILATERAL RUNOFF ARTERIOGRAM, RIGHT COMMON ILIAC ANGIOPLASTY;  Surgeon: Keny Sadler MD;  Location: [facility] ;  Service:    • APPENDECTOMY     • ATHRECTOMY ILIAC, FEMORAL, TIBIAL ARTERY Right 2017    Procedure: LEFT FEMORAL APPROACH AIF RIGHT LEG ARTERIOGRAM  RIGHT FEMORAL POPLITEAL ARTERY WITH DRUG COATED BALLOON  ANGIOPLASTY;  Surgeon: Keny Sadler MD;  Location: [facility] 18/19;  Service:    • BUNIONECTOMY     • EYE SURGERY      CATARACT EXTRACTION   • FEMORAL POPLITEAL BYPASS Left 10/31/2022    Procedure: LEFT FEMORAL TO PERONEAL ARTERY BYPASS WITH POLYTETRAFLUOROETHYLENE, LEFT FEMORAL ENDARTERECTOMY;  Surgeon: Juan A Roblero MD;  Location: [facility];  Service: Vascular;  Laterality: Left;   • HYSTERECTOMY  1970   • ORIF WRIST FRACTURE Left    • TOTAL HIP ARTHROPLASTY Right 11/15/2022    Procedure: Right Posterior Hip Hemiarthroplasty;  Surgeon: Keny Waller MD;  Location: [facility] OR;  Service: Orthopedics;  Laterality: Right;      General Information     Row Name 11/18/22 1459          Physical Therapy Time and Intention    Document Type therapy note (daily note)  -     Mode of Treatment physical therapy  -     Row Name 11/18/22 4544          General Information    Patient Profile Reviewed yes  -     Existing Precautions/Restrictions fall;hip;right  Noel-arthroplasty  -     Row Name 11/18/22 1459          Cognition    Orientation Status (Cognition) oriented x 3  -     Row Name 11/18/22 1459          Safety Issues, Functional Mobility    Safety Issues Affecting Function (Mobility) judgment;positioning of assistive device;problem-solving;safety precaution awareness  -     Impairments Affecting Function (Mobility) balance;coordination;endurance/activity tolerance;pain;postural/trunk control;strength;range of motion (ROM)  -           User Key  (r) = Recorded By, (t) = Taken By, (c) = Cosigned By    Initials Name Provider Type     Doretha Parish PTA Physical Therapist Assistant               Mobility     Row Name 11/18/22 1506          Bed Mobility    Supine-Sit [place] (Bed Mobility) moderate assist (50% patient effort)  initial post lean  -     Sit-Supine [place] (Bed Mobility) not tested  -     Assistive Device (Bed Mobility) bed rails;draw sheet  -      Comment, (Bed Mobility) needed extra time, assist for LEs OOB  -     Row Name 11/18/22 1502          Sit-Stand Transfer    Sit-Stand [place] (Transfers) minimum assist (75% patient effort);moderate assist (50% patient effort);verbal cues;nonverbal cues (demo/gesture)  -     Assistive Device (Sit-Stand Transfers) walker, front-wheeled  -     Comment, (Sit-Stand Transfer) cues for hand placement  -     Row Name 11/18/22 1502          Gait/Stairs (Locomotion)    [place] Level (Gait) contact guard;verbal cues  -     Assistive Device (Gait) walker, 4-wheeled  -     Distance in Feet (Gait) 20ft, slow paced, anticipates pain  -JM     Deviations/Abnormal Patterns (Gait) antalgic;base of support, narrow;maria c decreased;stride length decreased;weight shifting decreased  -     Bilateral Gait Deviations forward flexed posture  can improve if cued  -           User Key  (r) = Recorded By, (t) = Taken By, (c) = Cosigned By    Initials Name Provider Type    Doretha Ritchie PTA Physical Therapist Assistant               Obj/Interventions     [facility] Name 11/18/22 1506          Motor Skills    Therapeutic Exercise --  too fatigued at present  -           User Key  (r) = Recorded By, (t) = Taken By, (c) = Cosigned By    Initials Name Provider Type    Doretha Ritchie PTA Physical Therapist Assistant               Goals/Plan    No documentation.                Clinical Impression     Row Name 11/18/22 1507          Pain    Pretreatment Pain Rating 7/10  -     Posttreatment Pain Rating 8/10  -     Pain Location - Side/Orientation Right  -     Pain Location - hip  -     Pain Intervention(s) Repositioned;Ambulation/increased activity;Elevated;Rest  -     Row Name 11/18/22 1507          Plan of Care Review    Plan of Care Reviewed With patient  -     Progress improving  -     Outcome Evaluation Pt agreed to PT session, required MOD assist for bed mob due to pain and initial post lean, used  draw sheet to complete tfer to EOB, assist of 1 for amb this visit ~20ft w/rwx, cues for foot placement and posture to ensure safety and improved balance, [facility], just recovering from vascular sx on LLE when she fell  -     Row Name 11/18/22 1507          Therapy Assessment/Plan (PT)    Rehab Potential (PT) good, to achieve stated therapy goals  -     Criteria for Skilled Interventions Met (PT) yes  -     Row Name 11/18/22 1507          Vital Signs    O2 Delivery Pre Treatment room air  -     Row Name 11/18/22 1507          Positioning and Restraints    Pre-Treatment Position in bed  -     Post Treatment Position chair  -     In Chair reclined;call light within reach;encouraged to call for assist;exit alarm on;with family/caregiver;notified nsg;RLE elevated  -           User Key  (r) = Recorded By, (t) = Taken By, (c) = Cosigned By    Initials Name Provider Type    Doretha Ritchie PTA Physical Therapist Assistant               Outcome Measures     Row Name 11/18/22 1510          How much help from another person do you currently need...    Turning from your back to your side while in flat bed without using bedrails? 3  -JM     Moving from lying on back to sitting on the side of a flat bed without bedrails? 2  -JM     Moving to and from a bed to a chair (including a wheelchair)? 3  -JM     Standing up from a chair using your arms (e.g., wheelchair, bedside chair)? 3  -JM     Climbing 3-5 steps with a railing? 1  -JM     To walk in hospital room? 3  -JM     AM-PAC 6 Clicks Score (PT) 15  -     Highest level of mobility 4 --> Transferred to chair/commode  -     Row Name 11/18/22 1109          Functional Assessment    Outcome Measure Options AM-PAC 6 Clicks Daily Activity (OT)  -           User Key  (r) = Recorded By, (t) = Taken By, (c) = Cosigned By    Initials Name Provider Type    Doretha Ritchie PTA Physical Therapist Assistant    Melanie Klein, OT  Occupational Therapist                             Physical Therapy Education     Title: PT OT SLP Therapies (Done)     Topic: Physical Therapy (Done)     Point: Mobility training (Done)     Learning Progress Summary           Patient Acceptance, E,TB,D, VU,NR by  at 11/18/2022 1511    Acceptance, E,TB,D, VU,NR by  at 11/17/2022 1532    Acceptance, E,TB,D, VU,NR by  at 11/16/2022 1036                   Point: Home exercise program (Done)     Learning Progress Summary           Patient Acceptance, E,TB,D, VU,NR by  at 11/18/2022 1511    Acceptance, E,TB,D, VU,NR by  at 11/17/2022 1532    Acceptance, E,TB,D, VU,NR by  at 11/16/2022 1036                   Point: Body mechanics (Done)     Learning Progress Summary           Patient Acceptance, E,TB,D, VU,NR by  at 11/18/2022 1511    Acceptance, E,TB,D, VU,NR by  at 11/17/2022 1532    Acceptance, E,TB,D, VU,NR by  at 11/16/2022 1036                   Point: Precautions (Done)     Learning Progress Summary           Patient Acceptance, E,TB,D, VU,NR by  at 11/18/2022 1511    Acceptance, E,TB,D, VU,NR by  at 11/17/2022 1532    Acceptance, E,TB,D, VU,NR by  at 11/16/2022 1036                               User Key     Initials Effective Dates Name Provider Type Discipline     03/07/18 -  Doretha Parish PTA Physical Therapist Assistant PT     04/08/22 -  Zelda Rodarte PT Physical Therapist PT              PT Recommendation and Plan     Plan of Care Reviewed With: patient  Progress: improving  Outcome Evaluation: Pt agreed to PT session, required MOD assist for bed mob due to pain and initial post lean, used draw sheet to complete tfer to EOB, assist of 1 for amb this visit ~20ft w/rwx, cues for foot placement and posture to ensure safety and improved balance, [place] as [place], just recovering from vascular sx on LLE when she fell     Time Calculation:    PT Charges     Row Name 11/18/22 1511             Time Calculation     Start Time 1038  -      Stop Time 1101  -WILVER      Time Calculation (min) 23 min  -WILVER      PT Received On 11/18/22  -WILVER      PT - Next Appointment 11/19/22  -WILVER            User Key  (r) = Recorded By, (t) = Taken By, (c) = Cosigned By    Initials Name Provider Type    Doretha Ritchie PTA Physical Therapist Assistant              Therapy Charges for Today     Code Description Service Date Service Provider Modifiers Qty    02180611380 HC PT THER PROC EA 15 MIN 11/17/2022 Doretha Parish PTA GP 2    32122940037 HC PT THER SUPP EA 15 MIN 11/17/2022 Doretha Parish PTA GP 2    54120297104 HC PT THER PROC EA 15 MIN 11/18/2022 Doretha Parish PTA GP 2          PT G-Codes  Outcome Measure Options: AM-PAC 6 Clicks Daily Activity (OT)  AM-PAC 6 Clicks Score (PT): 15  AM-PAC 6 Clicks Score (OT): 15  Modified [place] Scale: 3 - Moderate disability.  Requiring some help, but able to walk without assistance.  PT Discharge Summary  Anticipated Discharge Disposition (PT): skilled nursing facility    Doretha Parish PTA  11/18/2022

## 2022-11-18 NOTE — PLAN OF CARE
Goal Outcome Evaluation:  Plan of Care Reviewed With: patient        Progress: improving  Outcome Evaluation: Pt agreed to PT session, required MOD assist for bed mob due to pain and initial post lean, used draw sheet to complete tfer to EOB, assist of 1 for amb this visit ~20ft w/rwx, cues for foot placement and posture to ensure safety and improved balance, plans SNU at [place] as pt lives alone, just recovering from vascular sx on LLE when she fell    Patient was intermittently wearing a face mask during this therapy encounter. Therapist used appropriate personal protective equipment including eye protection, mask, and gloves.  Mask used was standard procedure mask. Appropriate PPE was worn during the entire therapy session. Hand hygiene was completed before and after therapy session. Patient is not in enhanced droplet precautions.

## 2022-11-19 VITALS
HEIGHT: 63 IN | SYSTOLIC BLOOD PRESSURE: 120 MMHG | DIASTOLIC BLOOD PRESSURE: 69 MMHG | HEART RATE: 92 BPM | BODY MASS INDEX: 21.91 KG/M2 | OXYGEN SATURATION: 96 % | TEMPERATURE: 98.8 F | WEIGHT: 123.68 LBS | RESPIRATION RATE: 18 BRPM

## 2022-11-19 LAB
HCT VFR BLD AUTO: 28.1 % (ref 34–46.6)
HGB BLD-MCNC: 9.2 G/DL (ref 12–15.9)
INR PPP: 1.67 (ref 0.9–1.1)
PROTHROMBIN TIME: 19.9 SECONDS (ref 11.7–14.2)

## 2022-11-19 PROCEDURE — 85610 PROTHROMBIN TIME: CPT | Performed by: ORTHOPAEDIC SURGERY

## 2022-11-19 PROCEDURE — 85018 HEMOGLOBIN: CPT | Performed by: ORTHOPAEDIC SURGERY

## 2022-11-19 PROCEDURE — 85014 HEMATOCRIT: CPT | Performed by: ORTHOPAEDIC SURGERY

## 2022-11-19 PROCEDURE — 25010000002 ENOXAPARIN PER 10 MG: Performed by: STUDENT IN AN ORGANIZED HEALTH CARE EDUCATION/TRAINING PROGRAM

## 2022-11-19 RX ORDER — WARFARIN SODIUM 5 MG/1
TABLET ORAL
Qty: 30 TABLET | Refills: 0 | Status: SHIPPED | OUTPATIENT
Start: 2022-11-19

## 2022-11-19 RX ORDER — WARFARIN SODIUM 5 MG/1
5 TABLET ORAL
Status: DISCONTINUED | OUTPATIENT
Start: 2022-11-19 | End: 2022-11-19 | Stop reason: HOSPADM

## 2022-11-19 RX ADMIN — ACETAMINOPHEN 650 MG: 325 TABLET, FILM COATED ORAL at 05:43

## 2022-11-19 RX ADMIN — CLOPIDOGREL 75 MG: 75 TABLET, FILM COATED ORAL at 09:58

## 2022-11-19 RX ADMIN — TOPIRAMATE 25 MG: 25 TABLET, FILM COATED ORAL at 09:58

## 2022-11-19 RX ADMIN — DOCUSATE SODIUM 100 MG: 100 CAPSULE, LIQUID FILLED ORAL at 09:58

## 2022-11-19 RX ADMIN — POLYETHYLENE GLYCOL 3350 17 G: 17 POWDER, FOR SOLUTION ORAL at 09:58

## 2022-11-19 RX ADMIN — PANTOPRAZOLE SODIUM 40 MG: 40 TABLET, DELAYED RELEASE ORAL at 05:42

## 2022-11-19 RX ADMIN — ENOXAPARIN SODIUM 60 MG: 100 INJECTION SUBCUTANEOUS at 03:35

## 2022-11-19 RX ADMIN — HYDROCODONE BITARTRATE AND ACETAMINOPHEN 1 TABLET: 5; 325 TABLET ORAL at 13:43

## 2022-11-19 RX ADMIN — ENOXAPARIN SODIUM 60 MG: 100 INJECTION SUBCUTANEOUS at 13:41

## 2022-11-19 RX ADMIN — DOCUSATE SODIUM 50MG AND SENNOSIDES 8.6MG 2 TABLET: 8.6; 5 TABLET, FILM COATED ORAL at 09:58

## 2022-11-19 RX ADMIN — AMLODIPINE BESYLATE 10 MG: 10 TABLET ORAL at 09:58

## 2022-11-19 NOTE — NURSING NOTE
meds were escribed to a pharm that is not used by rehab facility...rehab said they have a pharm inhouse and have all d/c meds that are prescribed except narc but pt is only taking Tylenol at this point.

## 2022-11-19 NOTE — PROGRESS NOTES
[facility] Clinical Pharmacy Services: Warfarin Dosing/Monitoring Consult    Lourdes Platt is a 87 y.o. female, estimated creatinine clearance is 57.7 mL/min (by C-G formula based on SCr of 0.62 mg/dL). weighing 56.1 kg (123 lb 10.9 oz).    Results from last 7 days   Lab Units 11/19/22  0439 11/18/22  0518 11/17/22  0430 11/16/22  0504 11/15/22  0452 11/14/22  0819 11/14/22  0422   INR  1.67* 1.16* 0.98 1.06 1.05   < > 1.24*   HEMOGLOBIN g/dL 9.2* 9.5* 8.0* 8.1* 9.7*  --  9.1*   HEMATOCRIT % 28.1* 29.0* 23.5* 25.0* 29.3*  --  27.8*   PLATELETS 10*3/mm3  --  412 438  --  494*  --  500*    < > = values in this interval not displayed.     Prior to admission anticoagulation: [facility] Anticoagulation:  Consulting provider: Dr. Caro   Start date: 11/14  Indication: Other--full anticoagulation needed   Target INR: 2 - 3  Expected duration: TBD   Bridge Therapy: yes, with therapeutic enoxaparin     Potential food or drug interactions:   -enoxaparin, clopidogrel: increased risk of bleeding     Education complete?/Date: TBD   Assessment/Plan:  Significant increase in INR today at day #4 of new warfarin initiation. Will reduce dose to 5mg daily beginning today.   Continue enoxaparin 60 mg (~1 mg/kg) every 12 hours based on patient weight and current renal function per consult request.  Monitor for any signs or symptoms of bleeding  Follow up daily INRs and dose adjustments    Pharmacy will continue to follow until discharge or discontinuation of warfarin.     Jyoti Medina, PharmD  Clinical Pharmacist

## 2022-11-19 NOTE — PLAN OF CARE
Goal Outcome Evaluation:              Outcome Evaluation: POD 2 R carri hip post, A&O, VSS, RA, up to chair, worked well w/PT, dressing c/d/i, assist x1, voiding per bsc, educated on bp monitoring, rehab at d/c, [facility]

## 2022-11-19 NOTE — DISCHARGE SUMMARY
Patient Name: Lourdes Platt  : 1935  MRN: [id number]    Date of Admission: 2022  Date of Discharge:  2022  Primary Care Physician: Everton Disla MD      Chief Complaint:   No chief complaint on file.      Discharge Diagnoses     Active Hospital Problems    Diagnosis  POA   • **Fracture of neck of right femur ([facility]) [S72.001A]  Yes   • Closed displaced fracture of right femoral neck ([facility]) [S72.001A]  Yes   • PAD (peripheral artery disease) ([facility]) [I73.9]  Yes   • Essential hypertension [I10]  Yes      Resolved Hospital Problems   No resolved problems to display.        Hospital Course     This is an 87-year-old woman with a peripheral artery disease status post femoral peroneal bypass who presented an outlying facility after experiencing a fall that resulted in a right femoral neck fracture.  She underwent bipolar hemiarthroplasty of the right hip 11/15/2022.  There was some discussion regarding her anticoagulants in the setting of the peripheral arterial disease that required a femoral peroneal bypass.  She previously been on aspirin 81 mg for the Xarelto 2.5 mg daily.  Due to the cost of the Xarelto, patient reported that she was not able to continue taking the medication.  She was subsequently transitioned over to warfarin, with a goal INR of 2.0-3.0.  She is also being bridged with Lovenox in meantime.  Her INR should be checked daily until it reaches 2.0-3.0.  She will have to follow-up with her PCP for INR checks after discharge.     A UA was obtained due to some urinary complaints and a slight WBC elevation, and was negative for signs of UTI.     She will need a repeat CBC on 22.     Day of Discharge     Physical Exam:  Temp:  [98 °F (36.7 °C)-99.8 °F (37.7 °C)] 98.8 °F (37.1 °C)  Heart Rate:  [] 92  Resp:  [16-18] 18  BP: (120-199)/(69-83) 120/69  Body mass index is 21.91 kg/m².  Physical Exam  General: Normal appearance, no acute distress  HEENT: Normal cephalic  panic  CV: Regular rate and rhythm, no murmurs  Lungs: Clear to auscultation bilaterally, normal rhythm breathing on room air  Abdomen: Soft, nontender, nondistended  MSK: Right leg incision clean/dry/intact  Neuro: Oriented x3.  Consultants     Consult Orders (all) (From admission, onward)     Start     Ordered    11/15/22 1745  Inpatient Consult to Case Management   Once        Provider:  (Not yet assigned)    11/15/22 1744    11/14/22 0808  Inpatient Case Management  Consult  Once        Provider:  (Not yet assigned)    11/14/22 0807    11/14/22 0746  Inpatient Case Management  Consult  Once        Provider:  (Not yet assigned)    11/14/22 0745    11/14/22 0016  Inpatient Vascular Surgery Consult  Once        Specialty:  Vascular Surgery  Provider:  Juan A Roblero MD    11/14/22 0016 11/13/22 2119  Inpatient Orthopedic Surgery Consult  Once        Specialty:  Orthopedic Surgery  Provider:  Keny Waller MD    11/13/22 2118              Procedures     Imaging Results (All)     Procedure Component Value Units Date/Time    XR Hip 1 View Without Pelvis Right (Surgery Only) [[id number]] Collected: 11/15/22 1645     Updated: 11/15/22 1649    Narrative:      XR HIP 1 VIEW WO PELVIS RIGHT-     INDICATIONS: Postoperative evaluation.     TECHNIQUE: Frontal views of the right hip      COMPARISON: 11/14/2022     FINDINGS:      Intact appearing proximal right femoral carri-arthroplasty hardware is  seen with adjacent surgical soft tissue gas, overlying skin staples. No  acute fracture is identified. Arterial calcifications are present.          Impression:         Postsurgical changes.           This report was finalized on 11/15/2022 4:46 PM by Dr. Juaquin Murray M.D.       XR Hip With or Without Pelvis 2 - 3 View Right [[id number]] Collected: 11/14/22 1532     Updated: 11/14/22 1537    Narrative:      XR HIP W OR WO PELVIS 2-3 VIEW RIGHT-     INDICATIONS: Right  femoral neck fracture     TECHNIQUE: Frontal views of the pelvis, 3 views of the right hip     COMPARISON: 11/13/2022     FINDINGS:     Right femoral neck fracture is redemonstrated, without obvious interval  change in alignment of fracture fragments. No other fractures are  identified. Degenerative changes are seen at the symphysis pubis, partly  included lumbar spine. Arterial calcifications are conspicuous.       Impression:         As described.     This report was finalized on 11/14/2022 3:34 PM by Dr. Juaquin Murray M.D.       XR Outside Films [[id number]] Resulted: 11/14/22 1417     Updated: 11/14/22 1417    Narrative:      This procedure was auto-finalized with no dictation required.    CT Outside Films [[id number]] Resulted: 11/14/22 0814     Updated: 11/14/22 0814    Narrative:      This procedure was auto-finalized with no dictation required.          Pertinent Labs     Results from last 7 days   Lab Units 11/19/22 0439 11/18/22 0518 11/17/22  0430 11/16/22  0504 11/15/22  0452 11/14/22  0422   WBC 10*3/mm3  --  12.08* 10.53  --  15.37* 19.14*   HEMOGLOBIN g/dL 9.2* 9.5* 8.0* 8.1* 9.7* 9.1*   PLATELETS 10*3/mm3  --  412 438  --  494* 500*     Results from last 7 days   Lab Units 11/18/22 0518 11/16/22  0504 11/15/22  0452 11/14/22  0422   SODIUM mmol/L 135* 131* 136 141   POTASSIUM mmol/L 4.2 4.6 4.0 3.3*   CHLORIDE mmol/L 105 100 104 109*   CO2 mmol/L 19.3* 23.6 21.0* 19.7*   BUN mg/dL 11 15 11 15   CREATININE mg/dL 0.62 0.61 0.59 0.72   GLUCOSE mg/dL 109* 146* 98 128*   Estimated Creatinine Clearance: 57.7 mL/min (by C-G formula based on SCr of 0.62 mg/dL).    Results from last 7 days   Lab Units 11/18/22 0518 11/16/22  0504 11/15/22  0452 11/14/22  0422   CALCIUM mg/dL 8.8 8.8 8.9 9.1       Results from last 7 days   Lab Units 11/14/22  0422   TROPONIN T ng/mL <0.010           Invalid input(s): LDLCALC        Test Results Pending at Discharge       Discharge Details        Discharge Medications       New Medications      Instructions Start Date   clopidogrel 75 MG tablet  Commonly known as: PLAVIX   75 mg, Oral, Daily      Enoxaparin Sodium 60 MG/0.6ML solution prefilled syringe syringe  Commonly known as: LOVENOX   1 mg/kg (60 mg), Subcutaneous, Every 12 Hours      warfarin 5 MG tablet  Commonly known as: COUMADIN   Take one tablet daily         Continue These Medications      Instructions Start Date   atorvastatin 40 MG tablet  Commonly known as: LIPITOR   40 mg, Oral, Every Evening      carboxymethylcellulose 0.5 % solution  Commonly known as: REFRESH PLUS   1 drop, Both Eyes, 3 Times Daily PRN      fish oil 1200 MG capsule capsule   2,400 mg, Oral, Daily, HOLDING FOR SURGERY      Flaxseed Oil 1200 MG capsule   2 capsules, Oral, Daily, PT IS TO HOLD FOR SURGERY      furosemide 20 MG tablet  Commonly known as: LASIX   20 mg, Oral, Daily      iron polysacch complex-B12-VitC-FA-Succ  MG capsule capsule   1 capsule, Oral, Daily With Breakfast      lisinopril 40 MG tablet  Commonly known as: PRINIVIL,ZESTRIL   40 mg, Oral, Daily      montelukast 10 MG tablet  Commonly known as: SINGULAIR   10 mg, Oral, Daily      omeprazole 20 MG capsule  Commonly known as: priLOSEC   20 mg, Oral, Daily      oxyCODONE 5 MG immediate release tablet  Commonly known as: Roxicodone   5 mg, Oral, Every 4 Hours PRN      potassium chloride 10 MEQ CR capsule  Commonly known as: MICRO-K   10 mEq, Oral, Daily      topiramate 25 MG tablet  Commonly known as: TOPAMAX   25 mg, Oral, Daily         Stop These Medications    aspirin 81 MG EC tablet     ibuprofen 400 MG tablet  Commonly known as: ADVIL,MOTRIN     Rivaroxaban 2.5 MG tablet  Commonly known as: XARELTO            Allergies   Allergen Reactions   • Penicillins Itching and Swelling         Discharge Disposition:  Skilled Nursing Facility (DC - External)    Discharge Diet:  Diet Order   Procedures   • Diet Regular Texture (IDDSI 7); Regular Consistency; Regular/House Diet        Discharge Activity:       CODE STATUS:    Code Status and Medical Interventions:   Ordered at: 11/13/22 2117     Code Status (Patient has no pulse and is not breathing):    CPR (Attempt to Resuscitate)     Medical Interventions (Patient has pulse or is breathing):    Full Support       No future appointments.  Additional Instructions for the Follow-ups that You Need to Schedule     Ambulatory Referral to Home Health   As directed      Face to Face Visit Date: 11/18/2022    Follow-up provider for Plan of Care?: I will be treating the patient on an ongoing basis.  Please send me the Plan of Care for signature.    Follow-up provider: KENY BOBBY [[id number]]    Reason/Clinical Findings: post-surgical    Describe mobility limitations that make leaving home difficult: Requires the assistance of another to leave home    Nursing/Therapeutic Services Requested: Physical Therapy    PT orders: Total joint pathway    Frequency: 1 Week 1         Discharge Follow-up with Specialty: Orthopedics; 2 Weeks   As directed      Specialty: Orthopedics    Follow Up: 2 Weeks    Follow Up Details: return to the office to see Dr. Keny Bobby            Contact information for follow-up providers     Everton Disla MD .    Specialty: Internal Medicine  Contact information:  [address]  [address] [address] [phone]                   Contact information for after-discharge care     Destination     [facility] .    Service: Inpatient Rehabilitation  Contact information:  [address] [place] [address] [phone]                             Additional Instructions for the Follow-ups that You Need to Schedule     Ambulatory Referral to Home Health   As directed      Face to Face Visit Date: 11/18/2022    Follow-up provider for Plan of Care?: I will be treating the patient on an ongoing basis.  Please send me the Plan of Care for signature.    Follow-up  provider: KENY BOBBY [[id number]]    Reason/Clinical Findings: post-surgical    Describe mobility limitations that make leaving home difficult: Requires the assistance of another to leave home    Nursing/Therapeutic Services Requested: Physical Therapy    PT orders: Total joint pathway    Frequency: 1 Week 1         Discharge Follow-up with Specialty: Orthopedics; 2 Weeks   As directed      Specialty: Orthopedics    Follow Up: 2 Weeks    Follow Up Details: return to the office to see Dr. Keny Bobby           Time Spent on Discharge:  Greater than 30 minutes      Edwin Wolf MD  [facility]  11/19/22  11:23 EST

## 2022-11-21 NOTE — CASE MANAGEMENT/SOCIAL WORK
Case Management Discharge Note      Final Note: [facility].         Selected Continued Care - Discharged on 11/19/2022 Admission date: 11/13/2022 - Discharge disposition: Skilled Nursing Facility (DC - External)    Destination Coordination complete.    Service Provider Selected Services Address Phone Fax Patient Preferred    [facility] [address] [place] [address] [facility] [address] [phone] --          Durable Medical Equipment    No services have been selected for the patient.              Dialysis/Infusion    No services have been selected for the patient.              Home Medical Care    No services have been selected for the patient.              Therapy    No services have been selected for the patient.              Community Resources    No services have been selected for the patient.              Community & DME    No services have been selected for the patient.                Selected Continued Care - Prior Encounters Includes continued care and service providers with selected services from prior encounters from 8/15/2022 to 11/19/2022    Discharged on 11/3/2022 Admission date: 10/31/2022 - Discharge disposition: Home-Health Care [facility]    Home Medical Care     Service Provider Selected Services Address Phone Fax Patient Preferred    [facility] [address] [place] [id number] [phone] --                         Final Discharge Disposition Code: 62 - inpatient rehab facility

## 2022-12-12 ENCOUNTER — OFFICE VISIT (OUTPATIENT)
Dept: ORTHOPEDIC SURGERY | Facility: CLINIC | Age: 87
End: 2022-12-12

## 2022-12-12 VITALS — WEIGHT: 117 LBS | RESPIRATION RATE: 12 BRPM | BODY MASS INDEX: 20.73 KG/M2 | TEMPERATURE: 96.4 F | HEIGHT: 63 IN

## 2022-12-12 DIAGNOSIS — Z96.641 STATUS POST RIGHT HIP REPLACEMENT: Primary | ICD-10-CM

## 2022-12-12 PROCEDURE — 99024 POSTOP FOLLOW-UP VISIT: CPT | Performed by: ORTHOPAEDIC SURGERY

## 2022-12-12 PROCEDURE — 73502 X-RAY EXAM HIP UNI 2-3 VIEWS: CPT | Performed by: ORTHOPAEDIC SURGERY

## 2022-12-13 NOTE — PROGRESS NOTES
Lourdes Platt : 1935 MRN: [id number] DATE: 2022    DIAGNOSIS: 4 week follow up right hip replacement    SUBJECTIVE:Patient returns today for 2 week follow up of right hip replacement. Patient reports doing well with no unusual complaints. Appears to be progressing appropriately.  First office visit as she had been in a facility close to COVID.    OBJECTIVE:   Exam:. The incision is healing appropriately. No sign of infection. Range of motion is progressing as expected. The calf is soft and nontender with a negative Homans sign.    DIAGNOSTIC STUDIES  Xrays: 2 views of the right hip (AP pelvis and lateral right hip) were ordered and reviewed for evaluation of recent hip replacement. They demonstrate a well positioned, well aligned hip replacement without complicating factors noted. In comparison with previous films there has been interval implant placement.    ASSESSMENT: 4 week status post right hip replacement.    PLAN: 1) Dressing removed    2) PT exercises   3) Continue ice PRN   4) WBAT   5) Continue home medication    6) Follow up in 6-8 weeks with repeat Xrays of right hip (2views)  We will try to coordinate there visit along with vascular due to her recent vascular surgery in the contralateral side and the fact that if to drive about an hour and a half here.    2-week total hip information packet given and discussed including dental and GI procedure antibiotic prophylaxis.     Keny Waller MD  2022

## 2023-01-16 ENCOUNTER — TELEPHONE (OUTPATIENT)
Dept: ORTHOPEDIC SURGERY | Facility: CLINIC | Age: 88
End: 2023-01-16
Payer: MEDICARE

## 2023-01-16 NOTE — TELEPHONE ENCOUNTER
I have called Breann platt and let her know it was ok for Lourdes Platt to come off of the Warfarin

## 2023-01-16 NOTE — TELEPHONE ENCOUNTER
Patient's Daughter I law called and said the vein doctor has said she can come off the warfarin. However they would to know if it was ok from Dr. Waller's point of view

## 2023-02-02 ENCOUNTER — OFFICE VISIT (OUTPATIENT)
Dept: ORTHOPEDIC SURGERY | Facility: CLINIC | Age: 88
End: 2023-02-02
Payer: MEDICARE

## 2023-02-02 VITALS — HEIGHT: 64 IN | BODY MASS INDEX: 17.13 KG/M2 | TEMPERATURE: 97.8 F | WEIGHT: 100.3 LBS

## 2023-02-02 DIAGNOSIS — Z96.641 STATUS POST RIGHT HIP REPLACEMENT: Primary | ICD-10-CM

## 2023-02-02 PROCEDURE — 99024 POSTOP FOLLOW-UP VISIT: CPT | Performed by: ORTHOPAEDIC SURGERY

## 2023-02-02 PROCEDURE — 73502 X-RAY EXAM HIP UNI 2-3 VIEWS: CPT | Performed by: ORTHOPAEDIC SURGERY

## 2023-02-02 NOTE — PROGRESS NOTES
Patient: Lourdes Platt  YOB: 1935 87 y.o. female  Medical Record Number: [id number]    Chief Complaint:   Chief Complaint   Patient presents with   • Right Hip - Follow-up, Pain       History of Present Illness:Lourdes Platt is a 87 y.o. female who presents for follow-up of right hip replacement.  Patient is now about 3 months out from a right hip hemiarthroplasty.  States she is overall doing well she is now back home doing some home therapy.  Still using a walker.  Overall feels that she continues to improve.    Allergies:   Allergies   Allergen Reactions   • Penicillins Itching and Swelling       Medications:   Current Outpatient Medications   Medication Sig Dispense Refill   • carboxymethylcellulose (REFRESH PLUS) 0.5 % solution Administer 1 drop to both eyes 3 (Three) Times a Day As Needed for Dry Eyes.     • clopidogrel (PLAVIX) 75 MG tablet Take 1 tablet by mouth Daily. 30 tablet 0   • Flaxseed, Linseed, (FLAXSEED OIL) 1200 MG capsule Take 2 capsules by mouth Daily. PT IS TO HOLD FOR SURGERY     • furosemide (LASIX) 20 MG tablet Take 20 mg by mouth Daily.     • iron polysacch complex-B12-VitC-FA-Succ (Ferrex 150 Forte Plus)  MG capsule capsule Take 1 capsule by mouth Daily With Breakfast. 30 each 1   • lisinopril (PRINIVIL,ZESTRIL) 40 MG tablet Take 40 mg by mouth Daily.     • montelukast (SINGULAIR) 10 MG tablet Take 10 mg by mouth Daily.     • Omega-3 Fatty Acids (FISH OIL) 1200 MG capsule capsule Take 2,400 mg by mouth Daily. HOLDING FOR SURGERY     • omeprazole (priLOSEC) 20 MG capsule Take 20 mg by mouth Daily.     • potassium chloride (MICRO-K) 10 MEQ CR capsule Take 10 mEq by mouth Daily.     • topiramate (TOPAMAX) 25 MG tablet Take 1 tablet by mouth Daily.     • atorvastatin (LIPITOR) 40 MG tablet Take 1 tablet by mouth Every Evening. 90 tablet 3   • oxyCODONE (Roxicodone) 5 MG immediate release tablet Take 1 tablet by mouth Every 4 (Four) Hours As Needed for Moderate Pain for  "up to 12 doses. 12 tablet 0   • warfarin (COUMADIN) 5 MG tablet Take one tablet daily  Indications: Other - full anticoagulation (Patient taking differently: Take one tablet daily. Takes 2 mg daily except T and Th that takes 1 mg  Indications: Other - full anticoagulation) 30 tablet 0     No current facility-administered medications for this visit.         The following portions of the patient's history were reviewed and updated as appropriate: allergies, current medications, past family history, past medical history, past social history, past surgical history and problem list.    Review of Systems:   A 14 point review of systems was performed. All systems negative except pertinent positives/negative listed in HPI above    Physical Exam:   Vitals:    02/02/23 1036   Temp: 97.8 °F (36.6 °C)   TempSrc: Temporal   Weight: 45.5 kg (100 lb 4.8 oz)   Height: 162.6 cm (64\")   PainSc:   7   PainLoc: Hip       General: A and O x 3, ASA, NAD    SCLERA:    Normal    DENTITION:   Normal  Right lower extremity examined incisions well-healed.  Hip range of motion appropriate.  Motor and sensory intact distally.  Overall muscle strength is about 4 out of 5 but seems to be improving compared to last time.  Relates with a walker    Radiology:   AP and lateral films right hip taken reviewed show status post hip replacement implants in satisfactory position no evidence of loosening or subsidence compared to previous films.      Assessment/Plan:  3-month status post right hip hemiarthroplasty    Patient is doing well.  She is doing home health I told her to continue doing those therapy exercises if able to transition her from a walker to a cane they did be reasonable however when she is out about having some kind of walking aid may be safe for her and in her best interest.  Continue to progress back to regular activities.  Follow-up at the 1 year anniversary.  Call for any other questions or concerns.  Patient and her family understand " agree the plan.      Keny Waller MD  2/2/2023

## 2023-11-16 ENCOUNTER — OFFICE VISIT (OUTPATIENT)
Dept: ORTHOPEDIC SURGERY | Facility: CLINIC | Age: 88
End: 2023-11-16
Payer: MEDICARE

## 2023-11-16 VITALS — WEIGHT: 113.4 LBS | HEIGHT: 64 IN | BODY MASS INDEX: 19.36 KG/M2 | TEMPERATURE: 96.8 F

## 2023-11-16 DIAGNOSIS — Z96.641 STATUS POST RIGHT HIP REPLACEMENT: Primary | ICD-10-CM

## 2023-11-16 DIAGNOSIS — R52 PAIN: ICD-10-CM

## 2023-11-16 NOTE — PROGRESS NOTES
Patient: Lourdes Platt  YOB: 1935 88 y.o. female  Medical Record Number: [id number]    Chief Complaint:   Chief Complaint   Patient presents with    Right Hip - Follow-up, Pain       History of Present Illness:Lourdes Platt is a 88 y.o. female who presents for follow-up of right hip replacement.  Patient underwent right partial hip replacement 1 year ago.  States she is doing well.  Occasionally she has some lateral discomfort with increased activity or sitting to long that side.  Still is using a cane.  No other complaints.    Allergies:   Allergies   Allergen Reactions    Penicillins Itching and Swelling       Medications:   Current Outpatient Medications   Medication Sig Dispense Refill    atorvastatin (LIPITOR) 40 MG tablet Take 1 tablet by mouth Every Evening. 90 tablet 3    carboxymethylcellulose (REFRESH PLUS) 0.5 % solution Administer 1 drop to both eyes 3 (Three) Times a Day As Needed for Dry Eyes.      clopidogrel (PLAVIX) 75 MG tablet Take 1 tablet by mouth Daily. 30 tablet 0    Flaxseed, Linseed, (FLAXSEED OIL) 1200 MG capsule Take 2 capsules by mouth Daily. PT IS TO HOLD FOR SURGERY      furosemide (LASIX) 20 MG tablet Take 1 tablet by mouth Daily.      iron polysacch complex-B12-VitC-FA-Succ (Ferrex 150 Forte Plus)  MG capsule capsule Take 1 capsule by mouth Daily With Breakfast. 30 each 1    lisinopril (PRINIVIL,ZESTRIL) 40 MG tablet Take 1 tablet by mouth Daily.      montelukast (SINGULAIR) 10 MG tablet Take 1 tablet by mouth Daily.      Omega-3 Fatty Acids (FISH OIL) 1200 MG capsule capsule Take 2 capsules by mouth Daily. HOLDING FOR SURGERY      omeprazole (priLOSEC) 20 MG capsule Take 1 capsule by mouth Daily.      potassium chloride (MICRO-K) 10 MEQ CR capsule Take 1 capsule by mouth Daily.      topiramate (TOPAMAX) 25 MG tablet Take 1 tablet by mouth Daily.      warfarin (COUMADIN) 5 MG tablet Take one tablet daily  Indications: Other - full anticoagulation (Patient taking  "differently: Take one tablet daily. Takes 2 mg daily except T and Th that takes 1 mg  Indications: Other - full anticoagulation) 30 tablet 0    oxyCODONE (Roxicodone) 5 MG immediate release tablet Take 1 tablet by mouth Every 4 (Four) Hours As Needed for Moderate Pain for up to 12 doses. (Patient not taking: Reported on 11/16/2023) 12 tablet 0     No current facility-administered medications for this visit.         The following portions of the patient's history were reviewed and updated as appropriate: allergies, current medications, past family history, past medical history, past social history, past surgical history and problem list.    Review of Systems:   A 14 point review of systems was performed. All systems negative except pertinent positives/negative listed in HPI above    Physical Exam:   Vitals:    11/16/23 0938   Temp: 96.8 °F (36 °C)   Weight: 51.4 kg (113 lb 6.4 oz)   Height: 162.6 cm (64\")   PainSc:   7       General: A and O x 3, ASA, NAD    SCLERA:    Normal    DENTITION:   Normal      Right lower extremity examined gentle hip range of motion well-tolerated.  Patient can stand proceed position ambulates with a cane.  Motor and sensory intact distally.    Radiology:   AP and lateral films right hip taken reviewed show status post right hip hemiarthroplasty implant satisfactory position.  No acute changes from previous films    Assessment/Plan:  1 year status post right hip replacement    Patient is doing well.  Continue to progress in her activities.  Follow-up 5 anniversary diomedes all questions answered      Keny Waller MD  11/16/2023    "

## 2024-03-13 ENCOUNTER — HOSPITAL ENCOUNTER (INPATIENT)
Facility: HOSPITAL | Age: 89
LOS: 4 days | Discharge: NURSING, REHAB OR LONG-TERM CARE | DRG: 563 | End: 2024-03-17
Attending: INTERNAL MEDICINE | Admitting: FAMILY MEDICINE
Payer: MEDICARE

## 2024-03-13 DIAGNOSIS — Z78.9 DECREASED ACTIVITIES OF DAILY LIVING (ADL): ICD-10-CM

## 2024-03-13 DIAGNOSIS — S82.101A CLOSED FRACTURE OF PROXIMAL END OF RIGHT TIBIA, UNSPECIFIED FRACTURE MORPHOLOGY, INITIAL ENCOUNTER: ICD-10-CM

## 2024-03-13 DIAGNOSIS — R26.2 DIFFICULTY IN WALKING: Primary | ICD-10-CM

## 2024-03-14 ENCOUNTER — APPOINTMENT (OUTPATIENT)
Dept: GENERAL RADIOLOGY | Facility: HOSPITAL | Age: 89
DRG: 563 | End: 2024-03-14
Payer: MEDICARE

## 2024-03-14 PROBLEM — S82.209A TIBIA FRACTURE: Status: ACTIVE | Noted: 2024-03-14

## 2024-03-14 LAB
ABO GROUP BLD: NORMAL
ABO GROUP BLD: NORMAL
ALBUMIN SERPL-MCNC: 3.6 G/DL (ref 3.5–5.2)
ALBUMIN/GLOB SERPL: 1.4 G/DL
ALP SERPL-CCNC: 127 U/L (ref 39–117)
ALT SERPL W P-5'-P-CCNC: 8 U/L (ref 1–33)
ANION GAP SERPL CALCULATED.3IONS-SCNC: 11.5 MMOL/L (ref 5–15)
APTT PPP: 27.5 SECONDS (ref 24.2–34.2)
AST SERPL-CCNC: 14 U/L (ref 1–32)
BASOPHILS # BLD AUTO: 0.05 10*3/MM3 (ref 0–0.2)
BASOPHILS NFR BLD AUTO: 0.5 % (ref 0–1.5)
BILIRUB SERPL-MCNC: 0.3 MG/DL (ref 0–1.2)
BLD GP AB SCN SERPL QL: NEGATIVE
BUN SERPL-MCNC: 17 MG/DL (ref 8–23)
BUN/CREAT SERPL: 19.3 (ref 7–25)
CALCIUM SPEC-SCNC: 8.8 MG/DL (ref 8.6–10.5)
CHLORIDE SERPL-SCNC: 107 MMOL/L (ref 98–107)
CO2 SERPL-SCNC: 21.5 MMOL/L (ref 22–29)
CREAT SERPL-MCNC: 0.88 MG/DL (ref 0.57–1)
DEPRECATED RDW RBC AUTO: 48.4 FL (ref 37–54)
EGFRCR SERPLBLD CKD-EPI 2021: 63.7 ML/MIN/1.73
EOSINOPHIL # BLD AUTO: 0.13 10*3/MM3 (ref 0–0.4)
EOSINOPHIL NFR BLD AUTO: 1.4 % (ref 0.3–6.2)
ERYTHROCYTE [DISTWIDTH] IN BLOOD BY AUTOMATED COUNT: 14.9 % (ref 12.3–15.4)
GLOBULIN UR ELPH-MCNC: 2.5 GM/DL
GLUCOSE SERPL-MCNC: 102 MG/DL (ref 65–99)
HCT VFR BLD AUTO: 36.3 % (ref 34–46.6)
HGB BLD-MCNC: 12 G/DL (ref 12–15.9)
IMM GRANULOCYTES # BLD AUTO: 0.02 10*3/MM3 (ref 0–0.05)
IMM GRANULOCYTES NFR BLD AUTO: 0.2 % (ref 0–0.5)
INR PPP: 0.98 (ref 0.86–1.15)
LYMPHOCYTES # BLD AUTO: 1.28 10*3/MM3 (ref 0.7–3.1)
LYMPHOCYTES NFR BLD AUTO: 13.5 % (ref 19.6–45.3)
MAGNESIUM SERPL-MCNC: 2 MG/DL (ref 1.6–2.4)
MCH RBC QN AUTO: 29.4 PG (ref 26.6–33)
MCHC RBC AUTO-ENTMCNC: 33.1 G/DL (ref 31.5–35.7)
MCV RBC AUTO: 89 FL (ref 79–97)
MONOCYTES # BLD AUTO: 0.94 10*3/MM3 (ref 0.1–0.9)
MONOCYTES NFR BLD AUTO: 9.9 % (ref 5–12)
NEUTROPHILS NFR BLD AUTO: 7.04 10*3/MM3 (ref 1.7–7)
NEUTROPHILS NFR BLD AUTO: 74.5 % (ref 42.7–76)
NRBC BLD AUTO-RTO: 0 /100 WBC (ref 0–0.2)
PLATELET # BLD AUTO: 273 10*3/MM3 (ref 140–450)
PMV BLD AUTO: 10 FL (ref 6–12)
POTASSIUM SERPL-SCNC: 3.5 MMOL/L (ref 3.5–5.2)
PROT SERPL-MCNC: 6.1 G/DL (ref 6–8.5)
PROTHROMBIN TIME: 13.2 SECONDS (ref 11.8–14.9)
RBC # BLD AUTO: 4.08 10*6/MM3 (ref 3.77–5.28)
RH BLD: POSITIVE
RH BLD: POSITIVE
SODIUM SERPL-SCNC: 140 MMOL/L (ref 136–145)
T&S EXPIRATION DATE: NORMAL
WBC NRBC COR # BLD AUTO: 9.46 10*3/MM3 (ref 3.4–10.8)

## 2024-03-14 PROCEDURE — 83735 ASSAY OF MAGNESIUM: CPT | Performed by: INTERNAL MEDICINE

## 2024-03-14 PROCEDURE — 99221 1ST HOSP IP/OBS SF/LOW 40: CPT | Performed by: STUDENT IN AN ORGANIZED HEALTH CARE EDUCATION/TRAINING PROGRAM

## 2024-03-14 PROCEDURE — 97165 OT EVAL LOW COMPLEX 30 MIN: CPT

## 2024-03-14 PROCEDURE — 85025 COMPLETE CBC W/AUTO DIFF WBC: CPT | Performed by: INTERNAL MEDICINE

## 2024-03-14 PROCEDURE — 86850 RBC ANTIBODY SCREEN: CPT | Performed by: INTERNAL MEDICINE

## 2024-03-14 PROCEDURE — 85730 THROMBOPLASTIN TIME PARTIAL: CPT | Performed by: INTERNAL MEDICINE

## 2024-03-14 PROCEDURE — 25010000002 ACETAMINOPHEN 10 MG/ML SOLUTION

## 2024-03-14 PROCEDURE — 97161 PT EVAL LOW COMPLEX 20 MIN: CPT

## 2024-03-14 PROCEDURE — 86900 BLOOD TYPING SEROLOGIC ABO: CPT

## 2024-03-14 PROCEDURE — 73560 X-RAY EXAM OF KNEE 1 OR 2: CPT

## 2024-03-14 PROCEDURE — 86901 BLOOD TYPING SEROLOGIC RH(D): CPT | Performed by: INTERNAL MEDICINE

## 2024-03-14 PROCEDURE — 25810000003 LACTATED RINGERS PER 1000 ML: Performed by: INTERNAL MEDICINE

## 2024-03-14 PROCEDURE — 86900 BLOOD TYPING SEROLOGIC ABO: CPT | Performed by: INTERNAL MEDICINE

## 2024-03-14 PROCEDURE — 73610 X-RAY EXAM OF ANKLE: CPT

## 2024-03-14 PROCEDURE — 86901 BLOOD TYPING SEROLOGIC RH(D): CPT

## 2024-03-14 PROCEDURE — 85610 PROTHROMBIN TIME: CPT | Performed by: INTERNAL MEDICINE

## 2024-03-14 PROCEDURE — 99222 1ST HOSP IP/OBS MODERATE 55: CPT | Performed by: INTERNAL MEDICINE

## 2024-03-14 PROCEDURE — 80053 COMPREHEN METABOLIC PANEL: CPT | Performed by: INTERNAL MEDICINE

## 2024-03-14 RX ORDER — ACETAMINOPHEN 160 MG/5ML
650 SOLUTION ORAL EVERY 4 HOURS PRN
Status: DISCONTINUED | OUTPATIENT
Start: 2024-03-14 | End: 2024-03-17 | Stop reason: HOSPADM

## 2024-03-14 RX ORDER — PANTOPRAZOLE SODIUM 40 MG/1
40 TABLET, DELAYED RELEASE ORAL
Status: DISCONTINUED | OUTPATIENT
Start: 2024-03-14 | End: 2024-03-17 | Stop reason: HOSPADM

## 2024-03-14 RX ORDER — POLYETHYLENE GLYCOL 3350 17 G/17G
17 POWDER, FOR SOLUTION ORAL DAILY PRN
Status: DISCONTINUED | OUTPATIENT
Start: 2024-03-14 | End: 2024-03-17 | Stop reason: HOSPADM

## 2024-03-14 RX ORDER — MONTELUKAST SODIUM 10 MG/1
10 TABLET ORAL DAILY
Status: DISCONTINUED | OUTPATIENT
Start: 2024-03-14 | End: 2024-03-17 | Stop reason: HOSPADM

## 2024-03-14 RX ORDER — CLOPIDOGREL BISULFATE 75 MG/1
75 TABLET ORAL DAILY
Status: DISCONTINUED | OUTPATIENT
Start: 2024-03-14 | End: 2024-03-17 | Stop reason: HOSPADM

## 2024-03-14 RX ORDER — SODIUM CHLORIDE, SODIUM LACTATE, POTASSIUM CHLORIDE, CALCIUM CHLORIDE 600; 310; 30; 20 MG/100ML; MG/100ML; MG/100ML; MG/100ML
75 INJECTION, SOLUTION INTRAVENOUS CONTINUOUS
Status: DISCONTINUED | OUTPATIENT
Start: 2024-03-14 | End: 2024-03-14

## 2024-03-14 RX ORDER — ACETAMINOPHEN 325 MG/1
650 TABLET ORAL EVERY 4 HOURS PRN
Status: DISCONTINUED | OUTPATIENT
Start: 2024-03-14 | End: 2024-03-17 | Stop reason: HOSPADM

## 2024-03-14 RX ORDER — OXYCODONE HYDROCHLORIDE 5 MG/1
5 TABLET ORAL EVERY 4 HOURS PRN
Status: DISCONTINUED | OUTPATIENT
Start: 2024-03-14 | End: 2024-03-17 | Stop reason: HOSPADM

## 2024-03-14 RX ORDER — ACETAMINOPHEN 650 MG/1
650 SUPPOSITORY RECTAL EVERY 4 HOURS PRN
Status: DISCONTINUED | OUTPATIENT
Start: 2024-03-14 | End: 2024-03-17 | Stop reason: HOSPADM

## 2024-03-14 RX ORDER — METHENAMINE HIPPURATE 1000 MG/1
1 TABLET ORAL 2 TIMES DAILY
COMMUNITY
Start: 2024-02-16

## 2024-03-14 RX ORDER — PANTOPRAZOLE SODIUM 40 MG/1
1 TABLET, DELAYED RELEASE ORAL DAILY
COMMUNITY
Start: 2024-02-12

## 2024-03-14 RX ORDER — NALOXONE HCL 0.4 MG/ML
0.4 VIAL (ML) INJECTION
Status: DISCONTINUED | OUTPATIENT
Start: 2024-03-14 | End: 2024-03-17 | Stop reason: HOSPADM

## 2024-03-14 RX ORDER — MORPHINE SULFATE 2 MG/ML
2 INJECTION, SOLUTION INTRAMUSCULAR; INTRAVENOUS EVERY 4 HOURS PRN
Status: DISCONTINUED | OUTPATIENT
Start: 2024-03-14 | End: 2024-03-17 | Stop reason: HOSPADM

## 2024-03-14 RX ORDER — POTASSIUM CHLORIDE 750 MG/1
1 TABLET, FILM COATED, EXTENDED RELEASE ORAL DAILY
COMMUNITY
Start: 2024-02-12

## 2024-03-14 RX ORDER — SODIUM CHLORIDE 9 MG/ML
40 INJECTION, SOLUTION INTRAVENOUS AS NEEDED
Status: DISCONTINUED | OUTPATIENT
Start: 2024-03-14 | End: 2024-03-17 | Stop reason: HOSPADM

## 2024-03-14 RX ORDER — BISACODYL 10 MG
10 SUPPOSITORY, RECTAL RECTAL DAILY PRN
Status: DISCONTINUED | OUTPATIENT
Start: 2024-03-14 | End: 2024-03-17 | Stop reason: HOSPADM

## 2024-03-14 RX ORDER — SODIUM CHLORIDE 0.9 % (FLUSH) 0.9 %
10 SYRINGE (ML) INJECTION AS NEEDED
Status: DISCONTINUED | OUTPATIENT
Start: 2024-03-14 | End: 2024-03-17 | Stop reason: HOSPADM

## 2024-03-14 RX ORDER — BISACODYL 5 MG/1
5 TABLET, DELAYED RELEASE ORAL DAILY PRN
Status: DISCONTINUED | OUTPATIENT
Start: 2024-03-14 | End: 2024-03-17 | Stop reason: HOSPADM

## 2024-03-14 RX ORDER — SODIUM CHLORIDE 0.9 % (FLUSH) 0.9 %
10 SYRINGE (ML) INJECTION EVERY 12 HOURS SCHEDULED
Status: DISCONTINUED | OUTPATIENT
Start: 2024-03-14 | End: 2024-03-17 | Stop reason: HOSPADM

## 2024-03-14 RX ORDER — AMOXICILLIN 250 MG
2 CAPSULE ORAL 2 TIMES DAILY PRN
Status: DISCONTINUED | OUTPATIENT
Start: 2024-03-14 | End: 2024-03-17 | Stop reason: HOSPADM

## 2024-03-14 RX ORDER — ACETAMINOPHEN 10 MG/ML
1000 INJECTION, SOLUTION INTRAVENOUS EVERY 8 HOURS PRN
Status: COMPLETED | OUTPATIENT
Start: 2024-03-14 | End: 2024-03-15

## 2024-03-14 RX ORDER — L. ACIDOPHILUS/L. RHAMNOSUS 15B CELL
1 CAPSULE ORAL DAILY
COMMUNITY
Start: 2024-02-12

## 2024-03-14 RX ORDER — LISINOPRIL 20 MG/1
40 TABLET ORAL DAILY
Status: DISCONTINUED | OUTPATIENT
Start: 2024-03-14 | End: 2024-03-17 | Stop reason: HOSPADM

## 2024-03-14 RX ORDER — FUROSEMIDE 20 MG/1
20 TABLET ORAL DAILY
Status: DISCONTINUED | OUTPATIENT
Start: 2024-03-14 | End: 2024-03-17 | Stop reason: HOSPADM

## 2024-03-14 RX ORDER — TOPIRAMATE 25 MG/1
25 TABLET ORAL DAILY
Status: DISCONTINUED | OUTPATIENT
Start: 2024-03-14 | End: 2024-03-17 | Stop reason: HOSPADM

## 2024-03-14 RX ADMIN — MONTELUKAST 10 MG: 10 TABLET, FILM COATED ORAL at 11:05

## 2024-03-14 RX ADMIN — TOPIRAMATE 25 MG: 25 TABLET, FILM COATED ORAL at 11:05

## 2024-03-14 RX ADMIN — FUROSEMIDE 20 MG: 20 TABLET ORAL at 11:03

## 2024-03-14 RX ADMIN — PANTOPRAZOLE SODIUM 40 MG: 40 TABLET, DELAYED RELEASE ORAL at 11:03

## 2024-03-14 RX ADMIN — OXYCODONE 5 MG: 5 TABLET ORAL at 14:19

## 2024-03-14 RX ADMIN — Medication 10 ML: at 01:14

## 2024-03-14 RX ADMIN — CLOPIDOGREL BISULFATE 75 MG: 75 TABLET ORAL at 11:04

## 2024-03-14 RX ADMIN — ACETAMINOPHEN 1000 MG: 10 INJECTION INTRAVENOUS at 04:24

## 2024-03-14 RX ADMIN — OXYCODONE 5 MG: 5 TABLET ORAL at 21:35

## 2024-03-14 RX ADMIN — Medication 10 ML: at 21:33

## 2024-03-14 RX ADMIN — LISINOPRIL 40 MG: 20 TABLET ORAL at 11:05

## 2024-03-14 RX ADMIN — SODIUM CHLORIDE, POTASSIUM CHLORIDE, SODIUM LACTATE AND CALCIUM CHLORIDE 75 ML/HR: 600; 310; 30; 20 INJECTION, SOLUTION INTRAVENOUS at 01:14

## 2024-03-14 NOTE — PLAN OF CARE
Goal Outcome Evaluation:     Pt resting in bed comfortably. Pt medicated for pain once this shift. Pt assist x1 toe touch weight bearing. VSS stable. Pt has should go to [facility] in [place], [place]

## 2024-03-14 NOTE — PLAN OF CARE
Goal Outcome Evaluation:  Plan of Care Reviewed With: patient        Progress: improving  Outcome Evaluation: Pt presents to PT with deficits associated with R tibia fx. Pt demonstrates impaired gait mechanics, hightened pain state and impaired transfer mechanics. Skilled services needed to address these deficits. Plan to DC to IRF due to living alone. Discussed with Family present possibility of DC to home if Family member is able to provide home care as needed.      Anticipated Discharge Disposition (PT): inpatient rehabilitation facility

## 2024-03-14 NOTE — CONSULTS
[facility]   Consult Note    Patient Name: Lourdes Platt  : 1935  MRN: [id number]  Primary Care Physician:  Everton Disla MD  Referring Physician: No Known Provider  Date of admission: 3/13/2024    Subjective   Subjective     Reason for Consult/ Chief Complaint: Fall, right knee pain    HPI:  Lourdes Platt is a 88 y.o. female who sustained a ground-level fall onto the right knee.  She is unable to describe the events of the fall.  She presented to [facility].  X-rays and a follow-up CT scan were obtained and revealed a mildly depressed lateral tibial plateau fracture.  She was transferred to our facility for further care.  She also has chronic bilateral ankle and foot pain.  She did reports the right ankle slightly worse secondary to the fall.  She has a history of vascular surgery procedures on the bilateral lower extremities.  She has a prior history of right hip hemiarthroplasty for femoral neck fracture.    Review of Systems   14 Point ROS is negative except as noted above.     Personal History     Past Medical History:   Diagnosis Date    Arthritis     Bilateral carotid artery stenosis     Constipation     Depression     Foot swelling     RIGHT     GERD (gastroesophageal reflux disease)     Hypertension     Intermittent claudication 2017    PAD (peripheral artery disease) 2017    PONV (postoperative nausea and vomiting)     PVD (peripheral vascular disease) with claudication     lennie legs    Slow to wake up after anesthesia     Spinal headache     Stroke     ASA       Past Surgical History:   Procedure Laterality Date    ANGIOPLASTY FEMORAL ARTERY Left 2017    Procedure: RIGHT FEMORAL ARTERIAL CUTDOWN, LIGATION OF FEMORAL ARTERY PSUEDOANEURYSM, AIF WITH BILATERAL RUNOFF ARTERIOGRAM, RIGHT COMMON ILIAC ANGIOPLASTY;  Surgeon: Keny Sadler MD;  Location: [facility] ;  Service:     APPENDECTOMY      ATHRECTOMY ILIAC, FEMORAL, TIBIAL ARTERY Right  11/24/2017    Procedure: LEFT FEMORAL APPROACH AIF RIGHT LEG ARTERIOGRAM  RIGHT FEMORAL POPLITEAL ARTERY WITH DRUG COATED BALLOON ANGIOPLASTY;  Surgeon: Keny Sadler MD;  Location: [facility] 18/19;  Service:     BUNIONECTOMY      EYE SURGERY      CATARACT EXTRACTION    FEMORAL POPLITEAL BYPASS Left 10/31/2022    Procedure: LEFT FEMORAL TO PERONEAL ARTERY BYPASS WITH POLYTETRAFLUOROETHYLENE, LEFT FEMORAL ENDARTERECTOMY;  Surgeon: Juan A Roblero MD;  Location: [facility];  Service: Vascular;  Laterality: Left;    HYSTERECTOMY  1970    ORIF WRIST FRACTURE Left     TOTAL HIP ARTHROPLASTY Right 11/15/2022    Procedure: Right Posterior Hip Hemiarthroplasty;  Surgeon: Keny Waller MD;  Location: [facility];  Service: Orthopedics;  Laterality: Right;       Family History: family history includes Cancer in her mother; Heart disease in her brother; Leukemia in an other family member; Stroke in her brother and mother. Otherwise pertinent FHx was reviewed and not pertinent to current issue.    Social History:  reports that she has never smoked. She has never used smokeless tobacco. She reports that she does not drink alcohol and does not use drugs.    Home Medications:  Flaxseed Oil, atorvastatin, carboxymethylcellulose, clopidogrel, fish oil, furosemide, iron polysacch complex-B12-VitC-FA-Succ, lisinopril, montelukast, omeprazole, oxyCODONE, potassium chloride, topiramate, and warfarin    Allergies:  Allergies   Allergen Reactions    Penicillins Itching and Swelling       Objective    Objective     Vitals:   Temp:  [98.1 °F (36.7 °C)-99 °F (37.2 °C)] 98.1 °F (36.7 °C)  Heart Rate:  [87-96] 87  Resp:  [16-18] 16  BP: (144-175)/(60-76) 161/60    Physical Exam:   Constitutional: Awake, alert   HENT: Atraumatic, Normocephalic   Respiratory: Nonlabored respirations    Cardiovascular: Intact peripheral pulses    Musculoskeletal:     Right lower extremity: Moderate knee effusion.  No wounds.   Tender along the lateral aspect of the proximal tibia.  Calf nontender.  Healed vascular incisions.  Veinous stasis type changes distally.  Diminished peripheral pulses secondary to chronic vascular disease.     Imaging:  Imaging Results (Last 24 Hours)       ** No results found for the last 24 hours. **             Result Review    Result Review:  I have personally reviewed the results from the time of this admission to 3/14/2024 06:32 EDT and agree with these findings:  [x]  Laboratory  []  Microbiology  [x]  Radiology  []  EKG/Telemetry   []  Cardiology/Vascular   []  Pathology  []  Old records  []  Other:      Assessment & Plan   Assessment / Plan     Brief Patient Summary:  Lourdes Platt is a 88 y.o. female with a depressed lateral tibial plateau fracture secondary to ground-level fall    Active Hospital Problems:  Active Hospital Problems    Diagnosis     **Tibia fracture      Plan:     Nonoperative management  Toe-touch weightbearing right lower extremity in knee immobilizer  Knee immobilizer may be open for 0-30 knee motion with PT  Ice/elevate  X-ray right ankle today  Pain control, medical management, DVT prophylaxis per primary team    Dispo: Anticipate rehab placement.  2-week orthopedic follow-up after discharge.      Electronically signed by Tomas Villalobos MD, 03/14/24, 6:32 AM EDT.

## 2024-03-14 NOTE — H&P
[facility]  Date: 3/14/2024   Patient Name: Lourdes Platt  : 1935  MRN: [id number]  Primary Care Physician:  Everton Disla MD  Date of admission: 3/13/2024    Subjective   Subjective     Chief Complaint: Right lower extremity pain    HPI:    Lourdes Platt is a 88 y.o. female past medical history of PAD, hypertension that presented to the outside ER for evaluation after fall with right lower extremity pain.  Patient states she tripped and fell.  Denies head trauma or loss consciousness.  Denies any other fevers, chills, sweats, nausea, vomiting, chest pain shortness of breath, palpitations, abdominal pain diarrhea constipation, dysuria, rash.  Prior to transfer found to have a right tibia fracture and orthopedics has been consulted.      Personal History     Past Medical History:  Past Medical History:   Diagnosis Date    Arthritis     Bilateral carotid artery stenosis     Constipation     Depression     Foot swelling     RIGHT     GERD (gastroesophageal reflux disease)     Hypertension     Intermittent claudication 2017    PAD (peripheral artery disease) 2017    PONV (postoperative nausea and vomiting)     PVD (peripheral vascular disease) with claudication     lennie legs    Slow to wake up after anesthesia     Spinal headache     Stroke     ASA         Past Surgical History:  Past Surgical History:   Procedure Laterality Date    ANGIOPLASTY FEMORAL ARTERY Left 2017    Procedure: RIGHT FEMORAL ARTERIAL CUTDOWN, LIGATION OF FEMORAL ARTERY PSUEDOANEURYSM, AIF WITH BILATERAL RUNOFF ARTERIOGRAM, RIGHT COMMON ILIAC ANGIOPLASTY;  Surgeon: Keny Sadler MD;  Location: [facility] ;  Service:     APPENDECTOMY      ATHRECTOMY ILIAC, FEMORAL, TIBIAL ARTERY Right 2017    Procedure: LEFT FEMORAL APPROACH AIF RIGHT LEG ARTERIOGRAM  RIGHT FEMORAL POPLITEAL ARTERY WITH DRUG COATED BALLOON ANGIOPLASTY;  Surgeon: Keny Sadler,  MD;  Location: [facility] OR 18/19;  Service:     BUNIONECTOMY      EYE SURGERY      CATARACT EXTRACTION    FEMORAL POPLITEAL BYPASS Left 10/31/2022    Procedure: LEFT FEMORAL TO PERONEAL ARTERY BYPASS WITH POLYTETRAFLUOROETHYLENE, LEFT FEMORAL ENDARTERECTOMY;  Surgeon: Juan A Roblero MD;  Location: [facility] OR;  Service: Vascular;  Laterality: Left;    HYSTERECTOMY  1970    ORIF WRIST FRACTURE Left     TOTAL HIP ARTHROPLASTY Right 11/15/2022    Procedure: Right Posterior Hip Hemiarthroplasty;  Surgeon: Keny Waller MD;  Location: [facility] OR;  Service: Orthopedics;  Laterality: Right;         Family History:   Family History   Problem Relation Age of Onset    Cancer Mother     Stroke Mother     Heart disease Brother     Stroke Brother     Leukemia Other     Malig Hyperthermia Neg Hx          Social History:   Social History     Tobacco Use    Smoking status: Never    Smokeless tobacco: Never    Tobacco comments:     caffeine - 3 - 5 daily   Vaping Use    Vaping status: Never Used   Substance Use Topics    Alcohol use: No    Drug use: No         Home Medications:  Flaxseed Oil, atorvastatin, carboxymethylcellulose, clopidogrel, fish oil, furosemide, iron polysacch complex-B12-VitC-FA-Succ, lisinopril, montelukast, omeprazole, oxyCODONE, potassium chloride, topiramate, and warfarin    Allergies:  Allergies   Allergen Reactions    Penicillins Itching and Swelling       Review of Systems   All systems were reviewed and negative except for: Right lower extremity pain    Objective   Objective     Vitals:        Physical Exam    Constitutional: Awake, alert, no acute distress   Eyes: Pupils equal, sclerae anicteric, no conjunctival injection   HENT: NCAT, mucous membranes moist   Neck: Supple, no thyromegaly, no lymphadenopathy, trachea midline   Respiratory: Clear to auscultation bilaterally, nonlabored respirations    Cardiovascular: RRR, no murmurs, rubs, or gallops, palpable pedal pulses  bilaterally, left pedal pulses are diminished   Gastrointestinal: Positive bowel sounds, soft, nontender, nondistended   Musculoskeletal: No bilateral ankle edema, no clubbing or cyanosis to extremities   Psychiatric: Appropriate affect, cooperative   Neurologic: Oriented x 3, strength symmetric in all extremities, Cranial Nerves grossly intact to confrontation, speech clear   Skin: Erythema left foot to mid shin    Result Review    Result Review:  I have personally reviewed the results from the time of this admission to 3/14/2024 00:25 EDT and agree with these findings:  [x]  Laboratory  []  Microbiology  [x]  Radiology  []  EKG/Telemetry   []  Cardiology/Vascular   []  Pathology  []  Old records  []  Other:      Assessment & Plan   Assessment / Plan     Assessment/Plan:   Right tibia fracture: Supportive care and pain control.  N.p.o. with IV fluid.  Orthopedics called prior to transfer, will appreciate their recommendations.  Physical therapy and DVT prophylaxis when okay with orthopedics  Hypertension: Add back on medications  PAD: Add recommendations once verified      DVT prophylaxis:  Add back chemical DVT prophylaxis when okay with orthopedics        CODE STATUS:     Full code    Admission Status:  I believe this patient meets inpatient status.    Electronically signed by Sky Hutton Jr, MD, 03/14/24, 12:25 AM EDT.

## 2024-03-14 NOTE — PLAN OF CARE
Goal Outcome Evaluation:  Plan of Care Reviewed With: patient, son, daughter        Progress: no change  Outcome Evaluation: Patient presents with limitations in self-care, functional transfers, balance, and endurance. She would benefit from continued skilled occupational therapy services to maximize independence with ADLs/functional transfers.      Anticipated Discharge Disposition (OT): skilled nursing facility

## 2024-03-14 NOTE — THERAPY EVALUATION
Acute Care - Physical Therapy Initial Evaluation   [place]     Patient Name: Lourdes Platt  : 1935  MRN: [id number]  Today's Date: 3/14/2024  Admit date: 3/13/2024     Referring Physician: Daniel Chang MD     Surgery Date:* No surgery found *            Visit Dx:     ICD-10-CM ICD-9-CM   1. Difficulty in walking  R26.2 719.7     Patient Active Problem List   Diagnosis    PAD (peripheral artery disease)    Intermittent claudication    Shortness of breath    Essential hypertension    Atherosclerosis of native arteries of extremities with rest pain, right leg    Atherosclerosis of native arteries of extremities with rest pain, left leg    Acute blood loss anemia    Fracture of neck of right femur    Closed displaced fracture of right femoral neck    Tibia fracture     Past Medical History:   Diagnosis Date    Arthritis     Bilateral carotid artery stenosis     Constipation     Depression     Foot swelling     RIGHT     GERD (gastroesophageal reflux disease)     Hypertension     Intermittent claudication 2017    PAD (peripheral artery disease) 2017    PONV (postoperative nausea and vomiting)     PVD (peripheral vascular disease) with claudication     lennie legs    Slow to wake up after anesthesia     Spinal headache     Stroke     ASA     Past Surgical History:   Procedure Laterality Date    ANGIOPLASTY FEMORAL ARTERY Left 2017    Procedure: RIGHT FEMORAL ARTERIAL CUTDOWN, LIGATION OF FEMORAL ARTERY PSUEDOANEURYSM, AIF WITH BILATERAL RUNOFF ARTERIOGRAM, RIGHT COMMON ILIAC ANGIOPLASTY;  Surgeon: Keny Sadler MD;  Location: [facility] OR ;  Service:     APPENDECTOMY      ATHRECTOMY ILIAC, FEMORAL, TIBIAL ARTERY Right 2017    Procedure: LEFT FEMORAL APPROACH AIF RIGHT LEG ARTERIOGRAM  RIGHT FEMORAL POPLITEAL ARTERY WITH DRUG COATED BALLOON ANGIOPLASTY;  Surgeon: Keny Sadler MD;  Location: [facility] OR ;  Service:     BUNIONECTOMY      EYE SURGERY       CATARACT EXTRACTION    FEMORAL POPLITEAL BYPASS Left 10/31/2022    Procedure: LEFT FEMORAL TO PERONEAL ARTERY BYPASS WITH POLYTETRAFLUOROETHYLENE, LEFT FEMORAL ENDARTERECTOMY;  Surgeon: Juan A Roblero MD;  Location: [facility];  Service: Vascular;  Laterality: Left;    HYSTERECTOMY  1970    ORIF WRIST FRACTURE Left     TOTAL HIP ARTHROPLASTY Right 11/15/2022    Procedure: Right Posterior Hip Hemiarthroplasty;  Surgeon: Keny Waller MD;  Location: [facility];  Service: Orthopedics;  Laterality: Right;     PT Assessment (Last 12 Hours)       PT Evaluation and Treatment       Row Name 03/14/24 1000          Physical Therapy Time and Intention    Subjective Information complains of;pain;weakness  -RITU     Document Type evaluation  -RITU     Mode of Treatment individual therapy;physical therapy  -RITU     Patient Effort good  -RITU       Row Name 03/14/24 1000          General Information    Patient Profile Reviewed yes  -RITU     Patient Observations alert;cooperative;agree to therapy  -RITU     Prior Level of Function independent:;all household mobility;community mobility  -RITU     Equipment Currently Used at Home cane, straight;walker, rolling  -RITU     Existing Precautions/Restrictions fall  ROM out of immobilizer limited to 0-30 per othro  -RITU     Benefits Reviewed patient:;improve function;increase independence;increase strength  -RITU       Row Name 03/14/24 1000          Living Environment    Current Living Arrangements home  -RITU     Home Accessibility wheelchair accessible  -RITU     People in Home alone  -RITU     Primary Care Provided by self  -RITU       Row Name 03/14/24 1000          Range of Motion (ROM)    Range of Motion bilateral lower extremities  R knee in knee immobilizer, all other range WFL  -RITU       Row Name 03/14/24 1000          Strength (Manual Muscle Testing)    Strength (Manual Muscle Testing) bilateral lower extremities  3-/5 R hip flex, R knee not tested due to immobilizer, R ankle  3+/5. all L LE MMT 4/5  -RITU       Row Name 03/14/24 1000          Mobility    Extremity Weight-bearing Status right lower extremity  -RITU     Right Lower Extremity (Weight-bearing Status) toe touch weight-bearing (TTWB)  -RITU       Row Name 03/14/24 1000          Bed Mobility    Bed Mobility supine-sit  -RITU     Supine-Sit [place] (Bed Mobility) verbal cues;minimum assist (75% patient effort)  -RITU     Bed Mobility, Safety Issues decreased use of legs for bridging/pushing  -RITU     Assistive Device (Bed Mobility) bed rails  -RITU       Row Name 03/14/24 1000          Transfers    Transfers sit-stand transfer;stand-sit transfer  -RITU     Maintains Weight-bearing Status (Transfers) able to maintain  -RITU       Row Name 03/14/24 1000          Sit-Stand Transfer    Sit-Stand [place] (Transfers) verbal cues;contact guard  -RITU     Assistive Device (Sit-Stand Transfers) walker, front-wheeled  -RITU       Row Name 03/14/24 1000          Stand-Sit Transfer    Stand-Sit [place] (Transfers) verbal cues;contact guard  -RITU     Assistive Device (Stand-Sit Transfers) walker, front-wheeled  -RITU       Row Name 03/14/24 1000          Gait/Stairs (Locomotion)    Gait/Stairs Locomotion gait/ambulation assistive device  -RITU     [place] Level (Gait) minimum assist (75% patient effort)  -RITU     Assistive Device (Gait) walker, front-wheeled  -RITU     Patient was able to Ambulate yes  -RITU     Distance in Feet (Gait) 20  -RITU     Pattern (Gait) 3-point  -RIUT     Deviations/Abnormal Patterns (Gait) right sided deviations;gait speed decreased;weight shifting decreased  -RITU     Right Sided Gait Deviations decreased knee extension;heel strike decreased;forward flexed posture  -RITU     Comment, (Gait/Stairs) Pt requires frequent cues for WB throguh UE for maintenance of WB status.  -RITU       Row Name 03/14/24 1000          Safety Issues, Functional Mobility    Safety Issues Affecting Function (Mobility) awareness of need for  assistance;safety precaution awareness  -RITU     Impairments Affecting Function (Mobility) motor control;pain  -RITU       Row Name 03/14/24 1000          Plan of Care Review    Plan of Care Reviewed With patient  -RITU     Progress improving  -RITU     Outcome Evaluation Pt presents to PT with deficits associated with R tibia fx. Pt demonstrates impaired gait mechanics, hightened pain state and impaired transfer mechanics. Skilled services needed to address these deficits. Plan to DC to IRF due to living alone. Discussed with Family present possibility of DC to home if Family member is able to provide home care as needed.  -RITU       Row Name 03/14/24 1000          Positioning and Restraints    Pre-Treatment Position in bed  -RITU     Post Treatment Position chair  -RITU     In Chair reclined;call light within reach;with family/caregiver  -RITU       Row Name 03/14/24 1000          Therapy Assessment/Plan (PT)    Patient/Family Therapy Goals Statement (PT) Go Home and not to IRF  -RITU     Rehab Potential (PT) good, to achieve stated therapy goals  -RITU     Criteria for Skilled Interventions Met (PT) meets criteria  -RITU     Therapy Frequency (PT) daily  -RITU     Predicted Duration of Therapy Intervention (PT) 10 days  -RITU     Problem List (PT) problems related to;mobility;pain;strength;range of motion (ROM)  -RITU     Activity Limitations Related to Problem List (PT) unable to ambulate safely;unable to transfer safely  -RITU       Row Name 03/14/24 1000          PT Evaluation Complexity    History, PT Evaluation Complexity no personal factors and/or comorbidities  -RITU     Examination of Body Systems (PT Eval Complexity) total of 3 or more elements  -RITU     Clinical Presentation (PT Evaluation Complexity) stable  -RITU     Clinical Decision Making (PT Evaluation Complexity) low complexity  -RITU     Overall Complexity (PT Evaluation Complexity) low complexity  -RITU       Row Name 03/14/24 1000          Therapy Plan Review/Discharge Plan (PT)     Therapy Plan Review (PT) evaluation/treatment results reviewed  -RITU       Row Name 03/14/24 1000          Physical Therapy Goals    Bed Mobility Goal Selection (PT) bed mobility, PT goal 1  -RITU     Transfer Goal Selection (PT) transfer, PT goal 1  -RITU     Gait Training Goal Selection (PT) gait training, PT goal 1  -RITU       Row Name 03/14/24 1000          Bed Mobility Goal 1 (PT)    Activity/Assistive Device (Bed Mobility Goal 1, PT) supine to sit  -RITU     [place] Level/Cues Needed (Bed Mobility Goal 1, PT) independent  -RITU     Time Frame (Bed Mobility Goal 1, PT) 10 days  -RITU     Progress/Outcomes (Bed Mobility Goal 1, PT) new goal  -RITU       Row Name 03/14/24 1000          Transfer Goal 1 (PT)    Activity/Assistive Device (Transfer Goal 1, PT) sit-to-stand/stand-to-sit  -RITU     [place] Level/Cues Needed (Transfer Goal 1, PT) independent  -RITU     Time Frame (Transfer Goal 1, PT) 10 days  -RITU     Progress/Outcome (Transfer Goal 1, PT) new goal  -RITU       Row Name 03/14/24 1000          Gait Training Goal 1 (PT)    Activity/Assistive Device (Gait Training Goal 1, PT) gait (walking locomotion)  -RITU     [place] Level (Gait Training Goal 1, PT) independent  -RITU     Distance (Gait Training Goal 1, PT) 80  -RITU     Time Frame (Gait Training Goal 1, PT) 10 days  -RITU     Progress/Outcome (Gait Training Goal 1, PT) new goal  -RITU               User Key  (r) = Recorded By, (t) = Taken By, (c) = Cosigned By      Initials Name Provider Type    Naman Keene, PT Physical Therapist                    Physical Therapy Education       Title: PT OT SLP Therapies (Done)       Topic: Physical Therapy (Done)       Point: Mobility training (Done)       Learning Progress Summary             Patient Acceptance, E,TB, VU by RITU at 3/14/2024 1029                         Point: Precautions (Done)       Learning Progress Summary             Patient Acceptance, E,TB, VU by RITU at 3/14/2024 1029                                          User Key       Initials Effective Dates Name Provider Type Discipline    RITU 06/03/21 -  Naman Burrows PT Physical Therapist PT                  PT Recommendation and Plan  Anticipated Discharge Disposition (PT): inpatient rehabilitation facility  Planned Therapy Interventions (PT): balance training, bed mobility training, gait training, home exercise program, patient/family education, stair training, strengthening, transfer training  Therapy Frequency (PT): daily  Plan of Care Reviewed With: patient  Progress: improving  Outcome Evaluation: Pt presents to PT with deficits associated with R tibia fx. Pt demonstrates impaired gait mechanics, hightened pain state and impaired transfer mechanics. Skilled services needed to address these deficits. Plan to DC to IRF due to living alone. Discussed with Family present possibility of DC to home if Family member is able to provide home care as needed.   Outcome Measures       Row Name 03/14/24 1000             How much help from another person do you currently need...    Turning from your back to your side while in flat bed without using bedrails? 3  -RITU      Moving from lying on back to sitting on the side of a flat bed without bedrails? 3  -RIUT      Moving to and from a bed to a chair (including a wheelchair)? 3  -RITU      Standing up from a chair using your arms (e.g., wheelchair, bedside chair)? 3  -RITU      Climbing 3-5 steps with a railing? 2  -RITU      To walk in hospital room? 3  -RITU      AM-PAC 6 Clicks Score (PT) 17  -RITU      Highest Level of Mobility Goal 5 --> Static standing  -RITU                User Key  (r) = Recorded By, (t) = Taken By, (c) = Cosigned By      Initials Name Provider Type    RITUNaman Barraza PT Physical Therapist                     Time Calculation:    PT Charges       Row Name 03/14/24 1015             Time Calculation    PT Received On 03/14/24  -RITU      PT Goal Re-Cert Due Date 03/23/24  -RITU         Untimed Charges    PT  Eval/Re-eval Minutes 25  -RITU         Total Minutes    Untimed Charges Total Minutes 25  -RITU       Total Minutes 25  -RITU                User Key  (r) = Recorded By, (t) = Taken By, (c) = Cosigned By      Initials Name Provider Type    Naman Keene, PT Physical Therapist                  Therapy Charges for Today       Code Description Service Date Service Provider Modifiers Qty    17567426061 HC PT EVAL LOW COMPLEXITY 2 3/14/2024 Naman Burrows, PT GP 1            PT G-Codes  AM-PAC 6 Clicks Score (PT): 17    Naman Burrows, PT  3/14/2024

## 2024-03-14 NOTE — THERAPY EVALUATION
Patient Name: Lourdes Platt  : 1935    MRN: [id number]                              Today's Date: 3/14/2024       Admit Date: 3/13/2024    Visit Dx:     ICD-10-CM ICD-9-CM   1. Difficulty in walking  R26.2 719.7   2. Decreased activities of daily living (ADL)  Z78.9 V49.89     Patient Active Problem List   Diagnosis    PAD (peripheral artery disease)    Intermittent claudication    Shortness of breath    Essential hypertension    Atherosclerosis of native arteries of extremities with rest pain, right leg    Atherosclerosis of native arteries of extremities with rest pain, left leg    Acute blood loss anemia    Fracture of neck of right femur    Closed displaced fracture of right femoral neck    Tibia fracture     Past Medical History:   Diagnosis Date    Arthritis     Bilateral carotid artery stenosis     Constipation     Depression     Foot swelling     RIGHT     GERD (gastroesophageal reflux disease)     Hypertension     Intermittent claudication 2017    PAD (peripheral artery disease) 2017    PONV (postoperative nausea and vomiting)     PVD (peripheral vascular disease) with claudication     lennie legs    Slow to wake up after anesthesia     Spinal headache     Stroke     ASA     Past Surgical History:   Procedure Laterality Date    ANGIOPLASTY FEMORAL ARTERY Left 2017    Procedure: RIGHT FEMORAL ARTERIAL CUTDOWN, LIGATION OF FEMORAL ARTERY PSUEDOANEURYSM, AIF WITH BILATERAL RUNOFF ARTERIOGRAM, RIGHT COMMON ILIAC ANGIOPLASTY;  Surgeon: Keny Sadler MD;  Location: [facility] OR ;  Service:     APPENDECTOMY      ATHRECTOMY ILIAC, FEMORAL, TIBIAL ARTERY Right 2017    Procedure: LEFT FEMORAL APPROACH AIF RIGHT LEG ARTERIOGRAM  RIGHT FEMORAL POPLITEAL ARTERY WITH DRUG COATED BALLOON ANGIOPLASTY;  Surgeon: Keny Sadler MD;  Location: [facility] OR ;  Service:     BUNIONECTOMY      EYE SURGERY      CATARACT EXTRACTION    FEMORAL POPLITEAL BYPASS Left  10/31/2022    Procedure: LEFT FEMORAL TO PERONEAL ARTERY BYPASS WITH POLYTETRAFLUOROETHYLENE, LEFT FEMORAL ENDARTERECTOMY;  Surgeon: Juan A Roblero MD;  Location: [facility];  Service: Vascular;  Laterality: Left;    HYSTERECTOMY  1970    ORIF WRIST FRACTURE Left     TOTAL HIP ARTHROPLASTY Right 11/15/2022    Procedure: Right Posterior Hip Hemiarthroplasty;  Surgeon: Keny Waller MD;  Location: [facility] OR;  Service: Orthopedics;  Laterality: Right;      General Information       Row Name 03/14/24 1137          OT Time and Intention    Document Type evaluation  -     Mode of Treatment individual therapy;occupational therapy  -       Row Name 03/14/24 1137          General Information    Patient Profile Reviewed yes  -     Prior Level of Function --  (I) with ADLs, ambulated with a STC typically but would use RW PRN, has a walk-in shower with shower chair, elevated commode, stands to groom, no longer drives, and no home O2.  -     Existing Precautions/Restrictions fall;weight bearing  Toe-touch weightbearing RLE in knee immobilizer. Knee immobilizer may be open for 0-30° ROM when seated or supine  -     Barriers to Rehab none identified  -       Row Name 03/14/24 1137          Occupational Profile    Reason for Services/Referral (Occupational Profile) Patient is an 88 year old female admitted to [facility] for lateral tibial plateau fracture with non-operative management on March 13th, 2024. Occupational therapy consulted due to recent decline in ADLs/functional transfers. No previous occupational therapy services for current condition.  -       Row Name 03/14/24 1137          Living Environment    People in Home alone  Son and daughter in law able to assist if needed  -       Row Name 03/14/24 1137          Home Main Entrance    Number of Stairs, Main Entrance none  -       Row Name 03/14/24 1137          Cognition    Orientation Status (Cognition) oriented to;person   very [place]  -[facility] Name 03/14/24 1137          Safety Issues, Functional Mobility    Impairments Affecting Function (Mobility) balance;endurance/activity tolerance;pain;range of motion (ROM)  -               User Key  (r) = Recorded By, (t) = Taken By, (c) = Cosigned By      Initials Name Provider Type     Patricia Villarreal OT Occupational Therapist                     Mobility/ADL's       [facility] Name 03/14/24 1144          Bed Mobility    Comment, (Bed Mobility) Patient had just gotten back in bed prior to OT's arrival and declined further functional transfers. Per PT evaluation she required min assist for supine to sit and CGA for sit to stand using RW.  -[facility] Name 03/14/24 1144          Activities of Daily Living    BADL Assessment/Intervention bathing;upper body dressing;lower body dressing;grooming;feeding;toileting  -LF       Row Name 03/14/24 1144          Mobility    Extremity Weight-bearing Status right lower extremity  -     Right Lower Extremity (Weight-bearing Status) toe touch weight-bearing (TTWB)  Toe-touch weightbearing RLE in knee immobilizer. Knee immobilizer may be open for 0-30° ROM when seated or supine  -LF       Row Name 03/14/24 1144          Bathing Assessment/Intervention    [place] Level (Bathing) bathing skills;upper body;set up;lower body;maximum assist (25% patient effort)  -LF       Row Name 03/14/24 1144          Upper Body Dressing Assessment/Training    [place] Level (Upper Body Dressing) upper body dressing skills;set up  -LF       Row Name 03/14/24 1144          Lower Body Dressing Assessment/Training    [place] Level (Lower Body Dressing) lower body dressing skills;maximum assist (25% patient effort)  -[facility] Name 03/14/24 1144          Grooming Assessment/Training    [place] Level (Grooming) grooming skills;set up  -LF       Row Name 03/14/24 1144          Self-Feeding Assessment/Training    [place] Level (Feeding) feeding  skills;set up  -[facility] Name 03/14/24 1144          Toileting Assessment/Training    [place] Level (Toileting) toileting skills;maximum assist (25% patient effort);dependent (less than 25% patient effort)  -     Comment, (Toileting) Phillips catheter currently in place  -               User Key  (r) = Recorded By, (t) = Taken By, (c) = Cosigned By      Initials Name Provider Type     Patricia Villarreal OT Occupational Therapist                   Obj/Interventions       [facility] Name 03/14/24 1148          Sensory Assessment (Somatosensory)    Sensory Assessment (Somatosensory) UE sensation intact  -[facility] Name 03/14/24 1148          Vision Assessment/Intervention    Visual Impairment/Limitations WFL;corrective lenses full-time  -[facility] Name 03/14/24 1148          Range of Motion Comprehensive    General Range of Motion bilateral upper extremity ROM WFL  -[facility] Name 03/14/24 1148          Strength Comprehensive (MMT)    Comment, General Manual Muscle Testing (MMT) Assessment 4/5 BUEs  -[facility] Name 03/14/24 1148          Motor Skills    Motor Skills coordination;functional endurance  -     Coordination bilateral;upper extremity;WFL  -     Functional Endurance Fair  -[facility] Name 03/14/24 1148          Balance    Comment, Balance Not tested, likely impaired.  -               User Key  (r) = Recorded By, (t) = Taken By, (c) = Cosigned By      Initials Name Provider Type     Patricia Villarreal OT Occupational Therapist                   Goals/Plan       [facility] Name 03/14/24 1151          Bed Mobility Goal 1 (OT)    Activity/Assistive Device (Bed Mobility Goal 1, OT) bed mobility activities, all  -     [place] Level/Cues Needed (Bed Mobility Goal 1, OT) modified independence  -     Time Frame (Bed Mobility Goal 1, OT) long term goal (LTG);10 days  -       Row Name 03/14/24 1151          Transfer Goal 1 (OT)    Activity/Assistive Device (Transfer Goal 1, OT) transfers,  all  -LF     Mobile Level/Cues Needed (Transfer Goal 1, OT) modified independence  -LF     Time Frame (Transfer Goal 1, OT) long term goal (LTG);10 days  -LF       Row Name 03/14/24 1151          Bathing Goal 1 (OT)    Activity/Device (Bathing Goal 1, OT) bathing skills, all  -LF     Mobile Level/Cues Needed (Bathing Goal 1, OT) modified independence  -LF     Time Frame (Bathing Goal 1, OT) long term goal (LTG);10 days  -LF       Row Name 03/14/24 1151          Dressing Goal 1 (OT)    Activity/Device (Dressing Goal 1, OT) dressing skills, all  -LF     Mobile/Cues Needed (Dressing Goal 1, OT) modified independence  -LF     Time Frame (Dressing Goal 1, OT) long term goal (LTG);10 days  -LF       Row Name 03/14/24 1151          Toileting Goal 1 (OT)    Activity/Device (Toileting Goal 1, OT) toileting skills, all  -LF     Mobile Level/Cues Needed (Toileting Goal 1, OT) modified independence  -LF     Time Frame (Toileting Goal 1, OT) long term goal (LTG);10 days  -       Row Name 03/14/24 1151          Problem Specific Goal 1 (OT)    Problem Specific Goal 1 (OT) Patient will demonstrate good- endurance to support ADLs/functional transfers.  -LF     Time Frame (Problem Specific Goal 1, OT) long term goal (LTG);10 days  -       Row Name 03/14/24 1151          Therapy Assessment/Plan (OT)    Planned Therapy Interventions (OT) activity tolerance training;BADL retraining;functional balance retraining;occupation/activity based interventions;patient/caregiver education/training;strengthening exercise;transfer/mobility retraining  -               User Key  (r) = Recorded By, (t) = Taken By, (c) = Cosigned By      Initials Name Provider Type     Patricia Villarreal OT Occupational Therapist                   Clinical Impression       Row Name 03/14/24 1150          Pain Assessment    Additional Documentation Pain Scale: FACES Pre/Post-Treatment (Group)  -       Row Name 03/14/24 1150          Pain  Scale: FACES Pre/Post-Treatment    Pain: FACES Scale, Pretreatment 0-->no hurt  -LF     Posttreatment Pain Rating 0-->no hurt  -LF       Row Name 03/14/24 1150          Plan of Care Review    Plan of Care Reviewed With patient;son;daughter  -     Progress no change  -     Outcome Evaluation Patient presents with limitations in self-care, functional transfers, balance, and endurance. She would benefit from continued skilled occupational therapy services to maximize independence with ADLs/functional transfers.  -       Row Name 03/14/24 1150          Therapy Assessment/Plan (OT)    Patient/Family Therapy Goal Statement (OT) To maximize independence.  -     Rehab Potential (OT) good, to achieve stated therapy goals  -     Criteria for Skilled Therapeutic Interventions Met (OT) yes;meets criteria;skilled treatment is necessary  -     Therapy Frequency (OT) 5 times/wk  -       Row Name 03/14/24 1150          Therapy Plan Review/Discharge Plan (OT)    Anticipated Discharge Disposition (OT) skilled nursing facility  -       Row Name 03/14/24 1150          Vital Signs    O2 Delivery Pre Treatment room air  -     O2 Delivery Intra Treatment room air  -     O2 Delivery Post Treatment room air  -       Row Name 03/14/24 1150          Positioning and Restraints    Pre-Treatment Position in bed  -LF     Post Treatment Position bed  -LF     In Bed fowlers;call light within reach;encouraged to call for assist;with family/caregiver  -               User Key  (r) = Recorded By, (t) = Taken By, (c) = Cosigned By      Initials Name Provider Type     Patricia Villarreal, OT Occupational Therapist                   Outcome Measures       Row Name 03/14/24 1151          How much help from another is currently needed...    Putting on and taking off regular lower body clothing? 2  -LF     Bathing (including washing, rinsing, and drying) 2  -LF     Toileting (which includes using toilet bed pan or urinal) 2  -LF      Putting on and taking off regular upper body clothing 4  -LF     Taking care of personal grooming (such as brushing teeth) 4  -LF     Eating meals 4  -LF     AM-PAC 6 Clicks Score (OT) 18  -LF       Row Name 03/14/24 1008 03/14/24 1000       How much help from another person do you currently need...    Turning from your back to your side while in flat bed without using bedrails? 3  -KT 3  -RITU    Moving from lying on back to sitting on the side of a flat bed without bedrails? 3  -KT 3  -RITU    Moving to and from a bed to a chair (including a wheelchair)? 3  -KT 3  -RITU    Standing up from a chair using your arms (e.g., wheelchair, bedside chair)? 3  -KT 3  -RITU    Climbing 3-5 steps with a railing? 2  -KT 2  -RITU    To walk in hospital room? 3  -KT 3  -RITU    AM-PAC 6 Clicks Score (PT) 17  -KT 17  -RITU    Highest Level of Mobility Goal 5 --> Static standing  -KT 5 --> Static standing  -RITU      Row Name 03/14/24 0219 03/14/24 0208       How much help from another person do you currently need...    Turning from your back to your side while in flat bed without using bedrails? 2  -JH 2  -JH    Moving from lying on back to sitting on the side of a flat bed without bedrails? 1  -JH 1  -JH    Moving to and from a bed to a chair (including a wheelchair)? 1  -JH 1  -JH    Standing up from a chair using your arms (e.g., wheelchair, bedside chair)? 1  -JH 1  -JH    Climbing 3-5 steps with a railing? 1  -JH 1  -JH    To walk in hospital room? 1  -JH 1  -JH    AM-PAC 6 Clicks Score (PT) 7  -JH 7  -JH    Highest Level of Mobility Goal 2 --> Bed activities/dependent transfer  -JH 2 --> Bed activities/dependent transfer  -JH      Row Name 03/14/24 1151          Functional Assessment    Outcome Measure Options AM-PAC 6 Clicks Daily Activity (OT);Optimal Instrument  -LF       Row Name 03/14/24 1151          Optimal Instrument    Optimal Instrument Optimal - 3  -LF     Bending/Stooping 4  -LF     Standing 2  -LF     Reaching 1  -LF     From  the list, choose the 3 activities you would most like to be able to do without any difficulty Bending/stooping;Standing;Reaching  -     Total Score Optimal - 3 7  -LF               User Key  (r) = Recorded By, (t) = Taken By, (c) = Cosigned By      Initials Name Provider Type     Patricia Villarreal OT Occupational Therapist    Naman Keene, PT Physical Therapist    Lester Marroquin, RN Registered Nurse    Travis Luciano RN Registered Nurse                    Occupational Therapy Education       Title: PT OT SLP Therapies (Done)       Topic: Occupational Therapy (Done)       Point: ADL training (Done)       Description:   Instruct learner(s) on proper safety adaptation and remediation techniques during self care or transfers.   Instruct in proper use of assistive devices.                  Learning Progress Summary             Patient Acceptance, E,TB, VU by  at 3/14/2024 1152                         Point: Precautions (Done)       Description:   Instruct learner(s) on prescribed precautions during self-care and functional transfers.                  Learning Progress Summary             Patient Acceptance, E,TB, VU by  at 3/14/2024 1152                         Point: Body mechanics (Done)       Description:   Instruct learner(s) on proper positioning and spine alignment during self-care, functional mobility activities and/or exercises.                  Learning Progress Summary             Patient Acceptance, E,TB, VU by  at 3/14/2024 1152                                         User Key       Initials Effective Dates Name Provider Type [facility] 06/16/21 -  Patricia Villarreal OT Occupational Therapist OT                  OT Recommendation and Plan  Planned Therapy Interventions (OT): activity tolerance training, BADL retraining, functional balance retraining, occupation/activity based interventions, patient/caregiver education/training, strengthening exercise, transfer/mobility  retraining  Therapy Frequency (OT): 5 times/wk  Plan of Care Review  Plan of Care Reviewed With: patient, son, daughter  Progress: no change  Outcome Evaluation: Patient presents with limitations in self-care, functional transfers, balance, and endurance. She would benefit from continued skilled occupational therapy services to maximize independence with ADLs/functional transfers.     Time Calculation:   Evaluation Complexity (OT)  Review Occupational Profile/Medical/Therapy History Complexity: brief/low complexity  Assessment, Occupational Performance/Identification of Deficit Complexity: 3-5 performance deficits  Clinical Decision Making Complexity (OT): problem focused assessment/low complexity  Overall Complexity of Evaluation (OT): low complexity     Time Calculation- OT       Row Name 03/14/24 1152             Time Calculation- OT    OT Received On 03/14/24  -LF      OT Goal Re-Cert Due Date 03/23/24  -LF         Untimed Charges    OT Eval/Re-eval Minutes 33  -LF         Total Minutes    Untimed Charges Total Minutes 33  -LF       Total Minutes 33  -LF                User Key  (r) = Recorded By, (t) = Taken By, (c) = Cosigned By      Initials Name Provider Type    LF Patricia Villarreal OT Occupational Therapist                  Therapy Charges for Today       Code Description Service Date Service Provider Modifiers Qty    [id number] HC OT EVAL LOW COMPLEXITY 3 3/14/2024 Patricia Villarreal OT GO 1                 Patricia Villarreal OT  3/14/2024

## 2024-03-14 NOTE — PLAN OF CARE
Goal Outcome Evaluation:  Plan of Care Reviewed With: patient        Progress: no change  Outcome Evaluation: Pt. remains NPO upon admission to unit. Remains A & O X 4. Ice applied to RLE PRN. Pt declined narcotic pain medication, Hospitalist PA notified, orders placed by PA for IV Ofirmev. LR infusing at 75 mL/hr per orders. Phillips remains in place. Pulses 1/1 in BLE. Vascular issue noted to BLE to hospitalist.

## 2024-03-15 PROCEDURE — 99232 SBSQ HOSP IP/OBS MODERATE 35: CPT | Performed by: FAMILY MEDICINE

## 2024-03-15 PROCEDURE — 97116 GAIT TRAINING THERAPY: CPT

## 2024-03-15 PROCEDURE — 97110 THERAPEUTIC EXERCISES: CPT

## 2024-03-15 PROCEDURE — 25010000002 ACETAMINOPHEN 10 MG/ML SOLUTION

## 2024-03-15 PROCEDURE — 97530 THERAPEUTIC ACTIVITIES: CPT

## 2024-03-15 RX ADMIN — TOPIRAMATE 25 MG: 25 TABLET, FILM COATED ORAL at 08:31

## 2024-03-15 RX ADMIN — PANTOPRAZOLE SODIUM 40 MG: 40 TABLET, DELAYED RELEASE ORAL at 06:14

## 2024-03-15 RX ADMIN — MONTELUKAST 10 MG: 10 TABLET, FILM COATED ORAL at 08:31

## 2024-03-15 RX ADMIN — CLOPIDOGREL BISULFATE 75 MG: 75 TABLET ORAL at 08:31

## 2024-03-15 RX ADMIN — Medication 10 ML: at 08:31

## 2024-03-15 RX ADMIN — FUROSEMIDE 20 MG: 20 TABLET ORAL at 08:31

## 2024-03-15 RX ADMIN — OXYCODONE 5 MG: 5 TABLET ORAL at 15:23

## 2024-03-15 RX ADMIN — LISINOPRIL 40 MG: 20 TABLET ORAL at 08:31

## 2024-03-15 RX ADMIN — Medication 10 ML: at 21:04

## 2024-03-15 RX ADMIN — OXYCODONE 5 MG: 5 TABLET ORAL at 03:55

## 2024-03-15 RX ADMIN — OXYCODONE 5 MG: 5 TABLET ORAL at 23:18

## 2024-03-15 RX ADMIN — ACETAMINOPHEN 1000 MG: 10 INJECTION INTRAVENOUS at 21:04

## 2024-03-15 NOTE — PLAN OF CARE
Goal Outcome Evaluation:   No changes this shift. Patient sat up in chair for approximately 1 hour. Immobilizer remains in place.

## 2024-03-15 NOTE — THERAPY TREATMENT NOTE
Patient Name: Lourdes Platt  : 1935    MRN: [id number]                              Today's Date: 3/15/2024       Admit Date: 3/13/2024    Visit Dx:     ICD-10-CM ICD-9-CM   1. Difficulty in walking  R26.2 719.7   2. Decreased activities of daily living (ADL)  Z78.9 V49.89     Patient Active Problem List   Diagnosis    PAD (peripheral artery disease)    Intermittent claudication    Shortness of breath    Essential hypertension    Atherosclerosis of native arteries of extremities with rest pain, right leg    Atherosclerosis of native arteries of extremities with rest pain, left leg    Acute blood loss anemia    Fracture of neck of right femur    Closed displaced fracture of right femoral neck    Tibia fracture     Past Medical History:   Diagnosis Date    Arthritis     Bilateral carotid artery stenosis     Constipation     Depression     Foot swelling     RIGHT     GERD (gastroesophageal reflux disease)     Hypertension     Intermittent claudication 2017    PAD (peripheral artery disease) 2017    PONV (postoperative nausea and vomiting)     PVD (peripheral vascular disease) with claudication     lennie legs    Slow to wake up after anesthesia     Spinal headache     Stroke     ASA     Past Surgical History:   Procedure Laterality Date    ANGIOPLASTY FEMORAL ARTERY Left 2017    Procedure: RIGHT FEMORAL ARTERIAL CUTDOWN, LIGATION OF FEMORAL ARTERY PSUEDOANEURYSM, AIF WITH BILATERAL RUNOFF ARTERIOGRAM, RIGHT COMMON ILIAC ANGIOPLASTY;  Surgeon: Keny Sadler MD;  Location: [facility] OR ;  Service:     APPENDECTOMY      ATHRECTOMY ILIAC, FEMORAL, TIBIAL ARTERY Right 2017    Procedure: LEFT FEMORAL APPROACH AIF RIGHT LEG ARTERIOGRAM  RIGHT FEMORAL POPLITEAL ARTERY WITH DRUG COATED BALLOON ANGIOPLASTY;  Surgeon: Keny Sadler MD;  Location: [facility] OR ;  Service:     BUNIONECTOMY      EYE SURGERY      CATARACT EXTRACTION    FEMORAL POPLITEAL BYPASS Left  10/31/2022    Procedure: LEFT FEMORAL TO PERONEAL ARTERY BYPASS WITH POLYTETRAFLUOROETHYLENE, LEFT FEMORAL ENDARTERECTOMY;  Surgeon: Juan A Roblero MD;  Location: [facility];  Service: Vascular;  Laterality: Left;    HYSTERECTOMY  1970    ORIF WRIST FRACTURE Left     TOTAL HIP ARTHROPLASTY Right 11/15/2022    Procedure: Right Posterior Hip Hemiarthroplasty;  Surgeon: Keny Waller MD;  Location: [facility];  Service: Orthopedics;  Laterality: Right;      General Information       Row Name 03/15/24 1002          OT Time and Intention    Document Type therapy note (daily note) (P)   -     Mode of Treatment individual therapy;occupational therapy (P)   -               User Key  (r) = Recorded By, (t) = Taken By, (c) = Cosigned By      Initials Name Provider Type     Ed Wild, OT Student OT Student                     Mobility/ADL's    No documentation.                  Obj/Interventions       Row Name 03/15/24 1002          Shoulder (Therapeutic Exercise)    Shoulder (Therapeutic Exercise) AROM (active range of motion) (P)   -     Shoulder AROM (Therapeutic Exercise) bilateral;2 sets;10 repetitions (P)   Overhead reach and horizontal abduction/adduction  -       Row Name 03/15/24 1002          Elbow/Forearm (Therapeutic Exercise)    Elbow/Forearm (Therapeutic Exercise) AROM (active range of motion) (P)   -     Elbow/Forearm AROM (Therapeutic Exercise) bilateral;flexion;extension;supination;pronation;2 sets;10 repetitions (P)   -       Row Name 03/15/24 1002          Motor Skills    Motor Skills functional endurance (P)   -     Functional Endurance Fair, short rest breaks between exercises as needed. (P)   -     Therapeutic Exercise shoulder;elbow/forearm (P)   -               User Key  (r) = Recorded By, (t) = Taken By, (c) = Cosigned By      Initials Name Provider Type    Ed Perkins, OT Student OT Student                   Goals/Plan    No documentation.                   Clinical Impression       Row Name 03/15/24 1003          Pain Assessment    Additional Documentation Pain Scale: FACES Pre/Post-Treatment (Group) (P)   -       Row Name 03/15/24 1003          Pain Scale: FACES Pre/Post-Treatment    Pain: FACES Scale, Pretreatment 0-->no hurt (P)   -     Posttreatment Pain Rating 0-->no hurt (P)   -       Row Name 03/15/24 1003          Plan of Care Review    Plan of Care Reviewed With patient (P)   -     Progress improving (P)   -     Outcome Evaluation Patient agreeable to BUE exercises in bed this session. She completed 2 sets of 10 repititions, demonstrating fair endurance throughout the session. She would benefit from continued skilled OT services to maximize independence. (P)   -       Row Name 03/15/24 1003          Vital Signs    O2 Delivery Pre Treatment room air (P)   -     O2 Delivery Intra Treatment room air (P)   -     O2 Delivery Post Treatment room air (P)   -       Row Name 03/15/24 1003          Positioning and Restraints    Pre-Treatment Position in bed (P)   -     Post Treatment Position bed (P)   -     In Bed fowlers;call light within reach;encouraged to call for assist;with family/caregiver (P)   -               User Key  (r) = Recorded By, (t) = Taken By, (c) = Cosigned By      Initials Name Provider Type    Ed Perkins, OT Student OT Student                   Outcome Measures       Row Name 03/15/24 1005          How much help from another is currently needed...    Putting on and taking off regular lower body clothing? 2 (P)   -     Bathing (including washing, rinsing, and drying) 2 (P)   -MC     Toileting (which includes using toilet bed pan or urinal) 2 (P)   -MC     Putting on and taking off regular upper body clothing 4 (P)   -MC     Taking care of personal grooming (such as brushing teeth) 4 (P)   -MC     Eating meals 4 (P)   -     AM-PAC 6 Clicks Score (OT) 18 (P)   -       Row Name 03/15/24 8943           How much help from another person do you currently need...    Turning from your back to your side while in flat bed without using bedrails? 3  -AR     Moving from lying on back to sitting on the side of a flat bed without bedrails? 3  -AR     Moving to and from a bed to a chair (including a wheelchair)? 3  -AR     Standing up from a chair using your arms (e.g., wheelchair, bedside chair)? 3  -AR     Climbing 3-5 steps with a railing? 2  -AR     To walk in hospital room? 3  -AR     AM-PAC 6 Clicks Score (PT) 17  -AR     Highest Level of Mobility Goal 5 --> Static standing  -AR       Row Name 03/15/24 1005          Functional Assessment    Outcome Measure Options AM-PAC 6 Clicks Daily Activity (OT);Optimal Instrument (P)   -MC       Row Name 03/15/24 1005          Optimal Instrument    Optimal Instrument Optimal - 3 (P)   -MC     Bending/Stooping 4 (P)   -MC     Standing 2 (P)   -MC     Reaching 1 (P)   -MC     From the list, choose the 3 activities you would most like to be able to do without any difficulty Bending/stooping;Standing;Reaching (P)   -MC     Total Score Optimal - 3 7 (P)   -MC               User Key  (r) = Recorded By, (t) = Taken By, (c) = Cosigned By      Initials Name Provider Type    Zair Head, RN Registered Nurse    Ed Perkins, OT Student OT Student                    Occupational Therapy Education       Title: PT OT SLP Therapies (Done)       Topic: Occupational Therapy (Done)       Point: ADL training (Done)       Description:   Instruct learner(s) on proper safety adaptation and remediation techniques during self care or transfers.   Instruct in proper use of assistive devices.                  Learning Progress Summary             Patient Acceptance, E,TB, VU by SHANTELLE at 3/14/2024 2000    Acceptance, E,TB, VU by SHIRAZ at 3/14/2024 1152                         Point: Precautions (Done)       Description:   Instruct learner(s) on prescribed precautions during self-care and functional  transfers.                  Learning Progress Summary             Patient Acceptance, E,TB, VU by  at 3/14/2024 2000    Acceptance, E,TB, VU by  at 3/14/2024 1152                         Point: Body mechanics (Done)       Description:   Instruct learner(s) on proper positioning and spine alignment during self-care, functional mobility activities and/or exercises.                  Learning Progress Summary             Patient Acceptance, E,TB, VU by  at 3/14/2024 2000    Acceptance, E,TB, VU by  at 3/14/2024 1152                                         User Key       Initials Effective Dates Name Provider Type Discipline     06/16/21 -  Shea Arreaga, RN Registered Nurse Nurse     06/16/21 -  Patricia Villarreal OT Occupational Therapist OT                  OT Recommendation and Plan     Plan of Care Review  Plan of Care Reviewed With: (P) patient  Progress: (P) improving  Outcome Evaluation: (P) Patient agreeable to BUE exercises in bed this session. She completed 2 sets of 10 repititions, demonstrating fair endurance throughout the session. She would benefit from continued skilled OT services to maximize independence.     Time Calculation:         Time Calculation- OT       Row Name 03/15/24 1006             Time Calculation- OT    OT Received On 03/15/24 (P)   -      OT Goal Re-Cert Due Date 03/23/24 (P)   -         Timed Charges    27579 - OT Therapeutic Exercise Minutes 15 (P)   -         Total Minutes    Timed Charges Total Minutes 15 (P)   -       Total Minutes 15 (P)   -                User Key  (r) = Recorded By, (t) = Taken By, (c) = Cosigned By      Initials Name Provider Type     Ed Wild OT Student OT Student                  Therapy Charges for Today       Code Description Service Date Service Provider Modifiers Qty    [id number]  OT THER PROC EA 15 MIN 3/15/2024 Ed Wild OT Student GO 1                 Ed Wild OT Student  3/15/2024

## 2024-03-15 NOTE — PROGRESS NOTES
[facility]   Hospitalist Progress Note  Date: 3/15/2024  Patient Name: Lourdes Platt  : 1935  MRN: [id number]  Date of admission: 3/13/2024      Subjective   Subjective     Chief Complaint: Follow-up right lower extremity pain    Summary:Lourdes Platt is a 88 y.o. female past medical history of PAD, hypertension that presented to the outside ER for evaluation after fall with right lower extremity pain. Patient states she tripped and fell. Denies head trauma or loss consciousness. Denies any other fevers, chills, sweats, nausea, vomiting, chest pain shortness of breath, palpitations, abdominal pain diarrhea constipation, dysuria, rash. Prior to transfer found to have a right subchondral fracture involving the lateral tibial plateau and orthopedics has been consulted.  Recommending nonoperative management.  Patient placed in knee immobilizer made toe-touch weightbearing on right lower extremity.  [facility] on morning of 3/17/2024.    Interval Followup: Patient sitting up in the bedside patient be resting fairly comfortably.  Patient states pain currently well-controlled.  Had an episode of urinary incontinence while transferring with PT this morning.  No new issues per nursing.  Afebrile overnight.  Heart rate within normal limits.  Blood pressure within normal limits.  Satting well on room air.    Review of Systems  Constitutional: Negative for fatigue and fever.   HENT: Negative for sore throat and trouble swallowing.    Eyes: Negative for pain and discharge.   Respiratory: Negative for cough and shortness of breath.    Cardiovascular: Negative for chest pain and palpitations.   Gastrointestinal: Negative for abdominal pain, nausea and vomiting.   Endocrine: Negative for cold intolerance and heat intolerance.   Genitourinary: Negative for difficulty urinating and dysuria.   Musculoskeletal: Negative for back pain and neck stiffness.  Right knee pain  Skin: Negative for color change  and rash.   Neurological: Negative for syncope and headaches.   Hematological: Negative for adenopathy.   Psychiatric/Behavioral: Negative for confusion and hallucinations.    Objective   Objective     Vitals:   Temp:  [97.2 °F (36.2 °C)-99 °F (37.2 °C)] 97.3 °F (36.3 °C)  Heart Rate:  [76-99] 86  Resp:  [16] 16  BP: (112-148)/(52-73) 148/59  Physical Exam   General: well-developed appearing stated age in no acute distress  HEENT: Normocephalic atraumatic moist membranes pupils equal round reactive light, no scleral icterus no conjunctival injection  Cardiovascular: regular rate and rhythm no murmurs rubs or gallops S1-S2, no lower extremity edema appreciated  Pulmonary: Clear to auscultation bilaterally no wheezes rales or rhonchi symmetric chest expansion, unlabored, no conversational dyspnea appreciated  Gastrointestinal: Soft nontender nondistended positive bowel sounds all 4 quadrants no rebound or guarding  Musculoskeletal: No clubbing cyanosis, warm and well-perfused, calves soft symmetric nontender bilaterally and right knee immobilizer  Skin: Clean dry without rashes  Neuro: Cranial nerves II through XII intact grossly no sensorimotor deficits appreciated bilateral upper and lower extremities  Psych: Patient is calm cooperative and appropriate with exam not responding to internal stimuli  : No Phillips catheter no bladder distention no suprapubic tenderness    Result Review    Result Review:  I have personally reviewed these results and agree with these findings:  [x]  Laboratory  LAB RESULTS:      Lab 03/14/24  0025   WBC 9.46   HEMOGLOBIN 12.0   HEMATOCRIT 36.3   PLATELETS 273   NEUTROS ABS 7.04*   IMMATURE GRANS (ABS) 0.02   LYMPHS ABS 1.28   MONOS ABS 0.94*   EOS ABS 0.13   MCV 89.0   PROTIME 13.2   APTT 27.5         Lab 03/14/24  0025   SODIUM 140   POTASSIUM 3.5   CHLORIDE 107   CO2 21.5*   ANION GAP 11.5   BUN 17   CREATININE 0.88   EGFR 63.7   GLUCOSE 102*   CALCIUM 8.8   MAGNESIUM 2.0         Lab  03/14/24  0025   TOTAL PROTEIN 6.1   ALBUMIN 3.6   GLOBULIN 2.5   ALT (SGPT) 8   AST (SGOT) 14   BILIRUBIN 0.3   ALK PHOS 127*         Lab 03/14/24  0025   PROTIME 13.2   INR 0.98             Lab 03/14/24  0142 03/14/24  0025   ABO TYPING O O   RH TYPING Positive Positive   ANTIBODY SCREEN  --  Negative         Brief Urine Lab Results       None          Microbiology Results (last 10 days)       ** No results found for the last 240 hours. **            []  Microbiology  [x]  Radiology  XR Ankle 3+ View Right    Result Date: 3/14/2024   Negative for fracture.      CATRACHO WESTFALL MD       Electronically Signed and Approved By: CATRACHO WESTFALL MD on 3/14/2024 at 11:23             XR Knee 1 or 2 View Right    Result Date: 3/14/2024    1. Subchondral fracture involving the lateral tibial plateau better demonstrated on recent CT. 2. Lipohemarthrosis. 3. Osteopenia. 4. Chondrocalcinosis consistent with CPPD arthropathy.      CATRACHO WESTFALL MD       Electronically Signed and Approved By: CATRACHO WESTFALL MD on 3/14/2024 at 11:22              []  EKG/Telemetry   []  Cardiology/Vascular   []  Pathology  []  Old records  [x]  Other:  Scheduled Meds:clopidogrel, 75 mg, Oral, Daily  furosemide, 20 mg, Oral, Daily  lisinopril, 40 mg, Oral, Daily  montelukast, 10 mg, Oral, Daily  pantoprazole, 40 mg, Oral, Q AM  sodium chloride, 10 mL, Intravenous, Q12H  topiramate, 25 mg, Oral, Daily      Continuous Infusions:   PRN Meds:.  acetaminophen    acetaminophen **OR** acetaminophen **OR** acetaminophen    senna-docusate sodium **AND** polyethylene glycol **AND** bisacodyl **AND** bisacodyl    Morphine **AND** naloxone    oxyCODONE    polyethyl glycol-propyl glycol    sodium chloride    sodium chloride      Assessment & Plan   Assessment / Plan     Assessment/Plan:  Subchondral fracture of the right lateral tibial plateau following mechanical fall  Mechanical fall  PAD  History of CVA  Bilateral carotid artery  stenosis  GERD  Hypertension      Patient admitted for further evaluation and treatment  Orthopedics consulted thank you for assistance  Continue nonoperative management with knee immobilizer and toe-touch weightbearing  Continue physical therapy Occupational Therapy  Continue pain control with p.o. analgesics IV for breakthrough  Continue falls precautions  Continue home Plavix  Continue Topamax  Continue lisinopril  Further inpatient orders recommendations pending clinical course       Discussed plan with bedside RN.    Disposition: [facility] rehab [facility] morning.  Patient needs to arrive prior to noon.    DVT prophylaxis:  No DVT prophylaxis order currently exists.        CODE STATUS:   Code Status (Patient has no pulse and is not breathing): CPR (Attempt to Resuscitate)  Medical Interventions (Patient has pulse or is breathing): Full Support

## 2024-03-15 NOTE — PLAN OF CARE
Goal Outcome Evaluation:      Pt Ao x4, medicated for pain x2, enamorado cath out @ 0400. Knee immobilizer to RLE in place. No other issues noted. Pt awaiting rehab placement.

## 2024-03-15 NOTE — THERAPY TREATMENT NOTE
Acute Care - Physical Therapy Treatment Note   [place]     Patient Name: Lourdes Platt  : 1935  MRN: [id number]  Today's Date: 3/15/2024      Visit Dx:     ICD-10-CM ICD-9-CM   1. Difficulty in walking  R26.2 719.7   2. Decreased activities of daily living (ADL)  Z78.9 V49.89     Patient Active Problem List   Diagnosis    PAD (peripheral artery disease)    Intermittent claudication    Shortness of breath    Essential hypertension    Atherosclerosis of native arteries of extremities with rest pain, right leg    Atherosclerosis of native arteries of extremities with rest pain, left leg    Acute blood loss anemia    Fracture of neck of right femur    Closed displaced fracture of right femoral neck    Tibia fracture     Past Medical History:   Diagnosis Date    Arthritis     Bilateral carotid artery stenosis     Constipation     Depression     Foot swelling     RIGHT     GERD (gastroesophageal reflux disease)     Hypertension     Intermittent claudication 2017    PAD (peripheral artery disease) 2017    PONV (postoperative nausea and vomiting)     PVD (peripheral vascular disease) with claudication     lennie legs    Slow to wake up after anesthesia     Spinal headache     Stroke     ASA     Past Surgical History:   Procedure Laterality Date    ANGIOPLASTY FEMORAL ARTERY Left 2017    Procedure: RIGHT FEMORAL ARTERIAL CUTDOWN, LIGATION OF FEMORAL ARTERY PSUEDOANEURYSM, AIF WITH BILATERAL RUNOFF ARTERIOGRAM, RIGHT COMMON ILIAC ANGIOPLASTY;  Surgeon: Keny Sadler MD;  Location: [facility] OR ;  Service:     APPENDECTOMY      ATHRECTOMY ILIAC, FEMORAL, TIBIAL ARTERY Right 2017    Procedure: LEFT FEMORAL APPROACH AIF RIGHT LEG ARTERIOGRAM  RIGHT FEMORAL POPLITEAL ARTERY WITH DRUG COATED BALLOON ANGIOPLASTY;  Surgeon: Keny Sadlre MD;  Location: [facility] OR ;  Service:     BUNIONECTOMY      EYE SURGERY      CATARACT EXTRACTION    FEMORAL POPLITEAL BYPASS  Left 10/31/2022    Procedure: LEFT FEMORAL TO PERONEAL ARTERY BYPASS WITH POLYTETRAFLUOROETHYLENE, LEFT FEMORAL ENDARTERECTOMY;  Surgeon: Juan A Roblero MD;  Location: [facility];  Service: Vascular;  Laterality: Left;    HYSTERECTOMY  1970    ORIF WRIST FRACTURE Left     TOTAL HIP ARTHROPLASTY Right 11/15/2022    Procedure: Right Posterior Hip Hemiarthroplasty;  Surgeon: Keny Waller MD;  Location: [facility];  Service: Orthopedics;  Laterality: Right;     PT Assessment (Last 12 Hours)       PT Evaluation and Treatment       Row Name 03/15/24 1056          Physical Therapy Time and Intention    Subjective Information complains of;pain  -VK     Document Type therapy note (daily note)  -VK     Mode of Treatment individual therapy;physical therapy  -VK     Patient Effort adequate  -VK       Row Name 03/15/24 1056          General Information    Patient Profile Reviewed yes  -VK     Existing Precautions/Restrictions fall;weight bearing  -VK       Row Name 03/15/24 1056          Pain    Pain Location - Side/Orientation Right  -VK     Pain Location lower  -VK     Pain Location - extremity  -VK     Pain Intervention(s) Repositioned;Ambulation/increased activity;Rest  -VK       Row Name 03/15/24 1056          Cognition    Affect/Mental Status (Cognition) confused  -VK     Orientation Status (Cognition) oriented to;person;situation  -VK     Personal Safety Interventions fall prevention program maintained;gait belt;nonskid shoes/slippers when out of bed  -VK       Row Name 03/15/24 1056          Mobility    Extremity Weight-bearing Status right lower extremity  -VK     Right Lower Extremity (Weight-bearing Status) toe touch weight-bearing (TTWB)  Toe-touch weightbearing of RLE in knee immobilizer. Knee immobilizer may be open for 0-30 degrees ROM when seated or supine.  -VK       Row Name 03/15/24 1056          Bed Mobility    Bed Mobility sit-supine  -VK     Supine-Sit [place] (Bed Mobility) verbal  cues;minimum assist (75% patient effort)  -VK     Sit-Supine [place] (Bed Mobility) contact guard;verbal cues  -VK     Bed Mobility, Safety Issues decreased use of legs for bridging/pushing  -VK     Assistive Device (Bed Mobility) bed rails;head of bed elevated  -VK       Row Name 03/15/24 1056          Transfers    Transfers toilet transfer  -VK       Row Name 03/15/24 1056          Sit-Stand Transfer    Sit-Stand [place] (Transfers) contact guard;minimum assist (75% patient effort);verbal cues  -VK     Assistive Device (Sit-Stand Transfers) walker, front-wheeled  -VK       Row Name 03/15/24 1056          Stand-Sit Transfer    Stand-Sit [place] (Transfers) contact guard;minimum assist (75% patient effort);verbal cues  -VK     Assistive Device (Stand-Sit Transfers) walker, front-wheeled  -VK       Row Name 03/15/24 1056          Toilet Transfer    Type (Toilet Transfer) sit-stand;stand-sit  -VK     [place] Level (Toilet Transfer) minimum assist (75% patient effort);verbal cues  -VK     Assistive Device (Toilet Transfer) walker, front-wheeled;commode, bedside without drop arms  -VK       Row Name 03/15/24 1056          Gait/Stairs (Locomotion)    [place] Level (Gait) minimum assist (75% patient effort);verbal cues  -VK     Assistive Device (Gait) walker, front-wheeled  -VK     Patient was able to Ambulate yes  -VK     Distance in Feet (Gait) --  4ft, 4ft.  -VK     Pattern (Gait) step-to  -VK     Deviations/Abnormal Patterns (Gait) antalgic;base of support, narrow;maria c decreased;festinating/shuffling;gait speed decreased;stride length decreased;weight shifting decreased  -VK     Bilateral Gait Deviations forward flexed posture  -VK     Right Sided Gait Deviations decreased knee extension;heel strike decreased  -VK       Row Name 03/15/24 1056          Safety Issues, Functional Mobility    Safety Issues Affecting Function (Mobility) sequencing abilities  -VK     Impairments Affecting  Function (Mobility) balance;endurance/activity tolerance;pain;range of motion (ROM)  -VK       Row Name 03/15/24 1056          Balance    Balance Assessment sitting static balance;standing dynamic balance  -VK     Static Sitting Balance supervision  -VK     Position, Sitting Balance sitting edge of bed  -VK     Dynamic Standing Balance minimal assist;verbal cues  -VK     Position/Device Used, Standing Balance walker, front-wheeled  -VK       Row Name 03/15/24 1056          Positioning and Restraints    Pre-Treatment Position in bed  -VK     Post Treatment Position bed  -VK     In Bed supine;call light within reach;encouraged to call for assist;notified nsg;with family/caregiver  Bed alarm on bed not functioning properly, notified nursing of this.  -VK       Row Name 03/15/24 1056          Progress Summary (PT)    Progress Toward Functional Goals (PT) progress toward functional goals is fair  -VK     Daily Progress Summary (PT) Pt agreeable to treatment. Worked on transfers/ambulating today. Upon entering room, pt had urinated in her bed & also began urinating after standing up from bed with PTA. Pt cleaned up and bedding/gown changed. Pt ambulates very slowly and is hesitant to advance lower extremitiies, requires many verbal cues/prompts to ambulate. Pt continues to benefit from skilled PT to address stated deficits.  -VK               User Key  (r) = Recorded By, (t) = Taken By, (c) = Cosigned By      Initials Name Provider Type    Arlyn Callahan PTA Physical Therapist Assistant                    Physical Therapy Education       Title: PT OT SLP Therapies (Done)       Topic: Physical Therapy (Done)       Point: Mobility training (Done)       Learning Progress Summary             Patient Acceptance, E,TB, VU by SHANTELLE at 3/14/2024 2000    Acceptance, E,TB, VU by RITU at 3/14/2024 1029                         Point: Precautions (Done)       Learning Progress Summary             Patient Acceptance, E,TB, VU by SHANTELLE at  3/14/2024 2000    Acceptance, E,TB, VU by RITU at 3/14/2024 1029                                         User Key       Initials Effective Dates Name Provider Type Discipline     06/16/21 -  Shea Arreaga RN Registered Nurse Nurse    RITU 06/03/21 -  Naman Burrows, PT Physical Therapist PT                  PT Recommendation and Plan     Progress Summary (PT)  Progress Toward Functional Goals (PT): progress toward functional goals is fair  Daily Progress Summary (PT): Pt agreeable to treatment. Worked on transfers/ambulating today. Upon entering room, pt had urinated in her bed & also began urinating after standing up from bed with PTA. Pt cleaned up and bedding/gown changed. Pt ambulates very slowly and is hesitant to advance lower extremitiies, requires many verbal cues/prompts to ambulate. Pt continues to benefit from skilled PT to address stated deficits.   Outcome Measures       Row Name 03/15/24 1300 03/14/24 1000          How much help from another person do you currently need...    Turning from your back to your side while in flat bed without using bedrails? 3  -VK 3  -RITU     Moving from lying on back to sitting on the side of a flat bed without bedrails? 2  -VK 3  -RITU     Moving to and from a bed to a chair (including a wheelchair)? 2  -VK 3  -RITU     Standing up from a chair using your arms (e.g., wheelchair, bedside chair)? 3  -VK 3  -RITU     Climbing 3-5 steps with a railing? 1  -VK 2  -RITU     To walk in hospital room? 2  -VK 3  -RITU     AM-PAC 6 Clicks Score (PT) 13  -VK 17  -RITU     Highest Level of Mobility Goal 4 --> Transfer to chair/commode  -VK 5 --> Static standing  -RITU               User Key  (r) = Recorded By, (t) = Taken By, (c) = Cosigned By      Initials Name Provider Type    Naman Keene, PT Physical Therapist    Arlyn Callahan PTA Physical Therapist Assistant                     Time Calculation:    PT Charges       Row Name 03/15/24 1335             Time Calculation    PT  Received On 03/15/24  -VK         Timed Charges    79762 - Gait Training Minutes  10  -VK      90445 - PT Therapeutic Activity Minutes 22  -VK         Total Minutes    Timed Charges Total Minutes 32  -VK       Total Minutes 32  -VK                User Key  (r) = Recorded By, (t) = Taken By, (c) = Cosigned By      Initials Name Provider Type    Arlyn Callahan PTA Physical Therapist Assistant                  Therapy Charges for Today       Code Description Service Date Service Provider Modifiers Qty    11658342126 HC GAIT TRAINING EA 15 MIN 3/15/2024 Arlyn Chavez PTA GP 1    17783328349 HC PT THERAPEUTIC ACT EA 15 MIN 3/15/2024 Arlyn Chavez PTA GP 1            PT G-Codes  Outcome Measure Options: (P) AM-PAC 6 Clicks Daily Activity (OT), Optimal Instrument  AM-PAC 6 Clicks Score (PT): 13  AM-PAC 6 Clicks Score (OT): (P) 18    rAlyn Chavez PTA  3/15/2024

## 2024-03-16 PROCEDURE — 97110 THERAPEUTIC EXERCISES: CPT

## 2024-03-16 PROCEDURE — 99238 HOSP IP/OBS DSCHRG MGMT 30/<: CPT | Performed by: FAMILY MEDICINE

## 2024-03-16 PROCEDURE — 97530 THERAPEUTIC ACTIVITIES: CPT

## 2024-03-16 RX ORDER — OXYCODONE HYDROCHLORIDE 5 MG/1
5 TABLET ORAL EVERY 4 HOURS PRN
Qty: 12 TABLET | Refills: 0 | Status: SHIPPED | OUTPATIENT
Start: 2024-03-16 | End: 2024-03-18

## 2024-03-16 RX ADMIN — MONTELUKAST 10 MG: 10 TABLET, FILM COATED ORAL at 09:11

## 2024-03-16 RX ADMIN — CLOPIDOGREL BISULFATE 75 MG: 75 TABLET ORAL at 09:10

## 2024-03-16 RX ADMIN — FUROSEMIDE 20 MG: 20 TABLET ORAL at 09:11

## 2024-03-16 RX ADMIN — Medication 10 ML: at 09:11

## 2024-03-16 RX ADMIN — OXYCODONE 5 MG: 5 TABLET ORAL at 22:03

## 2024-03-16 RX ADMIN — OXYCODONE 5 MG: 5 TABLET ORAL at 04:00

## 2024-03-16 RX ADMIN — OXYCODONE 5 MG: 5 TABLET ORAL at 09:11

## 2024-03-16 RX ADMIN — LISINOPRIL 40 MG: 20 TABLET ORAL at 09:10

## 2024-03-16 RX ADMIN — DOCUSATE SODIUM 50MG AND SENNOSIDES 8.6MG 2 TABLET: 8.6; 5 TABLET, FILM COATED ORAL at 22:03

## 2024-03-16 RX ADMIN — TOPIRAMATE 25 MG: 25 TABLET, FILM COATED ORAL at 09:10

## 2024-03-16 RX ADMIN — Medication 10 ML: at 22:03

## 2024-03-16 RX ADMIN — OXYCODONE 5 MG: 5 TABLET ORAL at 13:29

## 2024-03-16 RX ADMIN — PANTOPRAZOLE SODIUM 40 MG: 40 TABLET, DELAYED RELEASE ORAL at 06:10

## 2024-03-16 RX ADMIN — ACETAMINOPHEN 650 MG: 325 TABLET ORAL at 06:10

## 2024-03-16 NOTE — PROGRESS NOTES
[facility]   Hospitalist Progress Note  Date: 3/16/2024  Patient Name: Lourdes Platt  : 1935  MRN: [id number]  Date of admission: 3/13/2024      Subjective   Subjective     Chief Complaint: Follow-up right lower extremity pain    Summary:Lourdes Platt is a 88 y.o. female past medical history of PAD, hypertension that presented to the outside ER for evaluation after fall with right lower extremity pain. Patient states she tripped and fell. Denies head trauma or loss consciousness. Denies any other fevers, chills, sweats, nausea, vomiting, chest pain shortness of breath, palpitations, abdominal pain diarrhea constipation, dysuria, rash. Prior to transfer found to have a right subchondral fracture involving the lateral tibial plateau and orthopedics has been consulted.  Recommending nonoperative management.  Patient placed in knee immobilizer made toe-touch weightbearing on right lower extremity.  Has a bed at [facility] on morning of 3/17/2024.    Interval Followup: Patient sitting up in the bed resting fairly comfortably.  Patient indicates pain currently well-controlled.  Patient denies any new issues.   No new issues per nursing.  Afebrile overnight.  Heart rate within normal limits.  Blood pressure within normal limits.  Satting well on room air.    Review of Systems  Constitutional: Negative for fatigue and fever.   HENT: Negative for sore throat and trouble swallowing.    Eyes: Negative for pain and discharge.   Respiratory: Negative for cough and shortness of breath.    Cardiovascular: Negative for chest pain and palpitations.   Gastrointestinal: Negative for abdominal pain, nausea and vomiting.   Endocrine: Negative for cold intolerance and heat intolerance.   Genitourinary: Negative for difficulty urinating and dysuria.   Musculoskeletal: Negative for back pain and neck stiffness.  Right knee pain  Skin: Negative for color change and rash.   Neurological: Negative for syncope and  headaches.   Hematological: Negative for adenopathy.   Psychiatric/Behavioral: Negative for confusion and hallucinations.    Objective   Objective     Vitals:   Temp:  [97.3 °F (36.3 °C)-98.2 °F (36.8 °C)] 97.7 °F (36.5 °C)  Heart Rate:  [69-86] 72  Resp:  [16-18] 18  BP: (111-157)/(54-74) 129/54  Physical Exam   General: well-developed appearing stated age in no acute distress  HEENT: Normocephalic atraumatic moist membranes pupils equal round reactive light, no scleral icterus no conjunctival injection  Cardiovascular: regular rate and rhythm no murmurs rubs or gallops S1-S2, no lower extremity edema appreciated  Pulmonary: Clear to auscultation bilaterally no wheezes rales or rhonchi symmetric chest expansion, unlabored, no conversational dyspnea appreciated  Gastrointestinal: Soft nontender nondistended positive bowel sounds all 4 quadrants no rebound or guarding  Musculoskeletal: No clubbing cyanosis, warm and well-perfused, calves soft symmetric nontender bilaterally and right knee immobilizer  Skin: Clean dry without rashes  Neuro: Cranial nerves II through XII intact grossly no sensorimotor deficits appreciated bilateral upper and lower extremities  Psych: Patient is calm cooperative and appropriate with exam not responding to internal stimuli  : No Phillips catheter no bladder distention no suprapubic tenderness    Result Review    Result Review:  I have personally reviewed these results and agree with these findings:  [x]  Laboratory  LAB RESULTS:      Lab 03/14/24  0025   WBC 9.46   HEMOGLOBIN 12.0   HEMATOCRIT 36.3   PLATELETS 273   NEUTROS ABS 7.04*   IMMATURE GRANS (ABS) 0.02   LYMPHS ABS 1.28   MONOS ABS 0.94*   EOS ABS 0.13   MCV 89.0   PROTIME 13.2   APTT 27.5         Lab 03/14/24  0025   SODIUM 140   POTASSIUM 3.5   CHLORIDE 107   CO2 21.5*   ANION GAP 11.5   BUN 17   CREATININE 0.88   EGFR 63.7   GLUCOSE 102*   CALCIUM 8.8   MAGNESIUM 2.0         Lab 03/14/24  0025   TOTAL PROTEIN 6.1   ALBUMIN 3.6    GLOBULIN 2.5   ALT (SGPT) 8   AST (SGOT) 14   BILIRUBIN 0.3   ALK PHOS 127*         Lab 03/14/24  0025   PROTIME 13.2   INR 0.98             Lab 03/14/24  0142 03/14/24  0025   ABO TYPING O O   RH TYPING Positive Positive   ANTIBODY SCREEN  --  Negative         Brief Urine Lab Results       None          Microbiology Results (last 10 days)       ** No results found for the last 240 hours. **            []  Microbiology  [x]  Radiology  XR Ankle 3+ View Right    Result Date: 3/14/2024   Negative for fracture.      CATRACHO WESTFALL MD       Electronically Signed and Approved By: CATRACHO WESTFALL MD on 3/14/2024 at 11:23             XR Knee 1 or 2 View Right    Result Date: 3/14/2024    1. Subchondral fracture involving the lateral tibial plateau better demonstrated on recent CT. 2. Lipohemarthrosis. 3. Osteopenia. 4. Chondrocalcinosis consistent with CPPD arthropathy.      CATRACHO WESTFALL MD       Electronically Signed and Approved By: CATRACHO WESTFALL MD on 3/14/2024 at 11:22              []  EKG/Telemetry   []  Cardiology/Vascular   []  Pathology  []  Old records  [x]  Other:  Scheduled Meds:clopidogrel, 75 mg, Oral, Daily  furosemide, 20 mg, Oral, Daily  lisinopril, 40 mg, Oral, Daily  montelukast, 10 mg, Oral, Daily  pantoprazole, 40 mg, Oral, Q AM  sodium chloride, 10 mL, Intravenous, Q12H  topiramate, 25 mg, Oral, Daily      Continuous Infusions:   PRN Meds:.  acetaminophen **OR** acetaminophen **OR** acetaminophen    senna-docusate sodium **AND** polyethylene glycol **AND** bisacodyl **AND** bisacodyl    Morphine **AND** naloxone    oxyCODONE    polyethyl glycol-propyl glycol    sodium chloride    sodium chloride      Assessment & Plan   Assessment / Plan     Assessment/Plan:  Subchondral fracture of the right lateral tibial plateau following mechanical fall  Mechanical fall  PAD  History of CVA  Bilateral carotid artery stenosis  GERD  Hypertension      Patient admitted for further evaluation and treatment  Orthopedics  consulted thank you for assistance  Continue nonoperative management with knee immobilizer and toe-touch weightbearing  Continue physical therapy Occupational Therapy  Continue pain control with p.o. analgesics with IV analgesics for breakthrough  Continue falls precautions  Continue home Plavix  Continue Topamax  Continue lisinopril  Further inpatient orders recommendations pending clinical course       Discussed plan with bedside RN.    Disposition: [facility] skilled nursing rehab [place] morning.  Patient needs to arrive prior to noon.    DVT prophylaxis:  No DVT prophylaxis order currently exists.        CODE STATUS:   Code Status (Patient has no pulse and is not breathing): CPR (Attempt to Resuscitate)  Medical Interventions (Patient has pulse or is breathing): Full Support

## 2024-03-16 NOTE — PLAN OF CARE
Goal Outcome Evaluation:   No changes this shift. Plans for patient to discharge to [facility] tomorrow. Immobilizer remains in place.

## 2024-03-16 NOTE — DISCHARGE SUMMARY
[facility]         HOSPITALIST  DISCHARGE SUMMARY    Patient Name: Lourdes Platt  : 1935  MRN: [id number]    Date of Admission: 3/13/2024  Date of Discharge: 3/17/2024  Primary Care Physician: Everton Disla MD    Consults       Date and Time Order Name Status Description    3/14/2024 12:06 AM Inpatient Orthopedic Surgery Consult Completed             Active and Resolved Hospital Problems:  Subchondral fracture of the right lateral tibial plateau following mechanical fall  Mechanical fall  PAD  History of CVA  Bilateral carotid artery stenosis  GERD  Hypertension  Active Hospital Problems    Diagnosis POA    **Tibia fracture [S82.209A] Yes      Resolved Hospital Problems   No resolved problems to display.       Hospital Course     Hospital Course:  Lourdes Platt is a 88 y.o. female past medical history of PAD, hypertension that presented to the outside ER for evaluation after fall with right lower extremity pain. Patient states she tripped and fell. Denies head trauma or loss consciousness. Denies any other fevers, chills, sweats, nausea, vomiting, chest pain shortness of breath, palpitations, abdominal pain diarrhea constipation, dysuria, rash. Prior to transfer found to have a right subchondral fracture involving the lateral tibial plateau and orthopedics has been consulted. Recommending nonoperative management. Patient placed in knee immobilizer made toe-touch weightbearing on right lower extremity.  Patient worked well with physical therapy occupational therapy thought to benefit from inpatient rehab.  Patient seen and evaluated on date of discharge and thought stable for discharge to [facility] rehab in [facility] to follow-up with orthopedics and her primary care provider in 1 to 2 weeks.        DISCHARGE Follow Up Recommendations for labs and diagnostics: As above      Day of Discharge     Vital Signs:  Temp:  [97.3 °F (36.3 °C)-98.2 °F (36.8 °C)] 97.7 °F (36.5 °C)  Heart  Rate:  [69-86] 72  Resp:  [16-18] 18  BP: (111-157)/(54-74) 129/54  Physical Exam:   General: well-developed appearing stated age in no acute distress  HEENT: Normocephalic atraumatic moist membranes pupils equal round reactive light, no scleral icterus no conjunctival injection  Cardiovascular: regular rate and rhythm no murmurs rubs or gallops S1-S2, no lower extremity edema appreciated  Pulmonary: Clear to auscultation bilaterally no wheezes rales or rhonchi symmetric chest expansion, unlabored, no conversational dyspnea appreciated  Gastrointestinal: Soft nontender nondistended positive bowel sounds all 4 quadrants no rebound or guarding  Musculoskeletal: No clubbing cyanosis, warm and well-perfused, calves soft symmetric nontender bilaterally and right knee immobilizer  Skin: Clean dry without rashes  Neuro: Cranial nerves II through XII intact grossly no sensorimotor deficits appreciated bilateral upper and lower extremities  Psych: Patient is calm cooperative and appropriate with exam not responding to internal stimuli  : No Phillips catheter no bladder distention no suprapubic tenderness       Discharge Details        Discharge Medications        Continue These Medications        Instructions Start Date   carboxymethylcellulose 0.5 % solution  Commonly known as: REFRESH PLUS   1 drop, Both Eyes, 3 Times Daily PRN      clopidogrel 75 MG tablet  Commonly known as: PLAVIX   75 mg, Oral, Daily      Florajen Women capsule   1 capsule, Oral, Daily      furosemide 20 MG tablet  Commonly known as: LASIX   20 mg, Oral, Daily      lisinopril 40 MG tablet  Commonly known as: PRINIVIL,ZESTRIL   40 mg, Oral, Daily      methenamine 1 g tablet  Commonly known as: HIPREX   1 g, Oral, 2 Times Daily      montelukast 10 MG tablet  Commonly known as: SINGULAIR   10 mg, Oral, Daily      oxyCODONE 5 MG immediate release tablet  Commonly known as: Roxicodone   5 mg, Oral, Every 4 Hours PRN      pantoprazole 40 MG EC  tablet  Commonly known as: PROTONIX   1 tablet, Oral, Daily      potassium chloride 10 MEQ CR tablet   1 tablet, Oral, Daily      topiramate 25 MG tablet  Commonly known as: TOPAMAX   25 mg, Oral, Daily               Allergies   Allergen Reactions    Penicillins Itching and Swelling       Discharge Disposition:  Rehab Facility or Unit (DC - External)    Diet:  Hospital:  Diet Order   Procedures    Diet: Regular/House; Fluid Consistency: Thin (IDDSI 0)       Discharge Activity:   Activity Instructions       Other Activity Instructions      Activity Instructions: Toe-touch weightbearing right lower extremity in knee immobilizer  Knee immobilizer may be open for 0-30 knee motion with PT    Other Activity Restrictions      Type of Restriction: Other    Explain Other Restrictions: Toe touch weight bearing right lower extremity.            CODE STATUS:  Code Status and Medical Interventions:   Ordered at: 03/14/24 0025     Code Status (Patient has no pulse and is not breathing):    CPR (Attempt to Resuscitate)     Medical Interventions (Patient has pulse or is breathing):    Full Support         No future appointments.        Pertinent  and/or Most Recent Results     PROCEDURES:   None    LAB RESULTS:      Lab 03/14/24 0025   WBC 9.46   HEMOGLOBIN 12.0   HEMATOCRIT 36.3   PLATELETS 273   NEUTROS ABS 7.04*   IMMATURE GRANS (ABS) 0.02   LYMPHS ABS 1.28   MONOS ABS 0.94*   EOS ABS 0.13   MCV 89.0   PROTIME 13.2   APTT 27.5         Lab 03/14/24 0025   SODIUM 140   POTASSIUM 3.5   CHLORIDE 107   CO2 21.5*   ANION GAP 11.5   BUN 17   CREATININE 0.88   EGFR 63.7   GLUCOSE 102*   CALCIUM 8.8   MAGNESIUM 2.0         Lab 03/14/24 0025   TOTAL PROTEIN 6.1   ALBUMIN 3.6   GLOBULIN 2.5   ALT (SGPT) 8   AST (SGOT) 14   BILIRUBIN 0.3   ALK PHOS 127*         Lab 03/14/24 0025   PROTIME 13.2   INR 0.98             Lab 03/14/24  0142 03/14/24 0025   ABO TYPING O O   RH TYPING Positive Positive   ANTIBODY SCREEN  --  Negative          Brief Urine Lab Results       None          Microbiology Results (last 10 days)       ** No results found for the last 240 hours. **            XR Ankle 3+ View Right    Result Date: 3/14/2024  Impression:  Negative for fracture.      CATRACHO WESTFALL MD       Electronically Signed and Approved By: CATRACHO WESTFALL MD on 3/14/2024 at 11:23             XR Knee 1 or 2 View Right    Result Date: 3/14/2024  Impression:   1. Subchondral fracture involving the lateral tibial plateau better demonstrated on recent CT. 2. Lipohemarthrosis. 3. Osteopenia. 4. Chondrocalcinosis consistent with CPPD arthropathy.      CATRACHO WESTFALL MD       Electronically Signed and Approved By: CATRACHO WESTFALL MD on 3/14/2024 at 11:22              Results for orders placed during the hospital encounter of 08/18/17    Doppler Arterial Multi Level Lower Extremity - Bilateral CAR    Interpretation Summary  · Right Conclusion: The right STELLA is moderately reduced. Waveforms are consistent with femoral disease and popliteal disease. Severe digital ischemia.  · Left Conclusion: The left STELLA is moderately reduced. Waveforms are consistent with femoral disease and popliteal disease. Severe digital ischemia.      Results for orders placed during the hospital encounter of 08/18/17    Doppler Arterial Multi Level Lower Extremity - Bilateral CAR    Interpretation Summary  · Right Conclusion: The right STELLA is moderately reduced. Waveforms are consistent with femoral disease and popliteal disease. Severe digital ischemia.  · Left Conclusion: The left STELLA is moderately reduced. Waveforms are consistent with femoral disease and popliteal disease. Severe digital ischemia.          Labs Pending at Discharge:        Time spent on Discharge including face to face service: 25 minutes    Electronically signed by Daniel Chang MD, 03/16/24, 2:17 PM EDT.

## 2024-03-16 NOTE — PLAN OF CARE
Goal Outcome Evaluation:              Outcome Evaluation: EOSS: Pt appears to be resting in bed, denies any needs at this time. Pain controlled w/ oxy and tylenol PRN. Pt noted to be incontinent, purewick replaced to help. Please see all flowsheets, VS, and MAR for complete documentation.

## 2024-03-16 NOTE — THERAPY TREATMENT NOTE
Acute Care - Physical Therapy Treatment Note   [place]     Patient Name: Lourdes Platt  : 1935  MRN: [id number]  Today's Date: 3/16/2024      Visit Dx:     ICD-10-CM ICD-9-CM   1. Difficulty in walking  R26.2 719.7   2. Decreased activities of daily living (ADL)  Z78.9 V49.89     Patient Active Problem List   Diagnosis    PAD (peripheral artery disease)    Intermittent claudication    Shortness of breath    Essential hypertension    Atherosclerosis of native arteries of extremities with rest pain, right leg    Atherosclerosis of native arteries of extremities with rest pain, left leg    Acute blood loss anemia    Fracture of neck of right femur    Closed displaced fracture of right femoral neck    Tibia fracture     Past Medical History:   Diagnosis Date    Arthritis     Bilateral carotid artery stenosis     Constipation     Depression     Foot swelling     RIGHT     GERD (gastroesophageal reflux disease)     Hypertension     Intermittent claudication 2017    PAD (peripheral artery disease) 2017    PONV (postoperative nausea and vomiting)     PVD (peripheral vascular disease) with claudication     lennie legs    Slow to wake up after anesthesia     Spinal headache     Stroke     ASA     Past Surgical History:   Procedure Laterality Date    ANGIOPLASTY FEMORAL ARTERY Left 2017    Procedure: RIGHT FEMORAL ARTERIAL CUTDOWN, LIGATION OF FEMORAL ARTERY PSUEDOANEURYSM, AIF WITH BILATERAL RUNOFF ARTERIOGRAM, RIGHT COMMON ILIAC ANGIOPLASTY;  Surgeon: Keny Sadler MD;  Location: [facility] OR ;  Service:     APPENDECTOMY      ATHRECTOMY ILIAC, FEMORAL, TIBIAL ARTERY Right 2017    Procedure: LEFT FEMORAL APPROACH AIF RIGHT LEG ARTERIOGRAM  RIGHT FEMORAL POPLITEAL ARTERY WITH DRUG COATED BALLOON ANGIOPLASTY;  Surgeon: Keny Sadler MD;  Location: [facility] OR ;  Service:     BUNIONECTOMY      EYE SURGERY      CATARACT EXTRACTION    FEMORAL POPLITEAL BYPASS  Left 10/31/2022    Procedure: LEFT FEMORAL TO PERONEAL ARTERY BYPASS WITH POLYTETRAFLUOROETHYLENE, LEFT FEMORAL ENDARTERECTOMY;  Surgeon: Juan A Roblero MD;  Location: [facility];  Service: Vascular;  Laterality: Left;    HYSTERECTOMY  1970    ORIF WRIST FRACTURE Left     TOTAL HIP ARTHROPLASTY Right 11/15/2022    Procedure: Right Posterior Hip Hemiarthroplasty;  Surgeon: Keny Waller MD;  Location: [facility];  Service: Orthopedics;  Laterality: Right;     PT Assessment (Last 12 Hours)       PT Evaluation and Treatment       Row Name 03/16/24 0953          Physical Therapy Time and Intention    Document Type therapy note (daily note)  -WM     Mode of Treatment individual therapy;physical therapy  -WM     Patient Effort good  -WM     Symptoms Noted During/After Treatment fatigue  -WM       Row Name 03/16/24 0953          Bed Mobility    Supine-Sit Rooks (Bed Mobility) contact guard;minimum assist (75% patient effort);verbal cues  -WM     Sit-Supine Rooks (Bed Mobility) contact guard;minimum assist (75% patient effort);verbal cues  -WM     Bed Mobility, Safety Issues decreased use of legs for bridging/pushing  -     Assistive Device (Bed Mobility) bed rails;draw sheet;head of bed elevated  -       Row Name 03/16/24 0953          Sit-Stand Transfer    Sit-Stand Rooks (Transfers) contact guard;minimum assist (75% patient effort);verbal cues  -     Assistive Device (Sit-Stand Transfers) walker, front-wheeled  -       Row Name 03/16/24 0953          Stand-Sit Transfer    Stand-Sit Rooks (Transfers) contact guard;minimum assist (75% patient effort);verbal cues  -     Assistive Device (Stand-Sit Transfers) walker, front-wheeled  -       Row Name 03/16/24 0953          Gait/Stairs (Locomotion)    Rooks Level (Gait) contact guard;minimum assist (75% patient effort);verbal cues  -     Assistive Device (Gait) walker, front-wheeled  -     Distance in Feet  (Gait) 8  -     Pattern (Gait) 4-point;step-to  -WM     Deviations/Abnormal Patterns (Gait) gait speed decreased;stride length decreased  -     Comment, (Gait/Stairs) Knee immobilizer RLE  -       Row Name 03/16/24 0953          Safety Issues, Functional Mobility    Impairments Affecting Function (Mobility) balance;endurance/activity tolerance;range of motion (ROM);strength  TTWB RLE  -       Row Name 03/16/24 0953          Motor Skills    Therapeutic Exercise hip;ankle;knee  -       Row Name 03/16/24 0953          Hip (Therapeutic Exercise)    Hip (Therapeutic Exercise) AAROM (active assistive range of motion)  -     Hip AAROM (Therapeutic Exercise) right;aBduction;aDduction;supine;10 repetitions;2 sets  -       Row Name 03/16/24 0953          Knee (Therapeutic Exercise)    Knee (Therapeutic Exercise) strengthening exercise  -     Knee Strengthening (Therapeutic Exercise) right;SLR (straight leg raise);supine;20 repititions  -       Row Name 03/16/24 0953          Ankle (Therapeutic Exercise)    Ankle (Therapeutic Exercise) AROM (active range of motion)  -     Ankle AROM (Therapeutic Exercise) bilateral;dorsiflexion;plantarflexion;supine;20 repititions  -       Row Name 03/16/24 0953          Positioning and Restraints    Pre-Treatment Position in bed  -     Post Treatment Position bed  -     In Bed fowlers;call light within reach;encouraged to call for assist  -       Row Name 03/16/24 0953          Progress Summary (PT)    Progress Toward Functional Goals (PT) progress toward functional goals is fair  -               User Key  (r) = Recorded By, (t) = Taken By, (c) = Cosigned By      Initials Name Provider Type    Emanuel Paige PTA Physical Therapist Assistant                    Physical Therapy Education       Title: PT OT SLP Therapies (Done)       Topic: Physical Therapy (Done)       Point: Mobility training (Done)       Learning Progress Summary             Patient  Acceptance, E, VU,NR by CATHERINE at 3/16/2024 0328    Acceptance, E,TB, VU by SHANTELLE at 3/14/2024 2000    Acceptance, E,TB, VU by RITU at 3/14/2024 1029                         Point: Precautions (Done)       Learning Progress Summary             Patient Acceptance, E, VU,NR by CATHERINE at 3/16/2024 0328    Acceptance, E,TB, VU by SHANTELLE at 3/14/2024 2000    Acceptance, E,TB, VU by RITU at 3/14/2024 1029                                         User Key       Initials Effective Dates Name Provider Type Discipline     06/16/21 -  Shea Arreaga, RN Registered Nurse Nurse    RITU 06/03/21 -  Naman Burrows, PT Physical Therapist PT    CATHERINE 01/16/24 -  Alber Calhoun RN Registered Nurse Nurse                  PT Recommendation and Plan     Progress Summary (PT)  Progress Toward Functional Goals (PT): progress toward functional goals is fair   Outcome Measures       Row Name 03/16/24 0959 03/15/24 1300 03/14/24 1000       How much help from another person do you currently need...    Turning from your back to your side while in flat bed without using bedrails? 3  -WM 3  -VK 3  -RITU    Moving from lying on back to sitting on the side of a flat bed without bedrails? 3  -WM 2  -VK 3  -RITU    Moving to and from a bed to a chair (including a wheelchair)? 3  -WM 2  -VK 3  -RITU    Standing up from a chair using your arms (e.g., wheelchair, bedside chair)? 3  -WM 3  -VK 3  -RITU    Climbing 3-5 steps with a railing? 1  -WM 1  -VK 2  -RITU    To walk in hospital room? 2  -WM 2  -VK 3  -RITU    AM-PAC 6 Clicks Score (PT) 15  -WM 13  -VK 17  -RITU    Highest Level of Mobility Goal 4 --> Transfer to chair/commode  -WM 4 --> Transfer to chair/commode  -VK 5 --> Static standing  -RITU              User Key  (r) = Recorded By, (t) = Taken By, (c) = Cosigned By      Initials Name Provider Type    Emanuel Paige, PTA Physical Therapist Assistant    Naman Keene, PT Physical Therapist    VK Chavez, Arlyn, PTA Physical Therapist Assistant                      Time Calculation:    PT Charges       Row Name 03/16/24 0953             Time Calculation    PT Received On 03/16/24  -WM         Timed Charges    95859 - PT Therapeutic Exercise Minutes 10  -WM      40355 - Gait Training Minutes  7  -WM      20922 - PT Therapeutic Activity Minutes 8  -WM         Total Minutes    Timed Charges Total Minutes 25  -WM       Total Minutes 25  -WM                User Key  (r) = Recorded By, (t) = Taken By, (c) = Cosigned By      Initials Name Provider Type     Emanuel Golden PTA Physical Therapist Assistant                      PT G-Codes  Outcome Measure Options: AM-PAC 6 Clicks Daily Activity (OT), Optimal Instrument  AM-PAC 6 Clicks Score (PT): 15  AM-PAC 6 Clicks Score (OT): 18    Emanuel Golden PTA  3/16/2024

## 2024-03-17 VITALS
BODY MASS INDEX: 20.7 KG/M2 | OXYGEN SATURATION: 95 % | HEIGHT: 64 IN | WEIGHT: 121.25 LBS | TEMPERATURE: 98.1 F | SYSTOLIC BLOOD PRESSURE: 114 MMHG | HEART RATE: 88 BPM | RESPIRATION RATE: 17 BRPM | DIASTOLIC BLOOD PRESSURE: 53 MMHG

## 2024-03-17 RX ADMIN — PANTOPRAZOLE SODIUM 40 MG: 40 TABLET, DELAYED RELEASE ORAL at 07:47

## 2024-03-17 RX ADMIN — OXYCODONE 5 MG: 5 TABLET ORAL at 07:46

## 2024-03-17 RX ADMIN — LISINOPRIL 40 MG: 20 TABLET ORAL at 08:07

## 2024-03-17 RX ADMIN — MONTELUKAST 10 MG: 10 TABLET, FILM COATED ORAL at 08:06

## 2024-03-17 RX ADMIN — CLOPIDOGREL BISULFATE 75 MG: 75 TABLET ORAL at 08:06

## 2024-03-17 RX ADMIN — POLYETHYLENE GLYCOL 3350 17 G: 17 POWDER, FOR SOLUTION ORAL at 07:46

## 2024-03-17 RX ADMIN — FUROSEMIDE 20 MG: 20 TABLET ORAL at 08:07

## 2024-03-17 RX ADMIN — Medication 10 ML: at 08:07

## 2024-03-17 RX ADMIN — TOPIRAMATE 25 MG: 25 TABLET, FILM COATED ORAL at 08:07

## 2024-03-17 NOTE — PLAN OF CARE
Goal Outcome Evaluation:  Plan of Care Reviewed With: patient        Progress: improving  Outcome Evaluation: Pt alert and able to make needs known. Pain controlled with PRN Roxicodone. Pt urinating without difficulty, utililzing purewick. Pt waiting transfer to rehab facility.

## 2024-03-17 NOTE — PLAN OF CARE
Goal Outcome Evaluation:   Patient to be discharged to [facility] this day. Son to provide transportation.

## 2024-03-18 ENCOUNTER — TELEPHONE (OUTPATIENT)
Dept: ORTHOPEDIC SURGERY | Facility: CLINIC | Age: 89
End: 2024-03-18
Payer: MEDICARE

## 2024-04-05 ENCOUNTER — TELEPHONE (OUTPATIENT)
Dept: ORTHOPEDIC SURGERY | Facility: CLINIC | Age: 89
End: 2024-04-05

## 2024-04-12 ENCOUNTER — OFFICE VISIT (OUTPATIENT)
Dept: ORTHOPEDIC SURGERY | Facility: CLINIC | Age: 89
End: 2024-04-12
Payer: MEDICARE

## 2024-04-12 VITALS
HEART RATE: 85 BPM | HEIGHT: 64 IN | OXYGEN SATURATION: 91 % | DIASTOLIC BLOOD PRESSURE: 83 MMHG | SYSTOLIC BLOOD PRESSURE: 149 MMHG | WEIGHT: 121 LBS | BODY MASS INDEX: 20.66 KG/M2

## 2024-04-12 DIAGNOSIS — S82.101A CLOSED FRACTURE OF PROXIMAL END OF RIGHT TIBIA, UNSPECIFIED FRACTURE MORPHOLOGY, INITIAL ENCOUNTER: Primary | ICD-10-CM

## 2024-04-12 NOTE — PROGRESS NOTES
"Chief Complaint  Follow-up of the Right Knee     Subjective      Lourdes Platt presents to [facility] for an evaluation of her right knee. Patient was recently admitted on 03/17/24 where Dr. Villalobos consulted the patient where he did conservative treatment. Patient was placed in a knee immobilizer and she presents today wearing this and ambulating with a walker. She reports she in now at home and no longer in rehab. She initially injured her knee by falling and landing on her knee about a month ago.     Allergies   Allergen Reactions    Penicillins Itching and Swelling        Social History     Socioeconomic History    Marital status:    Tobacco Use    Smoking status: Never    Smokeless tobacco: Never    Tobacco comments:     caffeine - 3 - 5 daily   Vaping Use    Vaping status: Never Used   Substance and Sexual Activity    Alcohol use: No    Drug use: No    Sexual activity: Defer        I reviewed the patient's chief complaint, history of present illness, review of systems, past medical history, surgical history, family history, social history, medications, and allergy list.     Review of Systems     Constitutional: Denies fevers, chills, weight loss  Cardiovascular: Denies chest pain, shortness of breath  Skin: Denies rashes, acute skin changes  Neurologic: Denies headache, loss of consciousness  MSK: Right knee pain       Vital Signs:   /83   Pulse 85   Ht 162.6 cm (64\")   Wt 54.9 kg (121 lb)   SpO2 91%   BMI 20.77 kg/m²          Physical Exam  General: Alert. No acute distress    Ortho Exam        Right lower extremity: skin is warm, dry and intact, Tender along the lateral aspect of the proximal tibia. Calf nontender, full extension, flexion to 90 degrees, stable to varus/valgus stress, neurovascularly intact, Positive EHL, FHL, GS, and TA. Sensation intact to all 5 nerves of the foot.     Procedures        Imaging Results (Most Recent)       Procedure Component " Value Units Date/Time    XR Knee Standing Right [[id number]] Resulted: 04/12/24 1300     Updated: 04/12/24 1309             Result Review :     X-Ray Report:  Right knee X-Ray  Indication: Evaluation of right knee pain   AP/Lateral view(s)  Findings: mildly distressed lateral plateau fracture   Prior studies available for comparison: yes       XR Ankle 3+ View Right    Result Date: 3/14/2024  Narrative: PROCEDURE: XR ANKLE 3+ VW RIGHT  COMPARISON: None  INDICATIONS: Fall, right ankle pain  FINDINGS:  Bones are osteopenic.  Medial malleolus and lateral malleolus intact.  Ankle mortise maintained.  Talar dome intact.  No fracture or dislocation.      Impression:  Negative for fracture.      CATRACHO WESTFALL MD       Electronically Signed and Approved By: CATRACHO WESTFALL MD on 3/14/2024 at 11:23             XR Knee 1 or 2 View Right    Result Date: 3/14/2024  Narrative: PROCEDURE: XR KNEE 1 OR 2 VW RIGHT  COMPARISON: Other, CT, CT LOWER EXTREMITY RIGHT WO CONTRAST, 3/13/2024, 17:08.  Other, CR, XR KNEE 3 VW RIGHT, 3/13/2024, 16:43.  INDICATIONS: Fall, tibial plateau fracture  FINDINGS:  Bones are osteopenic.  Lipohemarthrosis.  Subchondral nondisplaced fracture at the lateral tibial plateau better demonstrated on prior CT.  Extensive chondrocalcinosis at the knee consistent with CPPD arthropathy.  Extensive vascular calcifications.      Impression:   1. Subchondral fracture involving the lateral tibial plateau better demonstrated on recent CT. 2. Lipohemarthrosis. 3. Osteopenia. 4. Chondrocalcinosis consistent with CPPD arthropathy.      CATRACHO WESTFALL MD       Electronically Signed and Approved By: CATRACHO WESTFALL MD on 3/14/2024 at 11:22                     Assessment and Plan     Diagnoses and all orders for this visit:    1. Closed fracture of proximal end of right tibia, unspecified fracture morphology, initial encounter (Primary)  -     XR Knee Standing Right        The patient presents here today for an evaluation of her  right knee. X-rays were obtained today in the office and these were reviewed with the patient and her daughter.     Recommend doing 50% weight bearing on her. She will continue home health physical therapy. Advised to only wear the immoblizer when walking. Advised to work on range of motion when sitting.     Will obtain X-Rays of left knee  at next visit.    Call or return if worsening symptoms.    Follow Up     4 weeks       Patient was given instructions and counseling regarding her condition or for health maintenance advice. Please see specific information pulled into the AVS if appropriate.     Scribed for Jose Stapleton MD by Jayleen Bond.  04/12/24   12:59 EDT    I have personally performed the services described in this document as scribed by the above individual and it is both accurate and complete. Jose Stapleton MD 04/12/24

## 2024-05-06 ENCOUNTER — HOSPITAL ENCOUNTER (OUTPATIENT)
Facility: HOSPITAL | Age: 89
Discharge: HOME | End: 2024-05-06
Payer: MEDICARE

## 2024-05-06 ENCOUNTER — OFFICE VISIT (OUTPATIENT)
Dept: ORTHOPEDIC SURGERY | Facility: CLINIC | Age: 89
End: 2024-05-06
Payer: MEDICARE

## 2024-05-06 VITALS
WEIGHT: 121 LBS | HEART RATE: 74 BPM | OXYGEN SATURATION: 93 % | SYSTOLIC BLOOD PRESSURE: 131 MMHG | HEIGHT: 64 IN | DIASTOLIC BLOOD PRESSURE: 77 MMHG | BODY MASS INDEX: 20.66 KG/M2

## 2024-05-06 DIAGNOSIS — S82.121D CLOSED FRACTURE OF LATERAL PORTION OF RIGHT TIBIAL PLATEAU WITH ROUTINE HEALING, SUBSEQUENT ENCOUNTER: ICD-10-CM

## 2024-05-06 DIAGNOSIS — R60.0 LOCALIZED EDEMA: ICD-10-CM

## 2024-05-06 DIAGNOSIS — M25.561 RIGHT KNEE PAIN, UNSPECIFIED CHRONICITY: Primary | ICD-10-CM

## 2024-05-06 LAB
BH CV LOWER VASCULAR LEFT COMMON FEMORAL AUGMENT: NORMAL
BH CV LOWER VASCULAR LEFT COMMON FEMORAL COMPETENT: NORMAL
BH CV LOWER VASCULAR LEFT COMMON FEMORAL COMPRESS: NORMAL
BH CV LOWER VASCULAR LEFT COMMON FEMORAL PHASIC: NORMAL
BH CV LOWER VASCULAR LEFT COMMON FEMORAL SPONT: NORMAL
BH CV LOWER VASCULAR RIGHT COMMON FEMORAL AUGMENT: NORMAL
BH CV LOWER VASCULAR RIGHT COMMON FEMORAL COMPETENT: NORMAL
BH CV LOWER VASCULAR RIGHT COMMON FEMORAL COMPRESS: NORMAL
BH CV LOWER VASCULAR RIGHT COMMON FEMORAL PHASIC: NORMAL
BH CV LOWER VASCULAR RIGHT COMMON FEMORAL SPONT: NORMAL
BH CV LOWER VASCULAR RIGHT DISTAL FEMORAL COMPRESS: NORMAL
BH CV LOWER VASCULAR RIGHT GASTRONEMIUS COMPRESS: NORMAL
BH CV LOWER VASCULAR RIGHT GREATER SAPH AK AUGMENT: NORMAL
BH CV LOWER VASCULAR RIGHT GREATER SAPH AK COMPETENT: NORMAL
BH CV LOWER VASCULAR RIGHT GREATER SAPH AK COMPRESS: NORMAL
BH CV LOWER VASCULAR RIGHT GREATER SAPH AK PHASIC: NORMAL
BH CV LOWER VASCULAR RIGHT GREATER SAPH AK SPONT: NORMAL
BH CV LOWER VASCULAR RIGHT GREATER SAPH BK COMPRESS: NORMAL
BH CV LOWER VASCULAR RIGHT LESSER SAPH COMPRESS: NORMAL
BH CV LOWER VASCULAR RIGHT MID FEMORAL AUGMENT: NORMAL
BH CV LOWER VASCULAR RIGHT MID FEMORAL COMPETENT: NORMAL
BH CV LOWER VASCULAR RIGHT MID FEMORAL COMPRESS: NORMAL
BH CV LOWER VASCULAR RIGHT MID FEMORAL PHASIC: NORMAL
BH CV LOWER VASCULAR RIGHT MID FEMORAL SPONT: NORMAL
BH CV LOWER VASCULAR RIGHT PERONEAL AUGMENT: NORMAL
BH CV LOWER VASCULAR RIGHT PERONEAL COMPETENT: NORMAL
BH CV LOWER VASCULAR RIGHT PERONEAL COMPRESS: NORMAL
BH CV LOWER VASCULAR RIGHT PERONEAL PHASIC: NORMAL
BH CV LOWER VASCULAR RIGHT PERONEAL SPONT: NORMAL
BH CV LOWER VASCULAR RIGHT POPLITEAL AUGMENT: NORMAL
BH CV LOWER VASCULAR RIGHT POPLITEAL COMPETENT: NORMAL
BH CV LOWER VASCULAR RIGHT POPLITEAL COMPRESS: NORMAL
BH CV LOWER VASCULAR RIGHT POPLITEAL PHASIC: NORMAL
BH CV LOWER VASCULAR RIGHT POPLITEAL SPONT: NORMAL
BH CV LOWER VASCULAR RIGHT POSTERIOR TIBIAL AUGMENT: NORMAL
BH CV LOWER VASCULAR RIGHT POSTERIOR TIBIAL COMPETENT: NORMAL
BH CV LOWER VASCULAR RIGHT POSTERIOR TIBIAL COMPRESS: NORMAL
BH CV LOWER VASCULAR RIGHT POSTERIOR TIBIAL PHASIC: NORMAL
BH CV LOWER VASCULAR RIGHT POSTERIOR TIBIAL SPONT: NORMAL
BH CV LOWER VASCULAR RIGHT PROXIMAL FEMORAL COMPRESS: NORMAL

## 2024-05-06 PROCEDURE — 1159F MED LIST DOCD IN RCRD: CPT

## 2024-05-06 PROCEDURE — 93971 EXTREMITY STUDY: CPT | Performed by: SURGERY

## 2024-05-06 PROCEDURE — 99213 OFFICE O/P EST LOW 20 MIN: CPT

## 2024-05-06 PROCEDURE — 1160F RVW MEDS BY RX/DR IN RCRD: CPT

## 2024-05-06 PROCEDURE — 93971 EXTREMITY STUDY: CPT

## 2024-05-31 NOTE — PROGRESS NOTES
"Chief Complaint  Follow-up of the Right Knee    Subjective      Lourdes Platt presents to [facility] for follow up of her right knee.  Patient sustained a right tibial plateau fracture on 3/17/2024.  Been treating this conservatively with a walker and a knee immobilizer that she wears with ambulation.  She was last seen evaluated in office on 5/6/2024 and was advised to transition into a normal knee brace with ambulation.  Patient can weight-bear as tolerated with use of a walker.  Patient was also advised to continue physical therapy.     Today the patient is here today utilizing a walker.  She reports that she has had improvement in her right knee pain.  Her knee is not tender to touch and she has been working on her own with range of motion.  She did recently had a fall and has a new complaint of right hip pain.  Patient reports that she has been seen and had x-rays of her right hip at her PCP office yesterday.  Patient states that her right hip is more painful than her right knee ever was.  Patient also reports that she has chronic bilateral lower extremity swelling and redness.  She does report that it will fluctuate slightly in severity and after she has been sitting for a long period of time and is generally worse, like right now.    Allergies   Allergen Reactions    Penicillin G Swelling    Penicillins Itching and Swelling       Objective     Vital Signs:   Vitals:    06/05/24 0944   BP: 132/67   Pulse: 77   SpO2: 96%   Weight: 54.9 kg (121 lb 0.5 oz)   Height: 162.6 cm (64\")     Body mass index is 20.78 kg/m².    I reviewed the patient's chief complaint, history of present illness, review of systems, past medical history, surgical history, family history, social history, medications, and allergy list.     REVIEW OF SYSTEMS    Constitutional: Denies fevers, chills, weight loss  Cardiovascular: Denies chest pain, shortness of breath  Skin: Denies rashes, acute skin " changes  Neurologic: Denies headache, loss of consciousness  MSK: Right knee pain.     Ortho Exam  Right lower extremity: Nontender to palpation over the knee.  Tender to palpation over the lateral hip.  1+ pitting edema bilateral lower extremities.  -3 degrees knee extension.  Knee flexion 120 degrees.  Knee stable to varus valgus stress.  Nontender palpation over medial or lateral joint line.  Mild knee effusion.  Soft, nontender.  Demonstrates active ankle dorsiflexion and plantarflexion with associated stiffness secondary to swelling.  Sensation intact.  Neurovascular intact.  Palpable pedal pulses.          Imaging Results (Most Recent)       Procedure Component Value Units Date/Time    XR Knee AP & Lateral [[id number]] Collected: 06/05/24 1713     Updated: 06/11/24 1202    Narrative:      X-Ray Report:  Study: X-rays ordered, taken in the office, and reviewed today  Site: Right knee xray  Indication: Right tibial plateau fracture follow-up  View: AP, lateral knee view(s)  Findings: Mild tricompartmental arthritis is visualized.  Unable to   visualize previously seen tibial plateau fracture which suggests interval   healing.  No acute fractures are seen.  Prior studies available for comparison: yes                       Assessment and Plan   Diagnoses and all orders for this visit:    1. Right knee pain, unspecified chronicity (Primary)  -     XR Knee AP & Lateral    2. Closed fracture of lateral portion of right tibial plateau with routine healing, subsequent encounter    3. Localized edema         Lourdes Platt presents to [facility] for her right knee.  Patient has a right tibial plateau fracture that occurred on 3/19/2024 that been treating conservatively.  X-rays were obtained and reviewed with patient and family today.  Patient has had significant healing in the right tibial plateau fracture and is unable to visualize on straight.  Patient's knee examination is also at her  baseline.  Encourage patient to maintain her mobility and range of motion with exercises at home.  Discussed fall precautions and utilizing her walker with ambulation.    She will follow-up as needed.    Tobacco Use: Low Risk  (6/5/2024)    Patient History     Smoking Tobacco Use: Never     Smokeless Tobacco Use: Never     Passive Exposure: Not on file     Patient reports that they are a nonsmoker; cessation education not applicable.     BMI is within normal parameters. No other follow-up for BMI required.      Follow Up   Return if symptoms worsen or fail to improve.  There are no Patient Instructions on file for this visit.  Patient was given instructions and counseling regarding her condition or for health maintenance advice. Please see specific information pulled into the AVS if appropriate.       Dictated Utilizing Dragon Dictation. Please note that portions of this note were completed with a voice recognition program. Part of this note may be an electronic transcription/translation of spoken language to printed text using the Dragon Dictation System.

## 2024-06-05 ENCOUNTER — OFFICE VISIT (OUTPATIENT)
Dept: ORTHOPEDIC SURGERY | Facility: CLINIC | Age: 89
End: 2024-06-05
Payer: MEDICARE

## 2024-06-05 VITALS
DIASTOLIC BLOOD PRESSURE: 67 MMHG | SYSTOLIC BLOOD PRESSURE: 132 MMHG | OXYGEN SATURATION: 96 % | BODY MASS INDEX: 20.66 KG/M2 | HEART RATE: 77 BPM | WEIGHT: 121.03 LBS | HEIGHT: 64 IN

## 2024-06-05 DIAGNOSIS — M25.561 RIGHT KNEE PAIN, UNSPECIFIED CHRONICITY: Primary | ICD-10-CM

## 2024-06-05 DIAGNOSIS — R60.0 LOCALIZED EDEMA: ICD-10-CM

## 2024-06-05 DIAGNOSIS — S82.121D CLOSED FRACTURE OF LATERAL PORTION OF RIGHT TIBIAL PLATEAU WITH ROUTINE HEALING, SUBSEQUENT ENCOUNTER: ICD-10-CM

## 2024-06-05 PROCEDURE — 99024 POSTOP FOLLOW-UP VISIT: CPT

## 2024-06-05 PROCEDURE — 1159F MED LIST DOCD IN RCRD: CPT

## 2024-06-05 PROCEDURE — 1160F RVW MEDS BY RX/DR IN RCRD: CPT

## 2025-06-09 ENCOUNTER — APPOINTMENT (OUTPATIENT)
Dept: GENERAL RADIOLOGY | Facility: HOSPITAL | Age: 89
DRG: 301 | End: 2025-06-09
Payer: MEDICARE

## 2025-06-09 ENCOUNTER — HOSPITAL ENCOUNTER (INPATIENT)
Facility: HOSPITAL | Age: 89
LOS: 3 days | Discharge: NURSING, REHAB OR LONG-TERM CARE | DRG: 301 | End: 2025-06-14
Attending: EMERGENCY MEDICINE | Admitting: HOSPITALIST
Payer: MEDICARE

## 2025-06-09 DIAGNOSIS — I73.9 PERIPHERAL VASCULAR DISEASE: ICD-10-CM

## 2025-06-09 DIAGNOSIS — M79.89 LEG SWELLING: Primary | ICD-10-CM

## 2025-06-09 DIAGNOSIS — R06.01 ORTHOPNEA: ICD-10-CM

## 2025-06-09 LAB
ALBUMIN SERPL-MCNC: 3.5 G/DL (ref 3.5–5.2)
ALBUMIN/GLOB SERPL: 1.2 G/DL
ALP SERPL-CCNC: 86 U/L (ref 39–117)
ALT SERPL W P-5'-P-CCNC: 13 U/L (ref 1–33)
ANION GAP SERPL CALCULATED.3IONS-SCNC: 9.1 MMOL/L (ref 5–15)
AST SERPL-CCNC: 18 U/L (ref 1–32)
BASOPHILS # BLD AUTO: 0.04 10*3/MM3 (ref 0–0.2)
BASOPHILS NFR BLD AUTO: 0.4 % (ref 0–1.5)
BILIRUB SERPL-MCNC: 0.5 MG/DL (ref 0–1.2)
BUN SERPL-MCNC: 19 MG/DL (ref 8–23)
BUN/CREAT SERPL: 17.1 (ref 7–25)
CALCIUM SPEC-SCNC: 8.3 MG/DL (ref 8.6–10.5)
CHLORIDE SERPL-SCNC: 108 MMOL/L (ref 98–107)
CO2 SERPL-SCNC: 23.9 MMOL/L (ref 22–29)
CREAT SERPL-MCNC: 1.11 MG/DL (ref 0.57–1)
DEPRECATED RDW RBC AUTO: 43.6 FL (ref 37–54)
EGFRCR SERPLBLD CKD-EPI 2021: 47.9 ML/MIN/1.73
EOSINOPHIL # BLD AUTO: 0.34 10*3/MM3 (ref 0–0.4)
EOSINOPHIL NFR BLD AUTO: 3.5 % (ref 0.3–6.2)
ERYTHROCYTE [DISTWIDTH] IN BLOOD BY AUTOMATED COUNT: 12.9 % (ref 12.3–15.4)
GEN 5 1HR TROPONIN T REFLEX: 22 NG/L
GLOBULIN UR ELPH-MCNC: 2.9 GM/DL
GLUCOSE SERPL-MCNC: 107 MG/DL (ref 65–99)
HCT VFR BLD AUTO: 31.1 % (ref 34–46.6)
HGB BLD-MCNC: 10.2 G/DL (ref 12–15.9)
HOLD SPECIMEN: NORMAL
HOLD SPECIMEN: NORMAL
IMM GRANULOCYTES # BLD AUTO: 0.02 10*3/MM3 (ref 0–0.05)
IMM GRANULOCYTES NFR BLD AUTO: 0.2 % (ref 0–0.5)
LYMPHOCYTES # BLD AUTO: 1.41 10*3/MM3 (ref 0.7–3.1)
LYMPHOCYTES NFR BLD AUTO: 14.7 % (ref 19.6–45.3)
MAGNESIUM SERPL-MCNC: 2.1 MG/DL (ref 1.6–2.4)
MCH RBC QN AUTO: 30.7 PG (ref 26.6–33)
MCHC RBC AUTO-ENTMCNC: 32.8 G/DL (ref 31.5–35.7)
MCV RBC AUTO: 93.7 FL (ref 79–97)
MONOCYTES # BLD AUTO: 0.75 10*3/MM3 (ref 0.1–0.9)
MONOCYTES NFR BLD AUTO: 7.8 % (ref 5–12)
NEUTROPHILS NFR BLD AUTO: 7.06 10*3/MM3 (ref 1.7–7)
NEUTROPHILS NFR BLD AUTO: 73.4 % (ref 42.7–76)
NRBC BLD AUTO-RTO: 0 /100 WBC (ref 0–0.2)
NT-PROBNP SERPL-MCNC: 1578 PG/ML (ref 0–1800)
PLATELET # BLD AUTO: 267 10*3/MM3 (ref 140–450)
PMV BLD AUTO: 9.9 FL (ref 6–12)
POTASSIUM SERPL-SCNC: 3.5 MMOL/L (ref 3.5–5.2)
PROT SERPL-MCNC: 6.4 G/DL (ref 6–8.5)
RBC # BLD AUTO: 3.32 10*6/MM3 (ref 3.77–5.28)
SODIUM SERPL-SCNC: 141 MMOL/L (ref 136–145)
TROPONIN T % DELTA: 0
TROPONIN T NUMERIC DELTA: 0 NG/L
TROPONIN T SERPL HS-MCNC: 22 NG/L
WBC NRBC COR # BLD AUTO: 9.62 10*3/MM3 (ref 3.4–10.8)
WHOLE BLOOD HOLD COAG: NORMAL
WHOLE BLOOD HOLD SPECIMEN: NORMAL

## 2025-06-09 PROCEDURE — 83735 ASSAY OF MAGNESIUM: CPT

## 2025-06-09 PROCEDURE — 71045 X-RAY EXAM CHEST 1 VIEW: CPT

## 2025-06-09 PROCEDURE — 36415 COLL VENOUS BLD VENIPUNCTURE: CPT

## 2025-06-09 PROCEDURE — 84484 ASSAY OF TROPONIN QUANT: CPT | Performed by: EMERGENCY MEDICINE

## 2025-06-09 PROCEDURE — 83880 ASSAY OF NATRIURETIC PEPTIDE: CPT | Performed by: EMERGENCY MEDICINE

## 2025-06-09 PROCEDURE — 80053 COMPREHEN METABOLIC PANEL: CPT

## 2025-06-09 PROCEDURE — 93005 ELECTROCARDIOGRAM TRACING: CPT | Performed by: EMERGENCY MEDICINE

## 2025-06-09 PROCEDURE — 85025 COMPLETE CBC W/AUTO DIFF WBC: CPT

## 2025-06-09 PROCEDURE — 99285 EMERGENCY DEPT VISIT HI MDM: CPT

## 2025-06-09 PROCEDURE — 84484 ASSAY OF TROPONIN QUANT: CPT

## 2025-06-09 PROCEDURE — 93010 ELECTROCARDIOGRAM REPORT: CPT | Performed by: INTERNAL MEDICINE

## 2025-06-09 RX ORDER — SODIUM CHLORIDE 0.9 % (FLUSH) 0.9 %
10 SYRINGE (ML) INJECTION AS NEEDED
Status: DISCONTINUED | OUTPATIENT
Start: 2025-06-09 | End: 2025-06-14 | Stop reason: HOSPADM

## 2025-06-10 ENCOUNTER — APPOINTMENT (OUTPATIENT)
Dept: CARDIOLOGY | Facility: HOSPITAL | Age: 89
DRG: 301 | End: 2025-06-10
Payer: MEDICARE

## 2025-06-10 ENCOUNTER — APPOINTMENT (OUTPATIENT)
Dept: GENERAL RADIOLOGY | Facility: HOSPITAL | Age: 89
DRG: 301 | End: 2025-06-10
Payer: MEDICARE

## 2025-06-10 PROBLEM — R06.00 DYSPNEA: Status: ACTIVE | Noted: 2017-05-19

## 2025-06-10 PROBLEM — S72.001A CLOSED DISPLACED FRACTURE OF RIGHT FEMORAL NECK: Status: RESOLVED | Noted: 2022-11-14 | Resolved: 2025-06-10

## 2025-06-10 PROBLEM — D62 ACUTE BLOOD LOSS ANEMIA: Status: RESOLVED | Noted: 2022-11-03 | Resolved: 2025-06-10

## 2025-06-10 PROBLEM — K21.9 GERD WITHOUT ESOPHAGITIS: Status: ACTIVE | Noted: 2025-06-10

## 2025-06-10 PROBLEM — S82.209A TIBIA FRACTURE: Status: RESOLVED | Noted: 2024-03-14 | Resolved: 2025-06-10

## 2025-06-10 PROBLEM — S72.001A FRACTURE OF NECK OF RIGHT FEMUR: Status: RESOLVED | Noted: 2022-11-13 | Resolved: 2025-06-10

## 2025-06-10 PROBLEM — M79.89 LEG SWELLING: Status: ACTIVE | Noted: 2025-06-10

## 2025-06-10 LAB
ANION GAP SERPL CALCULATED.3IONS-SCNC: 10.5 MMOL/L (ref 5–15)
BACTERIA UR QL AUTO: NORMAL /HPF
BH CV LOWER ARTERIAL LEFT GREAT TOE SYS MAX: 43
BH CV LOWER ARTERIAL LEFT TBI RATIO: 0.25
BH CV LOWER ARTERIAL RIGHT ABI RATIO: 0.6
BH CV LOWER ARTERIAL RIGHT DORSALIS PEDIS SYS MAX: 82
BH CV LOWER ARTERIAL RIGHT GREAT TOE SYS MAX: 36
BH CV LOWER ARTERIAL RIGHT POST TIBIAL SYS MAX: 104
BH CV LOWER ARTERIAL RIGHT TBI RATIO: 0.21
BH CV LOWER VASCULAR LEFT COMMON FEMORAL AUGMENT: NORMAL
BH CV LOWER VASCULAR LEFT COMMON FEMORAL COMPETENT: NORMAL
BH CV LOWER VASCULAR LEFT COMMON FEMORAL COMPRESS: NORMAL
BH CV LOWER VASCULAR LEFT COMMON FEMORAL PHASIC: NORMAL
BH CV LOWER VASCULAR LEFT COMMON FEMORAL SPONT: NORMAL
BH CV LOWER VASCULAR LEFT DISTAL FEMORAL COMPRESS: NORMAL
BH CV LOWER VASCULAR LEFT GASTRONEMIUS COMPRESS: NORMAL
BH CV LOWER VASCULAR LEFT GREATER SAPH AK COMPRESS: NORMAL
BH CV LOWER VASCULAR LEFT GREATER SAPH BK COMPRESS: NORMAL
BH CV LOWER VASCULAR LEFT LESSER SAPH COMPRESS: NORMAL
BH CV LOWER VASCULAR LEFT MID FEMORAL AUGMENT: NORMAL
BH CV LOWER VASCULAR LEFT MID FEMORAL COMPETENT: NORMAL
BH CV LOWER VASCULAR LEFT MID FEMORAL COMPRESS: NORMAL
BH CV LOWER VASCULAR LEFT MID FEMORAL PHASIC: NORMAL
BH CV LOWER VASCULAR LEFT MID FEMORAL SPONT: NORMAL
BH CV LOWER VASCULAR LEFT PERONEAL COMPRESS: NORMAL
BH CV LOWER VASCULAR LEFT POPLITEAL AUGMENT: NORMAL
BH CV LOWER VASCULAR LEFT POPLITEAL COMPETENT: NORMAL
BH CV LOWER VASCULAR LEFT POPLITEAL COMPRESS: NORMAL
BH CV LOWER VASCULAR LEFT POPLITEAL PHASIC: NORMAL
BH CV LOWER VASCULAR LEFT POPLITEAL SPONT: NORMAL
BH CV LOWER VASCULAR LEFT POSTERIOR TIBIAL COMPRESS: NORMAL
BH CV LOWER VASCULAR LEFT PROFUNDA FEMORAL COMPRESS: NORMAL
BH CV LOWER VASCULAR LEFT PROXIMAL FEMORAL COMPRESS: NORMAL
BH CV LOWER VASCULAR LEFT SAPHENOFEMORAL JUNCTION COMPRESS: NORMAL
BH CV LOWER VASCULAR RIGHT COMMON FEMORAL AUGMENT: NORMAL
BH CV LOWER VASCULAR RIGHT COMMON FEMORAL COMPETENT: NORMAL
BH CV LOWER VASCULAR RIGHT COMMON FEMORAL COMPRESS: NORMAL
BH CV LOWER VASCULAR RIGHT COMMON FEMORAL PHASIC: NORMAL
BH CV LOWER VASCULAR RIGHT COMMON FEMORAL SPONT: NORMAL
BH CV LOWER VASCULAR RIGHT DISTAL FEMORAL COMPRESS: NORMAL
BH CV LOWER VASCULAR RIGHT GASTRONEMIUS COMPRESS: NORMAL
BH CV LOWER VASCULAR RIGHT GREATER SAPH AK COMPRESS: NORMAL
BH CV LOWER VASCULAR RIGHT GREATER SAPH BK COMPRESS: NORMAL
BH CV LOWER VASCULAR RIGHT LESSER SAPH COMPRESS: NORMAL
BH CV LOWER VASCULAR RIGHT MID FEMORAL AUGMENT: NORMAL
BH CV LOWER VASCULAR RIGHT MID FEMORAL COMPETENT: NORMAL
BH CV LOWER VASCULAR RIGHT MID FEMORAL COMPRESS: NORMAL
BH CV LOWER VASCULAR RIGHT MID FEMORAL PHASIC: NORMAL
BH CV LOWER VASCULAR RIGHT MID FEMORAL SPONT: NORMAL
BH CV LOWER VASCULAR RIGHT PERONEAL COMPRESS: NORMAL
BH CV LOWER VASCULAR RIGHT POPLITEAL AUGMENT: NORMAL
BH CV LOWER VASCULAR RIGHT POPLITEAL COMPETENT: NORMAL
BH CV LOWER VASCULAR RIGHT POPLITEAL COMPRESS: NORMAL
BH CV LOWER VASCULAR RIGHT POPLITEAL PHASIC: NORMAL
BH CV LOWER VASCULAR RIGHT POPLITEAL SPONT: NORMAL
BH CV LOWER VASCULAR RIGHT POSTERIOR TIBIAL COMPRESS: NORMAL
BH CV LOWER VASCULAR RIGHT PROFUNDA FEMORAL COMPRESS: NORMAL
BH CV LOWER VASCULAR RIGHT PROXIMAL FEMORAL COMPRESS: NORMAL
BH CV LOWER VASCULAR RIGHT SAPHENOFEMORAL JUNCTION COMPRESS: NORMAL
BILIRUB UR QL STRIP: NEGATIVE
BUN SERPL-MCNC: 16 MG/DL (ref 8–23)
BUN/CREAT SERPL: 20 (ref 7–25)
CALCIUM SPEC-SCNC: 8.1 MG/DL (ref 8.6–10.5)
CHLORIDE SERPL-SCNC: 108 MMOL/L (ref 98–107)
CLARITY UR: CLEAR
CO2 SERPL-SCNC: 26.5 MMOL/L (ref 22–29)
COLOR UR: YELLOW
CREAT SERPL-MCNC: 0.8 MG/DL (ref 0.57–1)
DEPRECATED RDW RBC AUTO: 43.9 FL (ref 37–54)
EGFRCR SERPLBLD CKD-EPI 2021: 71 ML/MIN/1.73
ERYTHROCYTE [DISTWIDTH] IN BLOOD BY AUTOMATED COUNT: 12.8 % (ref 12.3–15.4)
GLUCOSE BLDC GLUCOMTR-MCNC: 116 MG/DL (ref 70–130)
GLUCOSE BLDC GLUCOMTR-MCNC: 131 MG/DL (ref 70–130)
GLUCOSE BLDC GLUCOMTR-MCNC: 154 MG/DL (ref 70–130)
GLUCOSE SERPL-MCNC: 103 MG/DL (ref 65–99)
GLUCOSE UR STRIP-MCNC: NEGATIVE MG/DL
HCT VFR BLD AUTO: 28.2 % (ref 34–46.6)
HGB BLD-MCNC: 9.2 G/DL (ref 12–15.9)
HGB UR QL STRIP.AUTO: NEGATIVE
HYALINE CASTS UR QL AUTO: NORMAL /LPF
KETONES UR QL STRIP: NEGATIVE
LEUKOCYTE ESTERASE UR QL STRIP.AUTO: ABNORMAL
MCH RBC QN AUTO: 30.5 PG (ref 26.6–33)
MCHC RBC AUTO-ENTMCNC: 32.6 G/DL (ref 31.5–35.7)
MCV RBC AUTO: 93.4 FL (ref 79–97)
NITRITE UR QL STRIP: NEGATIVE
PH UR STRIP.AUTO: 7 [PH] (ref 5–8)
PLATELET # BLD AUTO: 232 10*3/MM3 (ref 140–450)
PMV BLD AUTO: 9.9 FL (ref 6–12)
POTASSIUM SERPL-SCNC: 3.3 MMOL/L (ref 3.5–5.2)
PROT UR QL STRIP: NEGATIVE
QT INTERVAL: 334 MS
QTC INTERVAL: 430 MS
RBC # BLD AUTO: 3.02 10*6/MM3 (ref 3.77–5.28)
RBC # UR STRIP: NORMAL /HPF
REF LAB TEST METHOD: NORMAL
SODIUM SERPL-SCNC: 145 MMOL/L (ref 136–145)
SP GR UR STRIP: 1.01 (ref 1–1.03)
SQUAMOUS #/AREA URNS HPF: NORMAL /HPF
UPPER ARTERIAL RIGHT ARM BRACHIAL SYS MAX: 174
UROBILINOGEN UR QL STRIP: ABNORMAL
WBC # UR STRIP: NORMAL /HPF
WBC NRBC COR # BLD AUTO: 9.05 10*3/MM3 (ref 3.4–10.8)

## 2025-06-10 PROCEDURE — 82948 REAGENT STRIP/BLOOD GLUCOSE: CPT

## 2025-06-10 PROCEDURE — G0378 HOSPITAL OBSERVATION PER HR: HCPCS

## 2025-06-10 PROCEDURE — 99204 OFFICE O/P NEW MOD 45 MIN: CPT | Performed by: INTERNAL MEDICINE

## 2025-06-10 PROCEDURE — 93922 UPR/L XTREMITY ART 2 LEVELS: CPT | Performed by: STUDENT IN AN ORGANIZED HEALTH CARE EDUCATION/TRAINING PROGRAM

## 2025-06-10 PROCEDURE — 85027 COMPLETE CBC AUTOMATED: CPT | Performed by: NURSE PRACTITIONER

## 2025-06-10 PROCEDURE — 93970 EXTREMITY STUDY: CPT | Performed by: SURGERY

## 2025-06-10 PROCEDURE — 25010000002 MORPHINE PER 10 MG: Performed by: NURSE PRACTITIONER

## 2025-06-10 PROCEDURE — 93970 EXTREMITY STUDY: CPT

## 2025-06-10 PROCEDURE — 93922 UPR/L XTREMITY ART 2 LEVELS: CPT

## 2025-06-10 PROCEDURE — 25010000002 FUROSEMIDE PER 20 MG: Performed by: EMERGENCY MEDICINE

## 2025-06-10 PROCEDURE — 73560 X-RAY EXAM OF KNEE 1 OR 2: CPT

## 2025-06-10 PROCEDURE — 81001 URINALYSIS AUTO W/SCOPE: CPT | Performed by: EMERGENCY MEDICINE

## 2025-06-10 PROCEDURE — 80048 BASIC METABOLIC PNL TOTAL CA: CPT | Performed by: NURSE PRACTITIONER

## 2025-06-10 PROCEDURE — 99222 1ST HOSP IP/OBS MODERATE 55: CPT | Performed by: STUDENT IN AN ORGANIZED HEALTH CARE EDUCATION/TRAINING PROGRAM

## 2025-06-10 RX ORDER — ENOXAPARIN SODIUM 100 MG/ML
30 INJECTION SUBCUTANEOUS DAILY
Status: DISCONTINUED | OUTPATIENT
Start: 2025-06-10 | End: 2025-06-10

## 2025-06-10 RX ORDER — ONDANSETRON 4 MG/1
4 TABLET, ORALLY DISINTEGRATING ORAL EVERY 6 HOURS PRN
Status: DISCONTINUED | OUTPATIENT
Start: 2025-06-10 | End: 2025-06-14 | Stop reason: HOSPADM

## 2025-06-10 RX ORDER — HYDROCODONE BITARTRATE AND ACETAMINOPHEN 5; 325 MG/1; MG/1
1 TABLET ORAL EVERY 6 HOURS PRN
Refills: 0 | Status: DISCONTINUED | OUTPATIENT
Start: 2025-06-10 | End: 2025-06-14 | Stop reason: HOSPADM

## 2025-06-10 RX ORDER — ACETAMINOPHEN 325 MG/1
650 TABLET ORAL EVERY 4 HOURS PRN
Status: DISCONTINUED | OUTPATIENT
Start: 2025-06-10 | End: 2025-06-14 | Stop reason: HOSPADM

## 2025-06-10 RX ORDER — ONDANSETRON 2 MG/ML
4 INJECTION INTRAMUSCULAR; INTRAVENOUS EVERY 6 HOURS PRN
Status: DISCONTINUED | OUTPATIENT
Start: 2025-06-10 | End: 2025-06-14 | Stop reason: HOSPADM

## 2025-06-10 RX ORDER — FUROSEMIDE 10 MG/ML
80 INJECTION INTRAMUSCULAR; INTRAVENOUS ONCE
Status: COMPLETED | OUTPATIENT
Start: 2025-06-10 | End: 2025-06-10

## 2025-06-10 RX ORDER — POTASSIUM CHLORIDE 750 MG/1
10 TABLET, FILM COATED, EXTENDED RELEASE ORAL DAILY
Status: DISCONTINUED | OUTPATIENT
Start: 2025-06-10 | End: 2025-06-11

## 2025-06-10 RX ORDER — NITROGLYCERIN 0.4 MG/1
0.4 TABLET SUBLINGUAL
Status: DISCONTINUED | OUTPATIENT
Start: 2025-06-10 | End: 2025-06-14 | Stop reason: HOSPADM

## 2025-06-10 RX ORDER — AMOXICILLIN 250 MG
2 CAPSULE ORAL 2 TIMES DAILY PRN
Status: DISCONTINUED | OUTPATIENT
Start: 2025-06-10 | End: 2025-06-14 | Stop reason: HOSPADM

## 2025-06-10 RX ORDER — TOPIRAMATE 25 MG/1
25 TABLET, FILM COATED ORAL DAILY
Status: DISCONTINUED | OUTPATIENT
Start: 2025-06-10 | End: 2025-06-10

## 2025-06-10 RX ORDER — PANTOPRAZOLE SODIUM 40 MG/1
40 TABLET, DELAYED RELEASE ORAL DAILY
Status: DISCONTINUED | OUTPATIENT
Start: 2025-06-10 | End: 2025-06-14 | Stop reason: HOSPADM

## 2025-06-10 RX ORDER — ACETAMINOPHEN 160 MG/5ML
650 SOLUTION ORAL EVERY 4 HOURS PRN
Status: DISCONTINUED | OUTPATIENT
Start: 2025-06-10 | End: 2025-06-14 | Stop reason: HOSPADM

## 2025-06-10 RX ORDER — POLYETHYLENE GLYCOL 3350 17 G/17G
17 POWDER, FOR SOLUTION ORAL DAILY PRN
Status: DISCONTINUED | OUTPATIENT
Start: 2025-06-10 | End: 2025-06-14 | Stop reason: HOSPADM

## 2025-06-10 RX ORDER — FUROSEMIDE 40 MG/1
40 TABLET ORAL DAILY
Status: DISCONTINUED | OUTPATIENT
Start: 2025-06-10 | End: 2025-06-14 | Stop reason: HOSPADM

## 2025-06-10 RX ORDER — CLOPIDOGREL BISULFATE 75 MG/1
75 TABLET ORAL DAILY
Status: DISCONTINUED | OUTPATIENT
Start: 2025-06-10 | End: 2025-06-14 | Stop reason: HOSPADM

## 2025-06-10 RX ORDER — MORPHINE SULFATE 2 MG/ML
2 INJECTION, SOLUTION INTRAMUSCULAR; INTRAVENOUS ONCE
Status: COMPLETED | OUTPATIENT
Start: 2025-06-10 | End: 2025-06-10

## 2025-06-10 RX ORDER — ACETAMINOPHEN 500 MG
1000 TABLET ORAL ONCE
Status: COMPLETED | OUTPATIENT
Start: 2025-06-10 | End: 2025-06-10

## 2025-06-10 RX ORDER — CALCIUM CARBONATE 500 MG/1
2 TABLET, CHEWABLE ORAL 2 TIMES DAILY PRN
Status: DISCONTINUED | OUTPATIENT
Start: 2025-06-10 | End: 2025-06-14 | Stop reason: HOSPADM

## 2025-06-10 RX ORDER — SODIUM CHLORIDE 0.9 % (FLUSH) 0.9 %
10 SYRINGE (ML) INJECTION AS NEEDED
Status: DISCONTINUED | OUTPATIENT
Start: 2025-06-10 | End: 2025-06-14 | Stop reason: HOSPADM

## 2025-06-10 RX ORDER — SODIUM CHLORIDE 0.9 % (FLUSH) 0.9 %
10 SYRINGE (ML) INJECTION EVERY 12 HOURS SCHEDULED
Status: DISCONTINUED | OUTPATIENT
Start: 2025-06-10 | End: 2025-06-14 | Stop reason: HOSPADM

## 2025-06-10 RX ORDER — MONTELUKAST SODIUM 10 MG/1
10 TABLET ORAL DAILY
Status: DISCONTINUED | OUTPATIENT
Start: 2025-06-10 | End: 2025-06-14 | Stop reason: HOSPADM

## 2025-06-10 RX ORDER — ACETAMINOPHEN 650 MG/1
650 SUPPOSITORY RECTAL EVERY 4 HOURS PRN
Status: DISCONTINUED | OUTPATIENT
Start: 2025-06-10 | End: 2025-06-14 | Stop reason: HOSPADM

## 2025-06-10 RX ORDER — BISACODYL 10 MG
10 SUPPOSITORY, RECTAL RECTAL DAILY PRN
Status: DISCONTINUED | OUTPATIENT
Start: 2025-06-10 | End: 2025-06-14 | Stop reason: HOSPADM

## 2025-06-10 RX ORDER — SODIUM CHLORIDE 9 MG/ML
40 INJECTION, SOLUTION INTRAVENOUS AS NEEDED
Status: DISCONTINUED | OUTPATIENT
Start: 2025-06-10 | End: 2025-06-14 | Stop reason: HOSPADM

## 2025-06-10 RX ORDER — ALBUTEROL SULFATE 90 UG/1
2 INHALANT RESPIRATORY (INHALATION) EVERY 6 HOURS PRN
COMMUNITY

## 2025-06-10 RX ORDER — BISACODYL 5 MG/1
5 TABLET, DELAYED RELEASE ORAL DAILY PRN
Status: DISCONTINUED | OUTPATIENT
Start: 2025-06-10 | End: 2025-06-14 | Stop reason: HOSPADM

## 2025-06-10 RX ORDER — LISINOPRIL 40 MG/1
40 TABLET ORAL DAILY
Status: DISCONTINUED | OUTPATIENT
Start: 2025-06-10 | End: 2025-06-14 | Stop reason: HOSPADM

## 2025-06-10 RX ADMIN — FUROSEMIDE 80 MG: 10 INJECTION, SOLUTION INTRAMUSCULAR; INTRAVENOUS at 00:44

## 2025-06-10 RX ADMIN — Medication 10 ML: at 08:33

## 2025-06-10 RX ADMIN — HYDROCODONE BITARTRATE AND ACETAMINOPHEN 1 TABLET: 5; 325 TABLET ORAL at 16:59

## 2025-06-10 RX ADMIN — Medication 10 ML: at 21:08

## 2025-06-10 RX ADMIN — HYDROCODONE BITARTRATE AND ACETAMINOPHEN 1 TABLET: 5; 325 TABLET ORAL at 09:43

## 2025-06-10 RX ADMIN — MORPHINE SULFATE 2 MG: 2 INJECTION, SOLUTION INTRAMUSCULAR; INTRAVENOUS at 05:01

## 2025-06-10 RX ADMIN — Medication 10 ML: at 03:34

## 2025-06-10 RX ADMIN — PANTOPRAZOLE SODIUM 40 MG: 40 TABLET, DELAYED RELEASE ORAL at 16:59

## 2025-06-10 RX ADMIN — LISINOPRIL 40 MG: 10 TABLET ORAL at 16:57

## 2025-06-10 RX ADMIN — ACETAMINOPHEN 1000 MG: 500 TABLET, FILM COATED ORAL at 00:58

## 2025-06-10 RX ADMIN — FUROSEMIDE 40 MG: 40 TABLET ORAL at 16:58

## 2025-06-10 RX ADMIN — CLOPIDOGREL BISULFATE 75 MG: 75 TABLET, FILM COATED ORAL at 16:59

## 2025-06-10 RX ADMIN — MONTELUKAST 10 MG: 10 TABLET, FILM COATED ORAL at 16:58

## 2025-06-10 RX ADMIN — POTASSIUM CHLORIDE 10 MEQ: 750 TABLET, EXTENDED RELEASE ORAL at 16:57

## 2025-06-10 NOTE — CONSULTS
Date of Hospital Visit: 06/10/25  Encounter Provider: Niko Calloway MD  Place of Service: [facility]  Patient Name: Lourdes Platt  :1935  Referral Provider: Cordell Longoria MD    Chief complaint: leg swelling and shortness of breath    History of Present Illness     Ms Hill is an 89yo woman with known severe PAD and carotid disease, as well as hypertension and hyperlipidemia.     She has had worsening leg pain and swelling for several weeks. She has a blister on the left leg that ruptured and is weeping. She was seen by a surgeon in [place] and was recommended to go to the ED at  but they presented here as her prior vascular surgeon is here. She fell recently and broke her left clavicle; her arm is in a sling. She reports worsening dyspnea for several weeks and orthopnea.     Here, her CXR was clear. Tn was 22, proBNP 1600, EKG NSR and WNL. She received a dose of IV furosemide and says her swelling is gone but her legs remain very painful, even if a sheet touches them.     Stress test 17  Myocardial perfusion imaging indicates a normal myocardial perfusion study with no evidence of ischemia.  Left ventricular ejection fraction is hyperdynamic (Calculated EF > 70%).  Impressions are consistent with a low risk study.    Past Medical History:   Diagnosis Date    Arthritis     Bilateral carotid artery stenosis     Constipation     Depression     Foot swelling     RIGHT     Fracture of neck of right femur 2022    GERD (gastroesophageal reflux disease)     Hypertension     Intermittent claudication 2017    PAD (peripheral artery disease) 2017    PONV (postoperative nausea and vomiting)     Slow to wake up after anesthesia     Spinal headache     Stroke 2005    Tibia fracture 2024       Past Surgical History:   Procedure Laterality Date    ANGIOPLASTY FEMORAL ARTERY Left 2017    Procedure: RIGHT FEMORAL ARTERIAL CUTDOWN, LIGATION  OF FEMORAL ARTERY PSUEDOANEURYSM, AIF WITH BILATERAL RUNOFF ARTERIOGRAM, RIGHT COMMON ILIAC ANGIOPLASTY;  Surgeon: Keny Sadler MD;  [facility];  Service:     APPENDECTOMY  1949    ATHRECTOMY ILIAC, FEMORAL, TIBIAL ARTERY Right 11/24/2017    Procedure: LEFT FEMORAL APPROACH AIF RIGHT LEG ARTERIOGRAM  RIGHT FEMORAL POPLITEAL ARTERY WITH DRUG COATED BALLOON ANGIOPLASTY;  Surgeon: Keny Sadler MD;  [facility];  Service:     BUNIONECTOMY      EYE SURGERY      CATARACT EXTRACTION    FEMORAL POPLITEAL BYPASS Left 10/31/2022    Procedure: LEFT FEMORAL TO PERONEAL ARTERY BYPASS WITH POLYTETRAFLUOROETHYLENE, LEFT FEMORAL ENDARTERECTOMY;  Surgeon: Juan A Roblero MD;  Location: [facility] OR;  Service: Vascular;  Laterality: Left;    HYSTERECTOMY  1970    ORIF WRIST FRACTURE Left     TOTAL HIP ARTHROPLASTY Right 11/15/2022    Procedure: Right Posterior Hip Hemiarthroplasty;  Surgeon: Keny Waller MD;  Location: [facility] OR;  Service: Orthopedics;  Laterality: Right;       Prior to Admission medications    Medication Sig Start Date End Date Taking? Authorizing Provider   carboxymethylcellulose (REFRESH PLUS) 0.5 % solution Administer 1 drop to both eyes 3 (Three) Times a Day As Needed for Dry Eyes.    Awais Tucker MD   clopidogrel (PLAVIX) 75 MG tablet Take 1 tablet by mouth Daily. 11/18/22   Edwin Wolf MD   furosemide (LASIX) 20 MG tablet Take 1 tablet by mouth Daily.    ProviderAwais MD   Lactobacillus (Florajen Women) capsule Take 1 capsule by mouth Daily. 2/12/24   Awais Tucker MD   lisinopril (PRINIVIL,ZESTRIL) 40 MG tablet Take 1 tablet by mouth Daily.    Awais Tucker MD   methenamine (HIPREX) 1 g tablet Take 1 tablet by mouth 2 (Two) Times a Day. 2/16/24   Awais Tucker MD   montelukast (SINGULAIR) 10 MG tablet Take 1 tablet by mouth Daily.    Awais Tucker MD   pantoprazole (PROTONIX) 40 MG  "EC tablet Take 1 tablet by mouth Daily. 2/12/24   ProviderAwais MD   potassium chloride 10 MEQ CR tablet Take 1 tablet by mouth Daily. 2/12/24   Awais Tucker MD   topiramate (TOPAMAX) 25 MG tablet Take 1 tablet by mouth Daily. 8/29/22   Awais Tucker MD       Social History     Socioeconomic History    Marital status:    Tobacco Use    Smoking status: Never    Smokeless tobacco: Never    Tobacco comments:     caffeine - 3 - 5 daily   Vaping Use    Vaping status: Never Used   Substance and Sexual Activity    Alcohol use: No    Drug use: No    Sexual activity: Defer       Family History   Problem Relation Age of Onset    Cancer Mother     Stroke Mother     Heart disease Brother     Stroke Brother     Leukemia Other     Malig Hyperthermia Neg Hx        Review of Systems   Respiratory:  Positive for shortness of breath.    Cardiovascular:  Positive for leg swelling.   Musculoskeletal:  Positive for arthralgias.   Neurological:  Positive for weakness.        Objective:     Vitals:    06/10/25 1000 06/10/25 1027 06/10/25 1100 06/10/25 1337   BP: 123/82 133/56 135/60 132/58   BP Location:   Right arm Right arm   Patient Position:   Lying Lying   Pulse: 96 96 78 80   Resp:   20 20   Temp:    97.7 °F (36.5 °C)   TempSrc:    Oral   SpO2: 95% 96% 94% 97%   Weight:       Height:         Body mass index is 19.74 kg/m².  Flowsheet Rows      Flowsheet Row First Filed Value   Admission Height 162.6 cm (64\") Documented at 06/10/2025 0332   Admission Weight 52.2 kg (115 lb) Documented at 06/10/2025 0332            Physical Exam  Vitals reviewed.   Constitutional:       Appearance: She is well-developed.   HENT:      Head: Normocephalic.      Nose: Nose normal.   Eyes:      Conjunctiva/sclera: Conjunctivae normal.   Neck:      Vascular: No JVD.   Cardiovascular:      Rate and Rhythm: Normal rate and regular rhythm.      Heart sounds: Normal heart sounds.      Comments: The skin on her legs is shiny and " red, there is no edema (there is skin wrinkling), the feet are warm but pulses (DP) are weak bilaterally, it hurts to even touch her skins. She has a skin tear on the left with serous weeping.  Pulmonary:      Effort: Pulmonary effort is normal.      Breath sounds: Normal breath sounds.   Abdominal:      Palpations: Abdomen is soft.      Tenderness: There is no abdominal tenderness.   Musculoskeletal:         General: Deformity (LUE in a sling) present.      Cervical back: Normal range of motion.   Skin:     General: Skin is warm and dry.      Findings: Erythema (BLE) present.   Neurological:      General: No focal deficit present.      Mental Status: She is alert.   Psychiatric:         Mood and Affect: Mood normal.           Lab Review:                Results from last 7 days   Lab Units 06/10/25  0632   SODIUM mmol/L 145   POTASSIUM mmol/L 3.3*   CHLORIDE mmol/L 108*   CO2 mmol/L 26.5   BUN mg/dL 16.0   CREATININE mg/dL 0.80   GLUCOSE mg/dL 103*   CALCIUM mg/dL 8.1*     Results from last 7 days   Lab Units 06/09/25  2204 06/09/25  2055   HSTROP T ng/L 22* 22*     Results from last 7 days   Lab Units 06/10/25  0632   WBC 10*3/mm3 9.05   HEMOGLOBIN g/dL 9.2*   HEMATOCRIT % 28.2*   PLATELETS 10*3/mm3 232             Results from last 7 days   Lab Units 06/09/25  2055   MAGNESIUM mg/dL 2.1                                 I personally viewed and interpreted the patient's EKG/Telemetry data    Assessment/Plan:     1. Leg swelling  2. SOA  3. PAD  4. HTN    She no longer has any leg swelling, after only 80mg of IV furosemide x 1. She has terrible pain in her legs, even with a sheet touching them. However, vascular does not feel that she has rest ischemia. I wonder if she has neuropathy. Regardless, her rest pain is not due to a cardiac cause. Her proBNP is really not that elevated for age, and her CXR is clear. An echo has been ordered and I'll follow up on that. She has more IV furosemide ordered; I will give her just  one more dose and reassess tomorrow.      Sincerely,    Niko Calloway MD

## 2025-06-10 NOTE — CONSULTS
Name: Lourdes Platt ADMIT: 2025   : 1935  PCP: Everotn Disla MD    MRN: [id number] LOS: 0 days   AGE/SEX: 89 y.o. female  ROOM: P25/1     Inpatient Vascular Surgery Consult  Consult performed by: Salina Garrison MD  Consult ordered by: Anurag Paris MD Ashlee Ann Vinyard, MD     LOS: 0 days   Patient Care Team:  Everton Disla MD as PCP - General (Internal Medicine)    Subjective     Chief complaint: leg pain    History of Present Illness  89 y.o. female with OA, carotid stenosis, depression, GERD, HTN, and PAD s/p left common femoral to peroneal artery bypass with PTFE in 2022 who presented to [facility] with complaints of bilateral leg pain and swelling.  She lives alone and ambulates with a walker.  She had a fall a few weeks ago where she broke her clavicle and developed abrasions on the left leg.  She has had progressively worsening bilateral leg pain and redness for several weeks.  The pain worsens with worsened leg swelling and is equivalent on both legs.  It is painful to touch.  She can feel and move both legs and feet.  She is taking plavix.  She has not been taking full anticoagulation since .      Review of Systems   Constitutional:  Negative for chills and fever.   All other systems reviewed and are negative.      Past Medical History:   Diagnosis Date    Arthritis     Bilateral carotid artery stenosis     Constipation     Depression     Foot swelling     RIGHT     GERD (gastroesophageal reflux disease)     Hypertension     Intermittent claudication 2017    PAD (peripheral artery disease) 2017    PONV (postoperative nausea and vomiting)     PVD (peripheral vascular disease) with claudication     lennie legs    Slow to wake up after anesthesia     Spinal headache     Stroke 2005    ASA       Past Surgical History:   Procedure Laterality Date    ANGIOPLASTY FEMORAL ARTERY Left 2017    Procedure: RIGHT FEMORAL ARTERIAL CUTDOWN,  LIGATION OF FEMORAL ARTERY PSUEDOANEURYSM, AIF WITH BILATERAL RUNOFF ARTERIOGRAM, RIGHT COMMON ILIAC ANGIOPLASTY;  Surgeon: Keny Sadler MD;  Location: [facility] OR 18/19;  Service:     APPENDECTOMY  1949    ATHRECTOMY ILIAC, FEMORAL, TIBIAL ARTERY Right 11/24/2017    Procedure: LEFT FEMORAL APPROACH AIF RIGHT LEG ARTERIOGRAM  RIGHT FEMORAL POPLITEAL ARTERY WITH DRUG COATED BALLOON ANGIOPLASTY;  Surgeon: Keny Sadler MD;  Location: [facility] OR 18/19;  Service:     BUNIONECTOMY      EYE SURGERY      CATARACT EXTRACTION    FEMORAL POPLITEAL BYPASS Left 10/31/2022    Procedure: LEFT FEMORAL TO PERONEAL ARTERY BYPASS WITH POLYTETRAFLUOROETHYLENE, LEFT FEMORAL ENDARTERECTOMY;  Surgeon: Juan A Roblero MD;  Location: [facility];  Service: Vascular;  Laterality: Left;    HYSTERECTOMY  1970    ORIF WRIST FRACTURE Left     TOTAL HIP ARTHROPLASTY Right 11/15/2022    Procedure: Right Posterior Hip Hemiarthroplasty;  Surgeon: Keny Waller MD;  Location: [facility];  Service: Orthopedics;  Laterality: Right;       Family History   Problem Relation Age of Onset    Cancer Mother     Stroke Mother     Heart disease Brother     Stroke Brother     Leukemia Other     Malig Hyperthermia Neg Hx          Social History     Tobacco Use    Smoking status: Never    Smokeless tobacco: Never    Tobacco comments:     caffeine - 3 - 5 daily   Vaping Use    Vaping status: Never Used   Substance Use Topics    Alcohol use: No    Drug use: No       Allergies: Penicillin g and Penicillins    Medications Prior to Admission   Medication Sig Dispense Refill Last Dose/Taking    carboxymethylcellulose (REFRESH PLUS) 0.5 % solution Administer 1 drop to both eyes 3 (Three) Times a Day As Needed for Dry Eyes.       clopidogrel (PLAVIX) 75 MG tablet Take 1 tablet by mouth Daily. 30 tablet 0     furosemide (LASIX) 20 MG tablet Take 1 tablet by mouth Daily.       Lactobacillus (Florajen Women) capsule Take 1  capsule by mouth Daily.       lisinopril (PRINIVIL,ZESTRIL) 40 MG tablet Take 1 tablet by mouth Daily.       methenamine (HIPREX) 1 g tablet Take 1 tablet by mouth 2 (Two) Times a Day.       montelukast (SINGULAIR) 10 MG tablet Take 1 tablet by mouth Daily.       pantoprazole (PROTONIX) 40 MG EC tablet Take 1 tablet by mouth Daily.       potassium chloride 10 MEQ CR tablet Take 1 tablet by mouth Daily.       topiramate (TOPAMAX) 25 MG tablet Take 1 tablet by mouth Daily.        clopidogrel, 75 mg, Oral, Daily  furosemide, 40 mg, Oral, Daily  lisinopril, 40 mg, Oral, Daily  montelukast, 10 mg, Oral, Daily  pantoprazole, 40 mg, Oral, Daily  potassium chloride, 10 mEq, Oral, Daily  sodium chloride, 10 mL, Intravenous, Q12H  topiramate, 25 mg, Oral, Daily           acetaminophen **OR** acetaminophen **OR** acetaminophen    senna-docusate sodium **AND** polyethylene glycol **AND** bisacodyl **AND** bisacodyl    calcium carbonate    HYDROcodone-acetaminophen    nitroglycerin    ondansetron ODT **OR** ondansetron    sodium chloride    sodium chloride    sodium chloride      Objective   Temp:  [97.7 °F (36.5 °C)-98.1 °F (36.7 °C)] 97.7 °F (36.5 °C)  Heart Rate:  [] 80  Resp:  [16-20] 20  BP: (123-172)/(56-84) 132/58    No intake/output data recorded.    Physical Exam  Vitals reviewed.   Constitutional:       General: She is not in acute distress.     Appearance: Normal appearance.   HENT:      Head: Normocephalic and atraumatic.      Right Ear: External ear normal.      Left Ear: External ear normal.      Nose: Nose normal.      Mouth/Throat:      Mouth: Mucous membranes are moist.      Pharynx: Oropharynx is clear.   Eyes:      Extraocular Movements: Extraocular movements intact.      Conjunctiva/sclera: Conjunctivae normal.      Pupils: Pupils are equal, round, and reactive to light.   Cardiovascular:      Rate and Rhythm: Normal rate and regular rhythm.      Pulses:           Carotid pulses are 2+ on the right side  and 2+ on the left side.       Radial pulses are 2+ on the right side and 2+ on the left side.        Femoral pulses are 2+ on the right side and 2+ on the left side.       Dorsalis pedis pulses are 1+ on the right side and 1+ on the left side.        Posterior tibial pulses are 1+ on the right side and 1+ on the left side.      Comments: Both feet warm, although tender to touch  Left ankle abrasion and left knee abrasion, otherwise no ulcerations or wounds  Pulmonary:      Comments: Non labored respirations on room air, equal chest rise  Abdominal:      General: Abdomen is flat. There is no distension.      Palpations: Abdomen is soft.   Musculoskeletal:      Cervical back: Normal range of motion. No rigidity.      Right lower leg: No edema.      Left lower leg: No edema.   Skin:     General: Skin is warm and dry.      Capillary Refill: Capillary refill takes less than 2 seconds.   Neurological:      General: No focal deficit present.      Mental Status: She is alert and oriented to person, place, and time.   Psychiatric:         Mood and Affect: Mood normal.         Behavior: Behavior normal.         Results from last 7 days   Lab Units 06/10/25  0632 06/09/25 2055   WBC 10*3/mm3 9.05 9.62   HEMOGLOBIN g/dL 9.2* 10.2*   PLATELETS 10*3/mm3 232 267     Results from last 7 days   Lab Units 06/10/25  0632 06/09/25 2055   SODIUM mmol/L 145 141   POTASSIUM mmol/L 3.3* 3.5   CHLORIDE mmol/L 108* 108*   CO2 mmol/L 26.5 23.9   BUN mg/dL 16.0 19.0   CREATININE mg/dL 0.80 1.11*   GLUCOSE mg/dL 103* 107*   Estimated Creatinine Clearance: 40.1 mL/min (by C-G formula based on SCr of 0.8 mg/dL).        Data Points:  During this visit the following were done:  Labs Reviewed [x]    Labs Ordered []    Radiology Reports Reviewed [x]    Radiology Images Reviewed []    Radiology Ordered [x]    EKG, echo, and/or stress test reviewed []    Vascular lab results reviewed  [x]    Vascular lab images reviewed and interpreted per myself    []    Referring Provider Records Reviewed []    ER Records Reviewed []    Hospital Records Reviewed/Summarized [x]    History Obtained From Family [x]    Radiological images view and Interpreted per myself []    Case Discussed with referring provider []     Decision to obtain and request outside records  []    Total data points reviewed: 6      Active Hospital Problems    Diagnosis  POA    **Leg swelling [M79.89]  Yes    GERD without esophagitis [K21.9]  Unknown    Dyspnea [R06.00]  Yes    Essential hypertension [I10]  Yes    PAD (peripheral artery disease) [I73.9]  Yes      Resolved Hospital Problems   No resolved problems to display.         Assessment & Plan       Leg swelling    PAD (peripheral artery disease)    Dyspnea    Essential hypertension    GERD without esophagitis        89 y.o. female with a history of PAD s/p left femoral to peroneal artery bypass with PTFE in 2022 who has not been on full anticoagulation who presents with bilateral leg pain and swelling    -she has been treated with diuresis with improvement in her leg swelling and pain.  Her legs do not appear acutely threatened, her feet arm warm and she is neurologically intact.  I do not have a vascular explanation for her severe pain.  She appears to have been lost to follow up and I am concerned regarding left leg bypass patency, although if this is now occluded it is not causing acute limb ischemia.  Venous duplex was negative for DVT.  I have ordered ABIs and a CTA runoff for further evaluation of her PAD and bypass graft.  Discussed non adherent dressing changes for the left leg with her nurse.  Discussed with her family at length.    I discussed the patients findings and my recommendations with patient, family, and nursing staff.  Please call my office with any question: [phone]    Salina Garrison MD  06/10/25  14:19 EDT    This note was created using Dragon dictation software.  I have reviewed the note for accuracy and made  corrections as needed.  Please note that some errors may still exist, please contact me with any questions or concerns.

## 2025-06-10 NOTE — ED PROVIDER NOTES
EMERGENCY DEPARTMENT ENCOUNTER  Room Number:  12/12  PCP: Everton Disla MD  Independent Historians: Patient      HPI:  Chief Complaint: had concerns including Leg Swelling and Shortness of Breath.     A complete HPI/ROS/PMH/PSH/SH/FH are unobtainable due to: Nothing      Context: Lourdes Platt is a 89 y.o. female with a medical history of peripheral artery disease, hypertension, who presents to the ED c/o acute bilateral lower extremity edema for the last 3 weeks that has been worsening over the last 1 week.  She states that her legs are very painful which is unusual.  She also has some weeping from wound on the left lateral leg.  She saw a vascular surgeon today in [facility] and between her leg swelling and her report of getting short of breath when she lies flat, they referred her to the ER due to concerns for acute heart failure.  She denies prior history of heart failure.     She sleeps in a recliner nightly and she states that she cannot lie flat because it makes her uncomfortable.  She cannot really say for certain if it causes her shortness of breath.  Her legs are mostly elevated when she is in the recliner but family admits that sometimes she has her legs down in a dependent position.  She does not wear compression stockings.    Patient also fell off of her porch on Friday and sustained a left clavicle fracture.  She typically ambulates with a walker but she has not been able to walk much since Friday due to her left upper extremity being immobilized.        Review of prior external notes (non-ED) -and- Review of prior external test results outside of this encounter: prior stress test reviewed from 2017, normal EF.         PAST MEDICAL HISTORY  Active Ambulatory Problems     Diagnosis Date Noted    PAD (peripheral artery disease) 05/19/2017    Intermittent claudication 05/19/2017    Dyspnea 05/19/2017    Essential hypertension 05/19/2017    Atherosclerosis of native arteries of extremities  with rest pain, right leg 07/19/2017    Atherosclerosis of native arteries of extremities with rest pain, left leg 10/31/2022    Acute blood loss anemia 11/03/2022    Fracture of neck of right femur 11/13/2022    Closed displaced fracture of right femoral neck 11/14/2022    Tibia fracture 03/14/2024     Resolved Ambulatory Problems     Diagnosis Date Noted    No Resolved Ambulatory Problems     Past Medical History:   Diagnosis Date    Arthritis     Bilateral carotid artery stenosis     Constipation     Depression     Foot swelling     GERD (gastroesophageal reflux disease)     Hypertension     PONV (postoperative nausea and vomiting)     PVD (peripheral vascular disease) with claudication     Slow to wake up after anesthesia     Spinal headache     Stroke 2005         PAST SURGICAL HISTORY  Past Surgical History:   Procedure Laterality Date    ANGIOPLASTY FEMORAL ARTERY Left 7/19/2017    Procedure: RIGHT FEMORAL ARTERIAL CUTDOWN, LIGATION OF FEMORAL ARTERY PSUEDOANEURYSM, AIF WITH BILATERAL RUNOFF ARTERIOGRAM, RIGHT COMMON ILIAC ANGIOPLASTY;  Surgeon: Keny Sadler MD;  Location: [facility] 18/19;  Service:     APPENDECTOMY  1949    ATHRECTOMY ILIAC, FEMORAL, TIBIAL ARTERY Right 11/24/2017    Procedure: LEFT FEMORAL APPROACH AIF RIGHT LEG ARTERIOGRAM  RIGHT FEMORAL POPLITEAL ARTERY WITH DRUG COATED BALLOON ANGIOPLASTY;  Surgeon: Keny Sadler MD;  Location: [facility] OR 18/19;  Service:     BUNIONECTOMY      EYE SURGERY      CATARACT EXTRACTION    FEMORAL POPLITEAL BYPASS Left 10/31/2022    Procedure: LEFT FEMORAL TO PERONEAL ARTERY BYPASS WITH POLYTETRAFLUOROETHYLENE, LEFT FEMORAL ENDARTERECTOMY;  Surgeon: Juan A Roblero MD;  Location: [facility];  Service: Vascular;  Laterality: Left;    HYSTERECTOMY  1970    ORIF WRIST FRACTURE Left     TOTAL HIP ARTHROPLASTY Right 11/15/2022    Procedure: Right Posterior Hip Hemiarthroplasty;  Surgeon: Keny Waller MD;  Location: [facility]  MAIN OR;  Service: Orthopedics;  Laterality: Right;         FAMILY HISTORY  Family History   Problem Relation Age of Onset    Cancer Mother     Stroke Mother     Heart disease Brother     Stroke Brother     Leukemia Other     Malig Hyperthermia Neg Hx          SOCIAL HISTORY  Social History     Socioeconomic History    Marital status:    Tobacco Use    Smoking status: Never    Smokeless tobacco: Never    Tobacco comments:     caffeine - 3 - 5 daily   Vaping Use    Vaping status: Never Used   Substance and Sexual Activity    Alcohol use: No    Drug use: No    Sexual activity: Defer         ALLERGIES  Penicillin g and Penicillins      REVIEW OF SYSTEMS  Review of all 14 systems is negative other than stated in the HPI above.      PHYSICAL EXAM    I have reviewed the triage vital signs and nursing notes.    ED Triage Vitals   Temp Heart Rate Resp BP SpO2   06/09/25 2041 06/09/25 2041 06/09/25 2041 06/09/25 2058 06/09/25 2041   98.1 °F (36.7 °C) 97 16 (P) 159/61 97 %      Temp src Heart Rate Source Patient Position BP Location FiO2 (%)   06/09/25 2041 -- 06/09/25 2058 06/09/25 2058 --   Oral  (P) Sitting (P) Left arm          GENERAL: awake and alert, frail-appearing  HENT: Normocephalic, atraumatic  EYES: no scleral icterus, EOMI  CV: regular rhythm, regular rate, no murmur, 2+ pitting edema below the knee bilaterally  RESPIRATORY: normal effort, no wheezing or crackles  ABDOMEN: soft, nondistended, nontender throughout  MUSCULOSKELETAL: no deformity.  Ecchymosis present over the left clavicle.  Left upper extremity immobilized in a sling.  NEURO: alert, moves all extremities, follows commands, cranial nerves II through XII intact, hearing impaired  PSYCH: calm, cooperative  SKIN: Warm, dry, superficial abrasion on the left lateral leg with some weeping of serous fluid.          LAB RESULTS  Recent Results (from the past 24 hours)   Comprehensive Metabolic Panel    Collection Time: 06/09/25  8:55 PM    Specimen:  Arm, Right; Blood   Result Value Ref Range    Glucose 107 (H) 65 - 99 mg/dL    BUN 19.0 8.0 - 23.0 mg/dL    Creatinine 1.11 (H) 0.57 - 1.00 mg/dL    Sodium 141 136 - 145 mmol/L    Potassium 3.5 3.5 - 5.2 mmol/L    Chloride 108 (H) 98 - 107 mmol/L    CO2 23.9 22.0 - 29.0 mmol/L    Calcium 8.3 (L) 8.6 - 10.5 mg/dL    Total Protein 6.4 6.0 - 8.5 g/dL    Albumin 3.5 3.5 - 5.2 g/dL    ALT (SGPT) 13 1 - 33 U/L    AST (SGOT) 18 1 - 32 U/L    Alkaline Phosphatase 86 39 - 117 U/L    Total Bilirubin 0.5 0.0 - 1.2 mg/dL    Globulin 2.9 gm/dL    A/G Ratio 1.2 g/dL    BUN/Creatinine Ratio 17.1 7.0 - 25.0    Anion Gap 9.1 5.0 - 15.0 mmol/L    eGFR 47.9 (L) >60.0 mL/min/1.73   High Sensitivity Troponin T    Collection Time: 06/09/25  8:55 PM    Specimen: Arm, Right; Blood   Result Value Ref Range    HS Troponin T 22 (H) <14 ng/L   Magnesium    Collection Time: 06/09/25  8:55 PM    Specimen: Arm, Right; Blood   Result Value Ref Range    Magnesium 2.1 1.6 - 2.4 mg/dL   Green Top (Gel)    Collection Time: 06/09/25  8:55 PM   Result Value Ref Range    Extra Tube Hold for add-ons.    Lavender Top    Collection Time: 06/09/25  8:55 PM   Result Value Ref Range    Extra Tube hold for add-on    Gold Top - SST    Collection Time: 06/09/25  8:55 PM   Result Value Ref Range    Extra Tube Hold for add-ons.    Light Blue Top    Collection Time: 06/09/25  8:55 PM   Result Value Ref Range    Extra Tube Hold for add-ons.    CBC Auto Differential    Collection Time: 06/09/25  8:55 PM    Specimen: Arm, Right; Blood   Result Value Ref Range    WBC 9.62 3.40 - 10.80 10*3/mm3    RBC 3.32 (L) 3.77 - 5.28 10*6/mm3    Hemoglobin 10.2 (L) 12.0 - 15.9 g/dL    Hematocrit 31.1 (L) 34.0 - 46.6 %    MCV 93.7 79.0 - 97.0 fL    MCH 30.7 26.6 - 33.0 pg    MCHC 32.8 31.5 - 35.7 g/dL    RDW 12.9 12.3 - 15.4 %    RDW-SD 43.6 37.0 - 54.0 fl    MPV 9.9 6.0 - 12.0 fL    Platelets 267 140 - 450 10*3/mm3    Neutrophil % 73.4 42.7 - 76.0 %    Lymphocyte % 14.7 (L) 19.6 -  45.3 %    Monocyte % 7.8 5.0 - 12.0 %    Eosinophil % 3.5 0.3 - 6.2 %    Basophil % 0.4 0.0 - 1.5 %    Immature Grans % 0.2 0.0 - 0.5 %    Neutrophils, Absolute 7.06 (H) 1.70 - 7.00 10*3/mm3    Lymphocytes, Absolute 1.41 0.70 - 3.10 10*3/mm3    Monocytes, Absolute 0.75 0.10 - 0.90 10*3/mm3    Eosinophils, Absolute 0.34 0.00 - 0.40 10*3/mm3    Basophils, Absolute 0.04 0.00 - 0.20 10*3/mm3    Immature Grans, Absolute 0.02 0.00 - 0.05 10*3/mm3    nRBC 0.0 0.0 - 0.2 /100 WBC   BNP    Collection Time: 06/09/25  8:55 PM    Specimen: Arm, Right; Blood   Result Value Ref Range    proBNP 1,578.0 0.0 - 1,800.0 pg/mL   ECG 12 Lead Dyspnea    Collection Time: 06/09/25  9:39 PM   Result Value Ref Range    QT Interval 334 ms    QTC Interval 430 ms   High Sensitivity Troponin T 1Hr    Collection Time: 06/09/25 10:04 PM    Specimen: Blood   Result Value Ref Range    HS Troponin T 22 (H) <14 ng/L    Troponin T Numeric Delta 0 ng/L    Troponin T % Delta 0 Abnormal if >/= 20%   Urinalysis With Microscopic If Indicated (No Culture) - Urine, Clean Catch    Collection Time: 06/10/25 12:44 AM    Specimen: Urine, Clean Catch   Result Value Ref Range    Color, UA Yellow Yellow, Straw    Appearance, UA Clear Clear    pH, UA 7.0 5.0 - 8.0    Specific Gravity, UA 1.013 1.005 - 1.030    Glucose, UA Negative Negative    Ketones, UA Negative Negative    Bilirubin, UA Negative Negative    Blood, UA Negative Negative    Protein, UA Negative Negative    Leuk Esterase, UA Trace (A) Negative    Nitrite, UA Negative Negative    Urobilinogen, UA 1.0 E.U./dL 0.2 - 1.0 E.U./dL   Urinalysis, Microscopic Only - Urine, Clean Catch    Collection Time: 06/10/25 12:44 AM    Specimen: Urine, Clean Catch   Result Value Ref Range    RBC, UA 0-2 None Seen, 0-2 /HPF    WBC, UA 0-2 None Seen, 0-2 /HPF    Bacteria, UA None Seen None Seen /HPF    Squamous Epithelial Cells, UA 0-2 None Seen, 0-2 /HPF    Hyaline Casts, UA None Seen None Seen /LPF    Methodology Automated  Microscopy        The above labs were ordered by me and independently reviewed by me.     RADIOLOGY  XR Chest 1 View  Result Date: 6/9/2025  SINGLE VIEW OF THE CHEST  HISTORY: Weakness and dizziness  COMPARISON: October 31, 2022  FINDINGS: Heart size is within normal limits. There is calcification of the aorta. No pneumothorax or pleural effusion is seen. There is scarring versus atelectasis noted at the lung bases.      Scarring versus atelectasis noted at the lung bases.  This report was finalized on 6/9/2025 9:38 PM by Dr. Joaquina Clinton M.D on Workstation: [id number]        The above radiology studies were ordered by me.  See ED course for independent interpretations.     MEDICATIONS GIVEN IN ER  Medications   sodium chloride 0.9 % flush 10 mL (has no administration in time range)   sodium chloride 0.9 % flush 10 mL (has no administration in time range)   sodium chloride 0.9 % flush 10 mL (has no administration in time range)   sodium chloride 0.9 % infusion 40 mL (has no administration in time range)   nitroglycerin (NITROSTAT) SL tablet 0.4 mg (has no administration in time range)   acetaminophen (TYLENOL) tablet 650 mg (has no administration in time range)     Or   acetaminophen (TYLENOL) 160 MG/5ML oral solution 650 mg (has no administration in time range)     Or   acetaminophen (TYLENOL) suppository 650 mg (has no administration in time range)   sennosides-docusate (PERICOLACE) 8.6-50 MG per tablet 2 tablet (has no administration in time range)     And   polyethylene glycol (MIRALAX) packet 17 g (has no administration in time range)     And   bisacodyl (DULCOLAX) EC tablet 5 mg (has no administration in time range)     And   bisacodyl (DULCOLAX) suppository 10 mg (has no administration in time range)   ondansetron ODT (ZOFRAN-ODT) disintegrating tablet 4 mg (has no administration in time range)     Or   ondansetron (ZOFRAN) injection 4 mg (has no administration in time range)   calcium carbonate (TUMS)  chewable tablet 500 mg (200 mg elemental) (has no administration in time range)   enoxaparin sodium (LOVENOX) syringe 30 mg (has no administration in time range)   furosemide (LASIX) injection 80 mg (80 mg Intravenous Given 6/10/25 0044)   acetaminophen (TYLENOL) tablet 1,000 mg (1,000 mg Oral Given 6/10/25 0058)         ORDERS PLACED DURING THIS VISIT:  Orders Placed This Encounter   Procedures    XR Chest 1 View    [place] Draw    Comprehensive Metabolic Panel    High Sensitivity Troponin T    Magnesium    Urinalysis With Microscopic If Indicated (No Culture) - Urine, Clean Catch    CBC Auto Differential    High Sensitivity Troponin T 1Hr    BNP    Urinalysis, Microscopic Only - Urine, Clean Catch    Basic Metabolic Panel    CBC (No Diff)    Diet: Cardiac; Healthy Heart (2-3 Na+); Fluid Consistency: Thin (IDDSI 0)    Undress & Gown    Continuous Pulse Oximetry    Vital Signs    Orthostatic Blood Pressure    Vital Signs    Intake & Output    Weigh Patient    Oral Care    Maintain IV Access    Telemetry - Place Orders & Notify Provider of Results When Patient Experiences Acute Chest Pain, Dysrhythmia or Respiratory Distress    May Be Off Telemetry for Tests    Neurovascular Checks    Code Status and Medical Interventions: CPR (Attempt to Resuscitate); Full Support    [facility] (on-call MD unless specified) Details    Inpatient Cardiology Consult    Oxygen Therapy- Nasal Cannula; Titrate 1-6 LPM Per SpO2; 90 - 95%    POC Glucose Once    ECG 12 Lead Dyspnea    Adult Transthoracic Echo Complete W/ Cont if Necessary Per Protocol    Insert Peripheral IV    Insert Peripheral IV    Initiate Observation Status    Fall Precautions    CBC & Differential    Green Top (Gel)    Lavender Top    Gold Top - SST    Light Blue Top         OUTPATIENT MEDICATION MANAGEMENT:  Current Facility-Administered Medications Ordered in Epic   Medication Dose Route Frequency Provider Last Rate Last Admin    acetaminophen (TYLENOL) tablet 650 mg  650  mg Oral Q4H PRN Doretha Alford APRN        Or    acetaminophen (TYLENOL) 160 MG/5ML oral solution 650 mg  650 mg Oral Q4H PRN Doretha Alford APRN        Or    acetaminophen (TYLENOL) suppository 650 mg  650 mg Rectal Q4H PRN Doretha Alford APRN        sennosides-docusate (PERICOLACE) 8.6-50 MG per tablet 2 tablet  2 tablet Oral BID PRN Doretha Alford APRN        And    polyethylene glycol (MIRALAX) packet 17 g  17 g Oral Daily PRN Doretha Alford APRN        And    bisacodyl (DULCOLAX) EC tablet 5 mg  5 mg Oral Daily PRN Doretha Alford APRN        And    bisacodyl (DULCOLAX) suppository 10 mg  10 mg Rectal Daily PRN Doretha Alford APRN        calcium carbonate (TUMS) chewable tablet 500 mg (200 mg elemental)  2 tablet Oral BID PRN Doretha Alford APRN        enoxaparin sodium (LOVENOX) syringe 30 mg  30 mg Subcutaneous Daily Doretha Alford APRN        nitroglycerin (NITROSTAT) SL tablet 0.4 mg  0.4 mg Sublingual Q5 Min PRN Doretha Alford APRN        ondansetron ODT (ZOFRAN-ODT) disintegrating tablet 4 mg  4 mg Oral Q6H PRN Doretha Alford APRN        Or    ondansetron (ZOFRAN) injection 4 mg  4 mg Intravenous Q6H PRN Doretha Alford APRN        sodium chloride 0.9 % flush 10 mL  10 mL Intravenous PRN Raymond Gray MD        sodium chloride 0.9 % flush 10 mL  10 mL Intravenous Q12H Doretha Alford APRN        sodium chloride 0.9 % flush 10 mL  10 mL Intravenous PRN Doretha Alford APRN        sodium chloride 0.9 % infusion 40 mL  40 mL Intravenous PRN Doretha Alford APRN         Current Outpatient Medications Ordered in Epic   Medication Sig Dispense Refill    carboxymethylcellulose (REFRESH PLUS) 0.5 % solution Administer 1 drop to both eyes 3 (Three) Times a Day As Needed for Dry Eyes.      clopidogrel (PLAVIX) 75 MG tablet Take 1 tablet by mouth Daily. 30 tablet 0    furosemide (LASIX) 20 MG tablet Take 1 tablet  by mouth Daily.      Lactobacillus (Florajen Women) capsule Take 1 capsule by mouth Daily.      lisinopril (PRINIVIL,ZESTRIL) 40 MG tablet Take 1 tablet by mouth Daily.      methenamine (HIPREX) 1 g tablet Take 1 tablet by mouth 2 (Two) Times a Day.      montelukast (SINGULAIR) 10 MG tablet Take 1 tablet by mouth Daily.      pantoprazole (PROTONIX) 40 MG EC tablet Take 1 tablet by mouth Daily.      potassium chloride 10 MEQ CR tablet Take 1 tablet by mouth Daily.      topiramate (TOPAMAX) 25 MG tablet Take 1 tablet by mouth Daily.           PROCEDURES  Procedures            PROGRESS, DATA ANALYSIS, CONSULTS, AND MEDICAL DECISION MAKING  All labs have been independently interpreted by me.  All radiology studies have been reviewed by me. All EKG's have been independently viewed and interpreted by me.  Discussion below represents my analysis of pertinent findings related to patient's condition, differential diagnosis, treatment plan and final disposition.    Differential diagnosis includes but is not limited to:  Venous insufficiency  Dependent edema  DVT  CHF  Hypoalbuminemia        Clinical Scores:                  ED Course as of 06/10/25 0317   Mon Jun 09, 2025   2332 HS Troponin T(!): 22 [JR]   2332 Troponin T Numeric Delta: 0 [JR]   2332 EKG          EKG time: 2139  Rhythm/Rate: Sinus rhythm, 99  P waves and WV: Normal   QRS, axis: Normal axis  ST and T waves: No acute ischemic changes    Interpreted Contemporaneously by me, independently viewed  Similar compared to prior 11/14/2022 2139 [JR]   2338 I reviewed orthopedic surgery progress note from 6/5/2024 where patient was seen in follow-up for a tibial plateau fracture. [JR]   2339 Chest x-ray independently interpreted PACS.  There is emphysematous change and atelectasis in the right lung base. [JR]   Tue Star 10, 2025   0011 Albumin: 3.5 [JR]   0012 proBNP: 1,578.0 [JR]   0141 Discussed with JOSE De La Paz for A, who agrees to admit on behalf of  Dr. Odell. [JR]      ED Course User Index  [JR] Sky Saleem MD       Patient be admitted for further evaluation of symptoms.  I think she definitely has some venous insufficiency and likely some dependent edema as she often sleeps in a recliner with her legs down and she has been less mobile for the last few days after sustaining a fall and left clavicle fracture.  She does not have a recent echo on file and I think would also be prudent to obtain venous duplex of bilateral lower extremities to rule out DVT.        AS OF 03:17 EDT VITALS:    BP - 159/76  HR - 90  TEMP - 98.1 °F (36.7 °C) (Oral)  O2 SATS - 90%    COMPLEXITY OF CARE  The patient requires admission.      Chronic or social conditions impacting patient care (Social Determinants of Health):     DIAGNOSIS  Final diagnoses:   Leg swelling   Orthopnea   Peripheral vascular disease           DISPOSITION  Admit      Prescription drug monitoring program review:           Please note that portions of this document were completed with a voice recognition program.    Note Disclaimer: At [facility], we believe that sharing information builds trust and better relationships. You are receiving this note because you recently visited [facility]. It is possible you will see health information before a provider has talked with you about it. This kind of information can be easy to misunderstand. To help you fully understand what it means for your health, we urge you to discuss this note with your provider.         Sky Saleem MD  06/10/25 3534

## 2025-06-10 NOTE — ED NOTES
Nursing report ED to floor  Lourdes Platt  89 y.o.  female    HPI :  HPI  Stated Reason for Visit: Pt to ED from home via pv with children. Son reports they took her to her thoracic doctor today and they are concerned she has CHF based on increased leg swelling and pt was unable to tolerate laying flat. Pt lives alone and they are concerned as she had a recent fall. Pt is hard of hearing.  History Obtained From: patient, family    Chief Complaint  Chief Complaint   Patient presents with    Leg Swelling    Shortness of Breath       Admitting doctor:   Anurag Paris MD    Admitting diagnosis:   The primary encounter diagnosis was Leg swelling. Diagnoses of Orthopnea and Peripheral vascular disease were also pertinent to this visit.    Code status:   Current Code Status       Date Active Code Status Order ID Comments User Context       6/10/2025 0154 CPR (Attempt to Resuscitate) [id number]  Doretha Alford APRN ED        Question Answer    Code Status (Patient has no pulse and is not breathing) CPR (Attempt to Resuscitate)    Medical Interventions (Patient has pulse or is breathing) Full Support                    Allergies:   Penicillin g and Penicillins    Isolation:   No active isolations    Intake and Output    Intake/Output Summary (Last 24 hours) at 6/10/2025 1101  Last data filed at 6/10/2025 0700  Gross per 24 hour   Intake --   Output 900 ml   Net -900 ml       Weight:       06/10/25  0332   Weight: 52.2 kg (115 lb)       Most recent vitals:   Vitals:    06/10/25 0727 06/10/25 0747 06/10/25 0757 06/10/25 0830   BP: 134/72 127/76 139/70 143/60   Pulse: 82 80 75 85   Resp:       Temp:       TempSrc:       SpO2: 94% 98% 97% 96%   Weight:       Height:           Active LDAs/IV Access:   Lines, Drains & Airways       Active LDAs       Name Placement date Placement time Site Days    Peripheral IV 06/09/25 2205 20 G Right Antecubital 06/09/25 2205  Antecubital  less than 1    External Urinary  Catheter 03/15/24  2040  --  451                    Labs (abnormal labs have a star):   Labs Reviewed   COMPREHENSIVE METABOLIC PANEL - Abnormal; Notable for the following components:       Result Value    Glucose 107 (*)     Creatinine 1.11 (*)     Chloride 108 (*)     Calcium 8.3 (*)     eGFR 47.9 (*)     All other components within normal limits    Narrative:     GFR Categories in Chronic Kidney Disease (CKD)              GFR Category          GFR (mL/min/1.73)    Interpretation  G1                    90 or greater        Normal or high (1)  G2                    60-89                Mild decrease (1)  G3a                   45-59                Mild to moderate decrease  G3b                   30-44                Moderate to severe decrease  G4                    15-29                Severe decrease  G5                    14 or less           Kidney failure    (1)In the absence of evidence of kidney disease, neither GFR category G1 or G2 fulfill the criteria for CKD.    eGFR calculation 2021 CKD-EPI creatinine equation, which does not include race as a factor   TROPONIN - Abnormal; Notable for the following components:    HS Troponin T 22 (*)     All other components within normal limits    Narrative:     High Sensitive Troponin T Reference Range:  <14.0 ng/L- Negative Female for AMI  <22.0 ng/L- Negative Male for AMI  >=14 - Abnormal Female indicating possible myocardial injury.  >=22 - Abnormal Male indicating possible myocardial injury.   Clinicians would have to utilize clinical acumen, EKG, Troponin, and serial changes to determine if it is an Acute Myocardial Infarction or myocardial injury due to an underlying chronic condition.        URINALYSIS W/ MICROSCOPIC IF INDICATED (NO CULTURE) - Abnormal; Notable for the following components:    Leuk Esterase, UA Trace (*)     All other components within normal limits   CBC WITH AUTO DIFFERENTIAL - Abnormal; Notable for the following components:    RBC 3.32 (*)      Hemoglobin 10.2 (*)     Hematocrit 31.1 (*)     Lymphocyte % 14.7 (*)     Neutrophils, Absolute 7.06 (*)     All other components within normal limits   HIGH SENSITIVITIY TROPONIN T 1HR - Abnormal; Notable for the following components:    HS Troponin T 22 (*)     All other components within normal limits    Narrative:     High Sensitive Troponin T Reference Range:  <14.0 ng/L- Negative Female for AMI  <22.0 ng/L- Negative Male for AMI  >=14 - Abnormal Female indicating possible myocardial injury.  >=22 - Abnormal Male indicating possible myocardial injury.   Clinicians would have to utilize clinical acumen, EKG, Troponin, and serial changes to determine if it is an Acute Myocardial Infarction or myocardial injury due to an underlying chronic condition.        BASIC METABOLIC PANEL - Abnormal; Notable for the following components:    Glucose 103 (*)     Potassium 3.3 (*)     Chloride 108 (*)     Calcium 8.1 (*)     All other components within normal limits    Narrative:     GFR Categories in Chronic Kidney Disease (CKD)              GFR Category          GFR (mL/min/1.73)    Interpretation  G1                    90 or greater        Normal or high (1)  G2                    60-89                Mild decrease (1)  G3a                   45-59                Mild to moderate decrease  G3b                   30-44                Moderate to severe decrease  G4                    15-29                Severe decrease  G5                    14 or less           Kidney failure    (1)In the absence of evidence of kidney disease, neither GFR category G1 or G2 fulfill the criteria for CKD.    eGFR calculation 2021 CKD-EPI creatinine equation, which does not include race as a factor   CBC (NO DIFF) - Abnormal; Notable for the following components:    RBC 3.02 (*)     Hemoglobin 9.2 (*)     Hematocrit 28.2 (*)     All other components within normal limits   MAGNESIUM - Normal   BNP (IN-HOUSE) - Normal    Narrative:     This  assay is used as an aid in the diagnosis of individuals suspected of having heart failure. It can be used as an aid in the diagnosis of acute decompensated heart failure (ADHF) in patients presenting with signs and symptoms of ADHF to the emergency department (ED). In addition, NT-proBNP of <300 pg/mL indicates ADHF is not likely.    Age Range Result Interpretation  NT-proBNP Concentration (pg/mL:      <50             Positive            >450                   Gray                 300-450                    Negative             <300    50-75           Positive            >900                  Gray                300-900                  Negative            <300      >75             Positive            >1800                  Gray                300-1800                  Negative            <300   RAINBOW DRAW    Narrative:     The following orders were created for panel order [facility] Draw.  Procedure                               Abnormality         Status                     ---------                               -----------         ------                     Green Top (Gel)[[id number]]                                  Final result               Lavender Top[[id number]]                                     Final result               Gold Top - SST[[id number]]                                   Final result               Light Blue Top[[id number]]                                   Final result                 Please view results for these tests on the individual orders.   URINALYSIS, MICROSCOPIC ONLY   POCT GLUCOSE FINGERSTICK   CBC AND DIFFERENTIAL    Narrative:     The following orders were created for panel order CBC & Differential.  Procedure                               Abnormality         Status                     ---------                               -----------         ------                     CBC Auto Differential[[id number]]        Abnormal            Final result                 Please view results for  these tests on the individual orders.   GREEN TOP   LAVENDER TOP   GOLD TOP - SST   LIGHT BLUE TOP       EKG:   ECG 12 Lead Dyspnea   Final Result   HEART RATE=99  bpm   RR Ycscvizu=390  ms   SD Shruttkv=383  ms   P Horizontal Axis=-5  deg   P Front Axis=84  deg   QRSD Interval=73  ms   QT Zpzsatcm=288  ms   SOxJ=469  ms   QRS Axis=29  deg   T Wave Axis=41  deg   - ABNORMAL ECG -   Atrial-paced complexes   Low voltage, precordial leads   When compared with ECG of 14-Nov-2022 01:13:24,   No significant change   Electronically Signed By: Nii West ([facility]) 2025-06-10 09:07:29   Date and Time of Study:2025-06-09 21:39:38      Telemetry Scan   Final Result          Meds given in ED:   Medications   sodium chloride 0.9 % flush 10 mL (has no administration in time range)   sodium chloride 0.9 % flush 10 mL (10 mL Intravenous Given 6/10/25 0833)   sodium chloride 0.9 % flush 10 mL (has no administration in time range)   sodium chloride 0.9 % infusion 40 mL (has no administration in time range)   nitroglycerin (NITROSTAT) SL tablet 0.4 mg (has no administration in time range)   acetaminophen (TYLENOL) tablet 650 mg (has no administration in time range)     Or   acetaminophen (TYLENOL) 160 MG/5ML oral solution 650 mg (has no administration in time range)     Or   acetaminophen (TYLENOL) suppository 650 mg (has no administration in time range)   sennosides-docusate (PERICOLACE) 8.6-50 MG per tablet 2 tablet (has no administration in time range)     And   polyethylene glycol (MIRALAX) packet 17 g (has no administration in time range)     And   bisacodyl (DULCOLAX) EC tablet 5 mg (has no administration in time range)     And   bisacodyl (DULCOLAX) suppository 10 mg (has no administration in time range)   ondansetron ODT (ZOFRAN-ODT) disintegrating tablet 4 mg (has no administration in time range)     Or   ondansetron (ZOFRAN) injection 4 mg (has no administration in time range)   calcium carbonate (TUMS) chewable tablet 500  mg (200 mg elemental) (has no administration in time range)   HYDROcodone-acetaminophen (NORCO) 5-325 MG per tablet 1 tablet (1 tablet Oral Given 6/10/25 9558)   furosemide (LASIX) injection 80 mg (80 mg Intravenous Given 6/10/25 0044)   acetaminophen (TYLENOL) tablet 1,000 mg (1,000 mg Oral Given 6/10/25 0058)   morphine injection 2 mg (2 mg Intravenous Given 6/10/25 0501)       Imaging results:  XR Chest 1 View  Result Date: 6/9/2025  Scarring versus atelectasis noted at the lung bases.  This report was finalized on 6/9/2025 9:38 PM by Dr. Joaquina Clinton M.D on Workstation: [email or web address]        Ambulatory status:   - x1 assist    Social issues:   Social History     Socioeconomic History    Marital status:    Tobacco Use    Smoking status: Never    Smokeless tobacco: Never    Tobacco comments:     caffeine - 3 - 5 daily   Vaping Use    Vaping status: Never Used   Substance and Sexual Activity    Alcohol use: No    Drug use: No    Sexual activity: Defer       Peripheral Neurovascular  Peripheral Neurovascular (Adult)  Peripheral Neurovascular WDL: neurovascular assessment lower, neurovascular assessment upper  Edema  Edema: leg, left, leg, right, ankle, left, ankle, right, foot, left, foot, right  Leg, Left Edema: 2+ (Mild)  Leg, Right Edema: 2+ (Mild)  Ankle, Left Edema: 2+ (Mild)  Ankle, Right Edema: 2+ (Mild)  Foot, Left Edema: 2+ (Mild)  Foot, Right Edema: 2+ (Mild)  LUE Neurovascular Assessment  Temperature LUE: warm  Color LUE: red  Sensation LUE: tenderness present, tingling present  RUE Neurovascular Assessment  Temperature RUE: warm  Color RUE: red  Sensation RUE: no numbness, no tenderness, no tingling  LLE Neurovascular Assessment  Temperature LLE: warm  Color LLE: no discoloration  Sensation LLE: no numbness, no tenderness, no tingling  RLE Neurovascular Assessment  Temperature RLE: warm  Color RLE: no discoloration  Sensation RLE: no numbness, no tenderness, no tingling    Neuro  Cognitive  Neuro Cognitive (Adult)  Cognitive/Neuro/Behavioral WDL: WDL    Learning       Respiratory  Respiratory WDL  Respiratory WDL: all  Rhythm/Pattern, Respiratory: no shortness of breath reported, pattern regular, unlabored, depth regular  Expansion/Accessory Muscles/Retractions: no use of accessory muscles  Nailbeds: no discoloration  Mucous Membranes: pink, intact, moist  Cough Frequency: no cough  Breath Sounds  All Lung Fields Breath Sounds: All Fields  All Lung Fields Breath Sounds: Posterior:, diminished    Abdominal Pain  Safety Interventions  Safety Precautions/Falls Reduction: assistive device/personal items within reach, family at bedside  All Alarms: none present    Pain Assessments  Pain (Adult)  (0-10) Pain Rating: Rest: 8  (0-10) Pain Rating: Activity: 8  Pain Reassessment while sleeping: appears asleep with no signs of discomfort or distress  Pain Location: extremity  Pain Side/Orientation: left  Response to Pain Interventions: interventions effective per patient    NIH Stroke Scale       Jordana Dillon RN  06/10/25 11:01 EDT

## 2025-06-10 NOTE — NURSING NOTE
WOCN : Received consult Left lateral leg. Came to ED bilateral leg swelling, weeping left leg. History of PVD , hypertension. Patient was seen by vascular.  Discussed with unit RN recommendations, vaseline gauze, abd and roll gauze. Please call if any further needs.

## 2025-06-10 NOTE — H&P
Patient Name:  Lourdes Platt  YOB: 1935  MRN:  [id number]  Admit Date:  6/9/2025  Patient Care Team:  Everton Disla MD as PCP - General (Internal Medicine)      Subjective   History Present Illness     Chief Complaint   Patient presents with    Leg Swelling    Shortness of Breath       Ms. Platt is a 89 y.o. non-smoker with a history of peripheral arterial disease, hypertension, bilateral carotid artery stenosis, depression, and GERD that presents to [facility] complaining of bilateral lower extremity swelling for the past 3 weeks. She states the swelling has worsened in the past one week and she has now developed pain in the legs. She also reports weeping from a wound on her left lateral leg. She went to see a vascular surgeon today in [place], and while there, she reported shortness of breath and orthopnea. She was told to go to the ED at the [facility] for work up for acute congestive heart failure. Her family member at the bedside reports the patient's vascular surgeon is here at [facility], so they presented to this ED instead. Of note, the patient has a broken left clavicle fracture from a recent fall.  She denies fever, chills, chest pain, cough, and palpitations. Work up in the ED revealed creatinine 1.11, troponin 22, and repeat troponin 22. A chest x-ray showed scarring versus atelectasis at the lung bases. An EKG showed A-paced complexes. She received a dose of IV lasix and has been admitted for evaluation by cardiology.      History of Present Illness  Review of Systems   Constitutional:  Negative for chills and fever.   HENT:  Negative for congestion and sore throat.    Eyes:  Negative for photophobia and visual disturbance.   Respiratory:  Positive for shortness of breath. Negative for apnea, cough, chest tightness and wheezing.    Cardiovascular:  Positive for leg swelling. Negative for chest pain and palpitations.    Gastrointestinal:  Negative for abdominal pain, nausea and vomiting.   Musculoskeletal:  Positive for arthralgias and myalgias. Negative for gait problem.   Skin:  Positive for wound. Negative for pallor.   Neurological:  Negative for dizziness, light-headedness, numbness and headaches.        Personal History     Past Medical History:   Diagnosis Date    Arthritis     Bilateral carotid artery stenosis     Constipation     Depression     Foot swelling     RIGHT     GERD (gastroesophageal reflux disease)     Hypertension     Intermittent claudication 05/19/2017    PAD (peripheral artery disease) 05/19/2017    PONV (postoperative nausea and vomiting)     PVD (peripheral vascular disease) with claudication     lennie legs    Slow to wake up after anesthesia     Spinal headache     Stroke 2005    ASA     Past Surgical History:   Procedure Laterality Date    ANGIOPLASTY FEMORAL ARTERY Left 7/19/2017    Procedure: RIGHT FEMORAL ARTERIAL CUTDOWN, LIGATION OF FEMORAL ARTERY PSUEDOANEURYSM, AIF WITH BILATERAL RUNOFF ARTERIOGRAM, RIGHT COMMON ILIAC ANGIOPLASTY;  Surgeon: Keny Sadler MD;  Location: [facility] 18/19;  Service:     APPENDECTOMY  1949    ATHRECTOMY ILIAC, FEMORAL, TIBIAL ARTERY Right 11/24/2017    Procedure: LEFT FEMORAL APPROACH AIF RIGHT LEG ARTERIOGRAM  RIGHT FEMORAL POPLITEAL ARTERY WITH DRUG COATED BALLOON ANGIOPLASTY;  Surgeon: Keny Sadler MD;  Location: [facility] OR 18/19;  Service:     BUNIONECTOMY      EYE SURGERY      CATARACT EXTRACTION    FEMORAL POPLITEAL BYPASS Left 10/31/2022    Procedure: LEFT FEMORAL TO PERONEAL ARTERY BYPASS WITH POLYTETRAFLUOROETHYLENE, LEFT FEMORAL ENDARTERECTOMY;  Surgeon: Juan A Roblero MD;  Location: [facility];  Service: Vascular;  Laterality: Left;    HYSTERECTOMY  1970    ORIF WRIST FRACTURE Left     TOTAL HIP ARTHROPLASTY Right 11/15/2022    Procedure: Right Posterior Hip Hemiarthroplasty;  Surgeon: Keny Waller MD;   Location: [facility] OR;  Service: Orthopedics;  Laterality: Right;     Family History   Problem Relation Age of Onset    Cancer Mother     Stroke Mother     Heart disease Brother     Stroke Brother     Leukemia Other     Malig Hyperthermia Neg Hx      Social History     Tobacco Use    Smoking status: Never    Smokeless tobacco: Never    Tobacco comments:     caffeine - 3 - 5 daily   Vaping Use    Vaping status: Never Used   Substance Use Topics    Alcohol use: No    Drug use: No     (Not in a hospital admission)    Allergies:    Allergies   Allergen Reactions    Penicillin G Swelling    Penicillins Itching and Swelling       Objective    Objective     Vital Signs  Temp:  [98.1 °F (36.7 °C)] 98.1 °F (36.7 °C)  Heart Rate:  [] 98  Resp:  [16] 16  BP: (149-172)/(67-84) 152/84  SpO2:  [90 %-97 %] 93 %  on   ;   Device (Oxygen Therapy): room air  Body mass index is 19.74 kg/m².    Physical Exam  Vitals and nursing note reviewed.   Constitutional:       Comments: Elderly, frail   HENT:      Head: Normocephalic and atraumatic.      Nose: Nose normal.      Mouth/Throat:      Mouth: Mucous membranes are moist.      Pharynx: Oropharynx is clear.   Eyes:      Extraocular Movements: Extraocular movements intact.      Conjunctiva/sclera: Conjunctivae normal.   Cardiovascular:      Rate and Rhythm: Normal rate and regular rhythm.      Pulses: Normal pulses.      Heart sounds: Normal heart sounds.   Pulmonary:      Effort: Pulmonary effort is normal.      Breath sounds: Normal breath sounds.   Abdominal:      General: Bowel sounds are normal.      Palpations: Abdomen is soft.   Musculoskeletal:         General: Tenderness (BLE. Tender to palpation) and signs of injury present. Normal range of motion.      Cervical back: Normal range of motion and neck supple.      Right lower leg: Edema (trace edema) present.      Left lower leg: Edema (trace edema) present.      Comments: LUE in sling   Skin:     General: Skin is warm and  dry.      Capillary Refill: Capillary refill takes less than 2 seconds.      Findings: Bruising (left hand) and erythema (mild, BLE) present.      Comments: Dressing to LLE is C/D/I   Neurological:      General: No focal deficit present.      Mental Status: She is alert and oriented to person, place, and time.   Psychiatric:         Mood and Affect: Mood normal.         Behavior: Behavior normal.         Results Review:  I reviewed the patient's new clinical results.  I reviewed the patient's new imaging results and agree with the interpretation.  I reviewed the patient's other test results and agree with the interpretation  I personally viewed and interpreted the patient's EKG/Telemetry data  Discussed with ED provider.    Lab Results (last 24 hours)       Procedure Component Value Units Date/Time    CBC & Differential [[id number]]  (Abnormal) Collected: 06/09/25 2055    Specimen: Blood from Arm, Right Updated: 06/09/25 2106    Narrative:      The following orders were created for panel order CBC & Differential.  Procedure                               Abnormality         Status                     ---------                               -----------         ------                     CBC Auto Differential[[id number]]        Abnormal            Final result                 Please view results for these tests on the individual orders.    Comprehensive Metabolic Panel [[id number]]  (Abnormal) Collected: 06/09/25 2055    Specimen: Blood from Arm, Right Updated: 06/09/25 2128     Glucose 107 mg/dL      BUN 19.0 mg/dL      Creatinine 1.11 mg/dL      Sodium 141 mmol/L      Potassium 3.5 mmol/L      Chloride 108 mmol/L      CO2 23.9 mmol/L      Calcium 8.3 mg/dL      Total Protein 6.4 g/dL      Albumin 3.5 g/dL      ALT (SGPT) 13 U/L      AST (SGOT) 18 U/L      Alkaline Phosphatase 86 U/L      Total Bilirubin 0.5 mg/dL      Globulin 2.9 gm/dL      A/G Ratio 1.2 g/dL      BUN/Creatinine Ratio 17.1     Anion Gap 9.1 mmol/L       eGFR 47.9 mL/min/1.73     Narrative:      GFR Categories in Chronic Kidney Disease (CKD)              GFR Category          GFR (mL/min/1.73)    Interpretation  G1                    90 or greater        Normal or high (1)  G2                    60-89                Mild decrease (1)  G3a                   45-59                Mild to moderate decrease  G3b                   30-44                Moderate to severe decrease  G4                    15-29                Severe decrease  G5                    14 or less           Kidney failure    (1)In the absence of evidence of kidney disease, neither GFR category G1 or G2 fulfill the criteria for CKD.    eGFR calculation 2021 CKD-EPI creatinine equation, which does not include race as a factor    High Sensitivity Troponin T [[id number]]  (Abnormal) Collected: 06/09/25 2055    Specimen: Blood from Arm, Right Updated: 06/09/25 2128     HS Troponin T 22 ng/L     Narrative:      High Sensitive Troponin T Reference Range:  <14.0 ng/L- Negative Female for AMI  <22.0 ng/L- Negative Male for AMI  >=14 - Abnormal Female indicating possible myocardial injury.  >=22 - Abnormal Male indicating possible myocardial injury.   Clinicians would have to utilize clinical acumen, EKG, Troponin, and serial changes to determine if it is an Acute Myocardial Infarction or myocardial injury due to an underlying chronic condition.         Magnesium [[id number]]  (Normal) Collected: 06/09/25 2055    Specimen: Blood from Arm, Right Updated: 06/09/25 2128     Magnesium 2.1 mg/dL     CBC Auto Differential [[id number]]  (Abnormal) Collected: 06/09/25 2055    Specimen: Blood from Arm, Right Updated: 06/09/25 2106     WBC 9.62 10*3/mm3      RBC 3.32 10*6/mm3      Hemoglobin 10.2 g/dL      Hematocrit 31.1 %      MCV 93.7 fL      MCH 30.7 pg      MCHC 32.8 g/dL      RDW 12.9 %      RDW-SD 43.6 fl      MPV 9.9 fL      Platelets 267 10*3/mm3      Neutrophil % 73.4 %      Lymphocyte % 14.7 %      Monocyte %  7.8 %      Eosinophil % 3.5 %      Basophil % 0.4 %      Immature Grans % 0.2 %      Neutrophils, Absolute 7.06 10*3/mm3      Lymphocytes, Absolute 1.41 10*3/mm3      Monocytes, Absolute 0.75 10*3/mm3      Eosinophils, Absolute 0.34 10*3/mm3      Basophils, Absolute 0.04 10*3/mm3      Immature Grans, Absolute 0.02 10*3/mm3      nRBC 0.0 /100 WBC     BNP [[id number]]  (Normal) Collected: 06/09/25 2055    Specimen: Blood from Arm, Right Updated: 06/09/25 2333     proBNP 1,578.0 pg/mL     Narrative:      This assay is used as an aid in the diagnosis of individuals suspected of having heart failure. It can be used as an aid in the diagnosis of acute decompensated heart failure (ADHF) in patients presenting with signs and symptoms of ADHF to the emergency department (ED). In addition, NT-proBNP of <300 pg/mL indicates ADHF is not likely.    Age Range Result Interpretation  NT-proBNP Concentration (pg/mL:      <50             Positive            >450                   Gray                 300-450                    Negative             <300    50-75           Positive            >900                  Gray                300-900                  Negative            <300      >75             Positive            >1800                  Gray                300-1800                  Negative            <300    High Sensitivity Troponin T 1Hr [[id number]]  (Abnormal) Collected: 06/09/25 2204    Specimen: Blood Updated: 06/09/25 2239     HS Troponin T 22 ng/L      Troponin T Numeric Delta 0 ng/L      Troponin T % Delta 0    Narrative:      High Sensitive Troponin T Reference Range:  <14.0 ng/L- Negative Female for AMI  <22.0 ng/L- Negative Male for AMI  >=14 - Abnormal Female indicating possible myocardial injury.  >=22 - Abnormal Male indicating possible myocardial injury.   Clinicians would have to utilize clinical acumen, EKG, Troponin, and serial changes to determine if it is an Acute Myocardial Infarction or myocardial injury  due to an underlying chronic condition.         Urinalysis With Microscopic If Indicated (No Culture) - Urine, Clean Catch [[id number]]  (Abnormal) Collected: 06/10/25 0044    Specimen: Urine, Clean Catch Updated: 06/10/25 0058     Color, UA Yellow     Appearance, UA Clear     pH, UA 7.0     Specific Gravity, UA 1.013     Glucose, UA Negative     Ketones, UA Negative     Bilirubin, UA Negative     Blood, UA Negative     Protein, UA Negative     Leuk Esterase, UA Trace     Nitrite, UA Negative     Urobilinogen, UA 1.0 E.U./dL    Urinalysis, Microscopic Only - Urine, Clean Catch [[id number]] Collected: 06/10/25 0044    Specimen: Urine, Clean Catch Updated: 06/10/25 0103     RBC, UA 0-2 /HPF      WBC, UA 0-2 /HPF      Bacteria, UA None Seen /HPF      Squamous Epithelial Cells, UA 0-2 /HPF      Hyaline Casts, UA None Seen /LPF      Methodology Automated Microscopy            Imaging Results (Last 24 Hours)       Procedure Component Value Units Date/Time    XR Chest 1 View [[id number]] Collected: 06/09/25 2137     Updated: 06/09/25 2141    Narrative:      SINGLE VIEW OF THE CHEST     HISTORY: Weakness and dizziness     COMPARISON: October 31, 2022     FINDINGS:  Heart size is within normal limits. There is calcification of the aorta.  No pneumothorax or pleural effusion is seen. There is scarring versus  atelectasis noted at the lung bases.       Impression:      Scarring versus atelectasis noted at the lung bases.     This report was finalized on 6/9/2025 9:38 PM by Dr. Joaquina Clinton M.D on Workstation: [id number]                   ECG 12 Lead Dyspnea   Preliminary Result   HEART RATE=99  bpm   RR Ottiidha=817  ms   OR Xgsgthew=689  ms   P Horizontal Axis=-5  deg   P Front Axis=84  deg   QRSD Interval=73  ms   QT Esjnpypj=898  ms   NMiI=853  ms   QRS Axis=29  deg   T Wave Axis=41  deg   - ABNORMAL ECG -   Atrial-paced complexes   Low voltage, precordial leads   When compared with ECG of 14-Nov-2022 01:13:24,    Significant change in rhythm   Significant axis, voltage or hypertrophy change   Date and Time of Study:2025-06-09 21:39:38           Assessment/Plan     Active Hospital Problems    Diagnosis  POA    **Leg swelling [M79.89]  Yes    GERD without esophagitis [K21.9]  Unknown    Dyspnea [R06.00]  Yes    Essential hypertension [I10]  Yes    PAD (peripheral artery disease) [I73.9]  Yes       Leg swelling  Dyspnea  -Admit for monitoring  -Check echo  -Check venous dopplers of BLE  -Cardiology consult  -Do not appreciate much swelling in the legs. Will defer any further diuretic dosing to cardiology group  -Continue home dose of Plavix  -WOCN consult for LLE wound    Fall  Left clavicle fracture  -PT consult    Hypertension  -Blood pressures are stable. Her creatinine is elevated, will hold lisinopril for now  -Monitor pressures  -Troponin is mildly elevated but flat. She denies any chest pain and there are no ischemic changes on EKG    PAD  -Continue Plavix  -Vascular surgery consult    GERD  -Continue Protonix    -Will address and continue any further appropriate home medications once med rec is completed.    -I discussed the patients findings and my recommendations with patient.    VTE Prophylaxis - SCDs.  Code Status - Full code.       JOSE Farmer  [facility]  06/10/25  06:21 EDT

## 2025-06-11 ENCOUNTER — APPOINTMENT (OUTPATIENT)
Dept: CARDIOLOGY | Facility: HOSPITAL | Age: 89
DRG: 301 | End: 2025-06-11
Payer: MEDICARE

## 2025-06-11 ENCOUNTER — APPOINTMENT (OUTPATIENT)
Dept: MRI IMAGING | Facility: HOSPITAL | Age: 89
DRG: 301 | End: 2025-06-11
Payer: MEDICARE

## 2025-06-11 ENCOUNTER — APPOINTMENT (OUTPATIENT)
Dept: CT IMAGING | Facility: HOSPITAL | Age: 89
DRG: 301 | End: 2025-06-11
Payer: MEDICARE

## 2025-06-11 LAB
ANION GAP SERPL CALCULATED.3IONS-SCNC: 10 MMOL/L (ref 5–15)
AORTIC DIMENSIONLESS INDEX: 0.73 (DI)
AV MEAN PRESS GRAD SYS DOP V1V2: 5 MMHG
AV VMAX SYS DOP: 142.5 CM/SEC
BH CV ECHO MEAS - AO MAX PG: 8.1 MMHG
BH CV ECHO MEAS - AO ROOT DIAM: 3 CM
BH CV ECHO MEAS - AO V2 VTI: 27.7 CM
BH CV ECHO MEAS - AVA(I,D): 1.8 CM2
BH CV ECHO MEAS - EDV(CUBED): 47.4 ML
BH CV ECHO MEAS - EDV(MOD-SP2): 39 ML
BH CV ECHO MEAS - EF(MOD-SP2): 71.8 %
BH CV ECHO MEAS - ESV(CUBED): 11.2 ML
BH CV ECHO MEAS - ESV(MOD-SP2): 11 ML
BH CV ECHO MEAS - FS: 38.1 %
BH CV ECHO MEAS - IVS/LVPW: 0.91 CM
BH CV ECHO MEAS - IVSD: 0.98 CM
BH CV ECHO MEAS - LAT PEAK E' VEL: 7.4 CM/SEC
BH CV ECHO MEAS - LV MASS(C)D: 112.4 GRAMS
BH CV ECHO MEAS - LV MAX PG: 4.7 MMHG
BH CV ECHO MEAS - LV MEAN PG: 2.24 MMHG
BH CV ECHO MEAS - LV V1 MAX: 108.3 CM/SEC
BH CV ECHO MEAS - LV V1 VTI: 20.1 CM
BH CV ECHO MEAS - LVIDD: 3.6 CM
BH CV ECHO MEAS - LVIDS: 2.24 CM
BH CV ECHO MEAS - LVOT AREA: 2.49 CM2
BH CV ECHO MEAS - LVOT DIAM: 1.78 CM
BH CV ECHO MEAS - LVPWD: 1.07 CM
BH CV ECHO MEAS - MED PEAK E' VEL: 5.8 CM/SEC
BH CV ECHO MEAS - MV A DUR: 0.11 SEC
BH CV ECHO MEAS - MV A MAX VEL: 102.4 CM/SEC
BH CV ECHO MEAS - MV DEC SLOPE: 705.2 CM/SEC2
BH CV ECHO MEAS - MV DEC TIME: 0.18 SEC
BH CV ECHO MEAS - MV E MAX VEL: 93 CM/SEC
BH CV ECHO MEAS - MV E/A: 0.91
BH CV ECHO MEAS - MV MAX PG: 5.1 MMHG
BH CV ECHO MEAS - MV MEAN PG: 2.6 MMHG
BH CV ECHO MEAS - MV P1/2T: 50.7 MSEC
BH CV ECHO MEAS - MV V2 VTI: 26.8 CM
BH CV ECHO MEAS - MVA(P1/2T): 4.3 CM2
BH CV ECHO MEAS - MVA(VTI): 1.86 CM2
BH CV ECHO MEAS - PA ACC TIME: 0.1 SEC
BH CV ECHO MEAS - PA V2 MAX: 94.5 CM/SEC
BH CV ECHO MEAS - PULM A REVS DUR: 0.1 SEC
BH CV ECHO MEAS - PULM A REVS VEL: 34.7 CM/SEC
BH CV ECHO MEAS - PULM DIAS VEL: 45.4 CM/SEC
BH CV ECHO MEAS - PULM S/D: 1.3
BH CV ECHO MEAS - PULM SYS VEL: 59.1 CM/SEC
BH CV ECHO MEAS - RAP SYSTOLE: 3 MMHG
BH CV ECHO MEAS - RV MAX PG: 1.76 MMHG
BH CV ECHO MEAS - RV V1 MAX: 66.4 CM/SEC
BH CV ECHO MEAS - RV V1 VTI: 11.7 CM
BH CV ECHO MEAS - RVSP: 38 MMHG
BH CV ECHO MEAS - SV(LVOT): 49.9 ML
BH CV ECHO MEAS - SV(MOD-SP2): 28 ML
BH CV ECHO MEAS - SVI(LVOT): 32.3 ML/M2
BH CV ECHO MEAS - SVI(MOD-SP2): 18.1 ML/M2
BH CV ECHO MEAS - TAPSE (>1.6): 1.5 CM
BH CV ECHO MEAS - TR MAX PG: 34.9 MMHG
BH CV ECHO MEAS - TR MAX VEL: 295.3 CM/SEC
BH CV ECHO MEASUREMENTS AVERAGE E/E' RATIO: 14.09
BH CV XLRA - TDI S': 13.2 CM/SEC
BUN SERPL-MCNC: 19 MG/DL (ref 8–23)
BUN/CREAT SERPL: 20.7 (ref 7–25)
CALCIUM SPEC-SCNC: 8.4 MG/DL (ref 8.6–10.5)
CHLORIDE SERPL-SCNC: 106 MMOL/L (ref 98–107)
CO2 SERPL-SCNC: 25 MMOL/L (ref 22–29)
CREAT SERPL-MCNC: 0.92 MG/DL (ref 0.57–1)
DEPRECATED RDW RBC AUTO: 45.6 FL (ref 37–54)
EGFRCR SERPLBLD CKD-EPI 2021: 60 ML/MIN/1.73
ERYTHROCYTE [DISTWIDTH] IN BLOOD BY AUTOMATED COUNT: 13 % (ref 12.3–15.4)
GLUCOSE BLDC GLUCOMTR-MCNC: 120 MG/DL (ref 70–130)
GLUCOSE BLDC GLUCOMTR-MCNC: 129 MG/DL (ref 70–130)
GLUCOSE BLDC GLUCOMTR-MCNC: 143 MG/DL (ref 70–130)
GLUCOSE BLDC GLUCOMTR-MCNC: 96 MG/DL (ref 70–130)
GLUCOSE SERPL-MCNC: 120 MG/DL (ref 65–99)
HCT VFR BLD AUTO: 33 % (ref 34–46.6)
HGB BLD-MCNC: 10.3 G/DL (ref 12–15.9)
IRON 24H UR-MRATE: 23 MCG/DL (ref 37–145)
IRON SATN MFR SERPL: 10 % (ref 20–50)
MCH RBC QN AUTO: 29.9 PG (ref 26.6–33)
MCHC RBC AUTO-ENTMCNC: 31.2 G/DL (ref 31.5–35.7)
MCV RBC AUTO: 95.9 FL (ref 79–97)
PLATELET # BLD AUTO: 268 10*3/MM3 (ref 140–450)
PMV BLD AUTO: 10.2 FL (ref 6–12)
POTASSIUM SERPL-SCNC: 3.4 MMOL/L (ref 3.5–5.2)
RBC # BLD AUTO: 3.44 10*6/MM3 (ref 3.77–5.28)
SINUS: 2.9 CM
SODIUM SERPL-SCNC: 141 MMOL/L (ref 136–145)
TIBC SERPL-MCNC: 231 MCG/DL (ref 298–536)
TRANSFERRIN SERPL-MCNC: 155 MG/DL (ref 200–360)
TSH SERPL DL<=0.05 MIU/L-ACNC: 1.93 UIU/ML (ref 0.27–4.2)
WBC NRBC COR # BLD AUTO: 10.23 10*3/MM3 (ref 3.4–10.8)

## 2025-06-11 PROCEDURE — 76014 MR SFTY IMPLT&/FB ASMT STF 1: CPT

## 2025-06-11 PROCEDURE — 93306 TTE W/DOPPLER COMPLETE: CPT | Performed by: INTERNAL MEDICINE

## 2025-06-11 PROCEDURE — 74183 MRI ABD W/O CNTR FLWD CNTR: CPT

## 2025-06-11 PROCEDURE — 93306 TTE W/DOPPLER COMPLETE: CPT

## 2025-06-11 PROCEDURE — 25510000001 IOPAMIDOL PER 1 ML: Performed by: HOSPITALIST

## 2025-06-11 PROCEDURE — 25510000002 GADOBENATE DIMEGLUMINE 529 MG/ML SOLUTION: Performed by: HOSPITALIST

## 2025-06-11 PROCEDURE — 85027 COMPLETE CBC AUTOMATED: CPT | Performed by: HOSPITALIST

## 2025-06-11 PROCEDURE — 25010000002 ENOXAPARIN PER 10 MG: Performed by: HOSPITALIST

## 2025-06-11 PROCEDURE — 84443 ASSAY THYROID STIM HORMONE: CPT | Performed by: HOSPITALIST

## 2025-06-11 PROCEDURE — 99232 SBSQ HOSP IP/OBS MODERATE 35: CPT | Performed by: NURSE PRACTITIONER

## 2025-06-11 PROCEDURE — 83540 ASSAY OF IRON: CPT | Performed by: HOSPITALIST

## 2025-06-11 PROCEDURE — 97162 PT EVAL MOD COMPLEX 30 MIN: CPT

## 2025-06-11 PROCEDURE — 99232 SBSQ HOSP IP/OBS MODERATE 35: CPT | Performed by: STUDENT IN AN ORGANIZED HEALTH CARE EDUCATION/TRAINING PROGRAM

## 2025-06-11 PROCEDURE — 80048 BASIC METABOLIC PNL TOTAL CA: CPT | Performed by: HOSPITALIST

## 2025-06-11 PROCEDURE — 84466 ASSAY OF TRANSFERRIN: CPT | Performed by: HOSPITALIST

## 2025-06-11 PROCEDURE — 25510000001 PERFLUTREN 6.52 MG/ML SUSPENSION 2 ML VIAL: Performed by: NURSE PRACTITIONER

## 2025-06-11 PROCEDURE — 82948 REAGENT STRIP/BLOOD GLUCOSE: CPT

## 2025-06-11 PROCEDURE — 75635 CT ANGIO ABDOMINAL ARTERIES: CPT

## 2025-06-11 PROCEDURE — 97530 THERAPEUTIC ACTIVITIES: CPT

## 2025-06-11 PROCEDURE — A9577 INJ MULTIHANCE: HCPCS | Performed by: HOSPITALIST

## 2025-06-11 RX ORDER — IOPAMIDOL 755 MG/ML
100 INJECTION, SOLUTION INTRAVASCULAR
Status: COMPLETED | OUTPATIENT
Start: 2025-06-11 | End: 2025-06-11

## 2025-06-11 RX ORDER — POTASSIUM CHLORIDE 750 MG/1
10 TABLET, EXTENDED RELEASE ORAL 2 TIMES DAILY WITH MEALS
Status: DISCONTINUED | OUTPATIENT
Start: 2025-06-11 | End: 2025-06-14 | Stop reason: HOSPADM

## 2025-06-11 RX ORDER — GABAPENTIN 100 MG/1
100 CAPSULE ORAL EVERY 12 HOURS SCHEDULED
Status: DISCONTINUED | OUTPATIENT
Start: 2025-06-11 | End: 2025-06-14 | Stop reason: HOSPADM

## 2025-06-11 RX ORDER — ENOXAPARIN SODIUM 100 MG/ML
40 INJECTION SUBCUTANEOUS EVERY 24 HOURS
Status: DISCONTINUED | OUTPATIENT
Start: 2025-06-11 | End: 2025-06-14 | Stop reason: HOSPADM

## 2025-06-11 RX ADMIN — GADOBENATE DIMEGLUMINE 9.5 ML: 529 INJECTION, SOLUTION INTRAVENOUS at 21:41

## 2025-06-11 RX ADMIN — Medication 10 ML: at 08:02

## 2025-06-11 RX ADMIN — FUROSEMIDE 40 MG: 40 TABLET ORAL at 09:12

## 2025-06-11 RX ADMIN — ENOXAPARIN SODIUM 40 MG: 100 INJECTION SUBCUTANEOUS at 12:32

## 2025-06-11 RX ADMIN — POTASSIUM CHLORIDE 10 MEQ: 750 TABLET, EXTENDED RELEASE ORAL at 20:22

## 2025-06-11 RX ADMIN — MONTELUKAST 10 MG: 10 TABLET, FILM COATED ORAL at 09:12

## 2025-06-11 RX ADMIN — CLOPIDOGREL BISULFATE 75 MG: 75 TABLET, FILM COATED ORAL at 09:12

## 2025-06-11 RX ADMIN — HYDROCODONE BITARTRATE AND ACETAMINOPHEN 1 TABLET: 5; 325 TABLET ORAL at 09:20

## 2025-06-11 RX ADMIN — HYDROCODONE BITARTRATE AND ACETAMINOPHEN 1 TABLET: 5; 325 TABLET ORAL at 16:52

## 2025-06-11 RX ADMIN — POTASSIUM CHLORIDE 10 MEQ: 750 TABLET, EXTENDED RELEASE ORAL at 09:12

## 2025-06-11 RX ADMIN — HYDROCODONE BITARTRATE AND ACETAMINOPHEN 1 TABLET: 5; 325 TABLET ORAL at 22:29

## 2025-06-11 RX ADMIN — GABAPENTIN 100 MG: 100 CAPSULE ORAL at 20:22

## 2025-06-11 RX ADMIN — PERFLUTREN 2 ML: 6.52 INJECTION, SUSPENSION INTRAVENOUS at 14:11

## 2025-06-11 RX ADMIN — IOPAMIDOL 95 ML: 755 INJECTION, SOLUTION INTRAVENOUS at 08:45

## 2025-06-11 RX ADMIN — Medication 10 ML: at 20:22

## 2025-06-11 RX ADMIN — PANTOPRAZOLE SODIUM 40 MG: 40 TABLET, DELAYED RELEASE ORAL at 09:12

## 2025-06-11 RX ADMIN — LISINOPRIL 40 MG: 10 TABLET ORAL at 09:12

## 2025-06-11 NOTE — PROGRESS NOTES
"    Patient Name: Lourdes Platt  :1935  89 y.o.      Patient Care Team:  Everton Disla MD as PCP - General (Internal Medicine)    Chief Complaint: follow up PAD, HTN , LE edema     Interval History:  Renal function stable. UOP ok , one documented occurrence.        Echo        Left ventricular ejection fraction appears to be 61 - 65%.    Left ventricular wall thickness is consistent with hypertrophy. Sigmoid-shaped ventricular septum is present.    Left ventricular diastolic function is consistent with (grade I) impaired relaxation.    There is calcification of the aortic valve.    Estimated right ventricular systolic pressure from tricuspid regurgitation is mildly elevated (35-45 mmHg). Calculated right ventricular systolic pressure from tricuspid regurgitation is 38 mmHg.          Objective   Vital Signs  Temp:  [97.7 °F (36.5 °C)-98.8 °F (37.1 °C)] 98.2 °F (36.8 °C)  Heart Rate:  [80-96] 96  Resp:  [14-20] 16  BP: (132-158)/(58-88) 153/82    Intake/Output Summary (Last 24 hours) at 2025 1246  Last data filed at 2025 0412  Gross per 24 hour   Intake 0 ml   Output 850 ml   Net -850 ml     Flowsheet Rows      Flowsheet Row First Filed Value   Admission Height 162.6 cm (64\") Documented at 06/10/2025 0332   Admission Weight 52.2 kg (115 lb) Documented at 06/10/2025 0332            Physical Exam:   General Appearance:    Alert, cooperative, in no acute distress   Lungs:     Clear to auscultation.  Normal respiratory effort and rate.      Heart:    Regular rhythm and normal rate, normal S1 and S2, no murmurs, gallops or rubs.     Chest Wall:    No abnormalities observed   Abdomen:     Soft, nontender, positive bowel sounds.     Extremities:   no cyanosis, clubbing or edema.  No marked joint deformities.  Adequate musculoskeletal strength.       Results Review:    Results from last 7 days   Lab Units 25  0534   SODIUM mmol/L 141   POTASSIUM mmol/L 3.4*   CHLORIDE mmol/L 106   CO2 mmol/L 25.0 "   BUN mg/dL 19.0   CREATININE mg/dL 0.92   GLUCOSE mg/dL 120*   CALCIUM mg/dL 8.4*     Results from last 7 days   Lab Units 06/09/25  2204 06/09/25 2055   HSTROP T ng/L 22* 22*     Results from last 7 days   Lab Units 06/11/25  0533   WBC 10*3/mm3 10.23   HEMOGLOBIN g/dL 10.3*   HEMATOCRIT % 33.0*   PLATELETS 10*3/mm3 268         Results from last 7 days   Lab Units 06/09/25 2055   MAGNESIUM mg/dL 2.1                   Medication Review:   clopidogrel, 75 mg, Oral, Daily  enoxaparin sodium, 40 mg, Subcutaneous, Q24H  furosemide, 40 mg, Oral, Daily  lisinopril, 40 mg, Oral, Daily  montelukast, 10 mg, Oral, Daily  pantoprazole, 40 mg, Oral, Daily  potassium chloride, 10 mEq, Oral, BID With Meals  sodium chloride, 10 mL, Intravenous, Q12H              Assessment & Plan   Leg swelling and pain - she says her edema is at baseline. Continue oral diuretic. Echocardiogram was relatively unremarkable.   SOA  PAD  HTN - reasonably controlled acutely.     Cardiac status seems relatively stable.   Echo ok.   Will see as needed.     JOSE Marion  [facility]  06/11/25  12:46 EDT

## 2025-06-11 NOTE — DISCHARGE PLACEMENT REQUEST
"Win Platt (89 y.o. Female)       Date of Birth   1935    Social Security Number       Address   [address] [facility] [address]    Home Phone   [phone]    MRN   [id number]       [facility]    Marital Status                               Admission Date   6/9/2025    Admission Type   Emergency    Admitting Provider   Anurag Paris MD    Attending Provider   Anurag Paris MD    Department, Room/Bed   [facility] PHASE II SURGE UNIT, P25/1       Discharge Date       Discharge Disposition       Discharge Destination                                 Attending Provider: Anurag Paris MD    Allergies: Penicillin G, Penicillins    Isolation: None   Infection: None   Code Status: CPR    Ht: 162.6 cm (64\")   Wt: 52.2 kg (115 lb)    Admission Cmt: None   Principal Problem: Leg swelling [M79.89]                   Active Insurance as of 6/9/2025       Primary Coverage       Payor Plan Insurance Group Employer/Plan Group    MEDICARE MEDICARE A & B        Payor Plan Address Payor Plan Phone Number Payor Plan Fax Number Effective Dates    [address] [phone]  7/1/2001 - None Entered    [facility] [id number]         Subscriber Name Subscriber Birth Date Member ID       WIN PLATT 1935 [id number]               Secondary Coverage       Payor Plan Insurance Group Employer/Plan Group    [id number]       Payor Plan Address Payor Plan Phone Number Payor Plan Fax Number Effective Dates    [address]   12/1/2016 - None Entered    [facility] [id number]         Subscriber Name Subscriber Birth Date Member ID       WIN PLATT 1935 [id number]                     Emergency Contacts        (Rel.) Home Phone Work Phone Mobile Phone    Stanley Platt (Son) [phone] -- [phone]    GiuliaFady (Son) [phone] -- [phone]    GiuliaBreann (Daughter) -- -- " [phone]              Emergency Contact Information       Name Relation Home Work Mobile    Stanley Platt Son [phone]    Fady Platt Son [phone]    Breann Platt Daughter   [phone]          Other Contacts    None on File

## 2025-06-11 NOTE — PROGRESS NOTES
Name: Lourdes Platt ADMIT: 2025   : 1935  PCP: Everton Disla MD    MRN: [id number] LOS: 0 days   AGE/SEX: 89 y.o. female  ROOM: P25/1     Subjective   Subjective   Feels about the same.  Still has significant tenderness and soreness of the lower extremities bilaterally.       Objective   Objective   Vital Signs  Temp:  [98.2 °F (36.8 °C)-98.8 °F (37.1 °C)] 98.3 °F (36.8 °C)  Heart Rate:  [82-96] 87  Resp:  [14-20] 16  BP: (118-158)/(65-88) 118/65  SpO2:  [95 %-97 %] 95 %  on   ;   Device (Oxygen Therapy): room air  Body mass index is 19.74 kg/m².  Physical Exam  Vitals reviewed.   Constitutional:       General: She is not in acute distress.     Comments: Frail-appearing   Cardiovascular:      Rate and Rhythm: Normal rate and regular rhythm.   Abdominal:      General: Bowel sounds are normal. There is no distension.      Palpations: Abdomen is soft.      Tenderness: There is no abdominal tenderness.   Musculoskeletal:      Right lower leg: No edema.      Left lower leg: No edema.   Skin:     General: Skin is warm and dry.   Neurological:      General: No focal deficit present.      Mental Status: She is alert.       Results Review     I reviewed the patient's new clinical results.  Results from last 7 days   Lab Units 25  0533 06/10/25  0632 25   WBC 10*3/mm3 10.23 9.05 9.62   HEMOGLOBIN g/dL 10.3* 9.2* 10.2*   PLATELETS 10*3/mm3 268 232 267     Results from last 7 days   Lab Units 25  0534 06/10/25  0632 25   SODIUM mmol/L 141 145 141   POTASSIUM mmol/L 3.4* 3.3* 3.5   CHLORIDE mmol/L 106 108* 108*   CO2 mmol/L 25.0 26.5 23.9   BUN mg/dL 19.0 16.0 19.0   CREATININE mg/dL 0.92 0.80 1.11*   GLUCOSE mg/dL 120* 103* 107*   EGFR mL/min/1.73 60.0* 71.0 47.9*     Results from last 7 days   Lab Units 25  2055   ALBUMIN g/dL 3.5   BILIRUBIN mg/dL 0.5   ALK PHOS U/L 86   AST (SGOT) U/L 18   ALT (SGPT) U/L 13     Results from last 7 days   Lab Units 25  0503  06/10/25  0632 06/09/25 2055   CALCIUM mg/dL 8.4* 8.1* 8.3*   ALBUMIN g/dL  --   --  3.5   MAGNESIUM mg/dL  --   --  2.1       Glucose   Date/Time Value Ref Range Status   06/11/2025 1102 143 (H) 70 - 130 mg/dL Final   06/11/2025 0726 96 70 - 130 mg/dL Final   06/10/2025 2003 154 (H) 70 - 130 mg/dL Final   06/10/2025 1729 116 70 - 130 mg/dL Final   06/10/2025 1254 131 (H) 70 - 130 mg/dL Final       CT Angio Abdominal Aorta Bilateral Iliofem Runoff  Result Date: 6/11/2025  1.  Extensive atherosclerosis throughout the arterial vasculature of the abdomen, pelvis and bilateral lower extremities as described in detail above. Postsurgical changes from left superficial femoral to tibial artery bypass are present with what appears to be complete thrombosis of the bypass graft. The left popliteal artery reconstitutes by what appears to be collateral vascularization, as before. 2.  Findings concerning for diverticulitis involving the sigmoid colon. 3.  Concern for a subcentimeter hypodense lesion within the pancreatic head. Further evaluation with MRI of the abdomen with without contrast recommended to further characterize. 4.  Nodular opacification within the lingula is only partially seen. Further evaluation with chest CT is recommended to further characterize and exclude neoplasm. Right lower lobe pulmonary consolidation raising concern for pneumonia can be follow-up at this time as well. 5.  Thickening of the distal esophagus suggestive of esophagitis in the appropriate context. Correlation with patient history is recommended follow-up endoscopy if clinically indicated. 6.  Gallbladder is at the upper limits of normal borderline distended in size, nonspecific. Correlation with patient history is recommended to exclude concern for cholecystitis with follow-up HIDA scan if clinically indicated. 7.  Other findings above    This report was finalized on 6/11/2025 1:16 PM by Dr. Luke Valverde M.D on Workstation: [id number]       XR Knee 1 or 2 View Left  Result Date: 6/10/2025  Diffuse osteopenia and subtle chondrocalcinosis, without radiographic evidence of acute fracture or dislocation.  This report was finalized on 6/10/2025 2:42 PM by Felton Forbes MD on Workstation: [id number]      XR Chest 1 View  Result Date: 6/9/2025  Scarring versus atelectasis noted at the lung bases.  This report was finalized on 6/9/2025 9:38 PM by Dr. Joaquina Clinton M.D on Workstation: [id number]        I have personally reviewed all medications:  Scheduled Medications  clopidogrel, 75 mg, Oral, Daily  enoxaparin sodium, 40 mg, Subcutaneous, Q24H  furosemide, 40 mg, Oral, Daily  lisinopril, 40 mg, Oral, Daily  montelukast, 10 mg, Oral, Daily  pantoprazole, 40 mg, Oral, Daily  potassium chloride, 10 mEq, Oral, BID With Meals  sodium chloride, 10 mL, Intravenous, Q12H    Infusions   Diet  Diet: Cardiac; Healthy Heart (2-3 Na+); Fluid Consistency: Thin (IDDSI 0)    I have personally reviewed:  [x]  Laboratory   []  Microbiology   []  Radiology   []  EKG/Telemetry  []  Cardiology/Vascular   []  Pathology    []  Records       Assessment/Plan     Active Hospital Problems    Diagnosis  POA    **Leg swelling [M79.89]  Yes    GERD without esophagitis [K21.9]  Unknown    Dyspnea [R06.00]  Yes    Essential hypertension [I10]  Yes    PAD (peripheral artery disease) [I73.9]  Yes      Resolved Hospital Problems   No resolved problems to display.       89 y.o. female admitted with Leg swelling.    Leg pain and swelling: Appreciate help of consultants.  Dopplers negative for DVT and 2D echocardiogram without significant CHF.  Suspect a lot of her pain may be due to PVD though seems to have severe sensitivity, question neuropathy?  - Trial very low-dose gabapentin  - Continue low-dose diuretic    Numerous abnormal findings on CT though I have very low suspicion for pneumonia or diverticulitis as mentioned.  She was noted to have a partially imaged lung nodule as well  as a questionable pancreatic head lesion with recommendation for additional follow-up    Generalized weakness: PT recommending SNF.  CCP working on this      Discussed with family at bedside  Disposition: As above.  CCP assisting, home versus SNF, potentially tomorrow if stable    Anurag Paris MD  [facility]  06/11/25  15:54 EDT

## 2025-06-11 NOTE — PROGRESS NOTES
"Name: Lourdes Platt ADMIT: 2025   : 1935  PCP: Everton Disla MD    MRN: [id number] LOS: 0 days   AGE/SEX: 89 y.o. female  ROOM:  [place] [address]/1     CC: Lower extremity pain, swelling  Interval History: Swelling continues to improve and the lower leg pain has improved as the swelling has gone down but still with severe pain in the left foot that patient's daughter at bedside says is chronic.   Subjective   Subjective     Review of Systems  Objective   Objective     Vitals:   Temp:  [97.7 °F (36.5 °C)-98.8 °F (37.1 °C)] 98.2 °F (36.8 °C)  Heart Rate:  [78-96] 96  Resp:  [14-20] 16  BP: (132-158)/(58-88) 153/82    No intake/output data recorded.    Scheduled Meds:     clopidogrel, 75 mg, Oral, Daily  enoxaparin sodium, 40 mg, Subcutaneous, Q24H  furosemide, 40 mg, Oral, Daily  lisinopril, 40 mg, Oral, Daily  montelukast, 10 mg, Oral, Daily  pantoprazole, 40 mg, Oral, Daily  potassium chloride, 10 mEq, Oral, BID With Meals  sodium chloride, 10 mL, Intravenous, Q12H      IV Meds:        Physical Exam  NAD  Respirations unlabored  Minimal lower extremity edema today.   Legs warm  Dressing on left lower leg wound.       Data Reviewed:  CBC    Results from last 7 days   Lab Units 25  0533 06/10/25  0632 255   WBC 10*3/mm3 10.23 9.05 9.62   HEMOGLOBIN g/dL 10.3* 9.2* 10.2*   PLATELETS 10*3/mm3 268 232 267     BMP   Results from last 7 days   Lab Units 25  0534 06/10/25  0632 255   SODIUM mmol/L 141 145 141   POTASSIUM mmol/L 3.4* 3.3* 3.5   CHLORIDE mmol/L 106 108* 108*   CO2 mmol/L 25.0 26.5 23.9   BUN mg/dL 19.0 16.0 19.0   CREATININE mg/dL 0.92 0.80 1.11*   GLUCOSE mg/dL 120* 103* 107*   MAGNESIUM mg/dL  --   --  2.1     Cr Clearance Estimated Creatinine Clearance: 34.8 mL/min (by C-G formula based on SCr of 0.92 mg/dL).  Coag     HbA1C No results found for: \"HGBA1C\"  Blood Glucose   Glucose   Date/Time Value Ref Range Status   2025 0726 96 70 - 130 mg/dL Final " "  06/10/2025 2003 154 (H) 70 - 130 mg/dL Final   06/10/2025 1729 116 70 - 130 mg/dL Final   06/10/2025 1254 131 (H) 70 - 130 mg/dL Final     Infection     CMP   Results from last 7 days   Lab Units 06/11/25  0534 06/10/25  0632 06/09/25 2055   SODIUM mmol/L 141 145 141   POTASSIUM mmol/L 3.4* 3.3* 3.5   CHLORIDE mmol/L 106 108* 108*   CO2 mmol/L 25.0 26.5 23.9   BUN mg/dL 19.0 16.0 19.0   CREATININE mg/dL 0.92 0.80 1.11*   GLUCOSE mg/dL 120* 103* 107*   ALBUMIN g/dL  --   --  3.5   BILIRUBIN mg/dL  --   --  0.5   ALK PHOS U/L  --   --  86   AST (SGOT) U/L  --   --  18   ALT (SGPT) U/L  --   --  13     ABG      UA    Results from last 7 days   Lab Units 06/10/25  0044   NITRITE UA  Negative   WBC UA /HPF 0-2   BACTERIA UA /HPF None Seen   SQUAM EPITHEL UA /HPF 0-2     BERNARDO  No results found for: \"POCMETH\", \"POCAMPHET\", \"POCBARBITUR\", \"POCBENZO\", \"POCCOCAINE\", \"POCOPIATES\", \"POCOXYCODO\", \"POCPHENCYC\", \"POCPROPOXY\", \"POCTHC\", \"POCTRICYC\"  Lysis Labs   Results from last 7 days   Lab Units 06/11/25  0534 06/11/25  0533 06/10/25  0632 06/09/25 2055   HEMOGLOBIN g/dL  --  10.3* 9.2* 10.2*   PLATELETS 10*3/mm3  --  268 232 267   CREATININE mg/dL 0.92  --  0.80 1.11*     Radiology(recent) XR Knee 1 or 2 View Left  Result Date: 6/10/2025  Diffuse osteopenia and subtle chondrocalcinosis, without radiographic evidence of acute fracture or dislocation.  This report was finalized on 6/10/2025 2:42 PM by Felton Forbes MD on Workstation: [id number]      XR Chest 1 View  Result Date: 6/9/2025  Scarring versus atelectasis noted at the lung bases.  This report was finalized on 6/9/2025 9:38 PM by Dr. Joaquina Clinton M.D on Workstation: [id number]       Most notable findings include: I've reviewed the images from the CTA today, L fem-peroneal bypass is occluded, appears to have some reconstitution distally. Non-invasive studies c/w CLTI.     Active Hospital Problems:   Active Hospital Problems    Diagnosis  POA    **Leg swelling " [M79.89]  Yes    GERD without esophagitis [K21.9]  Unknown    Dyspnea [R06.00]  Yes    Essential hypertension [I10]  Yes    PAD (peripheral artery disease) [I73.9]  Yes      Resolved Hospital Problems   No resolved problems to display.      Assessment & Plan     Assessment / Plan     89 year old lady with peripheral arterial disease with ischemic rest pain s/p left femoral-peroneal bypass that is now occluded. Pts edema has improved but with persistent left foot pain that I believe is mainly due to ischemic rest pain. Unfortunately she has no further revascularization options and so we are limited to pain control or amputation. She has wound on her anterior left lower leg that does not appear ischemic in nature but more likely related to edema.  Patient and daughter do not want to move forward with amputation at this time, which is understandable. She has no urgent need for amputation at this time.   Continue wound care to left lower leg wound.    Will arrange outpatient follow up in ~1 month to see how her wound is progressing.   Please keep feet warm, protected, and offloaded with pillow under the calves bilaterally such that heels are not touching the bed.       Everton Deng II, MD  06/11/25  10:31 EDT  Available via Secure Chat during the day for non-urgent issues.  After hours and for urgent issues please call the office at [phone]

## 2025-06-11 NOTE — THERAPY EVALUATION
Patient Name: Lourdes Platt  : 1935    MRN: [id number]                              Today's Date: 2025       Admit Date: 2025    Visit Dx:     ICD-10-CM ICD-9-CM   1. Leg swelling  M79.89 729.81   2. Orthopnea  R06.01 786.02   3. Peripheral vascular disease  I73.9 443.9     Patient Active Problem List   Diagnosis    PAD (peripheral artery disease)    Intermittent claudication    Dyspnea    Essential hypertension    Atherosclerosis of native arteries of extremities with rest pain, right leg    Atherosclerosis of native arteries of extremities with rest pain, left leg    Leg swelling    GERD without esophagitis     Past Medical History:   Diagnosis Date    Arthritis     Bilateral carotid artery stenosis     Constipation     Depression     Foot swelling     RIGHT     Fracture of neck of right femur 2022    GERD (gastroesophageal reflux disease)     Hypertension     Intermittent claudication 2017    PAD (peripheral artery disease) 2017    PONV (postoperative nausea and vomiting)     Slow to wake up after anesthesia     Spinal headache     Stroke 2005    Tibia fracture 2024     Past Surgical History:   Procedure Laterality Date    ANGIOPLASTY FEMORAL ARTERY Left 2017    Procedure: RIGHT FEMORAL ARTERIAL CUTDOWN, LIGATION OF FEMORAL ARTERY PSUEDOANEURYSM, AIF WITH BILATERAL RUNOFF ARTERIOGRAM, RIGHT COMMON ILIAC ANGIOPLASTY;  Surgeon: Keny Sadler MD;  Location: [facility] OR ;  Service:     APPENDECTOMY      ATHRECTOMY ILIAC, FEMORAL, TIBIAL ARTERY Right 2017    Procedure: LEFT FEMORAL APPROACH AIF RIGHT LEG ARTERIOGRAM  RIGHT FEMORAL POPLITEAL ARTERY WITH DRUG COATED BALLOON ANGIOPLASTY;  Surgeon: Keny Sadler MD;  Location: [facility] OR ;  Service:     BUNIONECTOMY      EYE SURGERY      CATARACT EXTRACTION    FEMORAL POPLITEAL BYPASS Left 10/31/2022    Procedure: LEFT FEMORAL TO PERONEAL ARTERY BYPASS WITH  POLYTETRAFLUOROETHYLENE, LEFT FEMORAL ENDARTERECTOMY;  Surgeon: Juan A Roblero MD;  Location: [facility] MAIN OR;  Service: Vascular;  Laterality: Left;    HYSTERECTOMY  1970    ORIF WRIST FRACTURE Left     TOTAL HIP ARTHROPLASTY Right 11/15/2022    Procedure: Right Posterior Hip Hemiarthroplasty;  Surgeon: Keny Waller MD;  Location: [facility] MAIN OR;  Service: Orthopedics;  Laterality: Right;      General Information       Row Name 06/11/25 1102          Physical Therapy Time and Intention    Document Type evaluation  -     Mode of Treatment individual therapy;physical therapy  -       Row Name 06/11/25 1102          General Information    Patient Profile Reviewed yes  -     Prior Level of Function independent:  -     Existing Precautions/Restrictions fall;non-weight bearing;left  -       Row Name 06/11/25 1102          Living Environment    Current Living Arrangements home  -     People in Home alone  son and DIL able to assist  -       Row Name 06/11/25 1102          Home Main Entrance    Number of Stairs, Main Entrance none  -       Row Name 06/11/25 1102          Cognition    Orientation Status (Cognition) oriented x 4  -       Row Name 06/11/25 1102          Safety Issues/Impairments Affecting Functional Mobility    Safety Issues Affecting Function (Mobility) problem-solving;insight into deficits/self-awareness;safety precaution awareness;safety precautions follow-through/compliance;sequencing abilities  -     Impairments Affecting Function (Mobility) pain;strength;endurance/activity tolerance;range of motion (ROM);balance;postural/trunk control  -               User Key  (r) = Recorded By, (t) = Taken By, (c) = Cosigned By      Initials Name Provider Type     Kathi Glover PT Physical Therapist                   Mobility       Row Name 06/11/25 1104          Bed Mobility    Bed Mobility supine-sit;sit-supine  -     Supine-Sit [place] (Bed Mobility) moderate assist  (50% patient effort);verbal cues  -SM     Sit-Supine [place] (Bed Mobility) moderate assist (50% patient effort);maximum assist (25% patient effort);verbal cues  -     Assistive Device (Bed Mobility) head of bed elevated;bed rails;repositioning sheet  -       Row Name 06/11/25 1104          Sit-Stand Transfer    Sit-Stand [place] (Transfers) moderate assist (50% patient effort);maximum assist (25% patient effort)  -     Assistive Device (Sit-Stand Transfers) other (see comments)  HHA  -     Comment, (Sit-Stand Transfer) x2 attempts. Patient leaning posteriorly. Unable to shift weigth fwd to gain static standing balance before needed to return to sitting 2/2 fatigue and B LE pain.  -               User Key  (r) = Recorded By, (t) = Taken By, (c) = Cosigned By      Initials Name Provider Type     Kathi Glover PT Physical Therapist                   Obj/Interventions       Row Name 06/11/25 1106          Range of Motion Comprehensive    General Range of Motion bilateral lower extremity ROM WFL  -     Comment, General Range of Motion L UE in sling - clavicle fx  -       Row Name 06/11/25 1106          Strength Comprehensive (MMT)    General Manual Muscle Testing (MMT) Assessment lower extremity strength deficits identified  -     Comment, General Manual Muscle Testing (MMT) Assessment Generalized B LE weakness  -[facility] Name 06/11/25 1106          Balance    Balance Assessment sitting static balance;sitting dynamic balance;standing static balance  -     Static Sitting Balance contact guard  -     Dynamic Sitting Balance minimal assist  -     Position, Sitting Balance sitting edge of bed  -     Static Standing Balance moderate assist;verbal cues  -     Position/Device Used, Standing Balance supported  A  -     Balance Interventions sitting;standing;sit to stand;supported;static;dynamic  -               User Key  (r) = Recorded By, (t) = Taken By, (c) = Cosigned By       Initials Name Provider Type     Kathi Glover PT Physical Therapist                   Goals/Plan       Row Name 06/11/25 1116          Bed Mobility Goal 1 (PT)    Activity/Assistive Device (Bed Mobility Goal 1, PT) bed mobility activities, all  -SM     [place] Level/Cues Needed (Bed Mobility Goal 1, PT) modified independence  -SM     Time Frame (Bed Mobility Goal 1, PT) 2 weeks  -SM       Row Name 06/11/25 1116          Transfer Goal 1 (PT)    Activity/Assistive Device (Transfer Goal 1, PT) sit-to-stand/stand-to-sit;bed-to-chair/chair-to-bed  -SM     [place] Level/Cues Needed (Transfer Goal 1, PT) contact guard required  -SM     Time Frame (Transfer Goal 1, PT) 2 weeks  -SM       Row Name 06/11/25 1116          Gait Training Goal 1 (PT)    Activity/Assistive Device (Gait Training Goal 1, PT) gait (walking locomotion);cane, straight  -SM     [place] Level (Gait Training Goal 1, PT) contact guard required  -SM     Distance (Gait Training Goal 1, PT) 30ft  -SM     Time Frame (Gait Training Goal 1, PT) 2 weeks  -SM               User Key  (r) = Recorded By, (t) = Taken By, (c) = Cosigned By      Initials Name Provider Type     Kathi Glover PT Physical Therapist                   Clinical Impression       Row Name 06/11/25 1106          Pain    Pain Location extremity;shoulder  -SM     Pain Side/Orientation left;bilateral;lower  -SM     Pain Management Interventions exercise or physical activity utilized  -SM     Response to Pain Interventions activity participation with tolerable pain  -SM       Row Name 06/11/25 1106          Plan of Care Review    Plan of Care Reviewed With patient  -SM     Outcome Evaluation Patient is an 89 y.o female who presented to [facility] with B LE swelling. Patient AOx4 supine in bed upon arrival, DIL at bedside. Patient also had a recent fall on Friday resulting in a L clavicle fracture. Patient NWB in sling. Patient lives at home alone, typically independent,  uses a rwx for ambulation. Patient reports she typically sleeps in her recliner. Today patient sat up to EOB with modA and increased time. Patient attempted STS x2 from elevated EOB. Patient leaning posteriorly. Unable to shift weight fwd to gain static standing balance before needed to return to sitting 2/2 fatigue and B LE pain. Patient not safe to attempt further transfers this date. Patient required maxA to return to supine in bed. Patient demonstrates deficits in strength, balance, and activity endurance limiting overall functional mobility. Patient would continue to benefit from skilled PT intervention to address deficits. Patient is not safe at this time to return home alone. Recommend SNF at d/c. Acute PT will continue to monitor.  -       Row Name 06/11/25 1106          Therapy Assessment/Plan (PT)    Rehab Potential (PT) good  -     Criteria for Skilled Interventions Met (PT) yes;skilled treatment is necessary  -     Therapy Frequency (PT) 5 times/wk  -       Row Name 06/11/25 1106          Vital Signs    Pre Patient Position Supine  -     Intra Patient Position Standing  -     Post Patient Position Supine  -SM       Row Name 06/11/25 1106          Positioning and Restraints    Pre-Treatment Position in bed  -SM     Post Treatment Position bed  -SM     In Bed notified nsg;call light within reach;encouraged to call for assist;exit alarm on;fowlers  -               User Key  (r) = Recorded By, (t) = Taken By, (c) = Cosigned By      Initials Name Provider Type     Kathi Glover, PT Physical Therapist                   Outcome Measures       Row Name 06/11/25 1116 06/11/25 0810       How much help from another person do you currently need...    Turning from your back to your side while in flat bed without using bedrails? 2  -SM 3  -CE    Moving from lying on back to sitting on the side of a flat bed without bedrails? 2  -SM 3  -CE    Moving to and from a bed to a chair (including a  wheelchair)? 2  -SM 3  -CE    Standing up from a chair using your arms (e.g., wheelchair, bedside chair)? 2  -SM 2  -CE    Climbing 3-5 steps with a railing? 1  -SM 2  -CE    To walk in hospital room? 1  -SM 2  -CE    AM-PAC 6 Clicks Score (PT) 10  -SM 15  -CE    Highest Level of Mobility Goal Move to Chair/Commode-4  -SM Move to Chair/Commode-4  -CE      Row Name 06/11/25 1116          Functional Assessment    Outcome Measure Options AM-PAC 6 Clicks Basic Mobility (PT)  -               User Key  (r) = Recorded By, (t) = Taken By, (c) = Cosigned By      Initials Name Provider Type    CE Josephine Powell RN Registered Nurse     Kathi Glover, PT Physical Therapist                                 Physical Therapy Education       Title: PT OT SLP Therapies (Done)       Topic: Physical Therapy (Done)       Point: Mobility training (Done)       Learning Progress Summary            Patient Acceptance, E, VU,NR by  at 6/11/2025 1116                      Point: Home exercise program (Done)       Learning Progress Summary            Patient Acceptance, E, VU,NR by  at 6/11/2025 1116                      Point: Body mechanics (Done)       Learning Progress Summary            Patient Acceptance, E, VU,NR by  at 6/11/2025 1116                      Point: Precautions (Done)       Learning Progress Summary            Patient Acceptance, E, VU,NR by  at 6/11/2025 1116                                      User Key       Initials Effective Dates Name Provider Type Discipline     05/02/22 -  Kathi Glover, [place] Physical Therapist PT                  PT Recommendation and Plan     Outcome Evaluation: Patient is an 89 y.o female who presented to [facility] with B LE swelling. Patient AOx4 supine in bed upon arrival, DIL at bedside. Patient also had a recent fall on Friday resulting in a L clavicle fracture. Patient NWB in sling. Patient lives at home alone, typically independent, uses a rwx for ambulation. Patient  reports she typically sleeps in her recliner. Today patient sat up to EOB with modA and increased time. Patient attempted STS x2 from elevated EOB. Patient leaning posteriorly. Unable to shift weight fwd to gain static standing balance before needed to return to sitting 2/2 fatigue and B LE pain. Patient not safe to attempt further transfers this date. Patient required maxA to return to supine in bed. Patient demonstrates deficits in strength, balance, and activity endurance limiting overall functional mobility. Patient would continue to benefit from skilled PT intervention to address deficits. Patient is not safe at this time to return home alone. Recommend SNF at d/c. Acute PT will continue to monitor.     Time Calculation:         PT Charges       Row Name 06/11/25 1117             Time Calculation    Start Time 1019  -      Stop Time 1041  -SM      Time Calculation (min) 22 min  -SM      PT Received On 06/11/25  -      PT - Next Appointment 06/12/25  -      PT Goal Re-Cert Due Date 06/25/25  -         Time Calculation- PT    Total Timed Code Minutes- PT 12 minute(s)  -SM         Timed Charges    42890 - PT Therapeutic Activity Minutes 12  -SM         Total Minutes    Timed Charges Total Minutes 12  -SM       Total Minutes 12  -SM                User Key  (r) = Recorded By, (t) = Taken By, (c) = Cosigned By      Initials Name Provider Type     Kathi Glover PT Physical Therapist                  Therapy Charges for Today       Code Description Service Date Service Provider Modifiers Qty    [id number]  PT THERAPEUTIC ACT EA 15 MIN 6/11/2025 Kathi Glover, PT GP 1    [id number] HC PT EVAL MOD COMPLEXITY 3 6/11/2025 Kathi Glover, PT GP 1            PT G-Codes  Outcome Measure Options: AM-PAC 6 Clicks Basic Mobility (PT)  AM-PAC 6 Clicks Score (PT): 10  PT Discharge Summary  Anticipated Discharge Disposition (PT): skilled nursing facility    Kathi Glover PT  6/11/2025

## 2025-06-11 NOTE — PLAN OF CARE
Goal Outcome Evaluation:  Plan of Care Reviewed With: patient           Outcome Evaluation: Patient is an 89 y.o female who presented to [facility] with B LE swelling. Patient AOx4 supine in bed upon arrival, DIL at bedside. Patient also had a recent fall on Friday resulting in a L clavicle fracture. Patient NWB in sling. Patient lives at home alone, typically independent, uses a rwx for ambulation. Patient reports she typically sleeps in her recliner. Today patient sat up to EOB with modA and increased time. Patient attempted STS x2 from elevated EOB. Patient leaning posteriorly. Unable to shift weight fwd to gain static standing balance before needed to return to sitting 2/2 fatigue and B LE pain. Patient not safe to attempt further transfers this date. Patient required maxA to return to supine in bed. Patient demonstrates deficits in strength, balance, and activity endurance limiting overall functional mobility. Patient would continue to benefit from skilled PT intervention to address deficits. Patient is not safe at this time to return home alone. Recommend SNF at d/c. Acute PT will continue to monitor.    Anticipated Discharge Disposition (PT): skilled nursing facility

## 2025-06-11 NOTE — PLAN OF CARE
Problem: Adult Inpatient Plan of Care  Goal: Plan of Care Review  Outcome: Not Progressing  Flowsheets (Taken 6/11/2025 1735)  Progress: improving  Outcome Evaluation: VSS, oriented x4, room air, left arm sling in place, CT and MRCP to be completed, heels elevated, assist x2, purewick.  Plan of Care Reviewed With: patient  Goal: Patient-Specific Goal (Individualized)  Outcome: Not Progressing  Goal: Absence of Hospital-Acquired Illness or Injury  Outcome: Not Progressing  Intervention: Identify and Manage Fall Risk  Recent Flowsheet Documentation  Taken 6/11/2025 1618 by Josephine Powell RN  Safety Promotion/Fall Prevention:   activity supervised   assistive device/personal items within reach   clutter free environment maintained   fall prevention program maintained   nonskid shoes/slippers when out of bed   room organization consistent   safety round/check completed  Taken 6/11/2025 1352 by Josephine Powell RN  Safety Promotion/Fall Prevention:   activity supervised   assistive device/personal items within reach   clutter free environment maintained   fall prevention program maintained   nonskid shoes/slippers when out of bed   room organization consistent   safety round/check completed  Taken 6/11/2025 1200 by Josephine Powell RN  Safety Promotion/Fall Prevention:   activity supervised   assistive device/personal items within reach   clutter free environment maintained   fall prevention program maintained   nonskid shoes/slippers when out of bed   room organization consistent   safety round/check completed  Taken 6/11/2025 1000 by Josephine Powell RN  Safety Promotion/Fall Prevention:   activity supervised   assistive device/personal items within reach   clutter free environment maintained   fall prevention program maintained   nonskid shoes/slippers when out of bed   room organization consistent   safety round/check completed  Taken 6/11/2025 0810 by Josephine Powell RN  Safety Promotion/Fall  Prevention:   activity supervised   assistive device/personal items within reach   clutter free environment maintained   fall prevention program maintained   nonskid shoes/slippers when out of bed   room organization consistent   safety round/check completed  Intervention: Prevent Skin Injury  Recent Flowsheet Documentation  Taken 6/11/2025 1618 by Josephine Powell RN  Body Position:   right   turned   heels elevated   legs elevated  Taken 6/11/2025 1352 by Josephine Powell RN  Body Position:   turned   left   legs elevated   heels elevated  Skin Protection: (mepliex applied to heels and elevated.) silicone foam dressing in place  Taken 6/11/2025 1200 by Josephine Powell RN  Body Position: supine  Taken 6/11/2025 1000 by Josephine Powell RN  Body Position: supine  Taken 6/11/2025 0810 by Josephine Poewll RN  Body Position:   supine   legs elevated   heels elevated  Goal: Optimal Comfort and Wellbeing  Outcome: Not Progressing  Goal: Readiness for Transition of Care  Outcome: Not Progressing     Problem: Fall Injury Risk  Goal: Absence of Fall and Fall-Related Injury  Outcome: Not Progressing  Intervention: Promote Injury-Free Environment  Recent Flowsheet Documentation  Taken 6/11/2025 1618 by Josephine Powell RN  Safety Promotion/Fall Prevention:   activity supervised   assistive device/personal items within reach   clutter free environment maintained   fall prevention program maintained   nonskid shoes/slippers when out of bed   room organization consistent   safety round/check completed  Taken 6/11/2025 1352 by Josephine Powell RN  Safety Promotion/Fall Prevention:   activity supervised   assistive device/personal items within reach   clutter free environment maintained   fall prevention program maintained   nonskid shoes/slippers when out of bed   room organization consistent   safety round/check completed  Taken 6/11/2025 1200 by Josephine Powell RN  Safety Promotion/Fall Prevention:    activity supervised   assistive device/personal items within reach   clutter free environment maintained   fall prevention program maintained   nonskid shoes/slippers when out of bed   room organization consistent   safety round/check completed  Taken 6/11/2025 1000 by Josephine Powell RN  Safety Promotion/Fall Prevention:   activity supervised   assistive device/personal items within reach   clutter free environment maintained   fall prevention program maintained   nonskid shoes/slippers when out of bed   room organization consistent   safety round/check completed  Taken 6/11/2025 0810 by Josephine Powell RN  Safety Promotion/Fall Prevention:   activity supervised   assistive device/personal items within reach   clutter free environment maintained   fall prevention program maintained   nonskid shoes/slippers when out of bed   room organization consistent   safety round/check completed     Problem: Skin Injury Risk Increased  Goal: Skin Health and Integrity  Outcome: Not Progressing  Intervention: Optimize Skin Protection  Recent Flowsheet Documentation  Taken 6/11/2025 1618 by Josephine Powell RN  Head of Bed (HOB) Positioning: HOB at 20-30 degrees  Taken 6/11/2025 1352 by Josephine Powell RN  Pressure Reduction Techniques:   frequent weight shift encouraged   heels elevated off bed   weight shift assistance provided  Head of Bed (HOB) Positioning: HOB at 20-30 degrees  Skin Protection: (mepliex applied to heels and elevated.) silicone foam dressing in place  Taken 6/11/2025 1200 by Josephine Powell RN  Head of Bed (HOB) Positioning: HOB at 30-45 degrees  Taken 6/11/2025 1000 by Josephine Powell RN  Head of Bed (HOB) Positioning: HOB at 30-45 degrees  Taken 6/11/2025 0810 by Josephine Powell RN  Head of Bed (HOB) Positioning: HOB at 30-45 degrees     Problem: Comorbidity Management  Goal: Blood Glucose Level Within Target Range  Outcome: Not Progressing  Goal: Blood Pressure in Desired  Range  Outcome: Not Progressing   Goal Outcome Evaluation:  Plan of Care Reviewed With: patient        Progress: improving  Outcome Evaluation: VSS, oriented x4, room air, left arm sling in place, CT and MRCP to be completed, heels elevated, assist x2, purewick.

## 2025-06-12 ENCOUNTER — APPOINTMENT (OUTPATIENT)
Dept: CT IMAGING | Facility: HOSPITAL | Age: 89
DRG: 301 | End: 2025-06-12
Payer: MEDICARE

## 2025-06-12 LAB
ANION GAP SERPL CALCULATED.3IONS-SCNC: 9 MMOL/L (ref 5–15)
BUN SERPL-MCNC: 15 MG/DL (ref 8–23)
BUN/CREAT SERPL: 21.7 (ref 7–25)
CALCIUM SPEC-SCNC: 8.8 MG/DL (ref 8.6–10.5)
CANCER AG19-9 SERPL-ACNC: 26.2 U/ML
CHLORIDE SERPL-SCNC: 104 MMOL/L (ref 98–107)
CO2 SERPL-SCNC: 24 MMOL/L (ref 22–29)
CREAT SERPL-MCNC: 0.69 MG/DL (ref 0.57–1)
EGFRCR SERPLBLD CKD-EPI 2021: 83.6 ML/MIN/1.73
GLUCOSE SERPL-MCNC: 99 MG/DL (ref 65–99)
POTASSIUM SERPL-SCNC: 3.6 MMOL/L (ref 3.5–5.2)
SODIUM SERPL-SCNC: 137 MMOL/L (ref 136–145)

## 2025-06-12 PROCEDURE — 71250 CT THORAX DX C-: CPT

## 2025-06-12 PROCEDURE — 80048 BASIC METABOLIC PNL TOTAL CA: CPT | Performed by: HOSPITALIST

## 2025-06-12 PROCEDURE — 25010000002 ENOXAPARIN PER 10 MG: Performed by: HOSPITALIST

## 2025-06-12 PROCEDURE — 86301 IMMUNOASSAY TUMOR CA 19-9: CPT | Performed by: HOSPITALIST

## 2025-06-12 RX ADMIN — PANTOPRAZOLE SODIUM 40 MG: 40 TABLET, DELAYED RELEASE ORAL at 09:34

## 2025-06-12 RX ADMIN — Medication 10 ML: at 09:34

## 2025-06-12 RX ADMIN — HYDROCODONE BITARTRATE AND ACETAMINOPHEN 1 TABLET: 5; 325 TABLET ORAL at 05:11

## 2025-06-12 RX ADMIN — POTASSIUM CHLORIDE 10 MEQ: 750 TABLET, EXTENDED RELEASE ORAL at 09:34

## 2025-06-12 RX ADMIN — MONTELUKAST 10 MG: 10 TABLET, FILM COATED ORAL at 09:34

## 2025-06-12 RX ADMIN — HYDROCODONE BITARTRATE AND ACETAMINOPHEN 1 TABLET: 5; 325 TABLET ORAL at 11:39

## 2025-06-12 RX ADMIN — GABAPENTIN 100 MG: 100 CAPSULE ORAL at 21:04

## 2025-06-12 RX ADMIN — Medication 10 ML: at 21:04

## 2025-06-12 RX ADMIN — GABAPENTIN 100 MG: 100 CAPSULE ORAL at 09:34

## 2025-06-12 RX ADMIN — HYDROCODONE BITARTRATE AND ACETAMINOPHEN 1 TABLET: 5; 325 TABLET ORAL at 18:02

## 2025-06-12 RX ADMIN — POTASSIUM CHLORIDE 10 MEQ: 750 TABLET, EXTENDED RELEASE ORAL at 18:03

## 2025-06-12 RX ADMIN — FUROSEMIDE 40 MG: 40 TABLET ORAL at 09:34

## 2025-06-12 RX ADMIN — CLOPIDOGREL BISULFATE 75 MG: 75 TABLET, FILM COATED ORAL at 09:34

## 2025-06-12 RX ADMIN — ENOXAPARIN SODIUM 40 MG: 100 INJECTION SUBCUTANEOUS at 11:39

## 2025-06-12 RX ADMIN — LISINOPRIL 40 MG: 10 TABLET ORAL at 09:34

## 2025-06-12 NOTE — PLAN OF CARE
Problem: Adult Inpatient Plan of Care  Goal: Plan of Care Review  Outcome: Progressing  Flowsheets  Taken 6/12/2025 0621 by Valentin Garcia, RN  Plan of Care Reviewed With: patient  Taken 6/11/2025 1735 by Josephine Powell RN  Progress: improving  Goal: Patient-Specific Goal (Individualized)  Outcome: Progressing  Goal: Absence of Hospital-Acquired Illness or Injury  Outcome: Progressing  Intervention: Identify and Manage Fall Risk  Recent Flowsheet Documentation  Taken 6/12/2025 0605 by Valentin Garcia, RN  Safety Promotion/Fall Prevention:   assistive device/personal items within reach   clutter free environment maintained   fall prevention program maintained   lighting adjusted   nonskid shoes/slippers when out of bed   room organization consistent   safety round/check completed  Taken 6/12/2025 0210 by Valentin Garcia, RN  Safety Promotion/Fall Prevention:   assistive device/personal items within reach   clutter free environment maintained   fall prevention program maintained   lighting adjusted   nonskid shoes/slippers when out of bed   room organization consistent   safety round/check completed  Taken 6/12/2025 0000 by Valentin Garcia, RN  Safety Promotion/Fall Prevention:   assistive device/personal items within reach   clutter free environment maintained   fall prevention program maintained   lighting adjusted   nonskid shoes/slippers when out of bed   room organization consistent   safety round/check completed  Taken 6/11/2025 2229 by Valentin Garcia, RN  Safety Promotion/Fall Prevention:   assistive device/personal items within reach   clutter free environment maintained   fall prevention program maintained   lighting adjusted   nonskid shoes/slippers when out of bed   room organization consistent   safety round/check completed  Taken 6/11/2025 2200 by Valentin Garcia, RN  Safety Promotion/Fall Prevention: patient off unit  Taken 6/11/2025 2022 by Valentin Garcia, RN  Safety Promotion/Fall  Prevention:   assistive device/personal items within reach   clutter free environment maintained   fall prevention program maintained   lighting adjusted   nonskid shoes/slippers when out of bed   room organization consistent   safety round/check completed  Intervention: Prevent Skin Injury  Recent Flowsheet Documentation  Taken 6/12/2025 0210 by Valentin Garcia, RN  Body Position: patient/family refused  Taken 6/12/2025 0000 by Valentin Garcia, RN  Body Position: patient/family refused  Taken 6/11/2025 2229 by Valentin Garcia, RN  Body Position:   supine   lower extremity elevated  Taken 6/11/2025 2022 by Valentin Garcia, RN  Body Position:   supine   lower extremity elevated  Goal: Optimal Comfort and Wellbeing  Outcome: Progressing  Intervention: Monitor Pain and Promote Comfort  Recent Flowsheet Documentation  Taken 6/12/2025 0511 by Valentin Garcia, RN  Pain Management Interventions: pain medication given  Taken 6/11/2025 2229 by Valentin Garcia, RN  Pain Management Interventions:   pain medication given   position adjusted   pillow support provided  Taken 6/11/2025 2022 by Valentin Garcia, RN  Pain Management Interventions: pain management plan reviewed with patient/caregiver  Intervention: Provide Person-Centered Care  Recent Flowsheet Documentation  Taken 6/11/2025 2022 by Vlaentin Garcia, RN  Trust Relationship/Rapport:   care explained   choices provided   emotional support provided   empathic listening provided   questions answered   questions encouraged   reassurance provided   thoughts/feelings acknowledged  Goal: Readiness for Transition of Care  Outcome: Progressing     Problem: Fall Injury Risk  Goal: Absence of Fall and Fall-Related Injury  Outcome: Progressing  Intervention: Identify and Manage Contributors  Recent Flowsheet Documentation  Taken 6/11/2025 2022 by Valentin Garcia, RN  Medication Review/Management: medications reviewed  Intervention: Promote Injury-Free Environment  Recent Flowsheet  Documentation  Taken 6/12/2025 0605 by Valentin Garcia, RN  Safety Promotion/Fall Prevention:   assistive device/personal items within reach   clutter free environment maintained   fall prevention program maintained   lighting adjusted   nonskid shoes/slippers when out of bed   room organization consistent   safety round/check completed  Taken 6/12/2025 0210 by Valentin Garcia, RN  Safety Promotion/Fall Prevention:   assistive device/personal items within reach   clutter free environment maintained   fall prevention program maintained   lighting adjusted   nonskid shoes/slippers when out of bed   room organization consistent   safety round/check completed  Taken 6/12/2025 0000 by Valentin Garcia, RN  Safety Promotion/Fall Prevention:   assistive device/personal items within reach   clutter free environment maintained   fall prevention program maintained   lighting adjusted   nonskid shoes/slippers when out of bed   room organization consistent   safety round/check completed  Taken 6/11/2025 2229 by Valentin Garcia, RN  Safety Promotion/Fall Prevention:   assistive device/personal items within reach   clutter free environment maintained   fall prevention program maintained   lighting adjusted   nonskid shoes/slippers when out of bed   room organization consistent   safety round/check completed  Taken 6/11/2025 2200 by Valentin Garcia, RN  Safety Promotion/Fall Prevention: patient off unit  Taken 6/11/2025 2022 by Valentin Garcia, RN  Safety Promotion/Fall Prevention:   assistive device/personal items within reach   clutter free environment maintained   fall prevention program maintained   lighting adjusted   nonskid shoes/slippers when out of bed   room organization consistent   safety round/check completed     Problem: Skin Injury Risk Increased  Goal: Skin Health and Integrity  Outcome: Progressing  Intervention: Optimize Skin Protection  Recent Flowsheet Documentation  Taken 6/11/2025 2229 by Valentin Garcia,  RN  Head of Bed (HOB) Positioning: HOB at 30 degrees  Taken 6/11/2025 2022 by Valentin Garcia, RN  Activity Management: activity encouraged  Head of Bed (HOB) Positioning: HOB at 20-30 degrees     Problem: Comorbidity Management  Goal: Blood Glucose Level Within Target Range  Outcome: Progressing  Intervention: Monitor and Manage Glycemia  Recent Flowsheet Documentation  Taken 6/11/2025 2022 by Valentin Garcia, RN  Medication Review/Management: medications reviewed  Goal: Blood Pressure in Desired Range  Outcome: Progressing  Intervention: Maintain Blood Pressure Management  Recent Flowsheet Documentation  Taken 6/11/2025 2022 by Valetnin Garcia, RN  Medication Review/Management: medications reviewed   Goal Outcome Evaluation:  Plan of Care Reviewed With: patient

## 2025-06-12 NOTE — CASE MANAGEMENT/SOCIAL WORK
Continued Stay Note  [facility]     Patient Name: Lourdes Platt  MRN: [id number]  Today's Date: 6/12/2025    Admit Date: 6/9/2025    Plan: [facility]   Discharge Plan       Row Name 06/12/25 1553       Plan    Plan [facility]    Patient/Family in Agreement with Plan yes    Plan Comments 6/11 Met with patient at bedside. Introduced self, explained CCP role, facesheet verified. Pt states she lives alone and uses walker and cane. Has been to rehab in past at [facility]. Has used HH in past, unsure of agency. Pt states she feels she need rehab and would like to go to [facility] again. Pt aks that CCP speak with her son/Stanley. Spoke jojo Angel who confirms plan. Spoke with [facility] who states referral can be faxed to [phone] yessenia Sultana. Referal faxed. Await determination. ROLANDO Leung RN                                                                                                                                                                                                                                     6/12    Received message from [facility] swing bed program who states they can accept patient and will have bed but need additional informaiton.  Call placed to [facility] [phone] and left voicemail.  Spoke with patient's TOMI/Breann and updated her on plan.  CCP continues to follow.  ROLANDO Leung RN                   Discharge Codes    No documentation.                 Expected Discharge Date and Time       Expected Discharge Date Expected Discharge Time    Jun 12, 2025               Maribeth Leung, RN

## 2025-06-12 NOTE — PROGRESS NOTES
Name: Lourdes Platt ADMIT: 2025   : 1935  PCP: Everton Disla MD    MRN: [id number] LOS: 1 days   AGE/SEX: 89 y.o. female  ROOM: P25/1     Subjective   Subjective   Patient resting comfortably in bed.  Denies any pain.  No nausea or vomiting.  In the medial eating lunch without any difficulties.  Denies any dysphagia.  Family at bedside.  No fevers or chills or shortness of breath.  No abdominal pain    Review of Systems  As above     Objective   Objective   Vital Signs  Temp:  [98.1 °F (36.7 °C)-99 °F (37.2 °C)] 99 °F (37.2 °C)  Heart Rate:  [85-95] 85  Resp:  [16] 16  BP: (121-148)/(63-90) 121/63  SpO2:  [95 %-96 %] 96 %  on   ;   Device (Oxygen Therapy): room air  Body mass index is 19.74 kg/m².  Physical Exam  Constitutional:       General: She is not in acute distress.     Appearance: She is not ill-appearing.      Comments: Elderly, frail   Cardiovascular:      Rate and Rhythm: Normal rate and regular rhythm.   Pulmonary:      Effort: Pulmonary effort is normal. No respiratory distress.   Abdominal:      General: Abdomen is flat. There is no distension.      Tenderness: There is no abdominal tenderness.   Musculoskeletal:         General: No swelling or deformity. Normal range of motion.      Comments: Left leg more swollen than right.  Left leg wrapped   Skin:     General: Skin is warm and dry.   Neurological:      General: No focal deficit present.      Mental Status: She is alert. Mental status is at baseline.         Results Review     I reviewed the patient's new clinical results.  Results from last 7 days   Lab Units 25  0533 06/10/25  0632 25   WBC 10*3/mm3 10.23 9.05 9.62   HEMOGLOBIN g/dL 10.3* 9.2* 10.2*   PLATELETS 10*3/mm3 268 232 267     Results from last 7 days   Lab Units 25  0633 25  0534 06/10/25  0632 25   SODIUM mmol/L 137 141 145 141   POTASSIUM mmol/L 3.6 3.4* 3.3* 3.5   CHLORIDE mmol/L 104 106 108* 108*   CO2 mmol/L 24.0 25.0  "26.5 23.9   BUN mg/dL 15.0 19.0 16.0 19.0   CREATININE mg/dL 0.69 0.92 0.80 1.11*   GLUCOSE mg/dL 99 120* 103* 107*   Estimated Creatinine Clearance: 46.4 mL/min (by C-G formula based on SCr of 0.69 mg/dL).  Results from last 7 days   Lab Units 06/09/25 2055   ALBUMIN g/dL 3.5   BILIRUBIN mg/dL 0.5   ALK PHOS U/L 86   AST (SGOT) U/L 18   ALT (SGPT) U/L 13     Results from last 7 days   Lab Units 06/12/25  0633 06/11/25  0534 06/10/25  0632 06/09/25 2055   CALCIUM mg/dL 8.8 8.4* 8.1* 8.3*   ALBUMIN g/dL  --   --   --  3.5   MAGNESIUM mg/dL  --   --   --  2.1     No results found for: \"COVID19\"  Glucose   Date/Time Value Ref Range Status   06/11/2025 2034 129 70 - 130 mg/dL Final   06/11/2025 1600 120 70 - 130 mg/dL Final   06/11/2025 1102 143 (H) 70 - 130 mg/dL Final   06/11/2025 0726 96 70 - 130 mg/dL Final   06/10/2025 2003 154 (H) 70 - 130 mg/dL Final   06/10/2025 1729 116 70 - 130 mg/dL Final   06/10/2025 1254 131 (H) 70 - 130 mg/dL Final       CT Chest Without Contrast Diagnostic  CT CHEST WITHOUT IV CONTRAST     HISTORY: Nodular opacification within the right lung seen on CTA     TECHNIQUE: Radiation dose reduction techniques were utilized, including  automated exposure control and exposure modulation based on body size.   3 mm images were obtained through the chest without IV contrast.     COMPARISON: CTA abdomen/pelvis 6/11/2025        FINDINGS AND IMPRESSION  Please note evaluation is suboptimal due to lack intravenous contrast as  well as beam hardening streak artifact and the patient's arms being  along her side.     Small pericardial effusion. The esophagus is mild to moderately  distended with fluid. Distal esophageal obstruction cannot be excluded  in the appropriate context and correlation patient is recommended  follow-up endoscopy if clinically indicated.  Mediastinal adenopathy is present. Index subcarinal node measures 1.4 cm  in short axis dimension (previously 1.6 cm in 2017. Index " aortopulmonary  window node measures 1.3 cm in short axis dimension, as before. No  axillary adenopathy by size criteria. At least mild to moderate coronary  artery calcification and/or coronary artery stents.     No pleural effusion or pneumothorax. Bronchiolectasis is present within  the lingula and right middle and lower lobes., Lingula, right middle  lobe and right lower lobe. There is reticulonodular opacification within  the bilateral lungs, most notably within the right upper lobe and right  lung base. Findings of pulmonary consolidation and opacification within  the lingula and right middle and lower lobes is present with findings of  volume loss within the right middle lobe and lingula. These findings are  most suggestive of acute on chronic atypical pneumonia with differential  considerations including but not limited to atypical mycobacterium  versus aspiration. Correlation with patient history and pulmonology  referral is recommended to determine appropriate urology. Given the  nodular opacification, follow-up with chest CT in 8 weeks is recommended  to ensure stability and resolution exclude the possibility of  malignancy. Mediastinal adenopathy should be followed up at this time as  well.     As well as the bilateral lung bases. Comminuted distal left clavicular  fracture is present. No suspicious lytic or blastic osseous lesions.              This report was finalized on 6/12/2025 11:24 AM by Dr. Luke Valverde M.D on Workstation: [id number]     MRI abdomen w wo contrast mrcp  MRI AND MRCP ABDOMEN WITH AND WITHOUT CONTRAST     HISTORY: Questionable pancreatic lesion     TECHNIQUE: Multiplanar multisequence MRI and MRCP of the abdomen was  performed before and after the IV administration of contrast.     COMPARISON: CTA abdomen and pelvis 6/11/2025     FINDINGS:  Asymmetric opacification within the right lung base only partially seen.     Gallbladder is at the upper limits of normal to borderline  distended, as  seen on recent CT. No significant intra or extrahepatic bili duct  dilation.     There appear to be a few scattered T2 hyperintense lesions in the  pancreas measuring up to approximately 0.5 to 0.6 cm. Main pancreatic  duct is not distended.     Liver has a lobulated appearance. No suspicious enhancing lesion is seen  in the liver, adrenal glands or kidneys. The main portal are patent.     IMPRESSION  1.  Few subcentimeter T2 hyperintense lesions within the pancreas.  Follow-up with abdominal MRI in 2 years recommended per ACR criteria.  2.  Liver has a lobulated appearance which can be seen in setting of  chronic liver disease in the appropriate context. Correlation with  patient history recommended.  3.  Asymmetric opacification the right lung base better seen on recent  CT. Please refer to this dictation for further information.  4.  Borderline distention of the gallbladder, as before. Findings are  nonspecific and correlation with patient history is recommended to  exclude clinical concern for cholecystitis with follow-up HIDA scan if  clinically indicated.  5.  Other findings as above        This report was finalized on 6/12/2025 8:45 AM by Dr. Luke Valverde M.D  on Workstation: [id number]       I reviewed the patient's daily medications.  Scheduled Medications  clopidogrel, 75 mg, Oral, Daily  enoxaparin sodium, 40 mg, Subcutaneous, Q24H  furosemide, 40 mg, Oral, Daily  gabapentin, 100 mg, Oral, Q12H  lisinopril, 40 mg, Oral, Daily  montelukast, 10 mg, Oral, Daily  pantoprazole, 40 mg, Oral, Daily  potassium chloride, 10 mEq, Oral, BID With Meals  sodium chloride, 10 mL, Intravenous, Q12H    Infusions   Diet  Diet: Cardiac; Healthy Heart (2-3 Na+); Fluid Consistency: Thin (IDDSI 0)         I have personally reviewed:  []  Laboratory   []  Microbiology   []  Radiology   []  EKG/Telemetry   []  Cardiology/Vascular   []  Pathology   []  Records     Assessment/Plan     Active Hospital Problems     Diagnosis  POA    **Leg swelling [M79.89]  Yes    GERD without esophagitis [K21.9]  Unknown    Dyspnea [R06.00]  Yes    Essential hypertension [I10]  Yes    PAD (peripheral artery disease) [I73.9]  Yes      Resolved Hospital Problems   No resolved problems to display.       89 y.o. female admitted with Leg swelling.    Bilateral lower extremity swelling  - DVTs negative for DVT.  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  No further revascularization options are limited to pain control or amputation  - Vascular following    Numerous incidentaloma's on CT  - Discussed with patient, no clinical evidence of cholecystitis, esophageal obstruction, diverticulitis, pneumonia  -CT chest concerning for atypical acute versus chronic pneumonia and esophageal obstruction.   Patient was in the middle of eating lunch with no difficulties when I walked in the room.  She had some fluid in her esophagus during the CT scan, patient had just eaten breakfast prior to going down there.  - MRI with pancreatic head lesion, recommend follow-up MRI in 2 years   -ultimately, had goals of care discussion with patient and family and at this time will not pursue any further workup.  Hold off on pulmonary or esophagram as patient is asymptomatic    Lovenox 40 mg SC daily for DVT prophylaxis.  Full code.  Discussed with patient, family, and nursing staff.  Anticipate discharge to SNU facility in 1-2 days.    Expected Discharge Date: 6/12/2025; Expected Discharge Time:       Tomas Acuna MD  [facility]  06/12/25  14:57 EDT

## 2025-06-12 NOTE — PLAN OF CARE
Goal Outcome Evaluation:  Plan of Care Reviewed With: patient        Progress: no change  Outcome Evaluation: Received from Observ[facility] overflow unit. waiting for rehab bed. [place] given for leg leg pain. Wound RN consulted for wound to buttocks/coccyx. Continue POC.

## 2025-06-12 NOTE — PLAN OF CARE
Problem: Adult Inpatient Plan of Care  Goal: Plan of Care Review  Outcome: Not Progressing  Flowsheets (Taken 6/12/2025 1705)  Progress: no change  Outcome Evaluation: VSS, oriented x4, forgetful, slept between care most of the day,  room air, turn q2h, purewick, norco for BLE pain, dc when rehab arranged.  Plan of Care Reviewed With: patient  Goal: Patient-Specific Goal (Individualized)  Outcome: Not Progressing  Goal: Absence of Hospital-Acquired Illness or Injury  Outcome: Not Progressing  Intervention: Identify and Manage Fall Risk  Recent Flowsheet Documentation  Taken 6/12/2025 1544 by Josephine Powell RN  Safety Promotion/Fall Prevention:   activity supervised   assistive device/personal items within reach   clutter free environment maintained   fall prevention program maintained   nonskid shoes/slippers when out of bed   room organization consistent   safety round/check completed  Taken 6/12/2025 1344 by Josephine Powell RN  Safety Promotion/Fall Prevention:   activity supervised   assistive device/personal items within reach   clutter free environment maintained   fall prevention program maintained   nonskid shoes/slippers when out of bed   room organization consistent   safety round/check completed  Taken 6/12/2025 1148 by Josephine Powell RN  Safety Promotion/Fall Prevention:   activity supervised   assistive device/personal items within reach   clutter free environment maintained   fall prevention program maintained   nonskid shoes/slippers when out of bed   room organization consistent   safety round/check completed  Taken 6/12/2025 1000 by Josephine Powell RN  Safety Promotion/Fall Prevention:   activity supervised   assistive device/personal items within reach   clutter free environment maintained   fall prevention program maintained   nonskid shoes/slippers when out of bed   room organization consistent   safety round/check completed  Taken 6/12/2025 0822 by Josephine Powell  RN  Safety Promotion/Fall Prevention:   activity supervised   assistive device/personal items within reach   clutter free environment maintained   fall prevention program maintained   nonskid shoes/slippers when out of bed   room organization consistent   safety round/check completed  Intervention: Prevent Skin Injury  Recent Flowsheet Documentation  Taken 6/12/2025 1544 by Josephine Powell RN  Body Position:   supine   legs elevated   heels elevated  Taken 6/12/2025 1344 by Josephine Powell RN  Body Position:   turned   left   heels elevated   legs elevated  Taken 6/12/2025 1148 by Josephine Powell RN  Body Position:   turned   right   heels elevated   legs elevated  Taken 6/12/2025 1000 by Josephine Powell RN  Body Position: (educated on importance of turns)   turned   left   heels elevated   legs elevated  Taken 6/12/2025 0822 by Josephine Powell RN  Body Position: (pulled up) supine  Skin Protection: silicone foam dressing in place  Goal: Optimal Comfort and Wellbeing  Outcome: Not Progressing  Goal: Readiness for Transition of Care  Outcome: Not Progressing     Problem: Fall Injury Risk  Goal: Absence of Fall and Fall-Related Injury  Outcome: Not Progressing  Intervention: Promote Injury-Free Environment  Recent Flowsheet Documentation  Taken 6/12/2025 1544 by Josephine Powell RN  Safety Promotion/Fall Prevention:   activity supervised   assistive device/personal items within reach   clutter free environment maintained   fall prevention program maintained   nonskid shoes/slippers when out of bed   room organization consistent   safety round/check completed  Taken 6/12/2025 1344 by Josephine Powell RN  Safety Promotion/Fall Prevention:   activity supervised   assistive device/personal items within reach   clutter free environment maintained   fall prevention program maintained   nonskid shoes/slippers when out of bed   room organization consistent   safety round/check completed  Taken  6/12/2025 1148 by Josephine Powell RN  Safety Promotion/Fall Prevention:   activity supervised   assistive device/personal items within reach   clutter free environment maintained   fall prevention program maintained   nonskid shoes/slippers when out of bed   room organization consistent   safety round/check completed  Taken 6/12/2025 1000 by Josephine Powell RN  Safety Promotion/Fall Prevention:   activity supervised   assistive device/personal items within reach   clutter free environment maintained   fall prevention program maintained   nonskid shoes/slippers when out of bed   room organization consistent   safety round/check completed  Taken 6/12/2025 0822 by Josephine Powell RN  Safety Promotion/Fall Prevention:   activity supervised   assistive device/personal items within reach   clutter free environment maintained   fall prevention program maintained   nonskid shoes/slippers when out of bed   room organization consistent   safety round/check completed     Problem: Skin Injury Risk Increased  Goal: Skin Health and Integrity  Outcome: Not Progressing  Intervention: Optimize Skin Protection  Recent Flowsheet Documentation  Taken 6/12/2025 1544 by Josephine Powell RN  Head of Bed (HOB) Positioning: HOB at 30-45 degrees  Taken 6/12/2025 1344 by Josephine Powell RN  Head of Bed (HOB) Positioning: HOB at 30-45 degrees  Taken 6/12/2025 1148 by Josephine Powell RN  Head of Bed (HOB) Positioning: HOB at 30-45 degrees  Taken 6/12/2025 1000 by Josephine Powell RN  Head of Bed (HOB) Positioning: HOB at 30-45 degrees  Pressure Reduction Devices: alternating pressure pump (NALINI)  Taken 6/12/2025 0822 by Josephine Powell RN  Pressure Reduction Techniques:   frequent weight shift encouraged   heels elevated off bed   weight shift assistance provided  Head of Bed (HOB) Positioning: HOB at 30-45 degrees  Pressure Reduction Devices: (accumax requested) --  Skin Protection: silicone foam dressing in  place     Problem: Comorbidity Management  Goal: Blood Glucose Level Within Target Range  Outcome: Not Progressing  Goal: Blood Pressure in Desired Range  Outcome: Not Progressing   Goal Outcome Evaluation:  Plan of Care Reviewed With: patient        Progress: no change  Outcome Evaluation: VSS, oriented x4, forgetful, slept between care most of the day,  room air, turn q2h, purewick, norco for BLE pain, dc when rehab arranged.

## 2025-06-13 PROCEDURE — 25010000002 ENOXAPARIN PER 10 MG: Performed by: HOSPITALIST

## 2025-06-13 RX ADMIN — HYDROCODONE BITARTRATE AND ACETAMINOPHEN 1 TABLET: 5; 325 TABLET ORAL at 14:26

## 2025-06-13 RX ADMIN — CLOPIDOGREL BISULFATE 75 MG: 75 TABLET, FILM COATED ORAL at 08:33

## 2025-06-13 RX ADMIN — HYDROCODONE BITARTRATE AND ACETAMINOPHEN 1 TABLET: 5; 325 TABLET ORAL at 20:22

## 2025-06-13 RX ADMIN — PANTOPRAZOLE SODIUM 40 MG: 40 TABLET, DELAYED RELEASE ORAL at 08:33

## 2025-06-13 RX ADMIN — POTASSIUM CHLORIDE 10 MEQ: 750 TABLET, EXTENDED RELEASE ORAL at 17:40

## 2025-06-13 RX ADMIN — GABAPENTIN 100 MG: 100 CAPSULE ORAL at 08:34

## 2025-06-13 RX ADMIN — GABAPENTIN 100 MG: 100 CAPSULE ORAL at 20:22

## 2025-06-13 RX ADMIN — FUROSEMIDE 40 MG: 40 TABLET ORAL at 08:36

## 2025-06-13 RX ADMIN — POTASSIUM CHLORIDE 10 MEQ: 750 TABLET, EXTENDED RELEASE ORAL at 08:33

## 2025-06-13 RX ADMIN — HYDROCODONE BITARTRATE AND ACETAMINOPHEN 1 TABLET: 5; 325 TABLET ORAL at 08:33

## 2025-06-13 RX ADMIN — MONTELUKAST 10 MG: 10 TABLET, FILM COATED ORAL at 08:34

## 2025-06-13 RX ADMIN — Medication 10 ML: at 08:35

## 2025-06-13 RX ADMIN — ENOXAPARIN SODIUM 40 MG: 100 INJECTION SUBCUTANEOUS at 11:19

## 2025-06-13 RX ADMIN — Medication 10 ML: at 20:22

## 2025-06-13 RX ADMIN — LISINOPRIL 40 MG: 10 TABLET ORAL at 08:36

## 2025-06-13 NOTE — CASE MANAGEMENT/SOCIAL WORK
Continued Stay Note  [facility]     Patient Name: Lourdes Platt  MRN: [id number]  Today's Date: 6/13/2025    Admit Date: 6/9/2025    Plan: [facility] via daughter in law transporting.  [facility] has their own pharmacy so no need to electronically send scripts.   Discharge Plan       Row Name 06/13/25 1504       Plan    Plan [facility] via daughter in law transporting.  [facility] has their own pharmacy so no need to electronically send scripts.    Plan Comments 6/13 Spoke with patient and daughter-in-law/Afsaneh at bedside.  Gave Pt her IMM.  Afsaneh advised plan is for Pt to got to rehab swing bed a [facility] tomorrow and she will be transporting patient via private auto.  CCP called and spoke with Kayy at [facility] ([phone]) and she confirmed they have a bed for patient tomorrow, 6/14/25.  Kayy also confirmed [facility] has their own pharmacy so need for medications to be electronically sent.  Packet placed in patient folder on CVU.  Report [phone].  CCP following..........Lorelei KEYES/COLTON                   Discharge Codes    No documentation.                 Expected Discharge Date and Time       Expected Discharge Date Expected Discharge Time    Jun 14, 2025               Lorelei Reilly RN

## 2025-06-13 NOTE — SIGNIFICANT NOTE
06/13/25 1528   OTHER   Discipline physical therapist   Rehab Time/Intention   Session Not Performed other (see comments)  (pt politely decline PT despite encouragement. report she had just gotten up to BSC and would like to rest)   Recommendation   PT - Next Appointment 06/14/25

## 2025-06-13 NOTE — PROGRESS NOTES
Name: Lourdes Platt ADMIT: 2025   : 1935  PCP: Everton Disla MD    MRN: [id number] LOS: 2 days   AGE/SEX: 89 y.o. female  ROOM:      Subjective   Subjective   Patient resting in bed.  Had some leg pain that improved with pain medication.  No difficulty with breakfast this morning.  No fevers or chills.  No shortness of breath no abdominal pain no nausea no vomiting.  Reports that she feels better than yesterday    Review of Systems  As above     Objective   Objective   Vital Signs  Temp:  [97.3 °F (36.3 °C)-99.1 °F (37.3 °C)] 99.1 °F (37.3 °C)  Heart Rate:  [81-94] 94  Resp:  [15-18] 15  BP: (107-140)/(54-72) 122/54  SpO2:  [93 %-96 %] 93 %  on   ;   Device (Oxygen Therapy): room air  Body mass index is 19.74 kg/m².  Physical Exam  Constitutional:       General: She is not in acute distress.     Appearance: She is not ill-appearing.      Comments: Elderly, frail   Cardiovascular:      Rate and Rhythm: Normal rate and regular rhythm.   Pulmonary:      Effort: Pulmonary effort is normal. No respiratory distress.   Abdominal:      General: Abdomen is flat. There is no distension.      Tenderness: There is no abdominal tenderness.   Musculoskeletal:         General: No swelling or deformity. Normal range of motion.      Comments: Left leg more swollen than right.  Left leg wrapped   Skin:     General: Skin is warm and dry.   Neurological:      General: No focal deficit present.      Mental Status: She is alert. Mental status is at baseline.         Results Review     I reviewed the patient's new clinical results.  Results from last 7 days   Lab Units 25  0533 06/10/25  0632 25   WBC 10*3/mm3 10.23 9.05 9.62   HEMOGLOBIN g/dL 10.3* 9.2* 10.2*   PLATELETS 10*3/mm3 268 232 267     Results from last 7 days   Lab Units 25  0633 25  0534 06/10/25  0625   SODIUM mmol/L 137 141 145 141   POTASSIUM mmol/L 3.6 3.4* 3.3* 3.5   CHLORIDE mmol/L 104 106 108* 108*  "  CO2 mmol/L 24.0 25.0 26.5 23.9   BUN mg/dL 15.0 19.0 16.0 19.0   CREATININE mg/dL 0.69 0.92 0.80 1.11*   GLUCOSE mg/dL 99 120* 103* 107*   Estimated Creatinine Clearance: 46.4 mL/min (by C-G formula based on SCr of 0.69 mg/dL).  Results from last 7 days   Lab Units 06/09/25 2055   ALBUMIN g/dL 3.5   BILIRUBIN mg/dL 0.5   ALK PHOS U/L 86   AST (SGOT) U/L 18   ALT (SGPT) U/L 13     Results from last 7 days   Lab Units 06/12/25  0633 06/11/25  0534 06/10/25  0632 06/09/25 2055   CALCIUM mg/dL 8.8 8.4* 8.1* 8.3*   ALBUMIN g/dL  --   --   --  3.5   MAGNESIUM mg/dL  --   --   --  2.1     No results found for: \"COVID19\"  Glucose   Date/Time Value Ref Range Status   06/11/2025 2034 129 70 - 130 mg/dL Final   06/11/2025 1600 120 70 - 130 mg/dL Final   06/11/2025 1102 143 (H) 70 - 130 mg/dL Final   06/11/2025 0726 96 70 - 130 mg/dL Final   06/10/2025 2003 154 (H) 70 - 130 mg/dL Final   06/10/2025 1729 116 70 - 130 mg/dL Final   06/10/2025 1254 131 (H) 70 - 130 mg/dL Final       CT Chest Without Contrast Diagnostic  CT CHEST WITHOUT IV CONTRAST     HISTORY: Nodular opacification within the right lung seen on CTA     TECHNIQUE: Radiation dose reduction techniques were utilized, including  automated exposure control and exposure modulation based on body size.   3 mm images were obtained through the chest without IV contrast.     COMPARISON: CTA abdomen/pelvis 6/11/2025        FINDINGS AND IMPRESSION  Please note evaluation is suboptimal due to lack intravenous contrast as  well as beam hardening streak artifact and the patient's arms being  along her side.     Small pericardial effusion. The esophagus is mild to moderately  distended with fluid. Distal esophageal obstruction cannot be excluded  in the appropriate context and correlation patient is recommended  follow-up endoscopy if clinically indicated.  Mediastinal adenopathy is present. Index subcarinal node measures 1.4 cm  in short axis dimension (previously 1.6 cm in " 2017. Index aortopulmonary  window node measures 1.3 cm in short axis dimension, as before. No  axillary adenopathy by size criteria. At least mild to moderate coronary  artery calcification and/or coronary artery stents.     No pleural effusion or pneumothorax. Bronchiolectasis is present within  the lingula and right middle and lower lobes., Lingula, right middle  lobe and right lower lobe. There is reticulonodular opacification within  the bilateral lungs, most notably within the right upper lobe and right  lung base. Findings of pulmonary consolidation and opacification within  the lingula and right middle and lower lobes is present with findings of  volume loss within the right middle lobe and lingula. These findings are  most suggestive of acute on chronic atypical pneumonia with differential  considerations including but not limited to atypical mycobacterium  versus aspiration. Correlation with patient history and pulmonology  referral is recommended to determine appropriate urology. Given the  nodular opacification, follow-up with chest CT in 8 weeks is recommended  to ensure stability and resolution exclude the possibility of  malignancy. Mediastinal adenopathy should be followed up at this time as  well.     As well as the bilateral lung bases. Comminuted distal left clavicular  fracture is present. No suspicious lytic or blastic osseous lesions.              This report was finalized on 6/12/2025 11:24 AM by Dr. Luke Valverde M.D on Workstation: [id number]     MRI abdomen w wo contrast mrcp  MRI AND MRCP ABDOMEN WITH AND WITHOUT CONTRAST     HISTORY: Questionable pancreatic lesion     TECHNIQUE: Multiplanar multisequence MRI and MRCP of the abdomen was  performed before and after the IV administration of contrast.     COMPARISON: CTA abdomen and pelvis 6/11/2025     FINDINGS:  Asymmetric opacification within the right lung base only partially seen.     Gallbladder is at the upper limits of normal to  borderline distended, as  seen on recent CT. No significant intra or extrahepatic bili duct  dilation.     There appear to be a few scattered T2 hyperintense lesions in the  pancreas measuring up to approximately 0.5 to 0.6 cm. Main pancreatic  duct is not distended.     Liver has a lobulated appearance. No suspicious enhancing lesion is seen  in the liver, adrenal glands or kidneys. The main portal are patent.     IMPRESSION  1.  Few subcentimeter T2 hyperintense lesions within the pancreas.  Follow-up with abdominal MRI in 2 years recommended per ACR criteria.  2.  Liver has a lobulated appearance which can be seen in setting of  chronic liver disease in the appropriate context. Correlation with  patient history recommended.  3.  Asymmetric opacification the right lung base better seen on recent  CT. Please refer to this dictation for further information.  4.  Borderline distention of the gallbladder, as before. Findings are  nonspecific and correlation with patient history is recommended to  exclude clinical concern for cholecystitis with follow-up HIDA scan if  clinically indicated.  5.  Other findings as above        This report was finalized on 6/12/2025 8:45 AM by Dr. Luke Valverde M.D  on Workstation: [id number]       I reviewed the patient's daily medications.  Scheduled Medications  clopidogrel, 75 mg, Oral, Daily  enoxaparin sodium, 40 mg, Subcutaneous, Q24H  furosemide, 40 mg, Oral, Daily  gabapentin, 100 mg, Oral, Q12H  lisinopril, 40 mg, Oral, Daily  montelukast, 10 mg, Oral, Daily  pantoprazole, 40 mg, Oral, Daily  potassium chloride, 10 mEq, Oral, BID With Meals  sodium chloride, 10 mL, Intravenous, Q12H    Infusions   Diet  Diet: Cardiac; Healthy Heart (2-3 Na+); Fluid Consistency: Thin (IDDSI 0)         I have personally reviewed:  [x]  Laboratory   []  Microbiology   []  Radiology   []  EKG/Telemetry   []  Cardiology/Vascular   []  Pathology   []  Records     Assessment/Plan     Active Hospital  Problems    Diagnosis  POA    **Leg swelling [M79.89]  Yes    GERD without esophagitis [K21.9]  Unknown    Dyspnea [R06.00]  Yes    Essential hypertension [I10]  Yes    PAD (peripheral artery disease) [I73.9]  Yes      Resolved Hospital Problems   No resolved problems to display.       89 y.o. female admitted with Leg swelling.    Bilateral lower extremity swelling  - DVTs negative for DVT.  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  No further revascularization options are limited to pain control or amputation  - Vascular following    Numerous incidentaloma's on CT  - Discussed with patient and family, no clinical evidence of cholecystitis, esophageal obstruction, diverticulitis, pneumonia  -CT chest concerning for atypical acute versus chronic pneumonia and esophageal obstruction.   Patient was in the middle of eating lunch with no difficulties when I walked in the room.  She had some fluid in her esophagus during the CT scan, patient had just eaten breakfast prior to going down there.  - MRI with pancreatic head lesion, recommend follow-up MRI in 2 years   -ultimately, had goals of care discussion with patient and family and at this time will not pursue any further workup unless she becomes symptomatic.  Hold off on pulmonary or esophagram as patient is asymptomatic per patient family request.    Lovenox 40 mg SC daily for DVT prophylaxis.  Full code.  Discussed with patient, family, and nursing staff.  Anticipate discharge to SNU facility in 1-2 days.    Expected Discharge Date: 6/14/2025; Expected Discharge Time:       Tomas Acuna MD  [facility]  06/13/25  11:38 EDT

## 2025-06-13 NOTE — NURSING NOTE
Reason for Visit: WOC Team consult for Assessment buttock / coccyx  Treatment Plan/Recommendations: Patient presented to ED with leg swelling and weeping, was seen by vascular. Bilateral buttock with friction, coccyx blanchable. Is not pressure. She needs assist of 2 to turn due to fracture left arm.       Wound Team Follow up Plan: Recommendations placed in epic. Discussed with unit RN. Apply heart shape silicone border dressing.

## 2025-06-13 NOTE — PLAN OF CARE
Goal Outcome Evaluation:  Plan of Care Reviewed With: patient        Progress: improving  Outcome Evaluation: Pt A&Ox4, family at bedside, VSS, up to bedside commode w/Assist x 2, broken collarbone, left arm in sling, injury to LLE, kerlex wraps, skin breakdown to gluteal areas, wound consults placed. Family at bedside. Awaiting rehab. NSR on monitor, will update POC as needed

## 2025-06-14 VITALS
WEIGHT: 115 LBS | TEMPERATURE: 98.8 F | HEIGHT: 64 IN | SYSTOLIC BLOOD PRESSURE: 105 MMHG | RESPIRATION RATE: 18 BRPM | HEART RATE: 77 BPM | OXYGEN SATURATION: 96 % | BODY MASS INDEX: 19.63 KG/M2 | DIASTOLIC BLOOD PRESSURE: 56 MMHG

## 2025-06-14 PROBLEM — Z66 DNR (DO NOT RESUSCITATE): Status: ACTIVE | Noted: 2025-06-14

## 2025-06-14 RX ORDER — HYDROCODONE BITARTRATE AND ACETAMINOPHEN 5; 325 MG/1; MG/1
1 TABLET ORAL ONCE AS NEEDED
Refills: 0 | Status: COMPLETED | OUTPATIENT
Start: 2025-06-14 | End: 2025-06-14

## 2025-06-14 RX ORDER — AMOXICILLIN 250 MG
2 CAPSULE ORAL 2 TIMES DAILY PRN
Start: 2025-06-14

## 2025-06-14 RX ORDER — FUROSEMIDE 40 MG/1
40 TABLET ORAL DAILY
Start: 2025-06-14

## 2025-06-14 RX ORDER — HYDROCODONE BITARTRATE AND ACETAMINOPHEN 5; 325 MG/1; MG/1
1 TABLET ORAL EVERY 6 HOURS PRN
Qty: 12 TABLET | Refills: 0 | Status: SHIPPED | OUTPATIENT
Start: 2025-06-14 | End: 2025-06-17

## 2025-06-14 RX ORDER — GABAPENTIN 100 MG/1
100 CAPSULE ORAL EVERY 12 HOURS SCHEDULED
Qty: 6 CAPSULE | Refills: 0 | Status: SHIPPED | OUTPATIENT
Start: 2025-06-14 | End: 2025-06-17

## 2025-06-14 RX ADMIN — DOCUSATE SODIUM 50 MG AND SENNOSIDES 8.6 MG 2 TABLET: 8.6; 5 TABLET, FILM COATED ORAL at 08:26

## 2025-06-14 RX ADMIN — HYDROCODONE BITARTRATE AND ACETAMINOPHEN 1 TABLET: 5; 325 TABLET ORAL at 10:13

## 2025-06-14 RX ADMIN — HYDROCODONE BITARTRATE AND ACETAMINOPHEN 1 TABLET: 5; 325 TABLET ORAL at 05:08

## 2025-06-14 RX ADMIN — CLOPIDOGREL BISULFATE 75 MG: 75 TABLET, FILM COATED ORAL at 08:13

## 2025-06-14 RX ADMIN — POLYETHYLENE GLYCOL 3350 17 G: 17 POWDER, FOR SOLUTION ORAL at 08:26

## 2025-06-14 RX ADMIN — PANTOPRAZOLE SODIUM 40 MG: 40 TABLET, DELAYED RELEASE ORAL at 08:14

## 2025-06-14 RX ADMIN — LISINOPRIL 40 MG: 10 TABLET ORAL at 08:14

## 2025-06-14 RX ADMIN — GABAPENTIN 100 MG: 100 CAPSULE ORAL at 08:14

## 2025-06-14 RX ADMIN — FUROSEMIDE 40 MG: 40 TABLET ORAL at 08:14

## 2025-06-14 RX ADMIN — MONTELUKAST 10 MG: 10 TABLET, FILM COATED ORAL at 08:13

## 2025-06-14 RX ADMIN — POTASSIUM CHLORIDE 10 MEQ: 750 TABLET, EXTENDED RELEASE ORAL at 08:13

## 2025-06-14 NOTE — PLAN OF CARE
Goal Outcome Evaluation:              Outcome Evaluation: Pt AO, VSS, RA. voiding per purewick overnight. up x2+ for ambulation. daughter staying at bedside. broken collarbone w/ severe bruising to L side, arm in sling. LLE skin tear, dressing CDI. Pt refusing turns at this time, education completed with patient and daughter, repositioned often. NSR on monitor. Waiting for bed at rehab, hopefully transported today. pain managed with prn medications. call light within reach, no further needs at this time.

## 2025-06-14 NOTE — DISCHARGE SUMMARY
Patient Name: Lourdes Platt  : 1935  MRN: [id number]    Date of Admission: 2025  Date of Discharge:  2025  Primary Care Physician: Everton Disla MD      Chief Complaint:   Leg Swelling and Shortness of Breath      Discharge Diagnoses     Active Hospital Problems    Diagnosis  POA    **Leg swelling [M79.89]  Yes    DNR (do not resuscitate) [Z66]  Yes    GERD without esophagitis [K21.9]  Yes    Dyspnea [R06.00]  Yes    Essential hypertension [I10]  Yes    PAD (peripheral artery disease) [I73.9]  Yes      Resolved Hospital Problems   No resolved problems to display.        Hospital Course     Ms. Platt is a 89 y.o. female with a history of peripheral vascular disease, hypertension presenting with  bilateral lower extremity swelling and left lower extremity ischemic pain.  Initial concern for heart failure and cardiology was consulted, but echo  without evidence of heart failure.  Suspect lower extremity swelling was from referral vascular disease and venous stasis.  Lower extremity Dopplers negative for DVT.  Patient received a few doses of IV Lasix and plan to discharge on an increased dose of oral Lasix.  Continue gabapentin and Norco as needed for neuropathic pain.    She had multiple incidentaloma's on CT of her abdomen.  She had no clinical evidence of cholecystitis, esophageal obstruction, diverticulitis or pneumonia.  MRI with pancreatic head lesion recommend follow-up MRI in 2 years.  Ultimately had goals of care discussion with patient and family at this time do not plan to pursue any further workup unless she becomes symptomatic.  She has not had any difficulty swallowing or breathing.    She has a left clavicle fracture that was treated at an outside hospital and she was discharged with a sling.  Suspect she will need sling for another 1 to 2 weeks.  She recently saw Ortho here on  for follow-up of her different fracture, I have messaged that provider to see if they could see  her in the next couple weeks after she has some time at rehab.  Pain is well-controlled.    Discussed with patient and family and patient is DNR/DNI.    At the time of discharge patient was told to take all medications as prescribed, keep all follow-up appointments, and call their doctor or return to the hospital with any worsening or concerning symptoms.    Day of Discharge     Subjective:  Patient doing well in bed.  Continues to feel better.  Family at bedside.  Discussed plan for discharge today.        Physical Exam:  Temp:  [98.1 °F (36.7 °C)-98.8 °F (37.1 °C)] 98.8 °F (37.1 °C)  Heart Rate:  [77-84] 77  Resp:  [16-18] 18  BP: (105-111)/(54-64) 105/56  Body mass index is 19.74 kg/m².  Physical Exam  Constitutional:       General: She is not in acute distress.     Appearance: She is not ill-appearing.      Comments: Elderly, frail   Cardiovascular:      Rate and Rhythm: Normal rate and regular rhythm.   Pulmonary:      Effort: Pulmonary effort is normal. No respiratory distress.   Abdominal:      General: Abdomen is flat. There is no distension.      Tenderness: There is no abdominal tenderness.   Musculoskeletal:         General: No swelling or deformity. Normal range of motion.      Comments: Left leg more swollen than right.  Left leg wrapped.  Left arm in sling  Skin:     General: Skin is warm and dry.   Neurological:      General: No focal deficit present.      Mental Status: She is alert. Mental status is at baseline.      Consultants     Consult Orders (all) (From admission, onward)       Start     Ordered    06/10/25 1216  Inpatient Vascular Surgery Consult  Once        Specialty:  Vascular Surgery  Provider:  Stewart Ocasio MD    06/10/25 1216    06/10/25 0153  Inpatient Cardiology Consult  Once        Specialty:  Cardiology  Provider:  Amira Santoyo MD    06/10/25 0154    06/10/25 0021  [facility] (on-call MD unless specified) Details  Once        Specialty:  Hospitalist  Provider:  (Not yet assigned)     06/10/25 0020                  Procedures     Imaging Results (All)       Procedure Component Value Units Date/Time    CT Chest Without Contrast Diagnostic [[id number]] Collected: 06/12/25 1110     Updated: 06/12/25 1127    Narrative:      CT CHEST WITHOUT IV CONTRAST     HISTORY: Nodular opacification within the right lung seen on CTA     TECHNIQUE: Radiation dose reduction techniques were utilized, including  automated exposure control and exposure modulation based on body size.   3 mm images were obtained through the chest without IV contrast.     COMPARISON: CTA abdomen/pelvis 6/11/2025        FINDINGS AND IMPRESSION  Please note evaluation is suboptimal due to lack intravenous contrast as  well as beam hardening streak artifact and the patient's arms being  along her side.     Small pericardial effusion. The esophagus is mild to moderately  distended with fluid. Distal esophageal obstruction cannot be excluded  in the appropriate context and correlation patient is recommended  follow-up endoscopy if clinically indicated.  Mediastinal adenopathy is present. Index subcarinal node measures 1.4 cm  in short axis dimension (previously 1.6 cm in 2017. Index aortopulmonary  window node measures 1.3 cm in short axis dimension, as before. No  axillary adenopathy by size criteria. At least mild to moderate coronary  artery calcification and/or coronary artery stents.     No pleural effusion or pneumothorax. Bronchiolectasis is present within  the lingula and right middle and lower lobes., Lingula, right middle  lobe and right lower lobe. There is reticulonodular opacification within  the bilateral lungs, most notably within the right upper lobe and right  lung base. Findings of pulmonary consolidation and opacification within  the lingula and right middle and lower lobes is present with findings of  volume loss within the right middle lobe and lingula. These findings are  most suggestive of acute on chronic atypical  pneumonia with differential  considerations including but not limited to atypical mycobacterium  versus aspiration. Correlation with patient history and pulmonology  referral is recommended to determine appropriate urology. Given the  nodular opacification, follow-up with chest CT in 8 weeks is recommended  to ensure stability and resolution exclude the possibility of  malignancy. Mediastinal adenopathy should be followed up at this time as  well.     As well as the bilateral lung bases. Comminuted distal left clavicular  fracture is present. No suspicious lytic or blastic osseous lesions.              This report was finalized on 6/12/2025 11:24 AM by Dr. Luke Valverde M.D on Workstation: [id number]       MRI abdomen w wo contrast mrcp [[id number]] Collected: 06/12/25 0825     Updated: 06/12/25 0848    Narrative:      MRI AND MRCP ABDOMEN WITH AND WITHOUT CONTRAST     HISTORY: Questionable pancreatic lesion     TECHNIQUE: Multiplanar multisequence MRI and MRCP of the abdomen was  performed before and after the IV administration of contrast.     COMPARISON: CTA abdomen and pelvis 6/11/2025     FINDINGS:  Asymmetric opacification within the right lung base only partially seen.     Gallbladder is at the upper limits of normal to borderline distended, as  seen on recent CT. No significant intra or extrahepatic bili duct  dilation.     There appear to be a few scattered T2 hyperintense lesions in the  pancreas measuring up to approximately 0.5 to 0.6 cm. Main pancreatic  duct is not distended.     Liver has a lobulated appearance. No suspicious enhancing lesion is seen  in the liver, adrenal glands or kidneys. The main portal are patent.     IMPRESSION  1.  Few subcentimeter T2 hyperintense lesions within the pancreas.  Follow-up with abdominal MRI in 2 years recommended per ACR criteria.  2.  Liver has a lobulated appearance which can be seen in setting of  chronic liver disease in the appropriate context.  Correlation with  patient history recommended.  3.  Asymmetric opacification the right lung base better seen on recent  CT. Please refer to this dictation for further information.  4.  Borderline distention of the gallbladder, as before. Findings are  nonspecific and correlation with patient history is recommended to  exclude clinical concern for cholecystitis with follow-up HIDA scan if  clinically indicated.  5.  Other findings as above        This report was finalized on 6/12/2025 8:45 AM by Dr. Luke Valverde M.D  on Workstation: [id number]       CT Angio Abdominal Aorta Bilateral Iliofem Runoff [[id number]] Collected: 06/11/25 1250     Updated: 06/11/25 1319    Narrative:      1.  CT ANGIOGRAM OF THE ABDOMEN AND PELVIS WITH BILATERAL LOWER  EXTREMITY RUNOFF. MULTIPLE CORONAL, SAGITTAL, AND 3-D RECONSTRUCTIONS.     HISTORY: Peripheral arterial disease status post left femoral to tibial  bypass     TECHNIQUE: Radiation dose reduction techniques were utilized, including  automated exposure control and exposure modulation based on body size.   CT angiogram of the abdomen and pelvis with lower extremity runoff was  performed. Multiple coronal, sagittal, and 3-D reconstruction images  were obtained.     COMPARISON: CTA abdomen/pelvis with lower extremity runoff 8/31/2022     FINDINGS:     Distal esophagus is thickened. Nodular opacification within the lingula  is only partially seen. Asymmetric pulmonary consolidation within the  right lower lobe is present. Air-fluid levels are present within the  small bowel which is otherwise nondistended. The appendix is surgically  absent. Gallbladder at the upper limits of normal borderline distended  in size, new since 8/31/2022.     Focus of hyperenhancement within the anterior aspect of the right  hepatic lobe inferiorly adjacent to the gallbladder, as before. While  this examination is not tailored for detailed evaluation of the  abdominal viscera due to contrast timing,  no gross abnormality seen  within the spleen,, adrenal glands or kidneys. No hydronephrosis;  however, please note evaluation of the distal ureters is suboptimal due  to streak artifact from right total hip arthroplasty.     There appears to be a subcentimeter lesion in the head measuring 0.9 cm.     There is colonic diverticulosis. Suggestion of subtle fat stranding  surrounding a small portion of the sigmoid colon with adjacent free  fluid. No definitive no free peritoneal air is seen. No abdominal pelvic  adenopathy by size criteria. No suspicious lytic or blastic osseous  lesions plate and screw device within the distal left radius is only  partially seen and cannot be evaluated. Heterogenous marrow is present  within the spine, as before.     CT angiography abdomen and pelvis:  *  Moderate stenosis of the origin of the celiac artery is present,  minimally progressed since 2022.  *  No significant stenosis of the origin of the superior mesenteric  artery is present. There is a short segment of mild stenosis of the  proximal superior mesenteric artery, as before.  *  Moderate to severe stenosis of the origins of the bilateral main  renal arteries arising off the aorta, minimally progressed on the right.  *  There is a stent within the right common iliac artery which remains  patent.  *  Short segments of mild to moderate stenosis within the left common  iliac artery is present secondary calcified noncalcified  atherosclerosis.     Left lower extremity:  *  No significant stenosis present within the left external iliac  artery.  *  Postsurgical changes from left femoral to tibial bypass are present.  The presumed left superficial femoral artery stent is completely  thrombosed throughout its course. Collateral vascularization within the  left lower extremity is present with reconstitution of the left  popliteal artery which demonstrates long segment of moderate to severe  stenosis throughout its course. The native  left peroneal artery is  patent in the foot. The native left anterior tibial and posterior tibial  arteries are not definitively opacified, as before.     Right lower extremity:  *  No significant stenosis present within the right external iliac  artery. There is a short segment of moderate stenosis within the  proximal right common femoral artery secondary to noncalcified  atherosclerosis, mildly progressed since 2/20/2022.  *  The right superficial femoral artery demonstrates short segment of  moderate to severe stenosis throughout its course, most notably within  its distal aspect on axial thin section image 272  *  Right popliteal artery demonstrates long segment of moderate to  severe stenosis throughout its course, most notably at the proximal  level of the patella. The native right popliteal artery appears to  become completely or nearly completely thrombosed at the inferior level  of the right patella, as before. The right peroneal artery reconstitutes  within the mid aspect of the right calf and is patent into the right  foot, as before. The native right anterior tibial and posterior tibial  arteries are not definitively opacified.       Impression:      1.  Extensive atherosclerosis throughout the arterial vasculature of the  abdomen, pelvis and bilateral lower extremities as described in detail  above. Postsurgical changes from left superficial femoral to tibial  artery bypass are present with what appears to be complete thrombosis of  the bypass graft. The left popliteal artery reconstitutes by what  appears to be collateral vascularization, as before.  2.  Findings concerning for diverticulitis involving the sigmoid colon.  3.  Concern for a subcentimeter hypodense lesion within the pancreatic  head. Further evaluation with MRI of the abdomen with without contrast  recommended to further characterize.  4.  Nodular opacification within the lingula is only partially seen.  Further evaluation with chest CT  is recommended to further characterize  and exclude neoplasm. Right lower lobe pulmonary consolidation raising  concern for pneumonia can be follow-up at this time as well.  5.  Thickening of the distal esophagus suggestive of esophagitis in the  appropriate context. Correlation with patient history is recommended  follow-up endoscopy if clinically indicated.  6.  Gallbladder is at the upper limits of normal borderline distended in  size, nonspecific. Correlation with patient history is recommended to  exclude concern for cholecystitis with follow-up HIDA scan if clinically  indicated.  7.  Other findings above           This report was finalized on 6/11/2025 1:16 PM by Dr. Luke Valverde M.D  on Workstation: [id number]       XR Knee 1 or 2 View Left [[id number]] Collected: 06/10/25 1440     Updated: 06/10/25 1445    Narrative:      XR KNEE 1 OR 2 VW LEFT-     DATE OF EXAM: 6/10/2025 1:11 PM     INDICATION: left knee pain and recent fall.     COMPARISON: CTA runoff 8/31/2022.     TECHNIQUE: 2 views of the knee were obtained.     FINDINGS:  Diffuse osteopenia. No evidence of acute fracture or dislocation. The  joint spaces are well-maintained. No significant knee joint effusion.  Subtle chondrocalcinosis. Severe calcified atherosclerotic disease.  Multiple surgical clips in the posterior proximal lower leg.       Impression:      Diffuse osteopenia and subtle chondrocalcinosis, without radiographic  evidence of acute fracture or dislocation.     This report was finalized on 6/10/2025 2:42 PM by Felton Forbes MD on  Workstation: [id number]       XR Chest 1 View [[id number]] Collected: 06/09/25 2137     Updated: 06/09/25 2141    Narrative:      SINGLE VIEW OF THE CHEST     HISTORY: Weakness and dizziness     COMPARISON: October 31, 2022     FINDINGS:  Heart size is within normal limits. There is calcification of the aorta.  No pneumothorax or pleural effusion is seen. There is scarring versus  atelectasis noted at  "the lung bases.       Impression:      Scarring versus atelectasis noted at the lung bases.     This report was finalized on 6/9/2025 9:38 PM by Dr. Joaquina Clinton M.D on Workstation: [id number]               Pertinent Labs     Results from last 7 days   Lab Units 06/11/25  0533 06/10/25  0632 06/09/25  2055   WBC 10*3/mm3 10.23 9.05 9.62   HEMOGLOBIN g/dL 10.3* 9.2* 10.2*   PLATELETS 10*3/mm3 268 232 267     Results from last 7 days   Lab Units 06/12/25 0633 06/11/25 0534 06/10/25  0632 06/09/25  2055   SODIUM mmol/L 137 141 145 141   POTASSIUM mmol/L 3.6 3.4* 3.3* 3.5   CHLORIDE mmol/L 104 106 108* 108*   CO2 mmol/L 24.0 25.0 26.5 23.9   BUN mg/dL 15.0 19.0 16.0 19.0   CREATININE mg/dL 0.69 0.92 0.80 1.11*   GLUCOSE mg/dL 99 120* 103* 107*   Estimated Creatinine Clearance: 46.4 mL/min (by C-G formula based on SCr of 0.69 mg/dL).  Results from last 7 days   Lab Units 06/09/25 2055   ALBUMIN g/dL 3.5   BILIRUBIN mg/dL 0.5   ALK PHOS U/L 86   AST (SGOT) U/L 18   ALT (SGPT) U/L 13     Results from last 7 days   Lab Units 06/12/25 0633 06/11/25 0534 06/10/25  0632 06/09/25  2055   CALCIUM mg/dL 8.8 8.4* 8.1* 8.3*   ALBUMIN g/dL  --   --   --  3.5   MAGNESIUM mg/dL  --   --   --  2.1       Results from last 7 days   Lab Units 06/09/25 2204 06/09/25 2055   HSTROP T ng/L 22* 22*   PROBNP pg/mL  --  1,578.0           Invalid input(s): \"LDLCALC\"        Test Results Pending at Discharge       Discharge Details        Discharge Medications        New Medications        Instructions Start Date   gabapentin 100 MG capsule  Commonly known as: NEURONTIN   100 mg, Oral, Every 12 Hours Scheduled      HYDROcodone-acetaminophen 5-325 MG per tablet  Commonly known as: NORCO   1 tablet, Oral, Every 6 Hours PRN      sennosides-docusate 8.6-50 MG per tablet  Commonly known as: PERICOLACE   2 tablets, Oral, 2 Times Daily PRN             Changes to Medications        Instructions Start Date   furosemide 40 MG tablet  Commonly " known as: LASIX  What changed:   medication strength  how much to take   40 mg, Oral, Daily             Continue These Medications        Instructions Start Date   albuterol sulfate  (90 Base) MCG/ACT inhaler  Commonly known as: PROVENTIL HFA;VENTOLIN HFA;PROAIR HFA   2 puffs, Inhalation, Every 6 Hours PRN      carboxymethylcellulose 0.5 % solution  Commonly known as: REFRESH PLUS   1 drop, Both Eyes, 3 Times Daily PRN      clopidogrel 75 MG tablet  Commonly known as: PLAVIX   75 mg, Oral, Daily      Florajen Women capsule   1 capsule, Oral, Daily      lisinopril 40 MG tablet  Commonly known as: PRINIVIL,ZESTRIL   40 mg, Oral, Daily      methenamine 1 g tablet  Commonly known as: HIPREX   1 g, Oral, 2 Times Daily      montelukast 10 MG tablet  Commonly known as: SINGULAIR   10 mg, Oral, Daily      pantoprazole 40 MG EC tablet  Commonly known as: PROTONIX   1 tablet, Oral, Daily      potassium chloride 10 MEQ CR tablet   1 tablet, Oral, Daily               Allergies   Allergen Reactions    Penicillin G Swelling    Penicillins Itching and Swelling         Discharge Disposition:  Rehab Facility or Unit (DC - External)    Discharge Diet:  Diet Order   Procedures    Diet: Cardiac; Healthy Heart (2-3 Na+); Fluid Consistency: Thin (IDDSI 0)       Discharge Activity:   As tolerated    CODE STATUS:    Code Status and Medical Interventions: No CPR (Do Not Attempt to Resuscitate); Limited Support; No intubation (DNI)   Ordered at: 06/14/25 0727     Code Status (Patient has no pulse and is not breathing):    No CPR (Do Not Attempt to Resuscitate)     Medical Interventions (Patient has pulse or is breathing):    Limited Support     Medical Intervention Limits:    No intubation (DNI)     Level Of Support Discussed With:    Patient       Next of Kin (If No Surrogate)       No future appointments.   Follow-up Information       Juan A Roblero MD .    Specialty: Vascular Surgery  Contact information:  [address]  [address]  [facility] [address]  [phone]               Everton Disla MD .    Specialty: Internal Medicine  Contact information:  [address]  [facility] [address] [phone]                             Time Spent on Discharge:  Greater than 30 minutes      Tomas Acuna MD  [facility]  06/14/25  09:02 EDT

## 2025-06-16 NOTE — CASE MANAGEMENT/SOCIAL WORK
Case Management Discharge Note    Final Note: Pt DC to [facility], transportation via family. JUN Correia/CCP     Selected Continued Care - Discharged on 6/14/2025 Admission date: 6/9/2025 - Discharge disposition: Rehab Facility or Unit (DC - External)     Transportation Services  Private: Car    Final Discharge Disposition Code: 62 - inpatient rehab facility

## (undated) DEVICE — SUT SILK 2/0 SH CR5 18IN C0125

## (undated) DEVICE — GLV SURG SENSICARE PI LF PF 8 GRN STRL

## (undated) DEVICE — SUT VIC 1 CT1 36IN J947H

## (undated) DEVICE — STELLAREX 0.035 OTW DRUG-COATED ANGIOPLASTY BALLOON (5MM X 120MM) X 135CM: Brand: STELLAREX

## (undated) DEVICE — PK ANGIO 40

## (undated) DEVICE — NEEDLE, QUINCKE, 18GX3.5": Brand: MEDLINE

## (undated) DEVICE — PINNACLE R/O II INTRODUCER SHEATH WITH RADIOPAQUE MARKER: Brand: PINNACLE

## (undated) DEVICE — SPNG LAP 18X18IN LF STRL PK/5

## (undated) DEVICE — MYNXGRIP 6F/7F: Brand: MYNXGRIP

## (undated) DEVICE — SYR LUERLOK 30CC

## (undated) DEVICE — SUT VIC 3/0 CTI 36IN J944H

## (undated) DEVICE — SOL NACL 0.9PCT 1000ML

## (undated) DEVICE — APPL DURAPREP IODOPHOR APL 26ML

## (undated) DEVICE — SMALL (GREEN) FOR GRAFTS UP TO 8 MM, 20 1/2" / 52 CM (1/PKG): Brand: SCANLAN® VASCULAR TUNNELER SHEATHS AND BULLET TIPS

## (undated) DEVICE — RADIFOCUS GLIDEWIRE ADVANTAGE GUIDEWIRE: Brand: GLIDEWIRE ADVANTAGE

## (undated) DEVICE — GLV SURG BIOGEL LTX PF 8

## (undated) DEVICE — STELLAREX 0.035 OTW DRUG-COATED ANGIOPLASTY BALLOON (4MM X 80MM) X 135CM: Brand: STELLAREX

## (undated) DEVICE — CVR PROB 96IN LF STRL

## (undated) DEVICE — SUT PROLN 6/0 BV1 D/A 30IN 8709H

## (undated) DEVICE — NDL PERC 1PRT THNWALL W/BASEPLT 18G 7CM

## (undated) DEVICE — MAT FLR ABSORBENT LG 4FT 10 2.5FT

## (undated) DEVICE — ANTIBACTERIAL UNDYED BRAIDED (POLYGLACTIN 910), SYNTHETIC ABSORBABLE SUTURE: Brand: COATED VICRYL

## (undated) DEVICE — STELLAREX 0.035 OTW DRUG-COATED ANGIOPLASTY BALLOON (4MM X 60MM) X 135CM: Brand: STELLAREX

## (undated) DEVICE — 200 ML FASTURN SYRINGE WITH QUICK FILL TUBE(ANGIO-SET,PKG,200ML FASTURN SYR W/QFT,MC)(60729695): Brand: MEDRAD® MARK V PROVIS 200ML FASTURN STERILE DISPOSABLE SYRINGE & QFT

## (undated) DEVICE — Device

## (undated) DEVICE — SUT PROLN 5/0 RB1 D/A 36IN 8556H

## (undated) DEVICE — TBG PENCL TELESCP MEGADYNE SMOKE EVAC 10FT

## (undated) DEVICE — TOTAL TRAY, 16FR 10ML SIL FOLEY, URN: Brand: MEDLINE

## (undated) DEVICE — GLV SURG BIOGEL LTX PF 7 1/2

## (undated) DEVICE — CATH IV INSYTE AUTOGARD SHLD 20G 1IN PNK

## (undated) DEVICE — HANDPIECE SET WITH COAXIAL HIGH FLOW TIP AND SUCTION TUBE: Brand: INTERPULSE

## (undated) DEVICE — NAVICROSS SUPPORT CATHETER: Brand: NAVICROSS

## (undated) DEVICE — STPLR SKIN VISISTAT WD 35CT

## (undated) DEVICE — DIL W/ HC 5.0FR 20CM

## (undated) DEVICE — CATH SELECTIVE RIM 5FX65CM .038

## (undated) DEVICE — PREP IM ENCHANCED TOTAL HIP BONE                                    PREPARATION KIT: Brand: PREP-IM

## (undated) DEVICE — EXTENSION SET, MALE LUER LOCK ADAPTER WITH RETRACTABLE COLLAR

## (undated) DEVICE — BLOOD COLLECTION/INFUSION SET,FEMALE LUER, 12 INCH (30.5 CM) TUBING: Brand: MONOJECT

## (undated) DEVICE — ANGIOGRAPHIC CATHETER: Brand: IMAGER™ II

## (undated) DEVICE — RADIFOCUS GLIDEWIRE: Brand: GLIDEWIRE

## (undated) DEVICE — 450 ML BOTTLE OF 0.05% CHLORHEXIDINE GLUCONATE IN 99.95% STERILE WATER FOR IRRIGATION, USP AND APPLICATOR.: Brand: IRRISEPT ANTIMICROBIAL WOUND LAVAGE

## (undated) DEVICE — DRSNG WND STRIP OPTIFOAM AG SUPRABS A/MIC 4X12IN STRL

## (undated) DEVICE — SUT ETHIB 2 CV V37 MS/4 30IN MX69G

## (undated) DEVICE — ADHS SKIN DERMABOND TOP ADVANCED

## (undated) DEVICE — BG TRANSF W/COUPLER SPK 600ML

## (undated) DEVICE — CATH GUIDE SOFTVU FLUSH HT STR .035 5F 65CM

## (undated) DEVICE — DRP SLUSH WARMR MACH CIR 44X44IN

## (undated) DEVICE — STPCK 3WY HP ROT

## (undated) DEVICE — PATIENT RETURN ELECTRODE, SINGLE-USE, CONTACT QUALITY MONITORING, ADULT, WITH 9FT CORD, FOR PATIENTS WEIGING OVER 33LBS. (15KG): Brand: MEGADYNE

## (undated) DEVICE — DRSNG WND GZ CURAD OIL EMULSION 3X8IN LF STRL 1PK

## (undated) DEVICE — ST ACC MICROPUNCTURE STFF .018 ECHO/PLDM/TP 4F/10CM 21G/7CM

## (undated) DEVICE — MEDI-VAC NON-CONDUCTIVE SUCTION TUBING 6MM X 6.1M (20 FT.) L: Brand: CARDINAL HEALTH

## (undated) DEVICE — SUT SILK 3/0 TIES 18IN A184H

## (undated) DEVICE — WIPE INST MEROCEL

## (undated) DEVICE — BG ISOL DRWSTR INVISISHIELD 20X20IN

## (undated) DEVICE — SUT PDS 1 CT1 36IN Z347H

## (undated) DEVICE — PICO 7 10CM X 30CM: Brand: PICO™ 7

## (undated) DEVICE — DESTINATION PERIPHERAL GUIDING SHEATH: Brand: DESTINATION

## (undated) DEVICE — ADHS SKIN SURG TISS VISC PREMIERPRO EXOFIN HI/VISC FAST/DRY

## (undated) DEVICE — PREP SOL POVIDONE/IODINE BT 4OZ

## (undated) DEVICE — PREMIUM WET SKIN PREP TRAY: Brand: MEDLINE INDUSTRIES, INC.

## (undated) DEVICE — BLD DEBAKY BEAVER MAMMATOME 8MM

## (undated) DEVICE — STELLAREX 0.035 OTW DRUG-COATED ANGIOPLASTY BALLOON (5MM X 60MM) X 135CM: Brand: STELLAREX

## (undated) DEVICE — SUT SILK 4/0 TIES 18IN A183H

## (undated) DEVICE — PK HIP TOTL 40

## (undated) DEVICE — SUT SILK 2/0 TIES 18IN A185H

## (undated) DEVICE — PINNACLE INTRODUCER SHEATH: Brand: PINNACLE

## (undated) DEVICE — DUAL CUT SAGITTAL BLADE

## (undated) DEVICE — STELLAREX 0.035 OTW DRUG-COATED ANGIOPLASTY BALLOON (5MM X 40MM) X 135CM: Brand: STELLAREX

## (undated) DEVICE — TRAP FLD MINIVAC MEGADYNE 100ML

## (undated) DEVICE — PK ATS CUST W CARDIOTOMY RESEVOIR

## (undated) DEVICE — HI-TORQUE WHISPER MS GUIDE WIRE .014 STRAIGHT TIP 3.0 CM X 300 CM: Brand: HI-TORQUE WHISPER

## (undated) DEVICE — CATH SZ ACCUVU SEG/20CM PIG .038IN 5F 70CM

## (undated) DEVICE — SHLD ANGIO 2LAYR CIR FEN

## (undated) DEVICE — SYR LL 3CC

## (undated) DEVICE — INFLATION DEVICE: Brand: ENCORE™ 26

## (undated) DEVICE — PK AAA 40

## (undated) DEVICE — IMMOB KN 3PNL DLX CANVS 22IN BLU

## (undated) DEVICE — VESSEL LOOPS X-RAY DETECTABLE: Brand: DEROYAL

## (undated) DEVICE — DESTINATION RENAL GUIDING SHEATH: Brand: DESTINATION

## (undated) DEVICE — DRAPE,U/ SHT,SPLIT,PLAS,STERIL: Brand: MEDLINE

## (undated) DEVICE — SPNG GZ WOVN 4X4IN 12PLY 10/BX STRL

## (undated) DEVICE — SUT MNCRYL 2/0 SH 27IN Y417H

## (undated) DEVICE — 3M™ IOBAN™ 2 ANTIMICROBIAL INCISE DRAPE 6640EZ: Brand: IOBAN™ 2

## (undated) DEVICE — PENCL E/S HNDSWCH ROCKR CB

## (undated) DEVICE — STPCK 3WY D201 DISCOFIX

## (undated) DEVICE — 3M™ IOBAN™ 2 ANTIMICROBIAL INCISE DRAPE 6650EZ: Brand: IOBAN™ 2

## (undated) DEVICE — DRSNG SURESITE WNDW 4X4.5

## (undated) DEVICE — GLV SURG SIGNATURE ESSENTIAL PF LTX SZ7.5

## (undated) DEVICE — DRAPE,REIN 53X77,STERILE: Brand: MEDLINE

## (undated) DEVICE — AUTOMATED TEST FOR USE ON THE VIDAS® SYSTEM TO DETECT ABNORMAL OPERATION OF THE VIDAS AND MINI VIDAS INSTRUMENT PIPETTE MECHANISMS AND OPTICAL SYSTEMS.: Brand: VIDAS® QCV

## (undated) DEVICE — SCANLAN® SUTURE BOOT™ INSTRUMENT JAW COVERS - ORIGINAL YELLOW, STANDARD PKG (5 PAIR/CARTRIDGE, 1 CARTRIDGE/PKG): Brand: SCANLAN® SUTURE BOOT™ INSTRUMENT JAW COVERS

## (undated) DEVICE — CATH IV INSYTE AUTOGARD SHLD 18G 1.25IN GRN

## (undated) DEVICE — SOL NS 500ML

## (undated) DEVICE — COUNT NDL MAG XLG

## (undated) DEVICE — SYS PERFUS SEP PLATLT W TIPS CUST

## (undated) DEVICE — CATH IV INSYTE AUTOGARD SHLD 16G 1 1/4